# Patient Record
Sex: FEMALE | Race: WHITE | NOT HISPANIC OR LATINO | Employment: OTHER | URBAN - METROPOLITAN AREA
[De-identification: names, ages, dates, MRNs, and addresses within clinical notes are randomized per-mention and may not be internally consistent; named-entity substitution may affect disease eponyms.]

---

## 2017-01-16 ENCOUNTER — OFFICE VISIT (OUTPATIENT)
Dept: FAMILY MEDICINE | Facility: CLINIC | Age: 67
End: 2017-01-16
Payer: COMMERCIAL

## 2017-01-16 VITALS
DIASTOLIC BLOOD PRESSURE: 50 MMHG | RESPIRATION RATE: 9 BRPM | SYSTOLIC BLOOD PRESSURE: 100 MMHG | TEMPERATURE: 97.8 F | WEIGHT: 166.8 LBS | HEART RATE: 78 BPM | BODY MASS INDEX: 30.69 KG/M2 | HEIGHT: 62 IN

## 2017-01-16 DIAGNOSIS — Z11.59 NEED FOR HEPATITIS C SCREENING TEST: ICD-10-CM

## 2017-01-16 DIAGNOSIS — M54.42 ACUTE MIDLINE LOW BACK PAIN WITH LEFT-SIDED SCIATICA: ICD-10-CM

## 2017-01-16 DIAGNOSIS — Z23 NEED FOR PROPHYLACTIC VACCINATION AGAINST STREPTOCOCCUS PNEUMONIAE (PNEUMOCOCCUS): ICD-10-CM

## 2017-01-16 DIAGNOSIS — E21.3 HYPERPARATHYROIDISM (H): ICD-10-CM

## 2017-01-16 DIAGNOSIS — M85.80 OSTEOPENIA: ICD-10-CM

## 2017-01-16 DIAGNOSIS — M25.562 LEFT KNEE PAIN, UNSPECIFIED CHRONICITY: ICD-10-CM

## 2017-01-16 DIAGNOSIS — E78.5 HYPERLIPIDEMIA WITH TARGET LDL LESS THAN 130: ICD-10-CM

## 2017-01-16 DIAGNOSIS — Z00.00 ROUTINE HISTORY AND PHYSICAL EXAMINATION OF ADULT: Primary | ICD-10-CM

## 2017-01-16 DIAGNOSIS — E55.9 VITAMIN D DEFICIENCY: ICD-10-CM

## 2017-01-16 DIAGNOSIS — Z23 NEED FOR PROPHYLACTIC VACCINATION WITH TETANUS-DIPHTHERIA (TD): ICD-10-CM

## 2017-01-16 LAB
ALBUMIN SERPL-MCNC: 4.3 G/DL (ref 3.4–5)
ALP SERPL-CCNC: 59 U/L (ref 40–150)
ALT SERPL W P-5'-P-CCNC: 22 U/L (ref 0–50)
ANION GAP SERPL CALCULATED.3IONS-SCNC: 10 MMOL/L (ref 3–14)
AST SERPL W P-5'-P-CCNC: 19 U/L (ref 0–45)
BILIRUB SERPL-MCNC: 1 MG/DL (ref 0.2–1.3)
BUN SERPL-MCNC: 16 MG/DL (ref 7–30)
CALCIUM SERPL-MCNC: 10.6 MG/DL (ref 8.5–10.1)
CHLORIDE SERPL-SCNC: 106 MMOL/L (ref 94–109)
CHOLEST SERPL-MCNC: 204 MG/DL
CO2 SERPL-SCNC: 25 MMOL/L (ref 20–32)
CREAT SERPL-MCNC: 0.8 MG/DL (ref 0.52–1.04)
DEPRECATED CALCIDIOL+CALCIFEROL SERPL-MC: 47 UG/L (ref 20–75)
GFR SERPL CREATININE-BSD FRML MDRD: 71 ML/MIN/1.7M2
GLUCOSE SERPL-MCNC: 86 MG/DL (ref 70–99)
HCV AB SERPL QL IA: NORMAL
HDLC SERPL-MCNC: 75 MG/DL
LDLC SERPL CALC-MCNC: 109 MG/DL
NONHDLC SERPL-MCNC: 129 MG/DL
POTASSIUM SERPL-SCNC: 4.3 MMOL/L (ref 3.4–5.3)
PROT SERPL-MCNC: 7.5 G/DL (ref 6.8–8.8)
SODIUM SERPL-SCNC: 141 MMOL/L (ref 133–144)
TRIGL SERPL-MCNC: 98 MG/DL

## 2017-01-16 PROCEDURE — 90715 TDAP VACCINE 7 YRS/> IM: CPT | Performed by: FAMILY MEDICINE

## 2017-01-16 PROCEDURE — 90670 PCV13 VACCINE IM: CPT | Performed by: FAMILY MEDICINE

## 2017-01-16 PROCEDURE — 36415 COLL VENOUS BLD VENIPUNCTURE: CPT | Performed by: FAMILY MEDICINE

## 2017-01-16 PROCEDURE — 86803 HEPATITIS C AB TEST: CPT | Performed by: FAMILY MEDICINE

## 2017-01-16 PROCEDURE — 80061 LIPID PANEL: CPT | Performed by: FAMILY MEDICINE

## 2017-01-16 PROCEDURE — 99397 PER PM REEVAL EST PAT 65+ YR: CPT | Mod: 25 | Performed by: FAMILY MEDICINE

## 2017-01-16 PROCEDURE — 82306 VITAMIN D 25 HYDROXY: CPT | Performed by: FAMILY MEDICINE

## 2017-01-16 PROCEDURE — 99213 OFFICE O/P EST LOW 20 MIN: CPT | Mod: 25 | Performed by: FAMILY MEDICINE

## 2017-01-16 PROCEDURE — 90471 IMMUNIZATION ADMIN: CPT | Performed by: FAMILY MEDICINE

## 2017-01-16 PROCEDURE — 80053 COMPREHEN METABOLIC PANEL: CPT | Performed by: FAMILY MEDICINE

## 2017-01-16 PROCEDURE — G0009 ADMIN PNEUMOCOCCAL VACCINE: HCPCS | Mod: 59 | Performed by: FAMILY MEDICINE

## 2017-01-16 RX ORDER — ALENDRONATE SODIUM 70 MG/1
70 TABLET ORAL
Qty: 4 TABLET | Status: SHIPPED | OUTPATIENT
Start: 2017-01-16 | End: 2018-02-16

## 2017-01-16 RX ORDER — NABUMETONE 500 MG/1
500-1000 TABLET, FILM COATED ORAL 2 TIMES DAILY PRN
Qty: 30 TABLET | Refills: 1 | Status: SHIPPED | OUTPATIENT
Start: 2017-01-16 | End: 2017-09-05

## 2017-01-16 ASSESSMENT — ANXIETY QUESTIONNAIRES
1. FEELING NERVOUS, ANXIOUS, OR ON EDGE: NOT AT ALL
2. NOT BEING ABLE TO STOP OR CONTROL WORRYING: NOT AT ALL
7. FEELING AFRAID AS IF SOMETHING AWFUL MIGHT HAPPEN: NOT AT ALL
3. WORRYING TOO MUCH ABOUT DIFFERENT THINGS: NOT AT ALL
GAD7 TOTAL SCORE: 0
5. BEING SO RESTLESS THAT IT IS HARD TO SIT STILL: NOT AT ALL
IF YOU CHECKED OFF ANY PROBLEMS ON THIS QUESTIONNAIRE, HOW DIFFICULT HAVE THESE PROBLEMS MADE IT FOR YOU TO DO YOUR WORK, TAKE CARE OF THINGS AT HOME, OR GET ALONG WITH OTHER PEOPLE: NOT DIFFICULT AT ALL
6. BECOMING EASILY ANNOYED OR IRRITABLE: NOT AT ALL

## 2017-01-16 ASSESSMENT — PATIENT HEALTH QUESTIONNAIRE - PHQ9: 5. POOR APPETITE OR OVEREATING: NOT AT ALL

## 2017-01-16 NOTE — PROGRESS NOTES
SUBJECTIVE:                                                            Aracelis Muñoz is a 66 year old female who presents for Preventive Visit.      Are you in the first 12 months of your Medicare Part B coverage?  Not sure     Healthy Habits:  Answers for HPI/ROS submitted by the patient on 1/13/2017   Annual Exam:  Getting at least 3 servings of Calcium per day:: NO  Bi-annual eye exam:: Yes  Dental care twice a year:: Yes  Sleep apnea or symptoms of sleep apnea:: Daytime drowsiness  Diet:: Regular (no restrictions)  Taking medications regularly:: Yes  Additional concerns today:: YES  PHQ-2 Depressed: Several days, Not at all  PHQ-2 Score: 1        COGNITIVE SCREEN  1) Repeat 3 items (Banana, Sunrise, Chair)    2) Clock draw: NORMAL  3) 3 item recall: Recalls 3 objects  Results: 3 items recalled: COGNITIVE IMPAIRMENT LESS LIKELY    Mini-CogTM Copyright S João. Licensed by the author for use in [facility]; reprinted with permission ([email or web address]). All rights reserved.             PROBLEMS TO ADD ON... Back spasms, wondering about sciatica. Was seen few weeks ago, and still has some ongoing problem on and off . Hard to do her exercise routine. sometimes pain goes down to back of thigh but not any further down no numbness. Had issue with sciatica long time ago and then she had pain all the way down to foot and also had toe problem, so she had seen back specialist . Has  he suggest she may need surgery although she opted to do injection and her sx did improve so she was just told to monitor although she had no significant recurrence of sciatica sx .     Right knee and ankle painful-  fell on it in Sept. She thought she was ok, although it did swell up couple of weeks later. she may have  hit against car door so she was seen  at urgent care. Swelling has gone down and still hurts. lately ankle is hurting too . sometimes going up down the step is hard, mostly bc of knee and back.. OTC pain med's  help some. Not using flexeril as much now. Has never been to any PT in a long time  But had PT for knee long time  ago. also has hx of knee injections  as well many years ago bc of arthritis in her knee . she was much better after that until recent injury     All Histories reviewed and updated in [facility] as appropriate.  Social History   Substance Use Topics     Smoking status: Never Smoker      Smokeless tobacco: Never Used     Alcohol Use: 0.0 oz/week     0 Standard drinks or equivalent per week      Comment: 1-2 glasses of wine 1-2 times per week        The patient does not drink >3 drinks per day nor >7 drinks per week.    Today's PHQ-2 Score:   PHQ-2 ( 1999 Pfizer) 1/13/2017 10/21/2016   Q1: Little interest or pleasure in doing things - 0   Q2: Feeling down, depressed or hopeless - 0   PHQ-2 Score - 0   Little interest or pleasure in doing things Several days -   Feeling down, depressed or hopeless Not at all -   PHQ-2 Score 1 -       Do you feel safe in your environment - YES    Do you have a Health Care Directive?: Yes: Patient states has Advance Directive and will bring in a copy to clinic.    Current providers sharing in care for this patient include:   Patient Care Team:  Shalini Phan MD as PCP - General (Family Practice)      Hearing impairment: Yes,     Ability to successfully perform activities of daily living: Yes, no assistance needed     Fall risk:       Home safety:  none identified      The following health maintenance items are reviewed in Epic and correct as of today:  Health Maintenance   Topic Date Due     HEPATITIS C SCREENING  05/28/1968     TETANUS Q10 YR  02/17/2015     PNEUMOCOCCAL (2 of 2 - PCV13) 12/21/2016     ASTHMA ACTION PLAN Q1 YR (NO INBASKET)  12/21/2016     FALL RISK ASSESSMENT  12/21/2016     ASTHMA CONTROL TEST Q6 MOS (NO INBASKET)  12/27/2016     PHQ-9 Q6 MONTHS (NO INBASKET)  06/07/2017     DEPRESSION ACTION PLAN Q1 YR (NO INBASKET)  06/27/2017     INFLUENZA VACCINE  "(SYSTEM ASSIGNED)  09/01/2017     PAP Q3 YR  09/22/2017     FIT Q1 YR (NO INBASKET)  09/24/2017     MAMMO Q1 YR INBASKET MESSAGE  11/15/2017     TSH Q2 YEAR (NO INBASKET)  12/21/2017     ADVANCE DIRECTIVE PLANNING Q5 YRS (NO INBASKET)  04/25/2018     DEXA Q2 YR  11/15/2018     LIPID SCREEN Q5 YR FEMALE (SYSTEM ASSIGNED)  12/21/2020         Pneumonia Vaccine:Adults age 65+ who received Pneumovax (PPSV23) at 65 years or older: Should be given PCV13 > 1 year after their most recent PPSV23  Mammogram Screening: Patient over age 50, mutual decision to screen reflected in health maintenance.     ROS:  C: NEGATIVE for fever, chills, change in weight  I: NEGATIVE for worrisome rashes, moles or lesions  E: NEGATIVE for vision changes or irritation  E/M: NEGATIVE for ear, mouth and throat problems  R: NEGATIVE for significant cough or SOB  B: NEGATIVE for masses, tenderness or discharge  CV: NEGATIVE for chest pain, palpitations or peripheral edema  GI: NEGATIVE for nausea, abdominal pain, heartburn, or change in bowel habits  : NEGATIVE for frequency, dysuria, or hematuria  MUSCULOSKELETAL:as per HPI   N: NEGATIVE for weakness, dizziness or paresthesias  E: NEGATIVE for temperature intolerance, skin/hair changes  H: NEGATIVE for bleeding problems  P: NEGATIVE for changes in mood or affect    Problem list, Medication list, Allergies, and Medical/Social/Surgical histories reviewed in [facility] and updated as appropriate.  OBJECTIVE:                                                            LMP 11/01/2004 Estimated body mass index is 30.54 kg/(m^2) as calculated from the following:    Height as of 12/9/16: 5' 2\" (1.575 m).    Weight as of 12/9/16: 167 lb (75.751 kg).  EXAM:   GENERAL: healthy, alert and no distress  EYES: Eyes grossly normal to inspection, PERRL and conjunctivae and sclerae normal  HENT: ear canals and TM's normal, nose and mouth without ulcers or lesions  NECK: no adenopathy, no asymmetry, masses, or scars and " thyroid normal to palpation  RESP: lungs clear to auscultation - no rales, rhonchi or wheezes  BREAST: normal without masses, tenderness or nipple discharge and no palpable axillary masses or adenopathy  CV: regular rate and rhythm, normal S1 S2, no S3 or S4, no murmur, click or rub, no peripheral edema   ABDOMEN: soft, nontender, no hepatosplenomegaly, no masses and bowel sounds normal   (female): deferred  MS: back with decreased range of motion ,  Tenderness mostly  lumber area bilaterally more so on left then rt.   knee with no swelling  tenderness to palpation along   Anterior, lateral, medial knee diffusely. , ROM aggravates pain   SKIN: no suspicious lesions or rashes  NEURO: Normal strength and tone, mentation intact and speech normal  PSYCH: mentation appears normal, affect normal/bright    ASSESSMENT / PLAN:                                                              (Z00.00) Routine history and physical examination of adult  (primary encounter diagnosis)  Comment:   Plan:       (M85.80) Osteopenia  Comment: 1. Prominent osteopenia in the left femoral neck, minimally improved.  2. Moderate-prominent osteopenia in the right femoral neck, mildly  improved.    Plan: alendronate (FOSAMAX) 70 MG tablet, Vitamin D         Deficiency, ENDOCRINOLOGY ADULT REFERRAL            (Z11.59) Need for hepatitis C screening test  Comment:   Plan: Hepatitis C Screen Reflex to HCV RNA Quant and         Genotype            (Z23) Need for prophylactic vaccination against Streptococcus pneumoniae (pneumococcus)  Comment:   Plan:  vaccine given today     (Z23) Need for prophylactic vaccination with tetanus-diphtheria (TD)  Comment:   Plan: vaccine given today     (E55.9) Vitamin D deficiency  Comment:   Plan: ENDOCRINOLOGY ADULT REFERRAL            (E21.3) Hyperparathyroidism (H)  Comment: due fro endo, f/u. Will check labs today. She will f/u with endo as needed   Plan: Comprehensive metabolic panel, Vitamin D          "Deficiency, ENDOCRINOLOGY ADULT REFERRAL            (M25.562) Left knee pain, unspecified chronicity  Comment:   Plan: [facility] PT, HAND, AND CHIROPRACTIC REFERRAL,         nabumetone (RELAFEN) 500 MG tablet           discussed back and knee  cares and symptomatic treatment including  adequate pain control, heat,  stretches etc. Gave Relafen and also referral to   physical therapy    she will do follow up if no improvement or problem. Consider further evaluation  if needed.       (M54.42) Acute midline low back pain with left-sided sciatica  Comment:   Plan: [facility] PT, HAND, AND CHIROPRACTIC REFERRAL,         nabumetone (RELAFEN) 500 MG tablet            (E78.5) Hyperlipidemia with target LDL less than 130  Comment:   Plan: Lipid panel reflex to direct LDL              End of Life Planning:  Patient currently has an advanced directive: Yes.  Practitioner is supportive of decision.    COUNSELING:  Reviewed preventive health counseling, as reflected in patient instructions       Regular exercise       Healthy diet/nutrition       Vision screening       Hearing screening       Dental care        Estimated body mass index is 30.54 kg/(m^2) as calculated from the following:    Height as of 12/9/16: 5' 2\" (1.575 m).    Weight as of 12/9/16: 167 lb (75.751 kg).  Weight management plan noted, stable and monitoring   reports that she has never smoked. She has never used smokeless tobacco.      Appropriate preventive services were discussed with this patient, including applicable screening as appropriate for cardiovascular disease, diabetes, osteopenia/osteoporosis, and glaucoma.  As appropriate for age/gender, discussed screening for colorectal cancer, prostate cancer, breast cancer, and cervical cancer. Checklist reviewing preventive services available has been given to the patient.    Reviewed patients plan of care and provided an AVS. The Basic Care Plan (routine screening as documented in Health Maintenance) for Aracelis meets " the Care Plan requirement. This Care Plan has been established and reviewed with the Patient.    Counseling Resources:  ATP IV Guidelines  Pooled Cohorts Equation Calculator  Breast Cancer Risk Calculator  FRAX Risk Assessment  ICSI Preventive Guidelines  Dietary Guidelines for Americans, 2010  USDA's MyPlate  ASA Prophylaxis  Lung CA Screening    Shalini Phan MD  [facility]

## 2017-01-16 NOTE — MR AVS SNAPSHOT
After Visit Summary   1/16/2017    Aracelis Muñoz    MRN: [id number]           Patient Information     Date Of Birth          1950        Visit Information        Provider Department      1/16/2017 8:30 AM Shalini Phan MD [facility]        Today's Diagnoses     Osteoporosis    -  1     Osteopenia         Need for hepatitis C screening test         Need for prophylactic vaccination against Streptococcus pneumoniae (pneumococcus)         Need for prophylactic vaccination with tetanus-diphtheria (TD)         Vitamin D deficiency         Hyperparathyroidism (H)         Left knee pain, unspecified chronicity         Acute midline low back pain with left-sided sciatica         Hyperlipidemia with target LDL less than 130           Care Instructions      Preventive Health Recommendations    Female Ages 65 +    Yearly exam:     See your health care provider every year in order to  o Review health changes.   o Discuss preventive care.    o Review your medicines if your doctor has prescribed any.      You no longer need a yearly Pap test unless you've had an abnormal Pap test in the past 10 years. If you have vaginal symptoms, such as bleeding or discharge, be sure to talk with your provider about a Pap test.      Every 1 to 2 years, have a mammogram.  If you are over 69, talk with your health care provider about whether or not you want to continue having screening mammograms.      Every 10 years, have a colonoscopy. Or, have a yearly FIT test (stool test). These exams will check for colon cancer.       Have a cholesterol test every 5 years, or more often if your doctor advises it.       Have a diabetes test (fasting glucose) every three years. If you are at risk for diabetes, you should have this test more often.       At age 65, have a bone density scan (DEXA) to check for osteoporosis (brittle bone disease).    Shots:    Get a flu shot each year.    Get a tetanus shot every  10 years.    Talk to your doctor about your pneumonia vaccines. There are now two you should receive - Pneumovax (PPSV 23) and Prevnar (PCV 13).    Talk to your doctor about the shingles vaccine.    Talk to your doctor about the hepatitis B vaccine.    Nutrition:     Eat at least 5 servings of fruits and vegetables each day.      Eat whole-grain bread, whole-wheat pasta and brown rice instead of white grains and rice.      Talk to your provider about Calcium and Vitamin D.     Lifestyle    Exercise at least 150 minutes a week (30 minutes a day, 5 days a week). This will help you control your weight and prevent disease.      Limit alcohol to one drink per day.      No smoking.       Wear sunscreen to prevent skin cancer.       See your dentist twice a year for an exam and cleaning.      See your eye doctor every 1 to 2 years to screen for conditions such as glaucoma, macular degeneration and cataracts.        Follow-ups after your visit        Additional Services     ENDOCRINOLOGY ADULT REFERRAL       Your provider has referred you to: [facility]: [facility] [phone]   [email or web address]      Please be aware that coverage of these services is subject to the terms and limitations of your health insurance plan.  Call member services at your health plan with any benefit or coverage questions.      Please bring the following to your appointment:    >>   Any x-rays, CTs or MRIs which have been performed.  Contact the facility where they were done to arrange for  prior to your scheduled appointment.    >>   List of current medications   >>   This referral request   >>   Any documents/labs given to you for this referral            [facility] PT, HAND, AND CHIROPRACTIC REFERRAL       **This order will print in the [facility] Scheduling Office**    Physical Therapy, Hand Therapy and Chiropractic Care are available through:    *[facility]  *[facility]  *[place]  [facility]    Call one number to schedule at any of the above locations: [phone].    Your provider has referred you to: Physical Therapy at [facility] or [facility]    Indication/Reason for Referral: Low Back Pain  Onset of Illness: ongoing several weeks, also has hx of sciatica   Therapy Orders: Evaluate and Treat  Special Programs:   Special Request:     Dwayne Rice      Additional Comments for the Therapist or Chiropractor:     Please be aware that coverage of these services is subject to the terms and limitations of your health insurance plan.  Call member services at your health plan with any benefit or coverage questions.      Please bring the following to your appointment:    *Your personal calendar for scheduling future appointments  *Comfortable clothing                  Who to contact     If you have questions or need follow up information about today's clinic visit or your schedule please contact [facility] [place] directly at [phone].  Normal or non-critical lab and imaging results will be communicated to you by MyChart, letter or phone within 4 business days after the clinic has received the results. If you do not hear from us within 7 days, please contact the clinic through [employer] or phone. If you have a critical or abnormal lab result, we will notify you by phone as soon as possible.  Submit refill requests through [employer] or call your pharmacy and they will forward the refill request to us. Please allow 3 business days for your refill to be completed.          Additional Information About Your Visit        MyChart Information     [employer] gives you secure access to your electronic health record. If you see a primary care provider, you can also send messages to your care team and make appointments. If you have questions, please call your primary care clinic.  If you do not have a primary care provider, please call [phone] and they will assist you.        Care  "EveryWhere ID     This is your Care EveryWhere ID. This could be used by other organizations to access your [facility] medical records  [id number]        Your Vitals Were     Pulse Temperature Respirations Height BMI (Body Mass Index) Last Period    78 97.8  F (36.6  C) (Tympanic) 9 5' 2\" (1.575 m) 30.50 kg/m2 11/01/2004       Blood Pressure from Last 3 Encounters:   01/16/17 100/50   12/09/16 116/64   10/21/16 103/66    Weight from Last 3 Encounters:   01/16/17 166 lb 12.8 oz (75.66 kg)   12/09/16 167 lb (75.751 kg)   10/21/16 172 lb (78.019 kg)              We Performed the Following     Comprehensive metabolic panel     ENDOCRINOLOGY ADULT REFERRAL     Hepatitis C Screen Reflex to HCV RNA Quant and Genotype     [facility] PT, HAND, AND CHIROPRACTIC REFERRAL     Lipid panel reflex to direct LDL     Vitamin D Deficiency          Today's Medication Changes          These changes are accurate as of: 1/16/17  9:37 AM.  If you have any questions, ask your nurse or doctor.               Start taking these medicines.        Dose/Directions    nabumetone 500 MG tablet   Commonly known as:  RELAFEN   Used for:  Left knee pain, unspecified chronicity, Acute midline low back pain with left-sided sciatica   Started by:  Shalini Phan MD        Dose:  500-1000 mg   Take 1-2 tablets (500-1,000 mg) by mouth 2 times daily as needed for moderate pain   Quantity:  30 tablet   Refills:  1            Where to get your medicines      These medications were sent to [facility]/pharmacy #[id number] - [place], [place] - [address]  [address] [place] [address]     Phone:  [phone]    - alendronate 70 MG tablet  - nabumetone 500 MG tablet             Primary Care Provider Office Phone # Fax #    Shalini Phan [phone]       [facility] [address] [place] [address] [address]  [place] [address]        Thank you!     Thank you for choosing [facility] [place]  for your care. Our goal is always " to provide you with excellent care. Hearing back from our patients is one way we can continue to improve our services. Please take a few minutes to complete the written survey that you may receive in the mail after your visit with us. Thank you!             Your Updated Medication List - Protect others around you: Learn how to safely use, store and throw away your medicines at www.disposemymeds.org.          This list is accurate as of: 1/16/17  9:37 AM.  Always use your most recent med list.                   Brand Name Dispense Instructions for use    albuterol 108 (90 BASE) MCG/ACT Inhaler    PROAIR HFA/PROVENTIL HFA/VENTOLIN HFA    1 Inhaler    Inhale 2 puffs into the lungs every 6 hours as needed for shortness of breath / dyspnea or wheezing As needed       alendronate 70 MG tablet    FOSAMAX    4 tablet    Take 1 tablet (70 mg) by mouth every 7 days       azelastine 0.05 % Soln ophthalmic solution    OPTIVAR    1 Bottle    Place 1 drop into both eyes daily as needed In both eyes as needed       cetirizine 10 MG tablet    zyrTEC    30 tablet    Take 1 tablet (10 mg) by mouth every evening       cyclobenzaprine 10 MG tablet    FLEXERIL    30 tablet    Take 1 tablet (10 mg) by mouth 3 times daily as needed for muscle spasms       escitalopram 20 MG tablet    LEXAPRO    90 tablet    TAKE 1 TABLET (20 MG) BY MOUTH DAILY       fluticasone-salmeterol 250-50 MCG/DOSE diskus inhaler    ADVAIR DISKUS    1 Inhaler    Inhale 1 puff into the lungs every 12 hours       nabumetone 500 MG tablet    RELAFEN    30 tablet    Take 1-2 tablets (500-1,000 mg) by mouth 2 times daily as needed for moderate pain       traZODone 100 MG tablet    DESYREL    90 tablet    TAKE 0.5-1 TABLETS ( MG) BY MOUTH NIGHTLY AS NEEDED FOR SLEEP       triamcinolone 0.5 % cream    KENALOG    30 g    Apply sparingly to affected area three times daily.

## 2017-01-17 ASSESSMENT — PATIENT HEALTH QUESTIONNAIRE - PHQ9: SUM OF ALL RESPONSES TO PHQ QUESTIONS 1-9: 3

## 2017-01-17 ASSESSMENT — ASTHMA QUESTIONNAIRES: ACT_TOTALSCORE: 24

## 2017-01-17 ASSESSMENT — ANXIETY QUESTIONNAIRES: GAD7 TOTAL SCORE: 0

## 2017-01-19 ENCOUNTER — THERAPY VISIT (OUTPATIENT)
Dept: PHYSICAL THERAPY | Facility: CLINIC | Age: 67
End: 2017-01-19
Payer: COMMERCIAL

## 2017-01-19 DIAGNOSIS — M25.561 RIGHT KNEE PAIN: ICD-10-CM

## 2017-01-19 DIAGNOSIS — M54.50 LUMBAGO: Primary | ICD-10-CM

## 2017-01-19 PROCEDURE — 97110 THERAPEUTIC EXERCISES: CPT | Mod: GP | Performed by: PHYSICAL THERAPIST

## 2017-01-19 PROCEDURE — 97161 PT EVAL LOW COMPLEX 20 MIN: CPT | Mod: GP | Performed by: PHYSICAL THERAPIST

## 2017-01-19 ASSESSMENT — ACTIVITIES OF DAILY LIVING (ADL)
LIMPING: I DO NOT HAVE THE SYMPTOM
KNEE_ACTIVITY_OF_DAILY_LIVING_SUM: 22
KNEEL ON THE FRONT OF YOUR KNEE: NOT ANSWERED
STAND: NOT ANSWERED
RISE FROM A CHAIR: NOT ANSWERED
SIT WITH YOUR KNEE BENT: NOT ANSWERED
WEAKNESS: THE SYMPTOM AFFECTS MY ACTIVITY SLIGHTLY
GIVING WAY, BUCKLING OR SHIFTING OF KNEE: I DO NOT HAVE THE SYMPTOM
GO UP STAIRS: NOT ANSWERED
GO DOWN STAIRS: NOT ANSWERED
SQUAT: NOT ANSWERED
SWELLING: I DO NOT HAVE THE SYMPTOM
PAIN: THE SYMPTOM AFFECTS MY ACTIVITY MODERATELY
WALK: NOT ANSWERED
STIFFNESS: THE SYMPTOM AFFECTS MY ACTIVITY SLIGHTLY

## 2017-01-20 NOTE — PROGRESS NOTES
Subjective:                                       Pertinent medical history includes:  Overweight and depression (pain at night/rest).      Current medications:  Pain medication and anti-depressants (Bone density).  Current occupation is Part time consultant.    Primary job tasks include:  Prolonged sitting.                              Objective:    System    Physical Exam    General     ROS    Assessment/Plan:

## 2017-01-26 ENCOUNTER — THERAPY VISIT (OUTPATIENT)
Dept: PHYSICAL THERAPY | Facility: CLINIC | Age: 67
End: 2017-01-26
Payer: COMMERCIAL

## 2017-01-26 DIAGNOSIS — M54.50 LUMBAGO: ICD-10-CM

## 2017-01-26 DIAGNOSIS — M25.561 RIGHT KNEE PAIN: Primary | ICD-10-CM

## 2017-01-26 PROCEDURE — 97112 NEUROMUSCULAR REEDUCATION: CPT | Mod: GP | Performed by: PHYSICAL THERAPIST

## 2017-01-26 PROCEDURE — 97110 THERAPEUTIC EXERCISES: CPT | Mod: GP | Performed by: PHYSICAL THERAPIST

## 2017-02-09 ENCOUNTER — THERAPY VISIT (OUTPATIENT)
Dept: PHYSICAL THERAPY | Facility: CLINIC | Age: 67
End: 2017-02-09
Payer: COMMERCIAL

## 2017-02-09 DIAGNOSIS — M54.50 LUMBAGO: ICD-10-CM

## 2017-02-09 DIAGNOSIS — M25.561 RIGHT KNEE PAIN: Primary | ICD-10-CM

## 2017-02-09 PROCEDURE — 97112 NEUROMUSCULAR REEDUCATION: CPT | Mod: GP | Performed by: PHYSICAL THERAPIST

## 2017-02-09 PROCEDURE — 97110 THERAPEUTIC EXERCISES: CPT | Mod: GP | Performed by: PHYSICAL THERAPIST

## 2017-03-05 DIAGNOSIS — F33.0 MAJOR DEPRESSIVE DISORDER, RECURRENT EPISODE, MILD (H): ICD-10-CM

## 2017-03-06 NOTE — TELEPHONE ENCOUNTER
Lexapro     Last Written Prescription Date: 12/7/16  Last Fill Quantity: 90, # refills: 0  Last Office Visit with G primary care provider:  1/16/17        Last PHQ-9 score on record=   PHQ-9 SCORE 1/16/2017   Total Score 3         Emily Arndt CMA

## 2017-03-07 RX ORDER — ESCITALOPRAM OXALATE 20 MG/1
TABLET ORAL
Qty: 90 TABLET | Refills: 1 | Status: SHIPPED | OUTPATIENT
Start: 2017-03-07 | End: 2018-04-10

## 2017-03-07 NOTE — TELEPHONE ENCOUNTER
Routing refill request to provider for review/approval because:  Drug interaction warning     Maile Morgan RN  [facility]  [phone]

## 2017-03-10 ENCOUNTER — TELEPHONE (OUTPATIENT)
Dept: FAMILY MEDICINE | Facility: CLINIC | Age: 67
End: 2017-03-10

## 2017-03-10 DIAGNOSIS — H91.90 DECREASED HEARING, UNSPECIFIED LATERALITY: Primary | ICD-10-CM

## 2017-03-10 NOTE — TELEPHONE ENCOUNTER
Reason for Call: Request for an order or referral:    Order or referral being requested: REFERRAL    Date needed: at your convenience    Has the patient been seen by the PCP for this problem? NO    Additional comments: PATIENT IS WANTING A REFERRAL FOR A HEARING TEST. SHE BELIEVES SHE NEEDS HEARING AIDES.    Phone number Patient can be reached at:  Home number on file [phone] (home)    Best Time:  ANY    Can we leave a detailed message on this number?  YES    Call taken on 3/10/2017 at 2:26 PM by Radha Penaloza

## 2017-03-13 NOTE — TELEPHONE ENCOUNTER
Patient informed with referral info as below.  No further questions    Your provider has referred you to: [facility]: [facility] [phone]   [email or web address]  [facility]: [facility] [phone]   [email or web address]    Nahed Petres RN

## 2017-03-21 ENCOUNTER — OFFICE VISIT (OUTPATIENT)
Dept: AUDIOLOGY | Facility: CLINIC | Age: 67
End: 2017-03-21
Attending: FAMILY MEDICINE
Payer: COMMERCIAL

## 2017-03-21 DIAGNOSIS — H90.3 BILATERAL SENSORINEURAL HEARING LOSS: Primary | ICD-10-CM

## 2017-03-21 PROCEDURE — 92550 TYMPANOMETRY & REFLEX THRESH: CPT | Performed by: AUDIOLOGIST

## 2017-03-21 PROCEDURE — 92557 COMPREHENSIVE HEARING TEST: CPT | Performed by: AUDIOLOGIST

## 2017-03-21 NOTE — PROGRESS NOTES
AUDIOLOGY REPORT    SUBJECTIVE:  Aracelis Muñoz is a 66 year old female who was seen in the Audiology Clinic at the [facility]  for audiologic evaluation, referred by Shalini Phan MD. The patient reports a gradual hearing loss bilaterally. The patient denies  Otalgia, aural fullness, otorrhea, tinnitus, and dizziness.  The patient notes difficulty with communication in a variety of listening situations.     OBJECTIVE:    Otoscopic exam indicates ears are clear of cerumen bilaterally     Pure Tone Thresholds assessed using conventional audiometry with good  reliability from 250-8000 Hz bilaterally using insert earphones     RIGHT:  normal sloping to moderately-severe sensorineural hearing loss    LEFT:    normal sloping to moderately-severe sensorineural hearing loss    Tympanogram:    RIGHT: normal eardrum mobility    LEFT:   normal eardrum mobility    Reflexes (reported by stimulus ear):  RIGHT: Ipsilateral is present at normal levels  RIGHT: Contralateral is present at normal levels  LEFT:   Ipsilateral is present at normal levels  LEFT:   Contralateral is present at normal levels      Speech Reception Threshold:    RIGHT: 35 dB HL    LEFT:   35 dB HL  Word Recognition Score:     RIGHT: 100% at 75 dB HL using NU-6 recorded word list.    LEFT:   100% at 75 dB HL using NU-6 recorded word list.      ASSESSMENT:     ICD-10-CM    1. Bilateral sensorineural hearing loss H90.3 AUDIOGRAM/TYMPANOGRAM - INTERFACE     COMPREHENSIVE HEARING TEST     TYMPANOMETRY AND REFLEX THRESHOLD MEASUREMENTS       Today s results were discussed with the patient in detail.     PLAN:  Patient was counseled regarding hearing loss and impact on communication.  Patient is a good candidate for amplification at this time. Handout on good communication strategies, and hearing aid use was given to patient. It is recommended that the patient consider a trial with hearing aids pending a medical evaluation.  Please  call this clinic with questions regarding these results or recommendations.        Antonio Wilkins.  Doctor of Audiology  [place] License # [id number]

## 2017-03-21 NOTE — MR AVS SNAPSHOT
After Visit Summary   3/21/2017    Aracelis Muñoz    MRN: [id number]           Patient Information     Date Of Birth          1950        Visit Information        Provider Department      3/21/2017 10:00 AM Steven Colon AuD [facility]        Today's Diagnoses     Bilateral sensorineural hearing loss    -  1       Follow-ups after your visit        Who to contact     If you have questions or need follow up information about today's clinic visit or your schedule please contact [facility] directly at [phone].  Normal or non-critical lab and imaging results will be communicated to you by [employer], letter or phone within 4 business days after the clinic has received the results. If you do not hear from us within 7 days, please contact the clinic through [employer] or phone. If you have a critical or abnormal lab result, we will notify you by phone as soon as possible.  Submit refill requests through [employer] or call your pharmacy and they will forward the refill request to us. Please allow 3 business days for your refill to be completed.          Additional Information About Your Visit        MyChart Information     [employer] gives you secure access to your electronic health record. If you see a primary care provider, you can also send messages to your care team and make appointments. If you have questions, please call your primary care clinic.  If you do not have a primary care provider, please call [phone] and they will assist you.      [employer] is an electronic gateway that provides easy, online access to your medical records. With [employer], you can request a clinic appointment, read your test results, renew a prescription or communicate with your care team.     To access your existing account, please contact your [facility] or call [phone] for assistance.        Care EveryWhere ID     This is your Care EveryWhere ID.  This could be used by other organizations to access your [facility] medical records  [id number]        Your Vitals Were     Last Period                   11/01/2004            Blood Pressure from Last 3 Encounters:   01/16/17 100/50   12/09/16 116/64   10/21/16 103/66    Weight from Last 3 Encounters:   01/16/17 166 lb 12.8 oz (75.7 kg)   12/09/16 167 lb (75.8 kg)   10/21/16 172 lb (78 kg)              We Performed the Following     AUDIOGRAM/TYMPANOGRAM - INTERFACE     COMPREHENSIVE HEARING TEST     TYMPANOMETRY AND REFLEX THRESHOLD MEASUREMENTS        Primary Care Provider Office Phone # Fax #    Shalini Phan [phone]       [facility] [place] [address] [place] [address] [address]  [place] [address]        Thank you!     Thank you for choosing [facility]  for your care. Our goal is always to provide you with excellent care. Hearing back from our patients is one way we can continue to improve our services. Please take a few minutes to complete the written survey that you may receive in the mail after your visit with us. Thank you!             Your Updated Medication List - Protect others around you: Learn how to safely use, store and throw away your medicines at www.disposemymeds.org.          This list is accurate as of: 3/21/17  3:04 PM.  Always use your most recent med list.                   Brand Name Dispense Instructions for use    albuterol 108 (90 BASE) MCG/ACT Inhaler    PROAIR HFA/PROVENTIL HFA/VENTOLIN HFA    1 Inhaler    Inhale 2 puffs into the lungs every 6 hours as needed for shortness of breath / dyspnea or wheezing As needed       alendronate 70 MG tablet    FOSAMAX    4 tablet    Take 1 tablet (70 mg) by mouth every 7 days       azelastine 0.05 % Soln ophthalmic solution    OPTIVAR    1 Bottle    Place 1 drop into both eyes daily as needed In both eyes as needed       cetirizine 10 MG tablet    zyrTEC    30 tablet    Take 1 tablet (10 mg) by mouth every evening        cyclobenzaprine 10 MG tablet    FLEXERIL    30 tablet    Take 1 tablet (10 mg) by mouth 3 times daily as needed for muscle spasms       escitalopram 20 MG tablet    LEXAPRO    90 tablet    TAKE 1 TABLET (20 MG) BY MOUTH DAILY       fluticasone-salmeterol 250-50 MCG/DOSE diskus inhaler    ADVAIR DISKUS    1 Inhaler    Inhale 1 puff into the lungs every 12 hours       nabumetone 500 MG tablet    RELAFEN    30 tablet    Take 1-2 tablets (500-1,000 mg) by mouth 2 times daily as needed for moderate pain       traZODone 100 MG tablet    DESYREL    90 tablet    TAKE 0.5-1 TABLETS ( MG) BY MOUTH NIGHTLY AS NEEDED FOR SLEEP       triamcinolone 0.5 % cream    KENALOG    30 g    Apply sparingly to affected area three times daily.

## 2017-04-19 DIAGNOSIS — G47.09 OTHER INSOMNIA: ICD-10-CM

## 2017-04-19 DIAGNOSIS — F33.0 MAJOR DEPRESSIVE DISORDER, RECURRENT EPISODE, MILD (H): ICD-10-CM

## 2017-04-20 RX ORDER — TRAZODONE HYDROCHLORIDE 100 MG/1
TABLET ORAL
Qty: 90 TABLET | Refills: 1 | OUTPATIENT
Start: 2017-04-20

## 2017-04-20 NOTE — TELEPHONE ENCOUNTER
Duplicate- sent 03/14/17- info sent to pharmacy.  Maile Morgan,RN  [facility]  [phone]

## 2017-04-20 NOTE — TELEPHONE ENCOUNTER
Trazodone       Last Written Prescription Date: 3/14/17  Last Fill Quantity: 90; # refills: 1  Last Office Visit with [facility], [facility] or  Health prescribing provider:  1/16/17        Last PHQ-9 score on record=   PHQ-9 SCORE 1/16/2017   Total Score 3       Lab Results   Component Value Date    AST 19 01/16/2017     Lab Results   Component Value Date    ALT 22 01/16/2017     Emily Arndt CMA

## 2017-06-05 DIAGNOSIS — Z91.09 ENVIRONMENTAL ALLERGIES: ICD-10-CM

## 2017-06-06 NOTE — TELEPHONE ENCOUNTER
Routing refill request to provider for review/approval because:  Prescription is greater than 12 months old    VALARIE Castillo

## 2017-06-06 NOTE — TELEPHONE ENCOUNTER
azelastine (OPTIVAR) 0.05 % SOLN ophthalmic solution  Last Written Prescription Date: 12/21/2015  Last Fill Quantity: 1 bottle,  # refills: prn   Last Office Visit with [facility], [facility] or [facility] prescribing provider:   01/16/2017      Sharda Roberson CMA

## 2017-06-07 RX ORDER — AZELASTINE HYDROCHLORIDE 0.5 MG/ML
SOLUTION/ DROPS OPHTHALMIC
Qty: 6 ML | Refills: 3 | Status: SHIPPED | OUTPATIENT
Start: 2017-06-07 | End: 2018-04-25

## 2017-07-24 ENCOUNTER — OFFICE VISIT (OUTPATIENT)
Dept: FAMILY MEDICINE | Facility: CLINIC | Age: 67
End: 2017-07-24
Payer: COMMERCIAL

## 2017-07-24 VITALS
TEMPERATURE: 99.7 F | WEIGHT: 163 LBS | HEART RATE: 76 BPM | SYSTOLIC BLOOD PRESSURE: 104 MMHG | OXYGEN SATURATION: 97 % | DIASTOLIC BLOOD PRESSURE: 67 MMHG | HEIGHT: 62 IN | BODY MASS INDEX: 30 KG/M2

## 2017-07-24 DIAGNOSIS — J45.30 MILD PERSISTENT ASTHMA WITHOUT COMPLICATION: ICD-10-CM

## 2017-07-24 DIAGNOSIS — R30.0 DYSURIA: Primary | ICD-10-CM

## 2017-07-24 DIAGNOSIS — N39.0 URINARY TRACT INFECTION, SITE UNSPECIFIED: ICD-10-CM

## 2017-07-24 LAB
ALBUMIN UR-MCNC: 30 MG/DL
APPEARANCE UR: CLEAR
BACTERIA #/AREA URNS HPF: ABNORMAL /HPF
BILIRUB UR QL STRIP: NEGATIVE
COLOR UR AUTO: YELLOW
GLUCOSE UR STRIP-MCNC: NEGATIVE MG/DL
HGB UR QL STRIP: ABNORMAL
KETONES UR STRIP-MCNC: NEGATIVE MG/DL
LEUKOCYTE ESTERASE UR QL STRIP: ABNORMAL
NITRATE UR QL: NEGATIVE
PH UR STRIP: 5.5 PH (ref 5–7)
RBC #/AREA URNS AUTO: ABNORMAL /HPF (ref 0–2)
SP GR UR STRIP: 1.02 (ref 1–1.03)
URN SPEC COLLECT METH UR: ABNORMAL
UROBILINOGEN UR STRIP-ACNC: 0.2 EU/DL (ref 0.2–1)
WBC #/AREA URNS AUTO: ABNORMAL /HPF (ref 0–2)

## 2017-07-24 PROCEDURE — 81001 URINALYSIS AUTO W/SCOPE: CPT | Performed by: FAMILY MEDICINE

## 2017-07-24 PROCEDURE — 87086 URINE CULTURE/COLONY COUNT: CPT | Performed by: FAMILY MEDICINE

## 2017-07-24 PROCEDURE — 99214 OFFICE O/P EST MOD 30 MIN: CPT | Performed by: FAMILY MEDICINE

## 2017-07-24 RX ORDER — NITROFURANTOIN 25; 75 MG/1; MG/1
100 CAPSULE ORAL 2 TIMES DAILY
Qty: 14 CAPSULE | Refills: 0 | Status: SHIPPED | OUTPATIENT
Start: 2017-07-24 | End: 2017-08-28

## 2017-07-24 NOTE — PATIENT INSTRUCTIONS
"   * BLADDER INFECTION,Female (Adult)    A bladder infection (\"cystitis\" or \"UTI\") usually causes a constant urge to urinate and a burning when passing urine. Urine may be cloudy, smelly or dark. There may be pain in the lower abdomen. A bladder infection occurs when bacteria from the vaginal area enter the bladder opening (urethra). This can occur from sexual intercourse, wearing tight clothing, dehydration and other factors.  HOME CARE:  1. Drink lots of fluids (at least 6-8 glasses a day, unless you must restrict fluids for other medical reasons). This will force the medicine into your urinary system and flush the bacteria out of your body. Cranberry juice has been shown to help clear out the bacteria.  2. Avoid sexual intercourse until your symptoms are gone.  3. A bladder infection is treated with antibiotics. You may also be given Pyridium (generic = phenazopyridine) to reduce the burning sensation. This medicine will cause your urine to become a bright orange color. The orange urine may stain clothing. You may wear a pad or panty-liner to protect clothing.  PREVENTING FUTURE INFECTIONS:  1. Always wipe from front to back after a bowel movement.  2. Keep the genital area clean and dry.  3. Drink plenty of fluids each day to avoid dehydration.  4. Urinate right after intercourse to flush out the bladder.  5. Wear cotton underwear and cotton-lined panty hose; avoid tight-fitting pants.  6. If you are on birth control pills and are having frequent bladder infections, discuss with your doctor.  FOLLOW UP: Return to this facility or see your doctor if ALL symptoms are not gone after three days of treatment.  GET PROMPT MEDICAL ATTENTION if any of the following occur:    Fever over 101 F (38.3 C)    No improvement by the third day of treatment    Increasing back or abdominal pain    Repeated vomiting; unable to keep medicine down    Weakness, dizziness or fainting    Vaginal discharge    Pain, redness or swelling in " the labia (outer vaginal area)    5488-8696 [employer], [address] [place] [address], [place] [address]. All rights reserved. This information is not intended as a substitute for professional medical care. Always follow your healthcare professional's instructions.

## 2017-07-24 NOTE — NURSING NOTE
"Chief Complaint   Patient presents with     UTI       Initial /67  Pulse 76  Temp 99.7  F (37.6  C) (Tympanic)  Ht 5' 2\" (1.575 m)  Wt 163 lb (73.9 kg)  LMP 11/01/2004  SpO2 97%  BMI 29.81 kg/m2 Estimated body mass index is 29.81 kg/(m^2) as calculated from the following:    Height as of this encounter: 5' 2\" (1.575 m).    Weight as of this encounter: 163 lb (73.9 kg).  Medication Reconciliation: complete  "

## 2017-07-24 NOTE — MR AVS SNAPSHOT
"              After Visit Summary   7/24/2017    Aracelis Muñoz    MRN: [id number]           Patient Information     Date Of Birth          1950        Visit Information        Provider Department      7/24/2017 3:20 PM Shalini Phan MD [facility]        Today's Diagnoses     Dysuria    -  1    Urinary tract infection, site unspecified        Mild persistent asthma without complication          Care Instructions       * BLADDER INFECTION,Female (Adult)    A bladder infection (\"cystitis\" or \"UTI\") usually causes a constant urge to urinate and a burning when passing urine. Urine may be cloudy, smelly or dark. There may be pain in the lower abdomen. A bladder infection occurs when bacteria from the vaginal area enter the bladder opening (urethra). This can occur from sexual intercourse, wearing tight clothing, dehydration and other factors.  HOME CARE:  1. Drink lots of fluids (at least 6-8 glasses a day, unless you must restrict fluids for other medical reasons). This will force the medicine into your urinary system and flush the bacteria out of your body. Cranberry juice has been shown to help clear out the bacteria.  2. Avoid sexual intercourse until your symptoms are gone.  3. A bladder infection is treated with antibiotics. You may also be given Pyridium (generic = phenazopyridine) to reduce the burning sensation. This medicine will cause your urine to become a bright orange color. The orange urine may stain clothing. You may wear a pad or panty-liner to protect clothing.  PREVENTING FUTURE INFECTIONS:  1. Always wipe from front to back after a bowel movement.  2. Keep the genital area clean and dry.  3. Drink plenty of fluids each day to avoid dehydration.  4. Urinate right after intercourse to flush out the bladder.  5. Wear cotton underwear and cotton-lined panty hose; avoid tight-fitting pants.  6. If you are on birth control pills and are having frequent bladder infections, " discuss with your doctor.  FOLLOW UP: Return to this facility or see your doctor if ALL symptoms are not gone after three days of treatment.  GET PROMPT MEDICAL ATTENTION if any of the following occur:    Fever over 101 F (38.3 C)    No improvement by the third day of treatment    Increasing back or abdominal pain    Repeated vomiting; unable to keep medicine down    Weakness, dizziness or fainting    Vaginal discharge    Pain, redness or swelling in the labia (outer vaginal area)    9034-8824 [employer], [address] [place] [address], [place] [id number]. All rights reserved. This information is not intended as a substitute for professional medical care. Always follow your healthcare professional's instructions.            Follow-ups after your visit        Who to contact     If you have questions or need follow up information about today's clinic visit or your schedule please contact [facility] [place] directly at [phone].  Normal or non-critical lab and imaging results will be communicated to you by MyChart, letter or phone within 4 business days after the clinic has received the results. If you do not hear from us within 7 days, please contact the clinic through [facility] or phone. If you have a critical or abnormal lab result, we will notify you by phone as soon as possible.  Submit refill requests through [employer] or call your pharmacy and they will forward the refill request to us. Please allow 3 business days for your refill to be completed.          Additional Information About Your Visit        MyChart Information     [employer] gives you secure access to your electronic health record. If you see a primary care provider, you can also send messages to your care team and make appointments. If you have questions, please call your primary care clinic.  If you do not have a primary care provider, please call [phone] and they will assist you.        Care EveryWhere ID     This is your Care  "EveryWhere ID. This could be used by other organizations to access your [facility] medical records  [id number]        Your Vitals Were     Pulse Temperature Height Last Period Pulse Oximetry BMI (Body Mass Index)    76 99.7  F (37.6  C) (Tympanic) 5' 2\" (1.575 m) 11/01/2004 97% 29.81 kg/m2       Blood Pressure from Last 3 Encounters:   07/24/17 104/67   01/16/17 100/50   12/09/16 116/64    Weight from Last 3 Encounters:   07/24/17 163 lb (73.9 kg)   01/16/17 166 lb 12.8 oz (75.7 kg)   12/09/16 167 lb (75.8 kg)              We Performed the Following     *UA reflex to Microscopic and Culture ([place] and [facility] (except [place] and [place])     DEPRESSION ACTION PLAN (DAP)     Urine Culture Aerobic Bacterial     Urine Microscopic          Today's Medication Changes          These changes are accurate as of: 7/24/17  4:06 PM.  If you have any questions, ask your nurse or doctor.               Start taking these medicines.        Dose/Directions    nitroFURantoin (macrocrystal-monohydrate) 100 MG capsule   Commonly known as:  MACROBID   Used for:  Urinary tract infection, site unspecified   Started by:  Shalini Phan MD        Dose:  100 mg   Take 1 capsule (100 mg) by mouth 2 times daily   Quantity:  14 capsule   Refills:  0            Where to get your medicines      These medications were sent to [facility]/pharmacy #[id number] - [place], [place] - [address]  [address] [place] [facility] [address]     Phone:  [phone]     nitroFURantoin (macrocrystal-monohydrate) 100 MG capsule                Primary Care Provider Office Phone # Fax #    Shalini Phan [phone]       [address]  [place] [address]        Equal Access to Services     [facility] IRVIN AH: Andriy merritt Soally, waaxda luqadaha, qaybta kaalmada adelaurayarandy, starr albarran. [facility] [phone].    ATENCIÓN: Si paco paredes " disposición servicios gratuitos de asistencia lingüística. Milly andre [phone].    We comply with applicable federal civil rights laws and [place] laws. We do not discriminate on the basis of race, color, national origin, age, disability sex, sexual orientation or gender identity.            Thank you!     Thank you for choosing [facility] [place]  for your care. Our goal is always to provide you with excellent care. Hearing back from our patients is one way we can continue to improve our services. Please take a few minutes to complete the written survey that you may receive in the mail after your visit with us. Thank you!             Your Updated Medication List - Protect others around you: Learn how to safely use, store and throw away your medicines at www.disposemymeds.org.          This list is accurate as of: 7/24/17  4:06 PM.  Always use your most recent med list.                   Brand Name Dispense Instructions for use Diagnosis    albuterol 108 (90 BASE) MCG/ACT Inhaler    PROAIR HFA/PROVENTIL HFA/VENTOLIN HFA    1 Inhaler    Inhale 2 puffs into the lungs every 6 hours as needed for shortness of breath / dyspnea or wheezing As needed    Mild persistent asthma without complication       alendronate 70 MG tablet    FOSAMAX    4 tablet    Take 1 tablet (70 mg) by mouth every 7 days    Osteopenia       ALLEGRA PO           azelastine 0.05 % Soln ophthalmic solution    OPTIVAR    6 mL    PLACE 1 DROP INTO BOTH EYES DAILY AS NEEDED IN BOTH EYES AS NEEDED    Environmental allergies       cetirizine 10 MG tablet    zyrTEC    30 tablet    Take 1 tablet (10 mg) by mouth every evening    Other specified disorder of skin, Allergic rhinitis, cause unspecified       cyclobenzaprine 10 MG tablet    FLEXERIL    30 tablet    Take 1 tablet (10 mg) by mouth 3 times daily as needed for muscle spasms    Back muscle spasm       escitalopram 20 MG tablet    LEXAPRO    90 tablet    TAKE 1 TABLET (20 MG) BY MOUTH  DAILY    Major depressive disorder, recurrent episode, mild (H)       fluticasone-salmeterol 250-50 MCG/DOSE diskus inhaler    ADVAIR DISKUS    1 Inhaler    Inhale 1 puff into the lungs every 12 hours    Mild persistent asthma with exacerbation       nabumetone 500 MG tablet    RELAFEN    30 tablet    Take 1-2 tablets (500-1,000 mg) by mouth 2 times daily as needed for moderate pain    Left knee pain, unspecified chronicity, Acute midline low back pain with left-sided sciatica       nitroFURantoin (macrocrystal-monohydrate) 100 MG capsule    MACROBID    14 capsule    Take 1 capsule (100 mg) by mouth 2 times daily    Urinary tract infection, site unspecified       traZODone 100 MG tablet    DESYREL    90 tablet    TAKE 0.5-1 TABLETS ( MG) BY MOUTH NIGHTLY AS NEEDED FOR SLEEP    Major depressive disorder, recurrent episode, mild (H), Other insomnia       triamcinolone 0.5 % cream    KENALOG    30 g    Apply sparingly to affected area three times daily.    Eczema, unspecified type

## 2017-07-24 NOTE — PROGRESS NOTES
SUBJECTIVE:                                                    Aracelis Muñoz is a 67 year old female who presents to clinic today for the following health issues:      URINARY TRACT SYMPTOMS      Duration: 3 days     Description  dysuria, frequency, burning urgency and odor    Intensity:  Moderate to severe     Accompanying signs and symptoms:  Fever/chills: YES- 99.7  Flank pain no   Nausea and vomiting: no   Vaginal symptoms: odor  Abdominal/Pelvic Pain: YES- mild tender     History  History of frequent UTI's: YES  History of kidney stones: no   Sexually Active: no   Possibility of pregnancy: No    Precipitating or alleviating factors: None    Therapies tried and outcome: none   Outcome:         Asthma Follow-Up    Was ACT completed today?    Yes  . Doing well on current med's, rarely needs albuterol   ACT Total Scores 7/24/2017   ACT TOTAL SCORE -   ASTHMA ER VISITS -   ASTHMA HOSPITALIZATIONS -   ACT TOTAL SCORE (Goal Greater than or Equal to 20) 24   In the past 12 months, how many times did you visit the emergency room for your asthma without being admitted to the hospital? 0   In the past 12 months, how many times were you hospitalized overnight because of your asthma? 0       Recent asthma triggers that patient is dealing with: None              Problem list and histories reviewed & adjusted, as indicated.  Additional history: as documented    Patient Active Problem List   Diagnosis     Menopausal LMP age 54     Diffuse cystic mastopathy     Slow transit constipation     Lumbar disc herniation with radiculopathy     Post-Nasal Drainage     Perennial allergic rhinitis     Environmental allergies     Osteopenia     Generalized anxiety disorder     Mild persistent intrinsic asthma without status asthmaticus with acute exacerbation     Serum calcium elevated     Hyperparathyroidism (H)     Vitamin D deficiency     Hypercalcemia     Other insomnia     Hyperlipidemia with target LDL less than 130     Mild  "persistent asthma without complication     Major depressive disorder, recurrent episode, mild (H)     Eczema, unspecified type     Left knee pain, unspecified chronicity     Acute midline low back pain with left-sided sciatica     Lumbago     Right knee pain     Past Surgical History:   Procedure Laterality Date     C NONSPECIFIC PROCEDURE      Cyst     C/SECTION, LOW TRANSVERSE      S/P C/S     CHOLECYSTECTOMY, OPEN      Cholecystectomy     HC EXCISION BREAST LESION, OPEN >=1      Benign mass right breast     HC MRI LUMBAR SPINE W/O CONTRAST  2010    multilevel degen disc disease/facet arthropathy, 5 mm caudally extruded left posterolateral disc herniation at L4-5 with impingement on the left L5 nerve root      HC PUNCTURE/ASPIRATION BREAST CYST, FIRST  ,,    bilateral '03, left '     LIGATN/STRIP LONG & SHORT SAPHEN      varicose vein stripping       Social History   Substance Use Topics     Smoking status: Never Smoker     Smokeless tobacco: Never Used     Alcohol use 0.0 oz/week     0 Standard drinks or equivalent per week      Comment: 1-2 glasses of wine 1-2 times per week      Family History   Problem Relation Age of Onset     Thyroid Disease Mother      Cancer - colorectal Father       age 65 colon cancer     Genitourinary Problems Maternal Aunt      fibrocystic breast     Hypertension Mother      Thyroid Disease Father      Allergies Mother      Allergies Father      Depression Father      Obesity Father              Reviewed and updated as needed this visit by clinical staff     Reviewed and updated as needed this visit by Provider         ROS:  Constitutional, HEENT, cardiovascular, pulmonary, GI, , musculoskeletal, neuro, skin, endocrine and psych systems are negative, except as otherwise noted.      OBJECTIVE:   /67  Pulse 76  Temp 99.7  F (37.6  C) (Tympanic)  Ht 5' 2\" (1.575 m)  Wt 163 lb (73.9 kg)  LMP 2004  SpO2 97%  BMI 29.81 kg/m2  Body " mass index is 29.81 kg/(m^2).  GENERAL: healthy, alert and no distress  RESP: lungs clear to auscultation - no rales, rhonchi or wheezes  CV: regular rate and rhythm, normal S1 S2, no S3 or S4, no murmur, click or rub, no peripheral edema and peripheral pulses strong  ABDOMEN: soft, nontender, no hepatosplenomegaly, no masses and bowel sounds normal  BACK: no CVA tenderness, no paralumbar tenderness  PSYCH: mentation appears normal, affect normal/bright        ASSESSMENT/PLAN:       (R30.0) Dysuria  (primary encounter diagnosis)  Comment:   Plan: *UA reflex to Microscopic and Culture ([place]         and [facility] (except [facility] and         [place]), Urine Microscopic, Urine Culture         Aerobic Bacterial            (N39.0) Urinary tract infection, site unspecified  Comment:   Plan: nitroFURantoin, macrocrystal-monohydrate,         (MACROBID) 100 MG capsule            (J45.30) Mild persistent asthma without complication  Comment:   Plan: stable on current med's       Patient expressed understanding and agreement with treatment plan. All patient's questions were answered, will let me know if has more later.  Medications: Rx's: Reviewed the potential side effects/complications of medications prescribed.       Shalini Phan MD  [facility] [place] [facility]

## 2017-07-25 LAB
BACTERIA SPEC CULT: NORMAL
MICRO REPORT STATUS: NORMAL
SPECIMEN SOURCE: NORMAL

## 2017-07-25 ASSESSMENT — ASTHMA QUESTIONNAIRES: ACT_TOTALSCORE: 24

## 2017-07-26 ENCOUNTER — TELEPHONE (OUTPATIENT)
Dept: FAMILY MEDICINE | Facility: CLINIC | Age: 67
End: 2017-07-26

## 2017-07-26 DIAGNOSIS — N89.8 VAGINAL ITCHING: ICD-10-CM

## 2017-07-26 DIAGNOSIS — R82.90 ABNORMAL URINALYSIS: Primary | ICD-10-CM

## 2017-07-26 RX ORDER — TERCONAZOLE 8 MG/G
1 CREAM VAGINAL AT BEDTIME
Qty: 20 G | Refills: 0 | Status: SHIPPED | OUTPATIENT
Start: 2017-07-26 | End: 2017-07-29

## 2017-07-26 RX ORDER — FLUCONAZOLE 150 MG/1
150 TABLET ORAL ONCE
Qty: 1 TABLET | Refills: 1 | Status: CANCELLED | OUTPATIENT
Start: 2017-07-26 | End: 2017-07-26

## 2017-07-26 NOTE — TELEPHONE ENCOUNTER
Script faxed, vaginal cream   Since her urine Cx is negative, recommend doing repeat UA after finishing treatment to make sure urine is back to normal   Order placed for lab

## 2017-07-26 NOTE — TELEPHONE ENCOUNTER
Patient had 2 questions:    1) patient is asking if she should be concerned about the trace amount of blood in her urine.    2)  Patient states that the medication she was given has helped with the frequency, but she still has intense itching. States that Dr. Phan mentioned that it may be a yeast infection.    Patient was prescribed Diflucan in 2014. Order pended.    Please advise. Triage to call  the patient back.  Cass Fraire RN

## 2017-08-01 ENCOUNTER — OFFICE VISIT (OUTPATIENT)
Dept: FAMILY MEDICINE | Facility: CLINIC | Age: 67
End: 2017-08-01
Payer: COMMERCIAL

## 2017-08-01 VITALS
HEIGHT: 62 IN | BODY MASS INDEX: 30.36 KG/M2 | HEART RATE: 71 BPM | DIASTOLIC BLOOD PRESSURE: 76 MMHG | TEMPERATURE: 99.1 F | SYSTOLIC BLOOD PRESSURE: 119 MMHG | WEIGHT: 165 LBS

## 2017-08-01 DIAGNOSIS — B96.89 BV (BACTERIAL VAGINOSIS): ICD-10-CM

## 2017-08-01 DIAGNOSIS — N76.0 BV (BACTERIAL VAGINOSIS): ICD-10-CM

## 2017-08-01 DIAGNOSIS — R82.90 ABNORMAL URINALYSIS: ICD-10-CM

## 2017-08-01 DIAGNOSIS — L29.9 ITCHING: Primary | ICD-10-CM

## 2017-08-01 LAB
ALBUMIN UR-MCNC: NEGATIVE MG/DL
APPEARANCE UR: CLEAR
BILIRUB UR QL STRIP: NEGATIVE
COLOR UR AUTO: YELLOW
GLUCOSE UR STRIP-MCNC: NEGATIVE MG/DL
HGB UR QL STRIP: NEGATIVE
KETONES UR STRIP-MCNC: ABNORMAL MG/DL
LEUKOCYTE ESTERASE UR QL STRIP: NEGATIVE
MICRO REPORT STATUS: ABNORMAL
NITRATE UR QL: NEGATIVE
PH UR STRIP: 6 PH (ref 5–7)
SP GR UR STRIP: 1.01 (ref 1–1.03)
SPECIMEN SOURCE: ABNORMAL
URN SPEC COLLECT METH UR: ABNORMAL
UROBILINOGEN UR STRIP-ACNC: 0.2 EU/DL (ref 0.2–1)
WET PREP SPEC: ABNORMAL

## 2017-08-01 PROCEDURE — 99213 OFFICE O/P EST LOW 20 MIN: CPT | Performed by: FAMILY MEDICINE

## 2017-08-01 PROCEDURE — 87210 SMEAR WET MOUNT SALINE/INK: CPT | Performed by: FAMILY MEDICINE

## 2017-08-01 PROCEDURE — 81003 URINALYSIS AUTO W/O SCOPE: CPT | Performed by: FAMILY MEDICINE

## 2017-08-01 RX ORDER — METRONIDAZOLE 7.5 MG/G
1 GEL VAGINAL AT BEDTIME
Qty: 70 G | Refills: 0 | Status: SHIPPED | OUTPATIENT
Start: 2017-08-01 | End: 2017-08-06

## 2017-08-01 ASSESSMENT — ANXIETY QUESTIONNAIRES
1. FEELING NERVOUS, ANXIOUS, OR ON EDGE: NOT AT ALL
3. WORRYING TOO MUCH ABOUT DIFFERENT THINGS: NOT AT ALL
IF YOU CHECKED OFF ANY PROBLEMS ON THIS QUESTIONNAIRE, HOW DIFFICULT HAVE THESE PROBLEMS MADE IT FOR YOU TO DO YOUR WORK, TAKE CARE OF THINGS AT HOME, OR GET ALONG WITH OTHER PEOPLE: NOT DIFFICULT AT ALL
3. WORRYING TOO MUCH ABOUT DIFFERENT THINGS: NOT AT ALL
6. BECOMING EASILY ANNOYED OR IRRITABLE: NOT AT ALL
GAD7 TOTAL SCORE: 0
GAD7 TOTAL SCORE: 0
7. FEELING AFRAID AS IF SOMETHING AWFUL MIGHT HAPPEN: NOT AT ALL
5. BEING SO RESTLESS THAT IT IS HARD TO SIT STILL: NOT AT ALL
2. NOT BEING ABLE TO STOP OR CONTROL WORRYING: NOT AT ALL
IF YOU CHECKED OFF ANY PROBLEMS ON THIS QUESTIONNAIRE, HOW DIFFICULT HAVE THESE PROBLEMS MADE IT FOR YOU TO DO YOUR WORK, TAKE CARE OF THINGS AT HOME, OR GET ALONG WITH OTHER PEOPLE: NOT DIFFICULT AT ALL
1. FEELING NERVOUS, ANXIOUS, OR ON EDGE: NOT AT ALL
7. FEELING AFRAID AS IF SOMETHING AWFUL MIGHT HAPPEN: NOT AT ALL
5. BEING SO RESTLESS THAT IT IS HARD TO SIT STILL: NOT AT ALL
6. BECOMING EASILY ANNOYED OR IRRITABLE: NOT AT ALL
2. NOT BEING ABLE TO STOP OR CONTROL WORRYING: NOT AT ALL

## 2017-08-01 ASSESSMENT — PATIENT HEALTH QUESTIONNAIRE - PHQ9
5. POOR APPETITE OR OVEREATING: NOT AT ALL
5. POOR APPETITE OR OVEREATING: NOT AT ALL

## 2017-08-01 NOTE — NURSING NOTE
"Chief Complaint   Patient presents with     UTI       Initial /76  Pulse 71  Temp 99.1  F (37.3  C) (Tympanic)  Ht 5' 2\" (1.575 m)  Wt 165 lb (74.8 kg)  LMP 11/01/2004  BMI 30.18 kg/m2 Estimated body mass index is 30.18 kg/(m^2) as calculated from the following:    Height as of this encounter: 5' 2\" (1.575 m).    Weight as of this encounter: 165 lb (74.8 kg).  Medication Reconciliation: complete  "

## 2017-08-01 NOTE — PATIENT INSTRUCTIONS
Bacterial Vaginosis    You have a vaginal infection called bacterial vaginosis (BV). Both good and bad bacteria are present in a healthy vagina. BV occurs when these bacteria get out of balance. The number of bad bacteria increase. And the number of good bacteria decrease.  BV may or may not cause symptoms. If symptoms do occur, they can include:    Thin, gray, milky-white, or sometimes green discharge    Unpleasant odor or  fishy  smell    Itching, burning, or pain in or around the vagina  It is not known what causes BV, but certain factors can make the problem more likely. This can include:    Douching    Having sex with a new partner    Having sex with more than one partner  BV will sometimes go away on its own. But treatment is usually recommended. This is because untreated BV can increase the risk of more serious health problems such as:    Pelvic inflammatory disease (PID)     delivery (giving birth to a baby early if you re pregnant)    HIV and certain other sexually transmitted diseases (STDs)    Infection after surgery on the reproductive organs  Home care  General care    BV is most often treated with medicines called antibiotics. These may be given as pills or as a vaginal cream. If antibiotics are prescribed, be sure to use them exactly as directed. Also, be sure to complete all of the medicine, even if your symptoms go away.    Avoid douching or having sex during treatment.    If you have sex with a female partner, ask your healthcare provider if she should also be treated.  Prevention    Limit or avoid douching.    Avoid having sex. If you do have sex, then take steps to lower your risk:    Use condoms when having sex.    Limit the number of partners you have sex with.  Follow-up care  Follow up with your healthcare provider, or as advised.  When to seek medical advice  Call your healthcare provider right away if:    You have a fever of 100.4 F (38 C) or higher, or as directed by your  provider.    Your symptoms worsen, or they don t go away within a few days of starting treatment.    You have new pain in the lower belly or pelvic region.    You have side effects that bother you or a reaction to the pills or cream you re prescribed.    You or any partners you have sex with have new symptoms, such as a rash, joint pain, or sores.  Date Last Reviewed: 7/30/2015 2000-2017 [employer]. [address] [place] [address], [place] [address]. All rights reserved. This information is not intended as a substitute for professional medical care. Always follow your healthcare professional's instructions.

## 2017-08-01 NOTE — MR AVS SNAPSHOT
After Visit Summary   2017    Aracelis Muñoz    MRN: [id number]           Patient Information     Date Of Birth          1950        Visit Information        Provider Department      2017 4:00 PM Shalini Phan MD [facility]        Today's Diagnoses     Itching    -  1    Abnormal urine        BV (bacterial vaginosis)        Abnormal urinalysis          Care Instructions      Bacterial Vaginosis    You have a vaginal infection called bacterial vaginosis (BV). Both good and bad bacteria are present in a healthy vagina. BV occurs when these bacteria get out of balance. The number of bad bacteria increase. And the number of good bacteria decrease.  BV may or may not cause symptoms. If symptoms do occur, they can include:    Thin, gray, milky-white, or sometimes green discharge    Unpleasant odor or  fishy  smell    Itching, burning, or pain in or around the vagina  It is not known what causes BV, but certain factors can make the problem more likely. This can include:    Douching    Having sex with a new partner    Having sex with more than one partner  BV will sometimes go away on its own. But treatment is usually recommended. This is because untreated BV can increase the risk of more serious health problems such as:    Pelvic inflammatory disease (PID)     delivery (giving birth to a baby early if you re pregnant)    HIV and certain other sexually transmitted diseases (STDs)    Infection after surgery on the reproductive organs  Home care  General care    BV is most often treated with medicines called antibiotics. These may be given as pills or as a vaginal cream. If antibiotics are prescribed, be sure to use them exactly as directed. Also, be sure to complete all of the medicine, even if your symptoms go away.    Avoid douching or having sex during treatment.    If you have sex with a female partner, ask your healthcare provider if she should also be  treated.  Prevention    Limit or avoid douching.    Avoid having sex. If you do have sex, then take steps to lower your risk:    Use condoms when having sex.    Limit the number of partners you have sex with.  Follow-up care  Follow up with your healthcare provider, or as advised.  When to seek medical advice  Call your healthcare provider right away if:    You have a fever of 100.4 F (38 C) or higher, or as directed by your provider.    Your symptoms worsen, or they don t go away within a few days of starting treatment.    You have new pain in the lower belly or pelvic region.    You have side effects that bother you or a reaction to the pills or cream you re prescribed.    You or any partners you have sex with have new symptoms, such as a rash, joint pain, or sores.  Date Last Reviewed: 7/30/2015 2000-2017 [employer]. [address] [place] [address]. All rights reserved. This information is not intended as a substitute for professional medical care. Always follow your healthcare professional's instructions.                Follow-ups after your visit        Who to contact     If you have questions or need follow up information about today's clinic visit or your schedule please contact [facility] [place] directly at [phone].  Normal or non-critical lab and imaging results will be communicated to you by [employer], letter or phone within 4 business days after the clinic has received the results. If you do not hear from us within 7 days, please contact the clinic through [employer] or phone. If you have a critical or abnormal lab result, we will notify you by phone as soon as possible.  Submit refill requests through [employer]Things or call your pharmacy and they will forward the refill request to us. Please allow 3 business days for your refill to be completed.          Additional Information About Your Visit        PingThings Information     [employer] gives you secure access to your  "electronic health record. If you see a primary care provider, you can also send messages to your care team and make appointments. If you have questions, please call your primary care clinic.  If you do not have a primary care provider, please call [phone] and they will assist you.        Care EveryWhere ID     This is your Care EveryWhere ID. This could be used by other organizations to access your [facility] medical records  [id number]        Your Vitals Were     Pulse Temperature Height Last Period BMI (Body Mass Index)       71 99.1  F (37.3  C) (Tympanic) 5' 2\" (1.575 m) 11/01/2004 30.18 kg/m2        Blood Pressure from Last 3 Encounters:   08/01/17 119/76   07/24/17 104/67   01/16/17 100/50    Weight from Last 3 Encounters:   08/01/17 165 lb (74.8 kg)   07/24/17 163 lb (73.9 kg)   01/16/17 166 lb 12.8 oz (75.7 kg)              We Performed the Following     *UA reflex to Microscopic and Culture ([place] and [facility] (except [place] and [place])     Wet prep          Today's Medication Changes          These changes are accurate as of: 8/1/17  4:56 PM.  If you have any questions, ask your nurse or doctor.               Start taking these medicines.        Dose/Directions    metroNIDAZOLE 0.75 % vaginal gel   Commonly known as:  METROGEL   Used for:  BV (bacterial vaginosis)   Started by:  Shalini Phan MD        Dose:  1 applicator   Place 1 applicator (5 g) vaginally At Bedtime for 5 days   Quantity:  70 g   Refills:  0            Where to get your medicines      These medications were sent to [facility]/pharmacy #[id number] - [place], [place] - [address]  [address] [facility] [place] [address]     Phone:  [phone]     metroNIDAZOLE 0.75 % vaginal gel                Primary Care Provider Office Phone # Fax #    Shalini Phan [phone]       [facility] [place] [address] [place] [address] [address]  [place] [address]        Equal Access to Services     EVELYN BRYAN AH: " Hadii aad ku hadnaelo Soomaali, waaxda luqadaha, qaybta kaalmada adelibrado, starr albarran. So [facility] [phone].    ATENCIÓN: Si sun fleming, tiene a perez disposición servicios gratuitos de asistencia lingüística. Llame al [phone].    We comply with applicable federal civil rights laws and [place] laws. We do not discriminate on the basis of race, color, national origin, age, disability sex, sexual orientation or gender identity.            Thank you!     Thank you for choosing [facility] [place]  for your care. Our goal is always to provide you with excellent care. Hearing back from our patients is one way we can continue to improve our services. Please take a few minutes to complete the written survey that you may receive in the mail after your visit with us. Thank you!             Your Updated Medication List - Protect others around you: Learn how to safely use, store and throw away your medicines at www.disposemymeds.org.          This list is accurate as of: 8/1/17  4:56 PM.  Always use your most recent med list.                   Brand Name Dispense Instructions for use Diagnosis    albuterol 108 (90 BASE) MCG/ACT Inhaler    PROAIR HFA/PROVENTIL HFA/VENTOLIN HFA    1 Inhaler    Inhale 2 puffs into the lungs every 6 hours as needed for shortness of breath / dyspnea or wheezing As needed    Mild persistent asthma without complication       alendronate 70 MG tablet    FOSAMAX    4 tablet    Take 1 tablet (70 mg) by mouth every 7 days    Osteopenia       ALLEGRA PO           azelastine 0.05 % Soln ophthalmic solution    OPTIVAR    6 mL    PLACE 1 DROP INTO BOTH EYES DAILY AS NEEDED IN BOTH EYES AS NEEDED    Environmental allergies       cetirizine 10 MG tablet    zyrTEC    30 tablet    Take 1 tablet (10 mg) by mouth every evening    Other specified disorder of skin, Allergic rhinitis, cause unspecified       cyclobenzaprine 10 MG tablet    FLEXERIL    30 tablet    Take  1 tablet (10 mg) by mouth 3 times daily as needed for muscle spasms    Back muscle spasm       escitalopram 20 MG tablet    LEXAPRO    90 tablet    TAKE 1 TABLET (20 MG) BY MOUTH DAILY    Major depressive disorder, recurrent episode, mild (H)       fluticasone-salmeterol 250-50 MCG/DOSE diskus inhaler    ADVAIR DISKUS    1 Inhaler    Inhale 1 puff into the lungs every 12 hours    Mild persistent asthma with exacerbation       metroNIDAZOLE 0.75 % vaginal gel    METROGEL    70 g    Place 1 applicator (5 g) vaginally At Bedtime for 5 days    BV (bacterial vaginosis)       nabumetone 500 MG tablet    RELAFEN    30 tablet    Take 1-2 tablets (500-1,000 mg) by mouth 2 times daily as needed for moderate pain    Left knee pain, unspecified chronicity, Acute midline low back pain with left-sided sciatica       nitroFURantoin (macrocrystal-monohydrate) 100 MG capsule    MACROBID    14 capsule    Take 1 capsule (100 mg) by mouth 2 times daily    Urinary tract infection, site unspecified       traZODone 100 MG tablet    DESYREL    90 tablet    TAKE 0.5-1 TABLETS ( MG) BY MOUTH NIGHTLY AS NEEDED FOR SLEEP    Major depressive disorder, recurrent episode, mild (H), Other insomnia       triamcinolone 0.5 % cream    KENALOG    30 g    Apply sparingly to affected area three times daily.    Eczema, unspecified type

## 2017-08-01 NOTE — PROGRESS NOTES
SUBJECTIVE:                                                    Aracelis Muñoz is a 67 year old female who presents to clinic today for the following health issues:      URINARY TRACT SYMPTOMS/VAGINAL ITCHING       Duration: follow up from OV 7/24    Description  Urinary sx resolved mostly . Just having slight  vaginal itching . Urine culture was not significant for infection although had microscopic blood, so she was told to do f/u check .    Intensity:  mild    Accompanying signs and symptoms:  Fever/chills: no   Flank pain no   Nausea and vomiting: no   Vaginal symptoms: itching, not much discharge   Abdominal/Pelvic Pain: no     History  History of frequent UTI's: YES  History of kidney stones: no   Sexually Active: no   Possibility of pregnancy: No    Precipitating or alleviating factors: None    Therapies tried and outcome: vaginal cream   Outcome: finished             Problem list and histories reviewed & adjusted, as indicated.  Additional history: as documented    Patient Active Problem List   Diagnosis     Menopausal LMP age 54     Diffuse cystic mastopathy     Slow transit constipation     Lumbar disc herniation with radiculopathy     Post-Nasal Drainage     Perennial allergic rhinitis     Environmental allergies     Osteopenia     Generalized anxiety disorder     Mild persistent intrinsic asthma without status asthmaticus with acute exacerbation     Serum calcium elevated     Hyperparathyroidism (H)     Vitamin D deficiency     Hypercalcemia     Other insomnia     Hyperlipidemia with target LDL less than 130     Mild persistent asthma without complication     Major depressive disorder, recurrent episode, mild (H)     Eczema, unspecified type     Left knee pain, unspecified chronicity     Acute midline low back pain with left-sided sciatica     Lumbago     Right knee pain     Past Surgical History:   Procedure Laterality Date     C NONSPECIFIC PROCEDURE  1982    Cyst     C/SECTION, LOW TRANSVERSE       "S/P C/S     CHOLECYSTECTOMY, OPEN      Cholecystectomy     HC EXCISION BREAST LESION, OPEN >=1      Benign mass right breast     HC MRI LUMBAR SPINE W/O CONTRAST  2010    multilevel degen disc disease/facet arthropathy, 5 mm caudally extruded left posterolateral disc herniation at L4-5 with impingement on the left L5 nerve root      HC PUNCTURE/ASPIRATION BREAST CYST, FIRST  ,,    bilateral '03, left '04     LIGATN/STRIP LONG & SHORT SAPHEN      varicose vein stripping       Social History   Substance Use Topics     Smoking status: Never Smoker     Smokeless tobacco: Never Used     Alcohol use 0.0 oz/week     0 Standard drinks or equivalent per week      Comment: 1-2 glasses of wine 1-2 times per week      Family History   Problem Relation Age of Onset     Thyroid Disease Mother      Cancer - colorectal Father       age 65 colon cancer     Genitourinary Problems Maternal Aunt      fibrocystic breast     Hypertension Mother      Thyroid Disease Father      Allergies Mother      Allergies Father      Depression Father      Obesity Father              Reviewed and updated as needed this visit by clinical staff     Reviewed and updated as needed this visit by Provider         ROS:  Constitutional, HEENT, cardiovascular, pulmonary, GI, , musculoskeletal, neuro, skin, endocrine and psych systems are negative, except as otherwise noted.      OBJECTIVE:   /76  Pulse 71  Temp 99.1  F (37.3  C) (Tympanic)  Ht 5' 2\" (1.575 m)  Wt 165 lb (74.8 kg)  LMP 2004  BMI 30.18 kg/m2  Body mass index is 30.18 kg/(m^2).  GENERAL: healthy, alert and no distress  NECK: no adenopathy  RESP: lungs clear to auscultation - no rales, rhonchi or wheezes  CV: regular rate and rhythm, normal S1 S2, no S3 or S4, no murmur, click or rub, no peripheral edema and peripheral pulses strong  ABDOMEN: soft, nontender, no hepatosplenomegaly, no masses and bowel sounds normal   (female): deferred per pt, " she did her own vaginal swab     Diagnostic Test Results:  Wet prep +ve clue cells   Urinalysis - unremarkable    ASSESSMENT/PLAN:     (L29.9) Itching  (primary encounter diagnosis)  Comment:   Plan: Wet prep            (N76.0,  B96.89) BV (bacterial vaginosis)  Comment:   Plan: metroNIDAZOLE (METROGEL) 0.75 % vaginal gel            (R82.90) Abnormal urinalysis  Comment: had microscopic blood, so need f/u   Plan: UA, normal except trace ketones    Cares and  treatment discussed follow up if problem   Patient expressed understanding and agreement with treatment plan. All patient's questions were answered, will let me know if has more later.  Medications: Rx's: Reviewed the potential side effects/complications of medications prescribed.       Shalini Phan MD  [facility]

## 2017-08-02 ASSESSMENT — PATIENT HEALTH QUESTIONNAIRE - PHQ9: SUM OF ALL RESPONSES TO PHQ QUESTIONS 1-9: 3

## 2017-08-02 ASSESSMENT — ANXIETY QUESTIONNAIRES: GAD7 TOTAL SCORE: 0

## 2017-08-18 DIAGNOSIS — J45.31 MILD PERSISTENT ASTHMA WITH EXACERBATION: ICD-10-CM

## 2017-08-18 NOTE — TELEPHONE ENCOUNTER
Medication is being filled for 1 time refill only due to:  Asthma check    Mary Valderrama RN- Triage [employer]

## 2017-08-18 NOTE — TELEPHONE ENCOUNTER
advair        Last Written Prescription Date: 12/21/15  Last Fill Quantity: 1, # refills: 11    Last Office Visit with G, P or [facility] prescribing provider:  8/1/17   Future Office Visit:       Date of Last Asthma Action Plan Letter:   Asthma Action Plan Q1 Year    Topic Date Due     Asthma Action Plan - yearly  03/07/2018      Asthma Control Test:   ACT Total Scores 7/24/2017   ACT TOTAL SCORE -   ASTHMA ER VISITS -   ASTHMA HOSPITALIZATIONS -   ACT TOTAL SCORE (Goal Greater than or Equal to 20) 24   In the past 12 months, how many times did you visit the emergency room for your asthma without being admitted to the hospital? 0   In the past 12 months, how many times were you hospitalized overnight because of your asthma? 0       Date of Last Spirometry Test:   No results found for this or any previous visit.

## 2017-08-28 ENCOUNTER — OFFICE VISIT (OUTPATIENT)
Dept: FAMILY MEDICINE | Facility: CLINIC | Age: 67
End: 2017-08-28
Payer: COMMERCIAL

## 2017-08-28 VITALS
WEIGHT: 162 LBS | SYSTOLIC BLOOD PRESSURE: 108 MMHG | BODY MASS INDEX: 29.81 KG/M2 | HEART RATE: 68 BPM | DIASTOLIC BLOOD PRESSURE: 69 MMHG | HEIGHT: 62 IN | OXYGEN SATURATION: 100 % | TEMPERATURE: 98.8 F

## 2017-08-28 DIAGNOSIS — N89.8 VAGINAL ITCHING: Primary | ICD-10-CM

## 2017-08-28 DIAGNOSIS — J06.9 VIRAL UPPER RESPIRATORY TRACT INFECTION: ICD-10-CM

## 2017-08-28 DIAGNOSIS — Z12.11 COLON CANCER SCREENING: ICD-10-CM

## 2017-08-28 DIAGNOSIS — J45.31 MILD PERSISTENT ASTHMA WITH EXACERBATION: ICD-10-CM

## 2017-08-28 DIAGNOSIS — M79.671 RIGHT FOOT PAIN: ICD-10-CM

## 2017-08-28 LAB
SPECIMEN SOURCE: NORMAL
WET PREP SPEC: NORMAL

## 2017-08-28 PROCEDURE — 99214 OFFICE O/P EST MOD 30 MIN: CPT | Performed by: FAMILY MEDICINE

## 2017-08-28 PROCEDURE — 87210 SMEAR WET MOUNT SALINE/INK: CPT | Performed by: FAMILY MEDICINE

## 2017-08-28 RX ORDER — TERCONAZOLE 8 MG/G
1 CREAM VAGINAL AT BEDTIME
Qty: 20 G | Refills: 0 | Status: SHIPPED | OUTPATIENT
Start: 2017-08-28 | End: 2017-08-31

## 2017-08-28 NOTE — MR AVS SNAPSHOT
After Visit Summary   8/28/2017    Aracelis Muñoz    MRN: [id number]           Patient Information     Date Of Birth          1950        Visit Information        Provider Department      8/28/2017 1:00 PM Shalini Phan MD [facility]        Today's Diagnoses     Vaginal symptom    -  1    Mild persistent asthma with exacerbation        Right foot pain          Care Instructions    Cares and  treatment discussed follow up if problem               Follow-ups after your visit        Your next 10 appointments already scheduled     Nov 24, 2017  2:00 PM CST   Office Visit with Ismael Adler MD   [facility] ([facility])    [address] [place] [id number] [phone]           Bring a current list of meds and any records pertaining to this visit. For Physicals, please bring immunization records and any forms needing to be filled out. Please arrive 10 minutes early to complete paperwork.              Who to contact     If you have questions or need follow up information about today's clinic visit or your schedule please contact [facility] directly at [phone].  Normal or non-critical lab and imaging results will be communicated to you by MyChart, letter or phone within 4 business days after the clinic has received the results. If you do not hear from us within 7 days, please contact the clinic through Precysehart or phone. If you have a critical or abnormal lab result, we will notify you by phone as soon as possible.  Submit refill requests through Slingjot or call your pharmacy and they will forward the refill request to us. Please allow 3 business days for your refill to be completed.          Additional Information About Your Visit        MyChart Information     Slingjot gives you secure access to your electronic health record. If you see a primary care provider, you can also send messages to your  "care team and make appointments. If you have questions, please call your primary care clinic.  If you do not have a primary care provider, please call [phone] and they will assist you.        Care EveryWhere ID     This is your Care EveryWhere ID. This could be used by other organizations to access your [place] medical records  [id number]        Your Vitals Were     Pulse Temperature Height Last Period Pulse Oximetry BMI (Body Mass Index)    68 98.8  F (37.1  C) (Tympanic) 5' 2\" (1.575 m) 11/01/2004 100% 29.63 kg/m2       Blood Pressure from Last 3 Encounters:   08/28/17 108/69   08/01/17 119/76   07/24/17 104/67    Weight from Last 3 Encounters:   08/28/17 162 lb (73.5 kg)   08/01/17 165 lb (74.8 kg)   07/24/17 163 lb (73.9 kg)              We Performed the Following     Wet prep          Today's Medication Changes          These changes are accurate as of: 8/28/17  1:41 PM.  If you have any questions, ask your nurse or doctor.               These medicines have changed or have updated prescriptions.        Dose/Directions    fluticasone-salmeterol 250-50 MCG/DOSE diskus inhaler   Commonly known as:  ADVAIR DISKUS   This may have changed:  See the new instructions.   Used for:  Mild persistent asthma with exacerbation   Changed by:  Shalini Phan MD        Dose:  1 puff   Inhale 1 puff into the lungs 2 times daily   Quantity:  60 Inhaler   Refills:  5            Where to get your medicines      These medications were sent to [facility]/pharmacy #[id number] - [place], [place] - [address]  [address] [place] [address] [place] [address]     Phone:  [phone]     fluticasone-salmeterol 250-50 MCG/DOSE diskus inhaler                Primary Care Provider Office Phone # Fax #    Shalini Phan [phone]       [address]  [place] [address]        Equal Access to Services     [facility] Hospital[facility] AH: Andriy Beltrán, sravan cunha, starr adhikari " stan boland conrado gordon'aan ah. So [facility] [phone].    ATENCIÓN: Si sun fleming, tiene a perez disposición servicios gratuitos de asistencia lingüística. Milly al [phone].    We comply with applicable federal civil rights laws and [place] laws. We do not discriminate on the basis of race, color, national origin, age, disability sex, sexual orientation or gender identity.            Thank you!     Thank you for choosing [facility] [place]  for your care. Our goal is always to provide you with excellent care. Hearing back from our patients is one way we can continue to improve our services. Please take a few minutes to complete the written survey that you may receive in the mail after your visit with us. Thank you!             Your Updated Medication List - Protect others around you: Learn how to safely use, store and throw away your medicines at www.disposemymeds.org.          This list is accurate as of: 8/28/17  1:41 PM.  Always use your most recent med list.                   Brand Name Dispense Instructions for use Diagnosis    albuterol 108 (90 BASE) MCG/ACT Inhaler    PROAIR HFA/PROVENTIL HFA/VENTOLIN HFA    1 Inhaler    Inhale 2 puffs into the lungs every 6 hours as needed for shortness of breath / dyspnea or wheezing As needed    Mild persistent asthma without complication       alendronate 70 MG tablet    FOSAMAX    4 tablet    Take 1 tablet (70 mg) by mouth every 7 days    Osteopenia       ALLEGRA PO           azelastine 0.05 % Soln ophthalmic solution    OPTIVAR    6 mL    PLACE 1 DROP INTO BOTH EYES DAILY AS NEEDED IN BOTH EYES AS NEEDED    Environmental allergies       cyclobenzaprine 10 MG tablet    FLEXERIL    30 tablet    Take 1 tablet (10 mg) by mouth 3 times daily as needed for muscle spasms    Back muscle spasm       escitalopram 20 MG tablet    LEXAPRO    90 tablet    TAKE 1 TABLET (20 MG) BY MOUTH DAILY    Major depressive disorder, recurrent episode, mild (H)        fluticasone-salmeterol 250-50 MCG/DOSE diskus inhaler    ADVAIR DISKUS    60 Inhaler    Inhale 1 puff into the lungs 2 times daily    Mild persistent asthma with exacerbation       nabumetone 500 MG tablet    RELAFEN    30 tablet    Take 1-2 tablets (500-1,000 mg) by mouth 2 times daily as needed for moderate pain    Left knee pain, unspecified chronicity, Acute midline low back pain with left-sided sciatica       traZODone 100 MG tablet    DESYREL    90 tablet    TAKE 0.5-1 TABLETS ( MG) BY MOUTH NIGHTLY AS NEEDED FOR SLEEP    Major depressive disorder, recurrent episode, mild (H), Other insomnia       triamcinolone 0.5 % cream    KENALOG    30 g    Apply sparingly to affected area three times daily.    Eczema, unspecified type

## 2017-08-28 NOTE — NURSING NOTE
"Chief Complaint   Patient presents with     Vaginal Problem       Initial /69  Pulse 68  Temp 98.8  F (37.1  C) (Tympanic)  Ht 5' 2\" (1.575 m)  Wt 162 lb (73.5 kg)  LMP 11/01/2004  SpO2 100%  BMI 29.63 kg/m2 Estimated body mass index is 29.63 kg/(m^2) as calculated from the following:    Height as of this encounter: 5' 2\" (1.575 m).    Weight as of this encounter: 162 lb (73.5 kg).  Medication Reconciliation: complete  "

## 2017-08-28 NOTE — PROGRESS NOTES
SUBJECTIVE:   Aracelis Muñoz is a 67 year old female who presents to clinic today for the following health issues:      Vaginal Symptoms      Duration: since Thursday     Description  vaginal discharge - white and itching. She was treated recently for BV. She was better. She was travelling so felt like sx coming back last few days.     Intensity:  moderate    Accompanying signs and symptoms (fever/dysuria/abdominal or back pain): None    History  Sexually active: not at present  Possibility of pregnancy: No  Recent antibiotic use: no     Precipitating or alleviating factors: None    Therapies tried and outcome: none   Outcome:         RESPIRATORY SYMPTOMS / asthma follow up       Duration: 8 days     Description  nasal congestion mostly clear drainage sometimes cough up phlegm productive slight yellow , although it is getting better     Severity:  Mild moderate, but improving     Accompanying signs and symptoms: No fever or chills , no sob     History (predisposing factors):  Asthma , doing well on current inhalers, need refill on Advair, works well, has not felt the need for albuterol in a while . Planning to travel. So wants to have enough med's to take with     Precipitating or alleviating factors: she was travelling, so was around lot of people     Therapies tried and outcome:  oral decongestant      add on problem   Has rt foot pain on and off past few weeks , no recall of injury although has been  on her feet a lot during recent vacation. No swelling, hurts sometimes mid foot.     Problem list and histories reviewed & adjusted, as indicated.  Additional history: as documented    Patient Active Problem List   Diagnosis     Menopausal LMP age 54     Diffuse cystic mastopathy     Slow transit constipation     Lumbar disc herniation with radiculopathy     Post-Nasal Drainage     Perennial allergic rhinitis     Environmental allergies     Osteopenia     Generalized anxiety disorder     Mild persistent  intrinsic asthma without status asthmaticus with acute exacerbation     Serum calcium elevated     Hyperparathyroidism (H)     Vitamin D deficiency     Hypercalcemia     Other insomnia     Hyperlipidemia with target LDL less than 130     Mild persistent asthma without complication     Major depressive disorder, recurrent episode, mild (H)     Eczema, unspecified type     Left knee pain, unspecified chronicity     Acute midline low back pain with left-sided sciatica     Lumbago     Right knee pain     Past Surgical History:   Procedure Laterality Date     C NONSPECIFIC PROCEDURE      Cyst     C/SECTION, LOW TRANSVERSE      S/P C/S     CHOLECYSTECTOMY, OPEN      Cholecystectomy     HC EXCISION BREAST LESION, OPEN >=1      Benign mass right breast     HC MRI LUMBAR SPINE W/O CONTRAST  2010    multilevel degen disc disease/facet arthropathy, 5 mm caudally extruded left posterolateral disc herniation at L4-5 with impingement on the left L5 nerve root      HC PUNCTURE/ASPIRATION BREAST CYST, FIRST  ,,    bilateral '03, left '     LIGATN/STRIP LONG & SHORT SAPHEN      varicose vein stripping       Social History   Substance Use Topics     Smoking status: Never Smoker     Smokeless tobacco: Never Used     Alcohol use 0.0 oz/week     0 Standard drinks or equivalent per week      Comment: 1-2 glasses of wine 1-2 times per week      Family History   Problem Relation Age of Onset     Thyroid Disease Mother      Cancer - colorectal Father       age 65 colon cancer     Genitourinary Problems Maternal Aunt      fibrocystic breast     Hypertension Mother      Thyroid Disease Father      Allergies Mother      Allergies Father      Depression Father      Obesity Father              Reviewed and updated as needed this visit by clinical staff     Reviewed and updated as needed this visit by Provider       ROS:  Constitutional, HEENT, cardiovascular, pulmonary, GI, , musculoskeletal, neuro, skin,  "endocrine and psych systems are negative, except as otherwise noted.      OBJECTIVE:   /69  Pulse 68  Temp 98.8  F (37.1  C) (Tympanic)  Ht 5' 2\" (1.575 m)  Wt 162 lb (73.5 kg)  LMP 11/01/2004  SpO2 100%  BMI 29.63 kg/m2  Body mass index is 29.63 kg/(m^2).  GENERAL: healthy, alert and no distress  NECK: no adenopathy  HE ENT, unremarkable  RESP: lungs clear to auscultation - no rales, rhonchi or wheezes  CV: regular rate and rhythm, normal S1 S2, no S3 or S4, no murmur,   ABDOMEN: soft, nontender, no hepatosplenomegaly, no masses and bowel sounds normal  MS: no edema, rt ankle/ foot, with no swelling or erythema, has minimal localized tenderness across lateral foot little below the lateral malleolus area, rest of the foot and ankle with no tenderness, ROM of ankle and foot is normal , minimal aggravation of pain with hyperextension of her ankle   , normal external genital very tight with speculum exam so unable to do adequate exam, although she did have small whitish thick discharge so wet prep swab taken         ASSESSMENT/PLAN:     (L29.8) Vaginal itching  (primary encounter diagnosis)  Comment:   Plan: Wet prep, terconazole (TERAZOL 3) 0.8 % cream           wet prep (vaginal swab )  is negative , although her sx sounds like yeast, so she is willing to try Terazol-3 to see if helps. Cares and symptomatic treatment discussed follow up if problem         (J06.9,  B97.89) Viral upper respiratory tract infection  Comment: improving  Plan: Cares and symptomatic treatment discussed follow up if problem         (J45.31) Mild persistent asthma with exacerbation  Comment: stable   Plan: fluticasone-salmeterol (ADVAIR DISKUS) 250-50         MCG/DOSE diskus inhaler      (M79.671) Right foot pain  Comment: lateral strain   Plan:   Discussed feet cares. Talked about comfortable shoes, orthotics to support etc..she is comfortable taking OTC pain med's as needed ,  to help with pain in the meantime. follow up if " ongoing problem. Consider PT or podiatry referral if needed. He will follow up as needed       (Z12.11) Colon cancer screening  Comment:   Plan: Fecal colorectal cancer screen (FIT)            Patient expressed understanding and agreement with treatment plan. All patient's questions were answered, will let me know if has more later.  Medications: Rx's: Reviewed the potential side effects/complications of medications prescribed.       Shalini Phan MD  [facility]

## 2017-08-31 ENCOUNTER — TELEPHONE (OUTPATIENT)
Dept: FAMILY MEDICINE | Facility: CLINIC | Age: 67
End: 2017-08-31

## 2017-08-31 DIAGNOSIS — N89.8 VAGINAL ITCHING: Primary | ICD-10-CM

## 2017-08-31 NOTE — TELEPHONE ENCOUNTER
Discussed cares  with pt over the phone. She was treated for BV earlier although her recent test was negative for any infection but we treated for possible yeast.although bc of her ongoing recurrent sx, suggest seeing gyn. Referral given. In the meantime she can OTC monistat as needed to help with her sx . She verbalizes understanding

## 2017-08-31 NOTE — TELEPHONE ENCOUNTER
Patient states that she was recently treated for yeast infection and states symptoms are better but not gone.  States still has some itching especially after urination and it will settle down and then get worse again.  States did complete 3 treatments as prescribed in OV with not complete resolve of symptoms.    Please advise with further recommendations for patient.  Soraya Amaro RN - Triage  [facility]    LOV 8/28/2017  L29.8) Vaginal itching  (primary encounter diagnosis)  Comment:   Plan: Wet prep, terconazole (TERAZOL 3) 0.8 % cream            wet prep (vaginal swab )  is negative , although her sx sounds like yeast, so she is willing to try Terazol-3 to see if helps. Cares and symptomatic treatment discussed follow up if problem

## 2017-08-31 NOTE — TELEPHONE ENCOUNTER
Reason for call:  Patient reporting a symptom    Symptom or request: Yeast infection, pt need to speak with a nurse regarding ongoing vaginal infection.    Duration (how long have symptoms been present): for one week    Have you been treated for this before? Yes    Additional comments: no    Phone Number patient can be reached at:  Cell number on file:    Telephone Information:   Mobile [phone]       Best Time:  anytime    Can we leave a detailed message on this number:  YES    Call taken on 8/31/2017 at 9:26 AM by Gi Lin

## 2017-09-05 ENCOUNTER — OFFICE VISIT (OUTPATIENT)
Dept: OBGYN | Facility: CLINIC | Age: 67
End: 2017-09-05
Payer: COMMERCIAL

## 2017-09-05 VITALS
SYSTOLIC BLOOD PRESSURE: 102 MMHG | DIASTOLIC BLOOD PRESSURE: 60 MMHG | BODY MASS INDEX: 29.13 KG/M2 | WEIGHT: 164.4 LBS | HEIGHT: 63 IN

## 2017-09-05 DIAGNOSIS — L29.2 VULVAR ITCHING: Primary | ICD-10-CM

## 2017-09-05 DIAGNOSIS — L65.9 HAIR LOSS: ICD-10-CM

## 2017-09-05 LAB
SPECIMEN SOURCE: NORMAL
WET PREP SPEC: NORMAL

## 2017-09-05 PROCEDURE — 87070 CULTURE OTHR SPECIMN AEROBIC: CPT | Performed by: OBSTETRICS & GYNECOLOGY

## 2017-09-05 PROCEDURE — 99203 OFFICE O/P NEW LOW 30 MIN: CPT | Mod: 25 | Performed by: OBSTETRICS & GYNECOLOGY

## 2017-09-05 PROCEDURE — 87210 SMEAR WET MOUNT SALINE/INK: CPT | Performed by: OBSTETRICS & GYNECOLOGY

## 2017-09-05 PROCEDURE — 88305 TISSUE EXAM BY PATHOLOGIST: CPT | Performed by: OBSTETRICS & GYNECOLOGY

## 2017-09-05 PROCEDURE — 36415 COLL VENOUS BLD VENIPUNCTURE: CPT | Performed by: OBSTETRICS & GYNECOLOGY

## 2017-09-05 PROCEDURE — 84443 ASSAY THYROID STIM HORMONE: CPT | Performed by: OBSTETRICS & GYNECOLOGY

## 2017-09-05 PROCEDURE — 87077 CULTURE AEROBIC IDENTIFY: CPT | Performed by: OBSTETRICS & GYNECOLOGY

## 2017-09-05 PROCEDURE — 87186 SC STD MICRODIL/AGAR DIL: CPT | Performed by: OBSTETRICS & GYNECOLOGY

## 2017-09-05 PROCEDURE — 56605 BIOPSY OF VULVA/PERINEUM: CPT | Performed by: OBSTETRICS & GYNECOLOGY

## 2017-09-05 ASSESSMENT — ANXIETY QUESTIONNAIRES
1. FEELING NERVOUS, ANXIOUS, OR ON EDGE: NOT AT ALL
2. NOT BEING ABLE TO STOP OR CONTROL WORRYING: NOT AT ALL
6. BECOMING EASILY ANNOYED OR IRRITABLE: NOT AT ALL
GAD7 TOTAL SCORE: 0
7. FEELING AFRAID AS IF SOMETHING AWFUL MIGHT HAPPEN: NOT AT ALL
3. WORRYING TOO MUCH ABOUT DIFFERENT THINGS: NOT AT ALL
5. BEING SO RESTLESS THAT IT IS HARD TO SIT STILL: NOT AT ALL

## 2017-09-05 ASSESSMENT — PATIENT HEALTH QUESTIONNAIRE - PHQ9
5. POOR APPETITE OR OVEREATING: NOT AT ALL
SUM OF ALL RESPONSES TO PHQ QUESTIONS 1-9: 1

## 2017-09-05 NOTE — PROGRESS NOTES
Consultation Requested by: Shalini Phan MD    Chief Complaint:                                                   Aracelis Muñoz is a 67 year old female who presents to clinic today for the following health issue(s):  Patient presents with:  Vaginal Problem: Pt states she has had vaginal itching on and off since early August. Pt was seen end of July and early August for possibly UTI. Everything was negative but pt was given antibiotics anyway. Had wetprep done at both visits and given cream which helped a little. Was travling and noticed discharge and odor which has cleared up. Now having more vaginal itching. Pt thinks may be related to hormones-states having hotflashes which she never had before and hair loss.        HPI:  Aracelis presents with vulvar itching for the past two months. Prior to her trip to [place] she had foul smelling vaginal discharge at the end of July. She was noted to have bacterial vaginosis at that time and was successfully treated. Although the discharge resolved she continued to have vulvar itching at the entrance to the vagina that seemed to be aggravated with urination. She denies any increased urinary frequency, denies painful urination. She denies any vaginal bleeding. She was evaluated with a repeat wet prep that was negative. Urine culture at the end of July was negative as well. She denies any history of vulvar disease in the past. She is also concerned about her progressive hair loss. She was last screened for thyroid disease in  and her TSH was normal at the time. She notes that her father and mother both had hypothyroidism requiring medication when they were older. She would like to be checked for this today as well.    Patient's last menstrual period was 2004..   Patient is not sexually active, .  Using menopause for contraception.    reports that she has never smoked. She has never used smokeless tobacco.      STD testing offered?  Declined    Health  maintenance updated:  yes    Today's PHQ-2 Score:   PHQ-2 ( 1999 Pfizer) 1/13/2017   Q1: Little interest or pleasure in doing things -   Q2: Feeling down, depressed or hopeless -   PHQ-2 Score -   Q1: Little interest or pleasure in doing things Several days   Q2: Feeling down, depressed or hopeless Not at all   PHQ-2 Score 1     Today's PHQ-9 Score:   PHQ-9 SCORE 9/5/2017   Total Score -   Total Score 1     Today's MARCELLE-7 Score:   MARCELLE-7 SCORE 9/5/2017   Total Score -   Total Score 0       Problem list and histories reviewed & adjusted, as indicated.  Additional history: as documented.    Patient Active Problem List   Diagnosis     Menopausal LMP age 54     Diffuse cystic mastopathy     Slow transit constipation     Lumbar disc herniation with radiculopathy     Post-Nasal Drainage     Perennial allergic rhinitis     Environmental allergies     Osteopenia     Generalized anxiety disorder     Mild persistent intrinsic asthma without status asthmaticus with acute exacerbation     Serum calcium elevated     Hyperparathyroidism (H)     Vitamin D deficiency     Hypercalcemia     Other insomnia     Hyperlipidemia with target LDL less than 130     Mild persistent asthma without complication     Major depressive disorder, recurrent episode, mild (H)     Eczema, unspecified type     Left knee pain, unspecified chronicity     Acute midline low back pain with left-sided sciatica     Lumbago     Right knee pain     Past Surgical History:   Procedure Laterality Date     C NONSPECIFIC PROCEDURE  1982    Cyst     C/SECTION, LOW TRANSVERSE      S/P C/S     CHOLECYSTECTOMY, OPEN  1985    Cholecystectomy     HC EXCISION BREAST LESION, OPEN >=1  1972    Benign mass right breast     HC MRI LUMBAR SPINE W/O CONTRAST  4/2010    multilevel degen disc disease/facet arthropathy, 5 mm caudally extruded left posterolateral disc herniation at L4-5 with impingement on the left L5 nerve root      HC PUNCTURE/ASPIRATION BREAST CYST, FIRST   ",,    bilateral '03, left '04     LIGATN/STRIP LONG & SHORT SAPHEN      varicose vein stripping      Social History   Substance Use Topics     Smoking status: Never Smoker     Smokeless tobacco: Never Used     Alcohol use 0.0 oz/week     0 Standard drinks or equivalent per week      Comment: 1-2 glasses of wine 1-2 times per week       Problem (# of Occurrences) Relation (Name,Age of Onset)    Allergies (2) Mother, Father    Cancer - colorectal (1) Father:  age 65 colon cancer    Depression (1) Father    Genitourinary Problems (1) Maternal Aunt: fibrocystic breast    Hypertension (1) Mother    Obesity (1) Father    Thyroid Disease (2) Mother, Father            Current Outpatient Prescriptions   Medication Sig     fluticasone-salmeterol (ADVAIR DISKUS) 250-50 MCG/DOSE diskus inhaler Inhale 1 puff into the lungs 2 times daily     Fexofenadine HCl (ALLEGRA PO)      azelastine (OPTIVAR) 0.05 % SOLN ophthalmic solution PLACE 1 DROP INTO BOTH EYES DAILY AS NEEDED IN BOTH EYES AS NEEDED     traZODone (DESYREL) 100 MG tablet TAKE 0.5-1 TABLETS ( MG) BY MOUTH NIGHTLY AS NEEDED FOR SLEEP     escitalopram (LEXAPRO) 20 MG tablet TAKE 1 TABLET (20 MG) BY MOUTH DAILY     alendronate (FOSAMAX) 70 MG tablet Take 1 tablet (70 mg) by mouth every 7 days     triamcinolone (KENALOG) 0.5 % cream Apply sparingly to affected area three times daily.     No current facility-administered medications for this visit.      Allergies   Allergen Reactions     Nefazodone Hydrochloride Swelling     (serzone) sore swollen tongue     Sulfa Drugs Swelling     Sore swollen tongue     Prochlorperazine Anxiety     (Compazine) became agitated       ROS:  12 point review of systems negative other than symptoms noted below.  Respiratory: Cough  Gastrointestinal: Constipation  Skin: Skin Dryness  Endocrine: Loss of Hair    OBJECTIVE:     /60  Ht 5' 3\" (1.6 m)  Wt 164 lb 6.4 oz (74.6 kg)  LMP 2004  BMI 29.12 " kg/m2  Body mass index is 29.12 kg/(m^2).    Exam:  Constitutional:  Appearance: Well nourished, well developed alert, in no acute distress  Chest:  Respiratory Effort:  Breathing unlabored  Cardiovascular: well perfused extremities, no peripheral edema  Back: no CVA tenderness bilaterally.  Gastrointestinal:  Abdominal Examination:  Abdomen nontender to palpation, tone normal without rigidity or guarding, no masses present, umbilicus without lesions; Liver/Spleen:  No hepatomegaly present, liver nontender to palpation; Hernias:  No hernias present  Skin:General Inspection:  No rashes present, no lesions present, no areas of discoloration; Genitalia and Groin:  No rashes present, no lesions present, no areas of discoloration, no masses present.  Neurologic/Psychiatric:  Mental Status:  Oriented X3   Pelvic Exam:  External Genitalia:     Atrophic vulva with thinning of the skin, no gross lesions. On Qtip application, endorses pruritus at posterior fourchette and along medial aspects of bilateral labia  Vagina:     Normal vaginal vault without central or paravaginal defects, ATROPHIC, thin white discharge in vaginal vault. Swabs collected  Bladder:     Nontender to palpation  Urethra:   Urethral Body:  Urethra palpation normal, urethra structural support normal   Urethral Meatus:  No erythema or lesions present  Cervix:     Appearance healthy, no lesions present,  no bleeding present    Perineum:     Perineum within normal limits, no evidence of trauma, no rashes or skin lesions present       In-Clinic Test Results:  Results for orders placed or performed in visit on 09/05/17 (from the past 24 hour(s))   Wet prep   Result Value Ref Range    Specimen Description Vagina     Wet Prep No Trichomonas seen     Wet Prep No clue cells seen     Wet Prep No yeast seen        ASSESSMENT/PLAN:                                                        ICD-10-CM    1. Vulvar itching L29.2 Wet prep     Wound Culture Aerobic Bacterial      Surgical pathology exam   2. Hair loss L65.9 TSH with free T4 reflex     As Aracelis's exam with her PCP was limited a wet prep was repeated today which was negative. An aerobic culture was collected as well for increased sensitivity. As aracelis is postmenopausal with no sign of candida on exam a vulvar biopsy was performed as noted below to rule out lichen sclerosus. She was informed that if positive will recommend topical clobetasol.    There are no Patient Instructions on file for this visit.        Ada Wnag MD  [facility]        INDICATIONS:                                                    Aracelis Muñoz is a 67 year old female that was found to have itching of her vulva.    Is a pregnancy test required: No.  Was a consent obtained?  Yes    Today's PHQ-2 Score:   PHQ-2 ( 1999 Pfizer) 1/13/2017   Q1: Little interest or pleasure in doing things -   Q2: Feeling down, depressed or hopeless -   PHQ-2 Score -   Q1: Little interest or pleasure in doing things Several days   Q2: Feeling down, depressed or hopeless Not at all   PHQ-2 Score 1       PROCEDURE:                                                      The area was prepped with Betadine. the area was injected with 3 cc's of 1%  Lidocaine without epinephrine. After adequate anesthesia was reached, the skin was flattened with one hand and a sample of the abnormal area was made with a 3 mm punch biopsy instrument.  The skin disc was elevated and scissors used to cut the underlying tissue.  The specimen was placed in formalin and sent to pathology for evaluation.  Pressure was applied to the biopsy site to control bleeding. Silver nitrate was applied.  Hemostasis was adequate.     POST PROCEDURE:                                                      She was observed.  She tolerated the procedure well. There were no complications. Patient was discharged in stable condition.    No douching, tampons or intercourse.  Call if  bleeding, swelling, pain, redness or fever occur.  Patient will be called with pathology results.    Ada Wang MD

## 2017-09-05 NOTE — Clinical Note
Ramo Phan. I saw Aracelis today and performed a vulvar biopsy. Her vaginal exam was not concerning for any vaginitis but I collected a general culture as well. Thank you for the referral! Ada Wang

## 2017-09-05 NOTE — MR AVS SNAPSHOT
After Visit Summary   9/5/2017    Aracelis Muñoz    MRN: [id number]           Patient Information     Date Of Birth          1950        Visit Information        Provider Department      9/5/2017 9:30 AM Ada Wang MD [facility]        Today's Diagnoses     Vulvar itching    -  1    Hair loss           Follow-ups after your visit        Follow-up notes from your care team     Return if symptoms worsen or fail to improve.      Your next 10 appointments already scheduled     Nov 24, 2017  2:00 PM CST   Office Visit with Ismael Adler MD   [facility] [place]    [address] [place] [id number] [phone]           Bring a current list of meds and any records pertaining to this visit. For Physicals, please bring immunization records and any forms needing to be filled out. Please arrive 10 minutes early to complete paperwork.              Who to contact     If you have questions or need follow up information about today's clinic visit or your schedule please contact [facility] directly at [phone].  Normal or non-critical lab and imaging results will be communicated to you by MyChart, letter or phone within 4 business days after the clinic has received the results. If you do not hear from us within 7 days, please contact the clinic through [employer] or phone. If you have a critical or abnormal lab result, we will notify you by phone as soon as possible.  Submit refill requests through [employer] ???? or call your pharmacy and they will forward the refill request to us. Please allow 3 business days for your refill to be completed.          Additional Information About Your Visit        MyChart Information     [employer] ???? gives you secure access to your electronic health record. If you see a primary care provider, you can also send messages to your care team and make appointments. If you  "have questions, please call your primary care clinic.  If you do not have a primary care provider, please call [phone] and they will assist you.        Care EveryWhere ID     This is your Care EveryWhere ID. This could be used by other organizations to access your [place] medical records  [id number]        Your Vitals Were     Height Last Period BMI (Body Mass Index)             5' 3\" (1.6 m) 11/01/2004 29.12 kg/m2          Blood Pressure from Last 3 Encounters:   09/05/17 102/60   08/28/17 108/69   08/01/17 119/76    Weight from Last 3 Encounters:   09/05/17 164 lb 6.4 oz (74.6 kg)   08/28/17 162 lb (73.5 kg)   08/01/17 165 lb (74.8 kg)              We Performed the Following     Surgical pathology exam     TSH with free T4 reflex     Wet prep     Wound Culture Aerobic Bacterial        Primary Care Provider Office Phone # Fax #    Shalini Darryl Phan [phone]       [address]  [place] [address]        Equal Access to Services     [facility]: Hadii aad ku hadasho Soally, waaxda luqadaha, qaybta kaalmarandy hoffman, starr jalloh . So [facility] [phone].    ATENCIÓN: Si habla español, tiene a perez disposición servicios gratuitos de asistencia lingüística. [facility] [phone].    We comply with applicable federal civil rights laws and [place] laws. We do not discriminate on the basis of race, color, national origin, age, disability sex, sexual orientation or gender identity.            Thank you!     Thank you for choosing [facility]  for your care. Our goal is always to provide you with excellent care. Hearing back from our patients is one way we can continue to improve our services. Please take a few minutes to complete the written survey that you may receive in the mail after your visit with us. Thank you!             Your Updated Medication List - Protect others around you: Learn how to safely use, store and throw away " your medicines at www.disposemymeds.org.          This list is accurate as of: 9/5/17 12:35 PM.  Always use your most recent med list.                   Brand Name Dispense Instructions for use Diagnosis    alendronate 70 MG tablet    FOSAMAX    4 tablet    Take 1 tablet (70 mg) by mouth every 7 days    Osteopenia       ALLEGRA PO           azelastine 0.05 % Soln ophthalmic solution    OPTIVAR    6 mL    PLACE 1 DROP INTO BOTH EYES DAILY AS NEEDED IN BOTH EYES AS NEEDED    Environmental allergies       escitalopram 20 MG tablet    LEXAPRO    90 tablet    TAKE 1 TABLET (20 MG) BY MOUTH DAILY    Major depressive disorder, recurrent episode, mild (H)       fluticasone-salmeterol 250-50 MCG/DOSE diskus inhaler    ADVAIR DISKUS    60 Inhaler    Inhale 1 puff into the lungs 2 times daily    Mild persistent asthma with exacerbation       traZODone 100 MG tablet    DESYREL    90 tablet    TAKE 0.5-1 TABLETS ( MG) BY MOUTH NIGHTLY AS NEEDED FOR SLEEP    Major depressive disorder, recurrent episode, mild (H), Other insomnia       triamcinolone 0.5 % cream    KENALOG    30 g    Apply sparingly to affected area three times daily.    Eczema, unspecified type

## 2017-09-06 LAB — TSH SERPL DL<=0.005 MIU/L-ACNC: 2.33 MU/L (ref 0.4–4)

## 2017-09-06 ASSESSMENT — ANXIETY QUESTIONNAIRES: GAD7 TOTAL SCORE: 0

## 2017-09-08 LAB
BACTERIA SPEC CULT: ABNORMAL
COPATH REPORT: NORMAL
Lab: ABNORMAL
SPECIMEN SOURCE: ABNORMAL

## 2017-09-11 DIAGNOSIS — N30.00 ACUTE CYSTITIS WITHOUT HEMATURIA: Primary | ICD-10-CM

## 2017-09-11 DIAGNOSIS — N76.1 SUBACUTE VAGINITIS: ICD-10-CM

## 2017-09-11 RX ORDER — LEVOFLOXACIN 250 MG/1
250 TABLET, FILM COATED ORAL DAILY
Qty: 3 TABLET | Refills: 0 | Status: SHIPPED | OUTPATIENT
Start: 2017-09-11 | End: 2017-12-28

## 2017-09-11 RX ORDER — CLINDAMYCIN PHOSPHATE 20 MG/G
CREAM VAGINAL
Qty: 40 G | Refills: 1 | Status: SHIPPED | OUTPATIENT
Start: 2017-09-11 | End: 2017-10-17

## 2017-09-11 NOTE — PROGRESS NOTES
Aracelis was called and the decision was made to treat a possible UTI that has developed since her last visit. And we are going to trial vaginal clindamycin for 2 weeks for inflammatory vaginitis.

## 2017-09-13 DIAGNOSIS — F33.0 MAJOR DEPRESSIVE DISORDER, RECURRENT EPISODE, MILD (H): ICD-10-CM

## 2017-09-13 DIAGNOSIS — G47.09 OTHER INSOMNIA: ICD-10-CM

## 2017-09-13 RX ORDER — TRAZODONE HYDROCHLORIDE 100 MG/1
TABLET ORAL
Qty: 90 TABLET | Refills: 1 | Status: SHIPPED | OUTPATIENT
Start: 2017-09-13 | End: 2018-03-07

## 2017-09-13 NOTE — TELEPHONE ENCOUNTER
Trazodone       Last Written Prescription Date: 3/14/17  Last Fill Quantity: 90; # refills: 1  Last Office Visit with [facility], [facility] or [facility] prescribing provider:  8/28/17   Next 5 appointments (look out 90 days)     Nov 24, 2017  2:00 PM CST   Office Visit with Ismael Adler MD   [facility] ([facility]    [address] [place] [id number] [phone]                   Last PHQ-9 score on record=   PHQ-9 SCORE 9/5/2017   Total Score -   Total Score 1       Lab Results   Component Value Date    AST 19 01/16/2017     Lab Results   Component Value Date    ALT 22 01/16/2017     Emily Arndt CMA

## 2017-09-13 NOTE — TELEPHONE ENCOUNTER
Routing refill request to provider for review/approval because:  Drug interaction warning    Maile Morgan RN  [facility]  [phone]

## 2017-10-16 ENCOUNTER — TELEPHONE (OUTPATIENT)
Dept: NURSING | Facility: CLINIC | Age: 67
End: 2017-10-16

## 2017-10-16 NOTE — TELEPHONE ENCOUNTER
Please let Aracelis know that she can refill the prescription. She may have a different infection such as a yeast infection so I recommend she keep the appointment so that I can send cultures.

## 2017-10-16 NOTE — TELEPHONE ENCOUNTER
"Pt last in clinic on 9/5/17 to see Dr. Wang for vaginal itching. Pt was treated with Clindamycin 2% cream. Pt's sx's went away and then on Thursday last wk her sx's started again. Only sx is vaginal itching \"right inside the vagina area\" and states that it's not nearly as bad as before. Denies unusual discharge, denies burning with urination, denies urinary frequency. Copy and pasted pt's result note below from her 9/5/17 visit. On 9/11/17 pt was prescribed: clindamycin (CLEOCIN) 2 % cream-Apply one application to the vagina at bed time for 2 weeks, 40g/1rf.     Routing to Dr. Wang, pt did schedule an appt with you tomorrow for her recurring vaginal itching. She does have a refill on the Clindamycin Rx, should she fill the Rx again it or come in for appt with you?          Notes Recorded by Ada Wang MD on 9/11/2017 at 8:35 AM  Ramo Hsu  Here are your results:  1. No thyroid disease  2. The biopsy did not show any skin conditions, just signs of scratching  3. I took a culture of the vagina and the bacteria shown are normally found in the vagina and bowel so nothing to treat  One treatment we can try is clindamycin cream, per vagina for 2 weeks to see if that gives you some relief. Let me know if you are interested and I can send it to your pharmacy of choice.  Best,  Ada Wang  "

## 2017-10-17 ENCOUNTER — OFFICE VISIT (OUTPATIENT)
Dept: OBGYN | Facility: CLINIC | Age: 67
End: 2017-10-17
Payer: COMMERCIAL

## 2017-10-17 VITALS — BODY MASS INDEX: 29.23 KG/M2 | HEIGHT: 63 IN | WEIGHT: 165 LBS

## 2017-10-17 DIAGNOSIS — N76.1 SUBACUTE VAGINITIS: Primary | ICD-10-CM

## 2017-10-17 LAB
GRAM STN SPEC: ABNORMAL
SPECIMEN SOURCE: ABNORMAL

## 2017-10-17 PROCEDURE — 87102 FUNGUS ISOLATION CULTURE: CPT | Performed by: OBSTETRICS & GYNECOLOGY

## 2017-10-17 PROCEDURE — 87205 SMEAR GRAM STAIN: CPT | Performed by: OBSTETRICS & GYNECOLOGY

## 2017-10-17 PROCEDURE — 99213 OFFICE O/P EST LOW 20 MIN: CPT | Performed by: OBSTETRICS & GYNECOLOGY

## 2017-10-17 RX ORDER — CLINDAMYCIN PHOSPHATE 20 MG/G
CREAM VAGINAL
Qty: 40 G | Refills: 3 | Status: SHIPPED | OUTPATIENT
Start: 2017-10-17 | End: 2017-12-28

## 2017-10-17 NOTE — PROGRESS NOTES
SUBJECTIVE:                                                   Aracelis Muñoz is a 67 year old female who presents to clinic today for the following health issue(s):  Patient presents with:  Vaginal Problem      HPI:  Aracelis experienced relief within 48 hours of using the clindamycin cream the last time. She used it for 14 days as prescribed. She started to have symptoms several days ago similar to her prior symptoms of vaginal itching without vaginal discharge. In September her wet prep was negative for yeast, trichomonas or bacterial vaginosis. She was treated presumptively for inflammatory vaginitis. She denies any increased urinary frequency or painful urination.    Patient's last menstrual period was 2004..   Patient is not sexually active, .  Using menopause for contraception.    reports that she has never smoked. She has never used smokeless tobacco.      STD testing offered?  Declined    Health maintenance updated:  yes    Today's PHQ-2 Score:   PHQ-2 (  [employer]) 2017   Q1: Little interest or pleasure in doing things -   Q2: Feeling down, depressed or hopeless -   PHQ-2 Score -   Q1: Little interest or pleasure in doing things Several days   Q2: Feeling down, depressed or hopeless Not at all   PHQ-2 Score 1     Today's PHQ-9 Score:   PHQ-9 SCORE 2017   Total Score -   Total Score 1     Today's MARCELLE-7 Score:   MARCELLE-7 SCORE 2017   Total Score -   Total Score 0       Problem list and histories reviewed & adjusted, as indicated.  Additional history: as documented.    Patient Active Problem List   Diagnosis     Menopausal LMP age 54     Diffuse cystic mastopathy     Slow transit constipation     Lumbar disc herniation with radiculopathy     Post-Nasal Drainage     Perennial allergic rhinitis     Environmental allergies     Osteopenia     Generalized anxiety disorder     Mild persistent intrinsic asthma without status asthmaticus with acute exacerbation     Serum calcium elevated      Hyperparathyroidism (H)     Vitamin D deficiency     Hypercalcemia     Other insomnia     Hyperlipidemia with target LDL less than 130     Mild persistent asthma without complication     Major depressive disorder, recurrent episode, mild (H)     Eczema, unspecified type     Left knee pain, unspecified chronicity     Acute midline low back pain with left-sided sciatica     Lumbago     Right knee pain     Past Surgical History:   Procedure Laterality Date     C NONSPECIFIC PROCEDURE      Cyst     C/SECTION, LOW TRANSVERSE      S/P C/S     CHOLECYSTECTOMY, OPEN      Cholecystectomy     HC EXCISION BREAST LESION, OPEN >=1      Benign mass right breast     HC MRI LUMBAR SPINE W/O CONTRAST  2010    multilevel degen disc disease/facet arthropathy, 5 mm caudally extruded left posterolateral disc herniation at L4-5 with impingement on the left L5 nerve root      HC PUNCTURE/ASPIRATION BREAST CYST, FIRST  ,,    bilateral '03, left      LIGATN/STRIP LONG & SHORT SAPHEN      varicose vein stripping      Social History   Substance Use Topics     Smoking status: Never Smoker     Smokeless tobacco: Never Used     Alcohol use 0.0 oz/week     0 Standard drinks or equivalent per week      Comment: 1-2 glasses of wine 1-2 times per week       Problem (# of Occurrences) Relation (Name,Age of Onset)    Allergies (2) Mother, Father    Cancer - colorectal (1) Father:  age 65 colon cancer    Depression (1) Father    Genitourinary Problems (1) Maternal Aunt: fibrocystic breast    Hypertension (1) Mother    Obesity (1) Father    Thyroid Disease (2) Mother, Father            Current Outpatient Prescriptions   Medication Sig     clindamycin (CLEOCIN) 2 % cream Apply one application to the vagina at bed time for 2 weeks     traZODone (DESYREL) 100 MG tablet TAKE 0.5-1 TABLETS BY MOUTH NIGHTLY AS NEEDED FOR SLEEP     levofloxacin (LEVAQUIN) 250 MG tablet Take 1 tablet (250 mg) by mouth daily      "fluticasone-salmeterol (ADVAIR DISKUS) 250-50 MCG/DOSE diskus inhaler Inhale 1 puff into the lungs 2 times daily     Fexofenadine HCl (ALLEGRA PO)      azelastine (OPTIVAR) 0.05 % SOLN ophthalmic solution PLACE 1 DROP INTO BOTH EYES DAILY AS NEEDED IN BOTH EYES AS NEEDED     escitalopram (LEXAPRO) 20 MG tablet TAKE 1 TABLET (20 MG) BY MOUTH DAILY     alendronate (FOSAMAX) 70 MG tablet Take 1 tablet (70 mg) by mouth every 7 days     triamcinolone (KENALOG) 0.5 % cream Apply sparingly to affected area three times daily.     No current facility-administered medications for this visit.      Allergies   Allergen Reactions     Nefazodone Hydrochloride Swelling     (serzone) sore swollen tongue     Sulfa Drugs Swelling     Sore swollen tongue     Prochlorperazine Anxiety     (Compazine) became agitated       ROS:  12 point review of systems negative other than symptoms noted below.  Genitourinary: Vaginal Dryness and Vaginal Itching    OBJECTIVE:     Ht 5' 3\" (1.6 m)  Wt 165 lb (74.8 kg)  LMP 11/01/2004  BMI 29.23 kg/m2  Body mass index is 29.23 kg/(m^2).    Exam:  GEN: NAD  CV: extremities are well perfused  RESP: non-labored respirations  :  Vulva: Atrophic labia with no visible lesions or abnormal discharge  Vagina: arugous with small amount of thin white discharge in fornix  Cervix; appears normal, no lesions noted. No vaginal bleeding    In-Clinic Test Results:  No results found for this or any previous visit (from the past 24 hour(s)).    ASSESSMENT/PLAN:                                                        ICD-10-CM    1. Subacute vaginitis N76.1 clindamycin (CLEOCIN) 2 % cream     Yeast culture     Gram stain   Will initiate another two weeks of treatment with the clindamycin cream as this worked before. This time a yeast culture and a gram stain were sent. If both are negative, I discussed with Aracelis going on chronic clindamycin cream on a weekly basis after this initial two weeks of treatment. Will " follow up with the gram stain and yeast culture results.  We also discussed proper cleaning and adding on rinsing with water externally after bowel movements to decrease bacteria load on the perineum.    There are no Patient Instructions on file for this visit.        Ada Wang MD  [facility]

## 2017-10-17 NOTE — MR AVS SNAPSHOT
"              After Visit Summary   10/17/2017    Aracelis Muñoz    MRN: [id number]           Patient Information     Date Of Birth          1950        Visit Information        Provider Department      10/17/2017 2:10 PM Ada Wang MD [facility]        Today's Diagnoses     Subacute vaginitis    -  1       Follow-ups after your visit        Follow-up notes from your care team     Return if symptoms worsen or fail to improve.      Who to contact     If you have questions or need follow up information about today's clinic visit or your schedule please contact [facility] directly at [phone].  Normal or non-critical lab and imaging results will be communicated to you by MyChart, letter or phone within 4 business days after the clinic has received the results. If you do not hear from us within 7 days, please contact the clinic through [employer] [facility] or phone. If you have a critical or abnormal lab result, we will notify you by phone as soon as possible.  Submit refill requests through [employer] or call your pharmacy and they will forward the refill request to us. Please allow 3 business days for your refill to be completed.          Additional Information About Your Visit        MyChart Information     [employer] gives you secure access to your electronic health record. If you see a primary care provider, you can also send messages to your care team and make appointments. If you have questions, please call your primary care clinic.  If you do not have a primary care provider, please call [phone] and they will assist you.        Care EveryWhere ID     This is your Care EveryWhere ID. This could be used by other organizations to access your [place] medical records  [id number]        Your Vitals Were     Height Last Period BMI (Body Mass Index)             5' 3\" (1.6 m) 11/01/2004 29.23 kg/m2          Blood Pressure from Last 3 Encounters:   09/05/17 " 102/60   08/28/17 108/69   08/01/17 119/76    Weight from Last 3 Encounters:   10/17/17 165 lb (74.8 kg)   09/05/17 164 lb 6.4 oz (74.6 kg)   08/28/17 162 lb (73.5 kg)              We Performed the Following     Gram stain     Yeast culture          Where to get your medicines      These medications were sent to [facility]/pharmacy #[id number] - [place], [place] - [address]  [address] [place] [address]     Phone:  [phone]     clindamycin 2 % cream          Primary Care Provider Office Phone # Fax #    Shalini Darryl Phan [phone]       [address]  [place] [address]        Equal Access to Services     [facility]: Hadii aad ku hadasho Soally, waaxda luqadaha, qaybta kaalmada conradoyarandy, starr jalloh . So [facility] [phone].    ATENCIÓN: Si habla español, tiene a perez disposición servicios gratuitos de asistencia lingüística. [facility] [phone].    We comply with applicable federal civil rights laws and [place] laws. We do not discriminate on the basis of race, color, national origin, age, disability, sex, sexual orientation, or gender identity.            Thank you!     Thank you for choosing [facility] [place]  for your care. Our goal is always to provide you with excellent care. Hearing back from our patients is one way we can continue to improve our services. Please take a few minutes to complete the written survey that you may receive in the mail after your visit with us. Thank you!             Your Updated Medication List - Protect others around you: Learn how to safely use, store and throw away your medicines at www.disposemymeds.org.          This list is accurate as of: 10/17/17  3:43 PM.  Always use your most recent med list.                   Brand Name Dispense Instructions for use Diagnosis    alendronate 70 MG tablet    FOSAMAX    4 tablet    Take 1 tablet (70 mg) by mouth every 7 days    Osteopenia        ALLEGRA PO           azelastine 0.05 % Soln ophthalmic solution    OPTIVAR    6 mL    PLACE 1 DROP INTO BOTH EYES DAILY AS NEEDED IN BOTH EYES AS NEEDED    Environmental allergies       clindamycin 2 % cream    CLEOCIN    40 g    Apply one application to the vagina at bed time for 2 weeks    Subacute vaginitis       escitalopram 20 MG tablet    LEXAPRO    90 tablet    TAKE 1 TABLET (20 MG) BY MOUTH DAILY    Major depressive disorder, recurrent episode, mild (H)       fluticasone-salmeterol 250-50 MCG/DOSE diskus inhaler    ADVAIR DISKUS    60 Inhaler    Inhale 1 puff into the lungs 2 times daily    Mild persistent asthma with exacerbation       levofloxacin 250 MG tablet    LEVAQUIN    3 tablet    Take 1 tablet (250 mg) by mouth daily    Acute cystitis without hematuria       traZODone 100 MG tablet    DESYREL    90 tablet    TAKE 0.5-1 TABLETS BY MOUTH NIGHTLY AS NEEDED FOR SLEEP    Major depressive disorder, recurrent episode, mild (H), Other insomnia       triamcinolone 0.5 % cream    KENALOG    30 g    Apply sparingly to affected area three times daily.    Eczema, unspecified type

## 2017-10-21 ENCOUNTER — MYC MEDICAL ADVICE (OUTPATIENT)
Dept: OBGYN | Facility: CLINIC | Age: 67
End: 2017-10-21

## 2017-10-21 DIAGNOSIS — R30.0 DYSURIA: Primary | ICD-10-CM

## 2017-10-23 ENCOUNTER — TELEPHONE (OUTPATIENT)
Dept: NURSING | Facility: CLINIC | Age: 67
End: 2017-10-23

## 2017-10-23 LAB
SPECIMEN SOURCE: NORMAL
YEAST SPEC QL CULT: NORMAL

## 2017-10-23 RX ORDER — NITROFURANTOIN 25; 75 MG/1; MG/1
100 CAPSULE ORAL 2 TIMES DAILY
Qty: 14 CAPSULE | Refills: 0 | Status: SHIPPED | OUTPATIENT
Start: 2017-10-23 | End: 2017-12-28

## 2017-10-23 NOTE — TELEPHONE ENCOUNTER
10/17/17 Subacute Vaginitis. Please see my chart message below. Routing to Dr Wang. Please advise.     10/17/17 Will initiate another two weeks of treatment with the clindamycin cream as this worked before. This time a yeast culture and a gram stain were sent. If both are negative, I discussed with Aracelis going on chronic clindamycin cream on a weekly basis after this initial two weeks of treatment. Will follow up with the gram stain and yeast culture results.  We also discussed proper cleaning and adding on rinsing with water externally after bowel movements to decrease bacteria load on the perineum.

## 2017-10-23 NOTE — TELEPHONE ENCOUNTER
Attempted to call Maile back but there was no answer. Macrobid has been sent to her pharmacy. Will await more communication from her before changing clindamycin to hydrocortisone cream.

## 2017-10-24 ENCOUNTER — ALLIED HEALTH/NURSE VISIT (OUTPATIENT)
Dept: NURSING | Facility: CLINIC | Age: 67
End: 2017-10-24
Payer: COMMERCIAL

## 2017-10-24 DIAGNOSIS — Z23 NEED FOR PROPHYLACTIC VACCINATION AND INOCULATION AGAINST INFLUENZA: Primary | ICD-10-CM

## 2017-10-24 PROCEDURE — 99207 ZZC NO CHARGE NURSE ONLY: CPT

## 2017-10-24 PROCEDURE — G0008 ADMIN INFLUENZA VIRUS VAC: HCPCS

## 2017-10-24 PROCEDURE — 90662 IIV NO PRSV INCREASED AG IM: CPT

## 2017-10-24 NOTE — MR AVS SNAPSHOT
After Visit Summary   10/24/2017    Aracelis Muñoz    MRN: [id number]           Patient Information     Date Of Birth          1950        Visit Information        Provider Department      10/24/2017 1:00 PM HAROON MARKS/LPN [facility] [place]        Today's Diagnoses     Need for prophylactic vaccination and inoculation against influenza    -  1       Follow-ups after your visit        Who to contact     If you have questions or need follow up information about today's clinic visit or your schedule please contact [facility] [place] directly at [phone].  Normal or non-critical lab and imaging results will be communicated to you by [employer], letter or phone within 4 business days after the clinic has received the results. If you do not hear from us within 7 days, please contact the clinic through [employer] or phone. If you have a critical or abnormal lab result, we will notify you by phone as soon as possible.  Submit refill requests through [employer] or call your pharmacy and they will forward the refill request to us. Please allow 3 business days for your refill to be completed.          Additional Information About Your Visit        MyChart Information     [employer] gives you secure access to your electronic health record. If you see a primary care provider, you can also send messages to your care team and make appointments. If you have questions, please call your primary care clinic.  If you do not have a primary care provider, please call [phone] and they will assist you.        Care EveryWhere ID     This is your Care EveryWhere ID. This could be used by other organizations to access your [place] medical records  [id number]        Your Vitals Were     Last Period                   11/01/2004            Blood Pressure from Last 3 Encounters:   09/05/17 102/60   08/28/17 108/69   08/01/17 119/76    Weight from Last 3 Encounters:   10/17/17 165 lb (74.8 kg)   09/05/17 164  lb 6.4 oz (74.6 kg)   08/28/17 162 lb (73.5 kg)              We Performed the Following     ADMIN INFLUENZA (For MEDICARE Patients ONLY) []     FLU VACCINE, INCREASED ANTIGEN, PRESV FREE, AGE 65+ [29456]        Primary Care Provider Office Phone # Fax #    Shalini Darryl Phan [phone]       [address]  [place] [address]        Equal Access to Services     [facility] : Hadii aad ku hadasho Soomaali, waaxda luqadaha, qaybta kaalmada adeegyada, waxay idiin hayaan adeeg kharash la'aan . So [facility] [phone].    ATENCIÓN: Si habla español, tiene a perez disposición servicios gratuitos de asistencia lingüística. [facility] [phone].    We comply with applicable federal civil rights laws and [place] laws. We do not discriminate on the basis of race, color, national origin, age, disability, sex, sexual orientation, or gender identity.            Thank you!     Thank you for choosing [facility] [place]  for your care. Our goal is always to provide you with excellent care. Hearing back from our patients is one way we can continue to improve our services. Please take a few minutes to complete the written survey that you may receive in the mail after your visit with us. Thank you!             Your Updated Medication List - Protect others around you: Learn how to safely use, store and throw away your medicines at www.disposemymeds.org.          This list is accurate as of: 10/24/17  1:14 PM.  Always use your most recent med list.                   Brand Name Dispense Instructions for use Diagnosis    alendronate 70 MG tablet    FOSAMAX    4 tablet    Take 1 tablet (70 mg) by mouth every 7 days    Osteopenia       ALLEGRA PO           azelastine 0.05 % Soln ophthalmic solution    OPTIVAR    6 mL    PLACE 1 DROP INTO BOTH EYES DAILY AS NEEDED IN BOTH EYES AS NEEDED    Environmental allergies       clindamycin 2 % cream    CLEOCIN    40 g    Apply one application to the vagina  at bed time for 2 weeks    Subacute vaginitis       escitalopram 20 MG tablet    LEXAPRO    90 tablet    TAKE 1 TABLET (20 MG) BY MOUTH DAILY    Major depressive disorder, recurrent episode, mild (H)       fluticasone-salmeterol 250-50 MCG/DOSE diskus inhaler    ADVAIR DISKUS    60 Inhaler    Inhale 1 puff into the lungs 2 times daily    Mild persistent asthma with exacerbation       levofloxacin 250 MG tablet    LEVAQUIN    3 tablet    Take 1 tablet (250 mg) by mouth daily    Acute cystitis without hematuria       nitroFURantoin (macrocrystal-monohydrate) 100 MG capsule    MACROBID    14 capsule    Take 1 capsule (100 mg) by mouth 2 times daily    Dysuria       traZODone 100 MG tablet    DESYREL    90 tablet    TAKE 0.5-1 TABLETS BY MOUTH NIGHTLY AS NEEDED FOR SLEEP    Major depressive disorder, recurrent episode, mild (H), Other insomnia       triamcinolone 0.5 % cream    KENALOG    30 g    Apply sparingly to affected area three times daily.    Eczema, unspecified type

## 2017-10-24 NOTE — PROGRESS NOTES

## 2017-10-25 ENCOUNTER — TELEPHONE (OUTPATIENT)
Dept: NURSING | Facility: CLINIC | Age: 67
End: 2017-10-25

## 2017-10-25 NOTE — TELEPHONE ENCOUNTER
Pt calling stating that the UTI sx's have subsided but the vaginal itching is still there. The vaginal itching has gotten a little better but still there. Pt aware Dr. Wang is in surgery today and if doesn't hear back today as she may check messages between cases then tomorrow. Pt voiced understanding and prefers it to be addressed by Dr. Wang and not on-call. Informed pt call with any concerns. Routing to Dr. Wang to review/advise.

## 2017-10-26 DIAGNOSIS — N89.8 ITCHING OF VAGINA: Primary | ICD-10-CM

## 2017-10-26 RX ORDER — HYDROCORTISONE ACETATE 25 MG/1
SUPPOSITORY RECTAL
Qty: 28 SUPPOSITORY | Refills: 1 | Status: SHIPPED | OUTPATIENT
Start: 2017-10-26 | End: 2017-12-28

## 2017-10-26 RX ORDER — FLUCONAZOLE 150 MG/1
150 TABLET ORAL ONCE
Qty: 1 TABLET | Refills: 0 | Status: SHIPPED | OUTPATIENT
Start: 2017-10-26 | End: 2018-01-18

## 2017-10-26 NOTE — PROGRESS NOTES
Discussed with Aracelis about her continued symptoms of itching not responding to the clindamycin cream. Will give her a one time dose of fluconazole followed by hydrocortisone suppositories to be placed in the vagina.

## 2017-10-27 ENCOUNTER — TELEPHONE (OUTPATIENT)
Dept: OBGYN | Facility: CLINIC | Age: 67
End: 2017-10-27

## 2017-10-27 NOTE — TELEPHONE ENCOUNTER
Called the pt. She is requesting an alternative medication due to cost of over $300 for the anusol-HC vaginal suppositories. She said that she was told by the pharmacy that they sent a request for reduction in cost to us- (possibly request for PA) I do not see any notation in the notes. Pt says that she is slightly better with the use of the other medication. Due to the clinic being closed- I will send a note to Dr Wang regarding alternative medication. We will get back to the pt next week after we heard back from the Dr.  Routing to Dr Wang to advise for possible alternative medication.

## 2017-12-08 ENCOUNTER — TRANSFERRED RECORDS (OUTPATIENT)
Dept: HEALTH INFORMATION MANAGEMENT | Facility: CLINIC | Age: 67
End: 2017-12-08

## 2017-12-14 ENCOUNTER — HOSPITAL ENCOUNTER (OUTPATIENT)
Dept: MAMMOGRAPHY | Facility: CLINIC | Age: 67
Discharge: HOME | End: 2017-12-14
Attending: FAMILY MEDICINE | Admitting: FAMILY MEDICINE
Payer: MEDICARE

## 2017-12-14 DIAGNOSIS — Z12.31 VISIT FOR SCREENING MAMMOGRAM: ICD-10-CM

## 2017-12-14 PROCEDURE — 77063 BREAST TOMOSYNTHESIS BI: CPT

## 2017-12-28 ENCOUNTER — OFFICE VISIT (OUTPATIENT)
Dept: FAMILY MEDICINE | Facility: CLINIC | Age: 67
End: 2017-12-28
Payer: COMMERCIAL

## 2017-12-28 ENCOUNTER — TELEPHONE (OUTPATIENT)
Dept: FAMILY MEDICINE | Facility: CLINIC | Age: 67
End: 2017-12-28

## 2017-12-28 VITALS
HEIGHT: 63 IN | WEIGHT: 163 LBS | DIASTOLIC BLOOD PRESSURE: 69 MMHG | BODY MASS INDEX: 28.88 KG/M2 | SYSTOLIC BLOOD PRESSURE: 107 MMHG | RESPIRATION RATE: 14 BRPM | TEMPERATURE: 97.3 F | HEART RATE: 67 BPM | OXYGEN SATURATION: 100 %

## 2017-12-28 DIAGNOSIS — Z12.11 SCREEN FOR COLON CANCER: ICD-10-CM

## 2017-12-28 DIAGNOSIS — Z01.818 PREOP GENERAL PHYSICAL EXAM: Primary | ICD-10-CM

## 2017-12-28 DIAGNOSIS — Z91.81 AT RISK FOR FALLING: ICD-10-CM

## 2017-12-28 DIAGNOSIS — H25.89 OTHER AGE-RELATED CATARACT OF BOTH EYES: ICD-10-CM

## 2017-12-28 PROCEDURE — 99214 OFFICE O/P EST MOD 30 MIN: CPT | Performed by: FAMILY MEDICINE

## 2017-12-28 NOTE — TELEPHONE ENCOUNTER
Pt calling to let you know to fax the Pre Op info to #[phone]. Phone # [phone]  [facility], Dr Falcon.

## 2017-12-28 NOTE — PROGRESS NOTES
[facility] [place]  [address] [address]  [phone]  Dept: [phone]    PRE-OP EVALUATION:  Today's date: 2017    Aracelis Muñoz (: 1950) presents for pre-operative evaluation assessment as requested by Dr. Devan Falcon.  She requires evaluation and anesthesia risk assessment prior to undergoing surgery/procedure.  Proposed procedure: Cataract surgery  Rt  first and then left  couple of weeks later     Date of Surgery/ Procedure: 18, 18  Time of Surgery/ Procedure: [facility]   Hospital/Surgical Facility: [facility]   Fax number for surgical facility: pt will call with fax number   Primary Physician: Shalini Phan  Type of Anesthesia Anticipated: to be determined    Patient has a Health Care Directive or Living Will:  YES    1. NO - Do you have a history of heart attack, stroke, stent, bypass or surgery on an artery in the head, neck, heart or legs?  2. NO - Do you ever have any pain or discomfort in your chest?  3. NO - Do you have a history of  Heart Failure?  4. YES - Are you troubled by shortness of breath when: walking on the level, up a slight hill or at night?  Sob mostly Asthma related sometimes , but asthma is well controlled on current med's   5. NO - Do you currently have a cold, bronchitis or other respiratory infection?  6. NO - Do you have a cough, shortness of breath or wheezing?  7. YES- Do you sometimes get pains in the calves of your legs when you walk? Mostly knee arthritis related, seeing ortho and improving   8. NO - Do you or anyone in your family have previous history of blood clots?  9. NO - Do you or does anyone in your family have a serious bleeding problem such as prolonged bleeding following surgeries or cuts?  10. NO - Have you ever had problems with anemia or been told to take iron pills?  11. NO - Have you had any abnormal blood loss such as black, tarry or bloody stools, or abnormal vaginal  bleeding?  12. YES - Have you ever had a blood transfusion? After child birth 1982   13. NO - Have you or any of your relatives ever had problems with anesthesia?  14. NO - Do you have sleep apnea, excessive snoring or daytime drowsiness?  15. NO - Do you have any prosthetic heart valves?  16. NO - Do you have prosthetic joints?  17. NO - Is there any chance that you may be pregnant?        HPI:                                                      Brief HPI related to upcoming procedure:  Having Cataract surgery  Rt  first and then left  couple of weeks later       See problem list for active medical problems.  Problems all longstanding and stable, except as noted/documented.  See ROS for pertinent symptoms related to these conditions.                                                                                                  .    MEDICAL HISTORY:                                                    Patient Active Problem List    Diagnosis Date Noted     Lumbago 01/19/2017     Priority: Medium     Right knee pain 01/19/2017     Priority: Medium     Left knee pain, unspecified chronicity 01/16/2017     Priority: Medium     Acute midline low back pain with left-sided sciatica 01/16/2017     Priority: Medium     Mild persistent asthma without complication 06/27/2016     Priority: Medium     Major depressive disorder, recurrent episode, mild (H) 06/27/2016     Priority: Medium     Eczema, unspecified type 06/27/2016     Priority: Medium     rt leg        Hyperlipidemia with target LDL less than 130 01/28/2016     Priority: Medium     Other insomnia 12/21/2015     Priority: Medium     Hypercalcemia 10/30/2013     Priority: Medium     Vitamin D deficiency 09/30/2013     Priority: Medium     Hyperparathyroidism (H) 09/24/2013     Priority: Medium     Serum calcium elevated 09/22/2013     Priority: Medium     Mild persistent intrinsic asthma without status asthmaticus with acute exacerbation 04/25/2013     Priority:  Medium     Generalized anxiety disorder 05/12/2011     Priority: Medium     Diagnosis updated by automated process. Provider to review and confirm.       Environmental allergies 04/22/2011     Priority: Medium     Osteopenia      Priority: Medium     Diffuse cystic mastopathy      Priority: Medium     bilateral       Slow transit constipation      Priority: Medium     Lumbar disc herniation with radiculopathy      Priority: Medium     left L4-5       Post-Nasal Drainage      Priority: Medium     chronic       Perennial allergic rhinitis      Priority: Medium     Menopausal LMP age 54      Priority: Medium      Past Medical History:   Diagnosis Date     Depressive disorder, not elsewhere classified     improved with trazadone     Diffuse cystic mastopathy     bilateral     Insomnia, unspecified      trazodone     Lumbar disc herniation with radiculopathy 2010    left L4-5     Mild persistent asthma      Osteopenia 2008     Perennial allergic rhinitis      Post-Nasal Drainage     chronic     Slow transit constipation      Past Surgical History:   Procedure Laterality Date     C NONSPECIFIC PROCEDURE  1982    Cyst     C/SECTION, LOW TRANSVERSE      S/P C/S     CHOLECYSTECTOMY, OPEN  1985    Cholecystectomy     HC EXCISION BREAST LESION, OPEN >=1  1972    Benign mass right breast     HC MRI LUMBAR SPINE W/O CONTRAST  4/2010    multilevel degen disc disease/facet arthropathy, 5 mm caudally extruded left posterolateral disc herniation at L4-5 with impingement on the left L5 nerve root      HC PUNCTURE/ASPIRATION BREAST CYST, FIRST  1995,12/03,8/04    bilateral '03, left '04     LIGATN/STRIP LONG & SHORT SAPHEN      varicose vein stripping     Current Outpatient Prescriptions   Medication Sig Dispense Refill     hydrocortisone (ANUSOL-HC) 25 MG Suppository Place one suppository in the vagina at night. 28 suppository 1     nitroFURantoin, macrocrystal-monohydrate, (MACROBID) 100 MG capsule Take 1 capsule (100 mg) by mouth 2  times daily 14 capsule 0     clindamycin (CLEOCIN) 2 % cream Apply one application to the vagina at bed time for 2 weeks 40 g 3     traZODone (DESYREL) 100 MG tablet TAKE 0.5-1 TABLETS BY MOUTH NIGHTLY AS NEEDED FOR SLEEP 90 tablet 1     levofloxacin (LEVAQUIN) 250 MG tablet Take 1 tablet (250 mg) by mouth daily 3 tablet 0     fluticasone-salmeterol (ADVAIR DISKUS) 250-50 MCG/DOSE diskus inhaler Inhale 1 puff into the lungs 2 times daily 60 Inhaler 5     Fexofenadine HCl (ALLEGRA PO)        azelastine (OPTIVAR) 0.05 % SOLN ophthalmic solution PLACE 1 DROP INTO BOTH EYES DAILY AS NEEDED IN BOTH EYES AS NEEDED 6 mL 3     escitalopram (LEXAPRO) 20 MG tablet TAKE 1 TABLET (20 MG) BY MOUTH DAILY 90 tablet 1     alendronate (FOSAMAX) 70 MG tablet Take 1 tablet (70 mg) by mouth every 7 days 4 tablet prn     triamcinolone (KENALOG) 0.5 % cream Apply sparingly to affected area three times daily. 30 g 0     OTC products: None, except as noted above    Allergies   Allergen Reactions     Nefazodone Hydrochloride Swelling     (serzone) sore swollen tongue     Sulfa Drugs Swelling     Sore swollen tongue     Prochlorperazine Anxiety     (Compazine) became agitated      Latex Allergy: NO    Social History   Substance Use Topics     Smoking status: Never Smoker     Smokeless tobacco: Never Used     Alcohol use 0.0 oz/week     0 Standard drinks or equivalent per week      Comment: 1-2 glasses of wine 1-2 times per week      History   Drug Use No       REVIEW OF SYSTEMS:                                                    C: NEGATIVE for fever, chills, change in weight  I: NEGATIVE for worrisome rashes, moles or lesions  E: NEGATIVE for vision changes or irritation  E/M: NEGATIVE for ear, mouth and throat problems  R: NEGATIVE for significant cough or SOB  B: NEGATIVE for masses, tenderness or discharge  CV: NEGATIVE for chest pain, palpitations or peripheral edema  GI: NEGATIVE for nausea, abdominal pain, heartburn, or change in  bowel habits  : NEGATIVE for frequency, dysuria, or hematuria  MUSCULOSKELETAL:POSITIVE  for  Arthritis   N: NEGATIVE for weakness, dizziness or paresthesias  E: NEGATIVE for temperature intolerance, skin/hair changes  H: NEGATIVE for bleeding problems  P: NEGATIVE for changes in mood or affect    EXAM:                                                    LMP 11/01/2004    GENERAL APPEARANCE: healthy, alert and no distress     EYES: grossly normal,, no erythema      HENT: ear canals and TM's normal  and mouth without ulcers or lesions     NECK: no adenopathy, no asymmetry, masses, or scars and thyroid normal to palpation     RESP: lungs clear to auscultation - no rales, rhonchi or wheezes     CV: regular rates and rhythm, normal S1 S2, no S3 or S4 and no murmur,     ABDOMEN:  soft, nontender, no HSM or masses and bowel sounds normal     MS: no  ankle edema     SKIN: no suspicious lesions or rashes     NEURO: Normal strength and tone, sensory exam grossly normal, mentation intact and speech normal     PSYCH: mentation appears normal. and affect normal/bright    DIAGNOSTICS:                                                    No labs or EKG required for low risk surgery (cataract, skin procedure, breast biopsy, etc)    Recent Labs   Lab Test  01/16/17   0941  12/21/15   1409   09/05/13   0733  05/12/11   0917   HGB   --    --    --   14.0  14.5   PLT   --    --    --   271  263   NA  141  139   < >  145*  139   POTASSIUM  4.3  3.9   < >  4.5  4.7   CR  0.80  0.79   < >  0.77  0.86    < > = values in this interval not displayed.        IMPRESSION:                                                      (Z01.818) Preop general physical exam  (primary encounter diagnosis)  Comment:   Plan:     (Z12.11) Screen for colon cancer  Comment:   Plan: Fecal colorectal cancer screen FIT      (H25.89) Other age-related cataract of both eyes  Comment:   Plan: ok for surgery     The proposed surgical procedure is considered INTERMEDIATE  risk.    REVISED CARDIAC RISK INDEX  The patient has the following serious cardiovascular risks for perioperative complications such as (MI, PE, VFib and 3  AV Block):  No serious cardiac risks  INTERPRETATION: 1 risks: Class II (low risk - 0.9% complication rate)    The patient has the following additional risks for perioperative complications:  No identified additional risks        RECOMMENDATIONS:                                                          APPROVAL GIVEN to proceed with proposed procedure, without further diagnostic evaluation       Signed Electronically by: Shalini Phan MD    Ok for surgery . Shalini Phan MD  02/06/2018     Copy of this evaluation report is provided to requesting physician.    [facility] Preop Guidelines

## 2017-12-28 NOTE — MR AVS SNAPSHOT
After Visit Summary   12/28/2017    Aracelis Muñoz    MRN: [id number]           Patient Information     Date Of Birth          1950        Visit Information        Provider Department      12/28/2017 9:40 AM Shalini Phan MD [facility] [place]        Today's Diagnoses     Preop general physical exam    -  1    Screen for colon cancer        At risk for falling        Other age-related cataract of both eyes          Care Instructions      Before Your Surgery      Call your surgeon if there is any change in your health. This includes signs of a cold or flu (such as a sore throat, runny nose, cough, rash or fever).    Do not smoke, drink alcohol or take over the counter medicine (unless your surgeon or primary care doctor tells you to) for the 24 hours before and after surgery.    If you take prescribed drugs: Follow your doctor s orders about which medicines to take and which to stop until after surgery.    Eating and drinking prior to surgery: follow the instructions from your surgeon    Take a shower or bath the night before surgery. Use the soap your surgeon gave you to gently clean your skin. If you do not have soap from your surgeon, use your regular soap. Do not shave or scrub the surgery site.  Wear clean pajamas and have clean sheets on your bed.           Follow-ups after your visit        Future tests that were ordered for you today     Open Future Orders        Priority Expected Expires Ordered    Fecal colorectal cancer screen FIT Routine 1/18/2018 3/22/2018 12/28/2017            Who to contact     If you have questions or need follow up information about today's clinic visit or your schedule please contact [facility] [place] directly at [phone].  Normal or non-critical lab and imaging results will be communicated to you by MyChart, letter or phone within 4 business days after the clinic has received the results. If you do not hear from us within 7  "days, please contact the clinic through [employer] or phone. If you have a critical or abnormal lab result, we will notify you by phone as soon as possible.  Submit refill requests through [employer] or call your pharmacy and they will forward the refill request to us. Please allow 3 business days for your refill to be completed.          Additional Information About Your Visit        Inmobiliariehart Information     [employer] gives you secure access to your electronic health record. If you see a primary care provider, you can also send messages to your care team and make appointments. If you have questions, please call your primary care clinic.  If you do not have a primary care provider, please call [phone] and they will assist you.        Care EveryWhere ID     This is your Care EveryWhere ID. This could be used by other organizations to access your [place] medical records  [id number]        Your Vitals Were     Pulse Temperature Respirations Height Last Period Pulse Oximetry    67 97.3  F (36.3  C) (Tympanic) 14 5' 3\" (1.6 m) 11/01/2004 100%    BMI (Body Mass Index)                   28.87 kg/m2            Blood Pressure from Last 3 Encounters:   12/28/17 107/69   09/05/17 102/60   08/28/17 108/69    Weight from Last 3 Encounters:   12/28/17 163 lb (73.9 kg)   10/17/17 165 lb (74.8 kg)   09/05/17 164 lb 6.4 oz (74.6 kg)               Primary Care Provider Office Phone # Fax #    Shalini Darryl Phan [phone]       [address] [place] [facility] [place] [place] [address]        Equal Access to Services     [facility] AH: Hadii aad ku hadasho Soomaali, waaxda luqadaha, qaybta kaalmada adeegmagaly, starr albarran. So [facility] [phone].    ATENCIÓN: Si habla español, tiene a perez disposición servicios gratuitos de asistencia lingüística. Llame al [phone].    We comply with applicable federal civil rights laws and [place] laws. We do not discriminate on the basis of race, color, national " origin, age, disability, sex, sexual orientation, or gender identity.            Thank you!     Thank you for choosing [facility] [place]  for your care. Our goal is always to provide you with excellent care. Hearing back from our patients is one way we can continue to improve our services. Please take a few minutes to complete the written survey that you may receive in the mail after your visit with us. Thank you!             Your Updated Medication List - Protect others around you: Learn how to safely use, store and throw away your medicines at www.disposemymeds.org.          This list is accurate as of: 12/28/17 10:23 AM.  Always use your most recent med list.                   Brand Name Dispense Instructions for use Diagnosis    alendronate 70 MG tablet    FOSAMAX    4 tablet    Take 1 tablet (70 mg) by mouth every 7 days    Osteopenia       ALLEGRA PO           azelastine 0.05 % Soln ophthalmic solution    OPTIVAR    6 mL    PLACE 1 DROP INTO BOTH EYES DAILY AS NEEDED IN BOTH EYES AS NEEDED    Environmental allergies       escitalopram 20 MG tablet    LEXAPRO    90 tablet    TAKE 1 TABLET (20 MG) BY MOUTH DAILY    Major depressive disorder, recurrent episode, mild (H)       fluticasone-salmeterol 250-50 MCG/DOSE diskus inhaler    ADVAIR DISKUS    60 Inhaler    Inhale 1 puff into the lungs 2 times daily    Mild persistent asthma with exacerbation       traZODone 100 MG tablet    DESYREL    90 tablet    TAKE 0.5-1 TABLETS BY MOUTH NIGHTLY AS NEEDED FOR SLEEP    Major depressive disorder, recurrent episode, mild (H), Other insomnia

## 2017-12-28 NOTE — NURSING NOTE
"Chief Complaint   Patient presents with     Pre-Op Exam       Initial /69 (Cuff Size: Adult Regular)  Pulse 67  Temp 97.3  F (36.3  C) (Tympanic)  Resp 14  Ht 5' 3\" (1.6 m)  Wt 163 lb (73.9 kg)  LMP 11/01/2004  SpO2 100%  BMI 28.87 kg/m2 Estimated body mass index is 28.87 kg/(m^2) as calculated from the following:    Height as of this encounter: 5' 3\" (1.6 m).    Weight as of this encounter: 163 lb (73.9 kg).  Medication Reconciliation: complete   Kiarra Bentley, CMA    "

## 2017-12-29 ASSESSMENT — ASTHMA QUESTIONNAIRES: ACT_TOTALSCORE: 23

## 2017-12-29 NOTE — TELEPHONE ENCOUNTER
Faxed preop report to Dr. Falcon-[facility] [phone] on December 29, 2017.  Molly Aldrich TC

## 2018-01-18 ENCOUNTER — OFFICE VISIT (OUTPATIENT)
Dept: FAMILY MEDICINE | Facility: CLINIC | Age: 68
End: 2018-01-18
Payer: COMMERCIAL

## 2018-01-18 VITALS
BODY MASS INDEX: 28.53 KG/M2 | OXYGEN SATURATION: 98 % | HEART RATE: 77 BPM | WEIGHT: 161 LBS | DIASTOLIC BLOOD PRESSURE: 70 MMHG | HEIGHT: 63 IN | SYSTOLIC BLOOD PRESSURE: 114 MMHG | TEMPERATURE: 98 F

## 2018-01-18 DIAGNOSIS — N89.8 ITCHING OF VAGINA: ICD-10-CM

## 2018-01-18 DIAGNOSIS — J40 BRONCHITIS: ICD-10-CM

## 2018-01-18 DIAGNOSIS — R68.89 FLU-LIKE SYMPTOMS: Primary | ICD-10-CM

## 2018-01-18 LAB
FLUAV+FLUBV AG SPEC QL: NEGATIVE
FLUAV+FLUBV AG SPEC QL: NEGATIVE
SPECIMEN SOURCE: NORMAL

## 2018-01-18 PROCEDURE — 99213 OFFICE O/P EST LOW 20 MIN: CPT | Performed by: FAMILY MEDICINE

## 2018-01-18 PROCEDURE — 87804 INFLUENZA ASSAY W/OPTIC: CPT | Performed by: FAMILY MEDICINE

## 2018-01-18 RX ORDER — FLUCONAZOLE 150 MG/1
150 TABLET ORAL ONCE
Qty: 1 TABLET | Refills: 0 | Status: SHIPPED | OUTPATIENT
Start: 2018-01-18 | End: 2018-01-18

## 2018-01-18 RX ORDER — CEFPROZIL 500 MG/1
500 TABLET, FILM COATED ORAL 2 TIMES DAILY
Qty: 20 TABLET | Refills: 0 | Status: SHIPPED | OUTPATIENT
Start: 2018-01-18 | End: 2018-04-05

## 2018-01-18 NOTE — PATIENT INSTRUCTIONS
Take medications as directed.  Treatment  and symptomatic cares discussed   Follow up if problem or concern

## 2018-01-18 NOTE — NURSING NOTE
"Chief Complaint   Patient presents with     URI     Vaginal Problem       Initial /70  Pulse 77  Temp 98  F (36.7  C) (Tympanic)  Ht 5' 3\" (1.6 m)  Wt 161 lb (73 kg)  LMP 11/01/2004  SpO2 98%  BMI 28.52 kg/m2 Estimated body mass index is 28.52 kg/(m^2) as calculated from the following:    Height as of this encounter: 5' 3\" (1.6 m).    Weight as of this encounter: 161 lb (73 kg).  Medication Reconciliation: complete  "

## 2018-01-18 NOTE — PROGRESS NOTES
SUBJECTIVE:   Aracelis Muñoz is a 67 year old female who presents to clinic today for the following health issues:      RESPIRATORY SYMPTOMS      Duration: 1 week     Description  nasal congestion, cough, chills,feverish feeling fatigue/malaise, myalgias, nausea, stomach ache,nauseated feeling  and chest congestion , cough becoming productive , week tired     Severity: moderate to severe     Accompanying signs and symptoms: None    History (predisposing factors):  none    Precipitating or alleviating factors: None    Therapies tried and outcome:  none        Vaginal Symptoms      Duration: 2 days     Description  itching and odor    Intensity:  moderate    Accompanying signs and symptoms (fever/dysuria/abdominal or back pain): None    History  Sexually active: not at present  Possibility of pregnancy: No  Recent antibiotic use: no     Precipitating or alleviating factors: None    Therapies tried and outcome: none   Outcome: She has history of yeast infections previously.  She actually had seen GYN at some point as well.  She was given Diflucan and that actually helped with her symptoms that she would like to try that again.  She also stated she is feeling well so she preferred not to do any GYN exam today.        Problem list and histories reviewed & adjusted, as indicated.  Additional history: as documented    Patient Active Problem List   Diagnosis     Menopausal LMP age 54     Diffuse cystic mastopathy     Slow transit constipation     Lumbar disc herniation with radiculopathy     Post-Nasal Drainage     Perennial allergic rhinitis     Environmental allergies     Osteopenia     Generalized anxiety disorder     Mild persistent intrinsic asthma without status asthmaticus with acute exacerbation     Serum calcium elevated     Hyperparathyroidism (H)     Vitamin D deficiency     Hypercalcemia     Other insomnia     Hyperlipidemia with target LDL less than 130     Mild persistent asthma without complication      "Major depressive disorder, recurrent episode, mild (H)     Eczema, unspecified type     Left knee pain, unspecified chronicity     Acute midline low back pain with left-sided sciatica     Lumbago     Right knee pain     Past Surgical History:   Procedure Laterality Date     C NONSPECIFIC PROCEDURE      Cyst     C/SECTION, LOW TRANSVERSE      S/P C/S     CHOLECYSTECTOMY, OPEN      Cholecystectomy     HC EXCISION BREAST LESION, OPEN >=1      Benign mass right breast     HC MRI LUMBAR SPINE W/O CONTRAST  2010    multilevel degen disc disease/facet arthropathy, 5 mm caudally extruded left posterolateral disc herniation at L4-5 with impingement on the left L5 nerve root      HC PUNCTURE/ASPIRATION BREAST CYST, FIRST  ,,    bilateral '03, left '     LIGATN/STRIP LONG & SHORT SAPHEN      varicose vein stripping       Social History   Substance Use Topics     Smoking status: Never Smoker     Smokeless tobacco: Never Used     Alcohol use 0.0 oz/week     0 Standard drinks or equivalent per week      Comment: 1-2 glasses of wine 1-2 times per week      Family History   Problem Relation Age of Onset     Thyroid Disease Mother      Hypertension Mother      Allergies Mother      Cancer - colorectal Father       age 65 colon cancer     Thyroid Disease Father      Allergies Father      Depression Father      Obesity Father      Genitourinary Problems Maternal Aunt      fibrocystic breast             Reviewed and updated as needed this visit by clinical staff     Reviewed and updated as needed this visit by Provider         ROS:  Constitutional, HEENT, cardiovascular, pulmonary, GI, , musculoskeletal, neuro, skin, endocrine and psych systems are negative, except as otherwise noted.      OBJECTIVE:   /70  Pulse 77  Temp 98  F (36.7  C) (Tympanic)  Ht 5' 3\" (1.6 m)  Wt 161 lb (73 kg)  LMP 2004  SpO2 98%  BMI 28.52 kg/m2  Body mass index is 28.52 kg/(m^2).  GENERAL: healthy, " alert and no distress  EYES: Eyes grossly normal to inspection, PERRL and conjunctivae and sclerae normal  HENT: ear canals and TM's normal and oral mucous membranes moist,Throat with mild pharyngeal erythema, no sinus  tenderness  NECK: no adenopathy  RESP: lungs clear to auscultation - no rales, rhonchi or wheezes  CV: regular rate and rhythm, normal S1 S2, no S3 or S4,   ABDOMEN: soft, nontender, no hepatosplenomegaly, no masses and bowel sounds normal  NEURO: Normal strength and tone, mentation intact and speech duong   , pt declined l        ASSESSMENT/PLAN:     (R68.89) Flu-like symptoms  (primary encounter diagnosis)  Comment:   Plan: Influenza A/B antigen  Influenza test negative          (J40) Bronchitis  Comment:   Plan: cefPROZIL (CEFZIL) 500 MG tablet             URI lingering onto bronchitis. Treat with cefzil.  Cares and symptomatic treatment discussed follow up if problem         (L29.8) Itching of vagina  Comment: declined wet prep and gu exam, but wish to get refill on diflucan, has been given previously per gyn   Plan: fluconazole (DIFLUCAN) 150 MG tablet                Patient expressed understanding and agreement with treatment plan. All patient's questions were answered, will let me know if has more later.  Medications: Rx's: Reviewed the potential side effects/complications of medications prescribed.       Shalini Phan MD  [facility] [place]

## 2018-01-18 NOTE — MR AVS SNAPSHOT
After Visit Summary   1/18/2018    Aracelis Muñoz    MRN: [id number]           Patient Information     Date Of Birth          1950        Visit Information        Provider Department      1/18/2018 10:20 AM Shalini Phan MD [facility]        Today's Diagnoses     Flu-like symptoms    -  1    Itching of vagina        Bronchitis          Care Instructions      Take medications as directed.  Treatment  and symptomatic cares discussed   Follow up if problem or concern             Follow-ups after your visit        Who to contact     If you have questions or need follow up information about today's clinic visit or your schedule please contact [facility] directly at [phone].  Normal or non-critical lab and imaging results will be communicated to you by MyChart, letter or phone within 4 business days after the clinic has received the results. If you do not hear from us within 7 days, please contact the clinic through [employer] or phone. If you have a critical or abnormal lab result, we will notify you by phone as soon as possible.  Submit refill requests through [employer] or call your pharmacy and they will forward the refill request to us. Please allow 3 business days for your refill to be completed.          Additional Information About Your Visit        MyChart Information     [employer] gives you secure access to your electronic health record. If you see a primary care provider, you can also send messages to your care team and make appointments. If you have questions, please call your primary care clinic.  If you do not have a primary care provider, please call [phone] and they will assist you.        Care EveryWhere ID     This is your Care EveryWhere ID. This could be used by other organizations to access your [facility] medical records  [id number]        Your Vitals Were     Pulse Temperature Height Last Period Pulse Oximetry BMI (Body Mass  "Index)    77 98  F (36.7  C) (Tympanic) 5' 3\" (1.6 m) 11/01/2004 98% 28.52 kg/m2       Blood Pressure from Last 3 Encounters:   01/18/18 114/70   12/28/17 107/69   09/05/17 102/60    Weight from Last 3 Encounters:   01/18/18 161 lb (73 kg)   12/28/17 163 lb (73.9 kg)   10/17/17 165 lb (74.8 kg)              We Performed the Following     Influenza A/B antigen          Today's Medication Changes          These changes are accurate as of: 1/18/18 11:07 AM.  If you have any questions, ask your nurse or doctor.               Start taking these medicines.        Dose/Directions    cefPROZIL 500 MG tablet   Commonly known as:  CEFZIL   Used for:  Bronchitis   Started by:  Shalini Phan MD        Dose:  500 mg   Take 1 tablet (500 mg) by mouth 2 times daily   Quantity:  20 tablet   Refills:  0       fluconazole 150 MG tablet   Commonly known as:  DIFLUCAN   Used for:  Itching of vagina   Started by:  Shalini Phan MD        Dose:  150 mg   Take 1 tablet (150 mg) by mouth once for 1 dose   Quantity:  1 tablet   Refills:  0            Where to get your medicines      These medications were sent to [id number] - [place], [place]  [address] [place] [address] [place] [facility] [address]     Phone:  [phone]     cefPROZIL 500 MG tablet    fluconazole 150 MG tablet                Primary Care Provider Office Phone # Fax #    Shalini Phan [phone]       [address]  [place] [address]        Equal Access to Services     [facility] AH: Hadii atiya Beltrán, wayarelida lusophia, qaybta kaalmari hoffman, starr albarran. So [facility] [phone].    ATENCIÓN: Si habla español, tiene a perez disposición servicios gratuitos de asistencia lingüística. Llame al [phone].    We comply with applicable federal civil rights laws and [place] laws. We do not discriminate on the basis of race, color, national origin, age, disability, " sex, sexual orientation, or gender identity.            Thank you!     Thank you for choosing [facility] [place]  for your care. Our goal is always to provide you with excellent care. Hearing back from our patients is one way we can continue to improve our services. Please take a few minutes to complete the written survey that you may receive in the mail after your visit with us. Thank you!             Your Updated Medication List - Protect others around you: Learn how to safely use, store and throw away your medicines at www.disposemymeds.org.          This list is accurate as of: 1/18/18 11:07 AM.  Always use your most recent med list.                   Brand Name Dispense Instructions for use Diagnosis    alendronate 70 MG tablet    FOSAMAX    4 tablet    Take 1 tablet (70 mg) by mouth every 7 days    Osteopenia       ALLEGRA PO           azelastine 0.05 % Soln ophthalmic solution    OPTIVAR    6 mL    PLACE 1 DROP INTO BOTH EYES DAILY AS NEEDED IN BOTH EYES AS NEEDED    Environmental allergies       cefPROZIL 500 MG tablet    CEFZIL    20 tablet    Take 1 tablet (500 mg) by mouth 2 times daily    Bronchitis       escitalopram 20 MG tablet    LEXAPRO    90 tablet    TAKE 1 TABLET (20 MG) BY MOUTH DAILY    Major depressive disorder, recurrent episode, mild (H)       fluconazole 150 MG tablet    DIFLUCAN    1 tablet    Take 1 tablet (150 mg) by mouth once for 1 dose    Itching of vagina       fluticasone-salmeterol 250-50 MCG/DOSE diskus inhaler    ADVAIR DISKUS    60 Inhaler    Inhale 1 puff into the lungs 2 times daily    Mild persistent asthma with exacerbation       traZODone 100 MG tablet    DESYREL    90 tablet    TAKE 0.5-1 TABLETS BY MOUTH NIGHTLY AS NEEDED FOR SLEEP    Major depressive disorder, recurrent episode, mild (H), Other insomnia

## 2018-01-20 ENCOUNTER — HOSPITAL ENCOUNTER (EMERGENCY)
Facility: CLINIC | Age: 68
Discharge: HOME | End: 2018-01-20
Attending: EMERGENCY MEDICINE | Admitting: EMERGENCY MEDICINE
Payer: MEDICARE

## 2018-01-20 ENCOUNTER — APPOINTMENT (OUTPATIENT)
Dept: GENERAL RADIOLOGY | Facility: CLINIC | Age: 68
End: 2018-01-20
Attending: EMERGENCY MEDICINE
Payer: MEDICARE

## 2018-01-20 VITALS
SYSTOLIC BLOOD PRESSURE: 115 MMHG | WEIGHT: 161 LBS | HEART RATE: 77 BPM | RESPIRATION RATE: 16 BRPM | DIASTOLIC BLOOD PRESSURE: 67 MMHG | OXYGEN SATURATION: 97 % | BODY MASS INDEX: 29.63 KG/M2 | HEIGHT: 62 IN | TEMPERATURE: 97.3 F

## 2018-01-20 DIAGNOSIS — R05.9 COUGH: ICD-10-CM

## 2018-01-20 DIAGNOSIS — R53.83 OTHER FATIGUE: ICD-10-CM

## 2018-01-20 LAB
ALBUMIN SERPL-MCNC: 3.8 G/DL (ref 3.4–5)
ALBUMIN UR-MCNC: NEGATIVE MG/DL
ALP SERPL-CCNC: 55 U/L (ref 40–150)
ALT SERPL W P-5'-P-CCNC: 22 U/L (ref 0–50)
ANION GAP SERPL CALCULATED.3IONS-SCNC: 8 MMOL/L (ref 3–14)
APPEARANCE UR: CLEAR
AST SERPL W P-5'-P-CCNC: 15 U/L (ref 0–45)
BASOPHILS # BLD AUTO: 0 10E9/L (ref 0–0.2)
BASOPHILS NFR BLD AUTO: 0.4 %
BILIRUB SERPL-MCNC: 0.6 MG/DL (ref 0.2–1.3)
BILIRUB UR QL STRIP: NEGATIVE
BUN SERPL-MCNC: 11 MG/DL (ref 7–30)
CALCIUM SERPL-MCNC: 9.9 MG/DL (ref 8.5–10.1)
CHLORIDE SERPL-SCNC: 104 MMOL/L (ref 94–109)
CO2 SERPL-SCNC: 26 MMOL/L (ref 20–32)
COLOR UR AUTO: YELLOW
CREAT SERPL-MCNC: 0.72 MG/DL (ref 0.52–1.04)
DIFFERENTIAL METHOD BLD: NORMAL
EOSINOPHIL # BLD AUTO: 0.2 10E9/L (ref 0–0.7)
EOSINOPHIL NFR BLD AUTO: 1.9 %
ERYTHROCYTE [DISTWIDTH] IN BLOOD BY AUTOMATED COUNT: 12.9 % (ref 10–15)
GFR SERPL CREATININE-BSD FRML MDRD: 81 ML/MIN/1.7M2
GLUCOSE SERPL-MCNC: 98 MG/DL (ref 70–99)
GLUCOSE UR STRIP-MCNC: NEGATIVE MG/DL
HCT VFR BLD AUTO: 43.3 % (ref 35–47)
HGB BLD-MCNC: 14.7 G/DL (ref 11.7–15.7)
HGB UR QL STRIP: NEGATIVE
IMM GRANULOCYTES # BLD: 0.1 10E9/L (ref 0–0.4)
IMM GRANULOCYTES NFR BLD: 0.8 %
KETONES UR STRIP-MCNC: NEGATIVE MG/DL
LACTATE BLD-SCNC: 0.7 MMOL/L (ref 0.7–2)
LEUKOCYTE ESTERASE UR QL STRIP: NEGATIVE
LYMPHOCYTES # BLD AUTO: 1.2 10E9/L (ref 0.8–5.3)
LYMPHOCYTES NFR BLD AUTO: 14.7 %
MCH RBC QN AUTO: 30.2 PG (ref 26.5–33)
MCHC RBC AUTO-ENTMCNC: 33.9 G/DL (ref 31.5–36.5)
MCV RBC AUTO: 89 FL (ref 78–100)
MONOCYTES # BLD AUTO: 0.8 10E9/L (ref 0–1.3)
MONOCYTES NFR BLD AUTO: 9.7 %
MUCOUS THREADS #/AREA URNS LPF: PRESENT /LPF
NEUTROPHILS # BLD AUTO: 5.8 10E9/L (ref 1.6–8.3)
NEUTROPHILS NFR BLD AUTO: 72.5 %
NITRATE UR QL: NEGATIVE
NRBC # BLD AUTO: 0 10*3/UL
NRBC BLD AUTO-RTO: 0 /100
NT-PROBNP SERPL-MCNC: 39 PG/ML (ref 0–900)
PH UR STRIP: 6.5 PH (ref 5–7)
PLATELET # BLD AUTO: 278 10E9/L (ref 150–450)
POTASSIUM SERPL-SCNC: 3.9 MMOL/L (ref 3.4–5.3)
PROT SERPL-MCNC: 7.4 G/DL (ref 6.8–8.8)
RBC # BLD AUTO: 4.87 10E12/L (ref 3.8–5.2)
RBC #/AREA URNS AUTO: <1 /HPF (ref 0–2)
SODIUM SERPL-SCNC: 138 MMOL/L (ref 133–144)
SOURCE: ABNORMAL
SP GR UR STRIP: 1.01 (ref 1–1.03)
SQUAMOUS #/AREA URNS AUTO: 1 /HPF (ref 0–1)
TROPONIN I SERPL-MCNC: <0.015 UG/L (ref 0–0.04)
TSH SERPL DL<=0.005 MIU/L-ACNC: 1.6 MU/L (ref 0.4–4)
UROBILINOGEN UR STRIP-MCNC: NORMAL MG/DL (ref 0–2)
WBC # BLD AUTO: 7.6 10E9/L (ref 4–11)
WBC #/AREA URNS AUTO: 2 /HPF (ref 0–2)

## 2018-01-20 PROCEDURE — 84484 ASSAY OF TROPONIN QUANT: CPT | Performed by: EMERGENCY MEDICINE

## 2018-01-20 PROCEDURE — 93005 ELECTROCARDIOGRAM TRACING: CPT

## 2018-01-20 PROCEDURE — 83880 ASSAY OF NATRIURETIC PEPTIDE: CPT | Performed by: EMERGENCY MEDICINE

## 2018-01-20 PROCEDURE — 81001 URINALYSIS AUTO W/SCOPE: CPT | Performed by: EMERGENCY MEDICINE

## 2018-01-20 PROCEDURE — 83605 ASSAY OF LACTIC ACID: CPT | Performed by: EMERGENCY MEDICINE

## 2018-01-20 PROCEDURE — 87040 BLOOD CULTURE FOR BACTERIA: CPT | Performed by: EMERGENCY MEDICINE

## 2018-01-20 PROCEDURE — 36415 COLL VENOUS BLD VENIPUNCTURE: CPT

## 2018-01-20 PROCEDURE — 71046 X-RAY EXAM CHEST 2 VIEWS: CPT

## 2018-01-20 PROCEDURE — 99285 EMERGENCY DEPT VISIT HI MDM: CPT | Mod: 25

## 2018-01-20 PROCEDURE — 84443 ASSAY THYROID STIM HORMONE: CPT | Performed by: EMERGENCY MEDICINE

## 2018-01-20 PROCEDURE — 85025 COMPLETE CBC W/AUTO DIFF WBC: CPT | Performed by: EMERGENCY MEDICINE

## 2018-01-20 PROCEDURE — 80053 COMPREHEN METABOLIC PANEL: CPT | Performed by: EMERGENCY MEDICINE

## 2018-01-20 PROCEDURE — 36415 COLL VENOUS BLD VENIPUNCTURE: CPT | Performed by: EMERGENCY MEDICINE

## 2018-01-20 ASSESSMENT — ENCOUNTER SYMPTOMS
ACTIVITY CHANGE: 1
COUGH: 1
LIGHT-HEADEDNESS: 1
DYSURIA: 0
CHILLS: 1
FREQUENCY: 0
FEVER: 0
SHORTNESS OF BREATH: 1

## 2018-01-20 NOTE — ED AVS SNAPSHOT
Emergency Department    [address] [facility] [address]    Phone:  [phone]    Fax:  [phone]                                       Aracelis Muñoz   MRN: [id number]    Department:   Emergency Department   Date of Visit:  1/20/2018           Patient Information     Date Of Birth          1950        Your diagnoses for this visit were:     Other fatigue     Cough        You were seen by Dotty Schuler MD.      Follow-up Information     Follow up with Shalini Phan MD. Schedule an appointment as soon as possible for a visit in 3 days.    Specialty:  Family Practice    Contact information:    [address] [place] [address] [address]  [place] [id number] [phone]          Discharge Instructions       *You may resume diet and activities as tolerated.  Get plenty of rest and drink plenty of fluids.  *No new medications. Continue your current medications.  *Follow-up with your doctor in 2-3 days for a recheck and follow-up of your thyroid tests.  *Return if you become worse in any way.      Managing Fatigue     Family members can help with meals and chores around the house.   Fatigue is common. It can be caused by worry, lack of sleep, or poor appetite. Fatigue can also be a sign of anemia, a shortage of red blood cells. You might need medical treatment for anemia. The tips below can help you feel better.  Conserving energy    Keep track of the times of day when you are most tired and plan around them. For instance, if you are more tired in the afternoon, try to get tasks done in the morning.    Decide which tasks are most important. Do those first.    Pass tasks along to others when you need to. Ask for help.    Accept help when it s offered. Tell people what they can do to help. For instance, you may need someone to fix a meal, fold clothes, or put gas in your car.    Plan rest times. You may want to take a nap each day. Just sitting quietly for a few minutes can make you feel more  rested.  What you can do to feel better    Relax before you try to sleep. Take a bath or read for a while.    Form a sleep pattern. Go to bed at the same time each night and get up at the same time each morning.    Eat well. Choose foods from all of the food groups each day.    Exercise. Take a brisk walk to help increase your energy.    Avoid caffeine and alcohol. Drink plenty of water or fruit juices instead.  Treating anemia  If you begin to feel more tired than normal, tell your doctor. Fatigue could be a sign of anemia. This problem is fairly common in cancer patients, especially during chemotherapy and radiation treatments. If your red blood cell count is too low, you may get a blood transfusion. In some cases, you may need medicine to increase the number of red blood cells your body makes.  When to call your healthcare provider  Call your healthcare provider if you have:    Shortness of breath or chest pain    A dizzy feeling when you get up from lying or sitting down    Paler skin than normal    Extreme tiredness that is not helped by sleep   Date Last Reviewed: 1/3/2016    6218-5534 [employer]. [address] [place] [address]. All rights reserved. This information is not intended as a substitute for professional medical care. Always follow your healthcare professional's instructions.      Discharge Instructions  Influenza    You were diagnosed today with influenza or influenza like illness.  Influenza is a respiratory (breathing) illness caused by influenza A or B viruses.  Influenza causes five primary symptoms: fever, headache, muscle aches/fatigue/malaise, sore throat and cough.  These symptoms start one to four days after you have been around a person with this illness. Influenza is spread through sneezing and coughing and can live on surfaces for several days.  It is usually contagious for 5 days but in some cases up to 10 days and often affects several family members. If you  have a family member who is less than 2 years old, older than 65 years old, pregnant or has a serious medical condition, they should be seen right away by a provider to decide if they should take preventative medications. Although influenza will make you feel very ill, most patients don t require any specific treatment. An antiviral medication might be prescribed for certain groups of patients (older patients, younger patients, and those with certain chronic medical problems).    Generally, every Emergency Department visit should have a follow-up clinic visit with either a primary or a specialty clinic/provider. Please follow-up as instructed by your emergency provider today.    Return to the Emergency Department if:    You have trouble breathing.    You develop pain in your chest.    You have signs of being dehydrated, such as dizziness or unable to urinate (pee) at least three times daily.    You are confused or severely weak.    You cannot stop vomiting (throwing up) or you cannot drink enough fluids.    In children, you should seek help if the child has any of the above or if child:    Has blue or purplish skin color.    Is so irritable that he or she does not want to be held.    Does not have tears when crying (in infants) or does not urinate at least three times daily.    Does not wake up easily.    What can I do to help myself?    Rest.    Fluids -- Drink hydrating solutions such as Gatorade  or Pedialyte  as often as you can. If you are drinking enough, you should pass urine at least every eight hours.    Tylenol  (acetaminophen) and Advil  (ibuprofen) can relieve fever, headache, and muscle aches. Do not give aspirin to children under 18 years old.     Antiviral treatment -- Antiviral medicines do not make the flu symptoms go away immediately.  They have only been shown to make the symptoms go away 12 to 24 hours sooner than they would without treatment.       Antibiotics -- Antibiotics are NOT useful for  treating viral illnesses such as influenza. Antibiotics should only be used if there is a bacterial complication of the flu such as bacterial pneumonia, ear infection, or sinusitis.    Because you were diagnosed with a flu like illness you are very contagious.  This means you cannot work, attend school or  for at least 24 hours or until you no longer have a fever.  If you were given a prescription for medicine here today, be sure to read all of the information (including the package insert) that comes with your prescription.  This will include important information about the medicine, its side effects, and any warnings that you need to know about.  The pharmacist who fills the prescription can provide more information and answer questions you may have about the medicine.  If you have questions or concerns that the pharmacist cannot address, please call or return to the Emergency Department.   Remember that you can always come back to the Emergency Department if you are not able to see your regular provider in the amount of time listed above, if you get any new symptoms, or if there is anything that worries you.        24 Hour Appointment Hotline       To make an appointment at any [facility], call [phone] ([phone]). If you don't have a family doctor or clinic, we will help you find one. [facility] clinics are conveniently located to serve the needs of you and your family.             Review of your medicines      Our records show that you are taking the medicines listed below. If these are incorrect, please call your family doctor or clinic.        Dose / Directions Last dose taken    alendronate 70 MG tablet   Commonly known as:  FOSAMAX   Dose:  70 mg   Quantity:  4 tablet        Take 1 tablet (70 mg) by mouth every 7 days   Refills:  prn        ALLEGRA PO        Refills:  0        azelastine 0.05 % Soln ophthalmic solution   Commonly known as:  OPTIVAR   Quantity:  6 mL        PLACE 1 DROP  INTO BOTH EYES DAILY AS NEEDED IN BOTH EYES AS NEEDED   Refills:  3        cefPROZIL 500 MG tablet   Commonly known as:  CEFZIL   Dose:  500 mg   Quantity:  20 tablet        Take 1 tablet (500 mg) by mouth 2 times daily   Refills:  0        escitalopram 20 MG tablet   Commonly known as:  LEXAPRO   Quantity:  90 tablet        TAKE 1 TABLET (20 MG) BY MOUTH DAILY   Refills:  1        fluticasone-salmeterol 250-50 MCG/DOSE diskus inhaler   Commonly known as:  ADVAIR DISKUS   Dose:  1 puff   Quantity:  60 Inhaler        Inhale 1 puff into the lungs 2 times daily   Refills:  5        traZODone 100 MG tablet   Commonly known as:  DESYREL   Quantity:  90 tablet        TAKE 0.5-1 TABLETS BY MOUTH NIGHTLY AS NEEDED FOR SLEEP   Refills:  1                Procedures and tests performed during your visit     Procedure/Test Number of Times Performed    Blood culture 2    CBC with platelets differential 1    Cardiac Continuous Monitoring 1    Comprehensive metabolic panel 1    EKG 12-lead, tracing only 1    Lactic acid whole blood 1    Nt probnp inpatient (BNP) 1    Peripheral IV catheter 1    Pulse oximetry nursing 1    TSH with free T4 reflex 1    Troponin I 1    UA with Microscopic 1    Vital signs 1    XR Chest 2 Views 1      Orders Needing Specimen Collection     None      Pending Results     Date and Time Order Name Status Description    1/20/2018 1202 Blood culture In process     1/20/2018 1154 XR Chest 2 Views Preliminary     1/20/2018 1154 EKG 12-lead, tracing only Preliminary     1/20/2018 1154 Blood culture In process             Pending Culture Results     Date and Time Order Name Status Description    1/20/2018 1202 Blood culture In process     1/20/2018 1154 Blood culture In process             Pending Results Instructions     If you had any lab results that were not finalized at the time of your Discharge, you can call the ED Lab Result RN at [phone]. You will be contacted by this team for any positive Lab  results or changes in treatment. The nurses are available 7 days a week from 10A to 6:30P.  You can leave a message 24 hours per day and they will return your call.        Test Results From Your Hospital Stay        1/20/2018  1:58 PM      Component Results     Component Value Ref Range & Units Status    WBC 7.6 4.0 - 11.0 10e9/L Final    RBC Count 4.87 3.8 - 5.2 10e12/L Final    Hemoglobin 14.7 11.7 - 15.7 g/dL Final    Hematocrit 43.3 35.0 - 47.0 % Final    MCV 89 78 - 100 fl Final    MCH 30.2 26.5 - 33.0 pg Final    MCHC 33.9 31.5 - 36.5 g/dL Final    RDW 12.9 10.0 - 15.0 % Final    Platelet Count 278 150 - 450 10e9/L Final    Diff Method Automated Method  Final    % Neutrophils 72.5 % Final    % Lymphocytes 14.7 % Final    % Monocytes 9.7 % Final    % Eosinophils 1.9 % Final    % Basophils 0.4 % Final    % Immature Granulocytes 0.8 % Final    Nucleated RBCs 0 0 /100 Final    Absolute Neutrophil 5.8 1.6 - 8.3 10e9/L Final    Absolute Lymphocytes 1.2 0.8 - 5.3 10e9/L Final    Absolute Monocytes 0.8 0.0 - 1.3 10e9/L Final    Absolute Eosinophils 0.2 0.0 - 0.7 10e9/L Final    Absolute Basophils 0.0 0.0 - 0.2 10e9/L Final    Abs Immature Granulocytes 0.1 0 - 0.4 10e9/L Final    Absolute Nucleated RBC 0.0  Final         1/20/2018 12:41 PM      Component Results     Component Value Ref Range & Units Status    Sodium 138 133 - 144 mmol/L Final    Potassium 3.9 3.4 - 5.3 mmol/L Final    Chloride 104 94 - 109 mmol/L Final    Carbon Dioxide 26 20 - 32 mmol/L Final    Anion Gap 8 3 - 14 mmol/L Final    Glucose 98 70 - 99 mg/dL Final    Urea Nitrogen 11 7 - 30 mg/dL Final    Creatinine 0.72 0.52 - 1.04 mg/dL Final    GFR Estimate 81 >60 mL/min/1.7m2 Final    Non  GFR Calc    GFR Estimate If Black >90 >60 mL/min/1.7m2 Final    African American GFR Calc    Calcium 9.9 8.5 - 10.1 mg/dL Final    Bilirubin Total 0.6 0.2 - 1.3 mg/dL Final    Albumin 3.8 3.4 - 5.0 g/dL Final    Protein Total 7.4 6.8 - 8.8 g/dL Final     Alkaline Phosphatase 55 40 - 150 U/L Final    ALT 22 0 - 50 U/L Final    AST 15 0 - 45 U/L Final         1/20/2018 12:23 PM      Component Results     Component Value Ref Range & Units Status    Lactic Acid 0.7 0.7 - 2.0 mmol/L Final         1/20/2018 12:41 PM      Component Results     Component Value Ref Range & Units Status    Troponin I ES <0.015 0.000 - 0.045 ug/L Final    The 99th percentile for upper reference range is 0.045 ug/L.  Troponin values   in the range of 0.045 - 0.120 ug/L may be associated with risks of adverse   clinical events.           1/20/2018 12:41 PM      Component Results     Component Value Ref Range & Units Status    N-Terminal Pro BNP Inpatient 39 0 - 900 pg/mL Final       Reference range shown and results flagged as abnormal are suggested inpatient   cut points for confirming diagnosis if CHF in an acute setting. Establishing a   baseline value for each individual patient is useful for follow-up. An   inpatient or emergency department NT-proPBNP <300 pg/mL effectively rules out   acute CHF, with 99% negative predictive value.  The outpatient non-acute reference range for ruling out CHF is:   0-125 pg/mL (age 18 to less than 75)   0-450 pg/mL (age 75 yrs and older)           1/20/2018 12:15 PM         1/20/2018 12:35 PM      Narrative     CHEST TWO VIEWS 1/20/2018 12:22 PM     COMPARISON: Two-view chest x-ray 6/9/2011    HISTORY: Fatigue, cough.     FINDINGS: The cardiac silhouette, pulmonary vasculature, lungs and  pleural spaces are within normal limits.        Impression     IMPRESSION: Clear lungs.         1/20/2018  1:28 PM      Component Results     Component Value Ref Range & Units Status    Color Urine Yellow  Final    Appearance Urine Clear  Final    Glucose Urine Negative NEG^Negative mg/dL Final    Bilirubin Urine Negative NEG^Negative Final    Ketones Urine Negative NEG^Negative mg/dL Final    Specific Gravity Urine 1.009 1.003 - 1.035 Final    Blood Urine Negative  NEG^Negative Final    pH Urine 6.5 5.0 - 7.0 pH Final    Protein Albumin Urine Negative NEG^Negative mg/dL Final    Urobilinogen mg/dL Normal 0.0 - 2.0 mg/dL Final    Nitrite Urine Negative NEG^Negative Final    Leukocyte Esterase Urine Negative NEG^Negative Final    Source Midstream Urine  Final    WBC Urine 2 0 - 2 /HPF Final    RBC Urine <1 0 - 2 /HPF Final    Squamous Epithelial /HPF Urine 1 0 - 1 /HPF Final    Mucous Urine Present (A) NEG^Negative /LPF Final         1/20/2018  1:36 PM         1/20/2018  2:12 PM      Component Results     Component Value Ref Range & Units Status    TSH 1.60 0.40 - 4.00 mU/L Final                Clinical Quality Measure: Blood Pressure Screening     Your blood pressure was checked while you were in the emergency department today. The last reading we obtained was  BP: 119/56 . Please read the guidelines below about what these numbers mean and what you should do about them.  If your systolic blood pressure (the top number) is less than 120 and your diastolic blood pressure (the bottom number) is less than 80, then your blood pressure is normal. There is nothing more that you need to do about it.  If your systolic blood pressure (the top number) is 120-139 or your diastolic blood pressure (the bottom number) is 80-89, your blood pressure may be higher than it should be. You should have your blood pressure rechecked within a year by a primary care provider.  If your systolic blood pressure (the top number) is 140 or greater or your diastolic blood pressure (the bottom number) is 90 or greater, you may have high blood pressure. High blood pressure is treatable, but if left untreated over time it can put you at risk for heart attack, stroke, or kidney failure. You should have your blood pressure rechecked by a primary care provider within the next 4 weeks.  If your provider in the emergency department today gave you specific instructions to follow-up with your doctor or provider even  sooner than that, you should follow that instruction and not wait for up to 4 weeks for your follow-up visit.        Thank you for choosing [facility]       Thank you for choosing [facility] for your care. Our goal is always to provide you with excellent care. Hearing back from our patients is one way we can continue to improve our services. Please take a few minutes to complete the written survey that you may receive in the mail after you visit with us. Thank you!        [email or web address] Information     [employer] gives you secure access to your electronic health record. If you see a primary care provider, you can also send messages to your care team and make appointments. If you have questions, please call your primary care clinic.  If you do not have a primary care provider, please call [phone] and they will assist you.        Care EveryWhere ID     This is your Care EveryWhere ID. This could be used by other organizations to access your [facility] medical records  [id number]        Equal Access to Services     EVELYN BRYAN : Andriy Beltrán, sravan cunha, justine hoffman, starr jalloh . So [facility] [phone].    ATENCIÓN: Si habla español, tiene a perez disposición servicios gratuitos de asistencia lingüística. Llame al [phone].    We comply with applicable federal civil rights laws and [place] laws. We do not discriminate on the basis of race, color, national origin, age, disability, sex, sexual orientation, or gender identity.            After Visit Summary       This is your record. Keep this with you and show to your community pharmacist(s) and doctor(s) at your next visit.

## 2018-01-20 NOTE — ED PROVIDER NOTES
History     Chief Complaint:  Flu Symptoms       HPI   Aracelis Muñoz is a 67 year old female who presents to the emergency department today for evaluation of flu symptoms. The patient reports cough and chills with associated loss of energy and lightheadedness over the past 12 days. She had influenza like symptoms last week that improved but she had persistent cough and still feels awful.  She reports mild shortness of breath, nausea and loose stool. She was placed on cefprozil 2 days ago for possible bronchitis by her PMD when she was seen 2 days ago and has not had significant improvement of her symptoms. The patient denies any chest pain, urinary symptoms or fevers.    Allergies:  Nefazodone Hydrochloride  Sulfa Drugs  Prochlorperazine     Medications:    traZODone (DESYREL) 100 MG tablet  fluticasone-salmeterol (ADVAIR DISKUS) 250-50 MCG/DOSE diskus inhaler  Fexofenadine HCl (ALLEGRA PO)  alendronate (FOSAMAX) 70 MG tablet  escitalopram (LEXAPRO) 20 MG tablet    Past Medical History:    Depressive disorder, not elsewhere classified   Diffuse cystic mastopathy   Insomnia, unspecified   Lumbar disc herniation with radiculopathy   Mild persistent asthma   Osteopenia   Perennial allergic rhinitis   Post-Nasal Drainage   Slow transit constipation     Past Surgical History:    Cyst  , low transverse  Cholecystectomy  Benign mass right breast, excision  MRI Lumbar spine w/o contrast  Puncture/aspiration breast cyst, first  Varicose vein stripping    Family History:    Thyroid Disease  Hypertension  Allergies  Colorectal Cancer  Thyroid Disease  Depression  Obesity  Genitourinary Problems    Social History:  The patient was alone.  Smoking Status: Never Smoker  Smokeless Tobacco: Never Used  Alcohol Use: Yes   Marital Status:        Review of Systems   Constitutional: Positive for activity change and chills. Negative for fever.   Respiratory: Positive for cough and shortness of breath.   "  Genitourinary: Negative for dysuria, frequency and urgency.   Neurological: Positive for light-headedness.   All other systems reviewed and are negative.    Physical Exam   First Vitals:  BP: 115/62  Pulse: 74  Temp: 97.3  F (36.3  C)  Resp: 16  Height: 157.5 cm (5' 2\")  Weight: 73 kg (161 lb)  SpO2: 99 %    Physical Exam  General: Well-nourished, appears fatigued  Eyes: PERRL, conjunctivae pink no scleral icterus or conjunctival injection  ENT:  Moist mucus membranes, posterior oropharynx clear without erythema or exudates  Respiratory:  Lungs clear to auscultation bilaterally, no crackles/rubs/wheezes.  Good air movement  CV: Normal rate and rhythm, no murmurs/rubs/gallops  GI:  Abdomen soft and non-distended.  Normoactive BS.  No tenderness, guarding or rebound  Skin: Warm, dry.  No rashes or petechiae  Musculoskeletal: No peripheral edema or calf tenderness  Neuro: Alert and oriented to person/place/time  Psychiatric: Flat affect      Emergency Department Course     ECG:  Indication: Flu Symptoms  Completed at 1228.  Read at 1230.   Normal sinus rhythm  Left axis deviation  Abnormal ECG  Rate 66 bpm. ME interval 158. QRS duration 72. QT/QTc 384/402. P-R-T axes 46 -33 52.     Imaging:  Radiology findings were communicated with the patient who voiced understanding of the findings.    XR Chest 2 Views  IMPRESSION: Clear lungs.  Report per radiology      Laboratory:  Laboratory findings were communicated with the family who voiced understanding of the findings.  CBC: AWNL. (WBC 7.6, HGB 14.7, )   CMP: AWNL (Creatinine 0.72)  Lactic Acid: 0.7   Troponin (Collected 1200): <0.015   BNP: 39   TSH with free T4 reflex: Pending   Blood cultures: Pending     UA with Microscopic: clear yellow urine, mucous present o/w WNL     Emergency Department Course:  Nursing notes and vitals reviewed.  EKG obtained in the ED, see results above.    The patient was sent for a XR Chest 2 Views while in the emergency department, " results above.    IV was inserted and blood was drawn for laboratory testing, results above.  The patient provided a urine sample here in the emergency department. This was sent for laboratory testing, findings above.   1146: I performed an exam of the patient as documented above.   1345: Patient rechecked and updated.   Findings and plan explained to the Patient. Patient discharged home with instructions regarding supportive care, medications, and reasons to return. The importance of close follow-up was reviewed.   I personally reviewed the laboratory and imaging results with the Patient and answered all related questions prior to discharge.    Impression & Plan      Medical Decision Making:  Aracelis Muñoz is a delightful 67 year old woman who is relatively healthy and comes today with generalized fatigue and feeling wiped out. It sounds like she had an influenza-like illness last week and recently saw her doctor who diagnosed her with bronchitis and placed her on an antibiotic. She tested negative 2 days ago in the clinic for influenza so I doubt that it is influenza today. Her vitals are all completely normal. I considered a broad differential. Sodium is normal as are all of the rest of her electrolytes. She is not in renal failure. She has a normal creatinine that is at its baseline. There are no signs of hepatitis. Lactic acid is normal. There are no signs of dehydration or infection. Troponin and BNP were both normal, so no signs of cardiac event,abdominal pain, post-influenza cardiomyopathy. Chest x-ray is clear without congestion or signs of pneumonia and her vital signs have remained normal. Her EKG is unremarkable and I did add on a TSH, which is pending at this time. I explained to her that her primary care doctor can follow up on this. She is not significantly bradycardic. I think this is all related to post-influenza symptoms and that she probably needs some time to improve. She was updated and I  discussed the plan with her. She is frustrated that I don't have a treatment to offered her but given that I am not sure the exact cause of her fatigue, I don't have anything for her to take and I don't have any solutions at this point. I would like her to follow up with her doctor in the next few days and return with any worsening.     Diagnosis:    ICD-10-CM    1. Other fatigue R53.83 TSH with free T4 reflex   2. Cough R05      Disposition:  discharged to home  Scribe Disclosure:  I, Tess Gomez, am serving as a scribe at 11:40 AM on 1/20/2018 to document services personally performed by Dotty Schuler MD based on my observations and the provider's statements to me.    1/20/2018    EMERGENCY DEPARTMENT       Dotty Schuler MD  01/20/18 1947

## 2018-01-20 NOTE — ED AVS SNAPSHOT
Emergency Department    [address] [facility] [address]    Phone:  [phone]    Fax:  [phone]                                       Aracelis Muñoz   MRN: [id number]    Department:   Emergency Department   Date of Visit:  1/20/2018           After Visit Summary Signature Page     I have received my discharge instructions, and my questions have been answered. I have discussed any challenges I see with this plan with the nurse or doctor.    ..........................................................................................................................................  Patient/Patient Representative Signature      ..........................................................................................................................................  Patient Representative Print Name and Relationship to Patient    ..................................................               ................................................  Date                                            Time    ..........................................................................................................................................  Reviewed by Signature/Title    ...................................................              ..............................................  Date                                                            Time

## 2018-01-20 NOTE — DISCHARGE INSTRUCTIONS
*You may resume diet and activities as tolerated.  Get plenty of rest and drink plenty of fluids.  *No new medications. Continue your current medications.  *Follow-up with your doctor in 2-3 days for a recheck and follow-up of your thyroid tests.  *Return if you become worse in any way.      Managing Fatigue     Family members can help with meals and chores around the house.   Fatigue is common. It can be caused by worry, lack of sleep, or poor appetite. Fatigue can also be a sign of anemia, a shortage of red blood cells. You might need medical treatment for anemia. The tips below can help you feel better.  Conserving energy    Keep track of the times of day when you are most tired and plan around them. For instance, if you are more tired in the afternoon, try to get tasks done in the morning.    Decide which tasks are most important. Do those first.    Pass tasks along to others when you need to. Ask for help.    Accept help when it s offered. Tell people what they can do to help. For instance, you may need someone to fix a meal, fold clothes, or put gas in your car.    Plan rest times. You may want to take a nap each day. Just sitting quietly for a few minutes can make you feel more rested.  What you can do to feel better    Relax before you try to sleep. Take a bath or read for a while.    Form a sleep pattern. Go to bed at the same time each night and get up at the same time each morning.    Eat well. Choose foods from all of the food groups each day.    Exercise. Take a brisk walk to help increase your energy.    Avoid caffeine and alcohol. Drink plenty of water or fruit juices instead.  Treating anemia  If you begin to feel more tired than normal, tell your doctor. Fatigue could be a sign of anemia. This problem is fairly common in cancer patients, especially during chemotherapy and radiation treatments. If your red blood cell count is too low, you may get a blood transfusion. In some cases, you may need  medicine to increase the number of red blood cells your body makes.  When to call your healthcare provider  Call your healthcare provider if you have:    Shortness of breath or chest pain    A dizzy feeling when you get up from lying or sitting down    Paler skin than normal    Extreme tiredness that is not helped by sleep   Date Last Reviewed: 1/3/2016    9068-2596 [employer]. [address] [place] [address]. All rights reserved. This information is not intended as a substitute for professional medical care. Always follow your healthcare professional's instructions.      Discharge Instructions  Influenza    You were diagnosed today with influenza or influenza like illness.  Influenza is a respiratory (breathing) illness caused by influenza A or B viruses.  Influenza causes five primary symptoms: fever, headache, muscle aches/fatigue/malaise, sore throat and cough.  These symptoms start one to four days after you have been around a person with this illness. Influenza is spread through sneezing and coughing and can live on surfaces for several days.  It is usually contagious for 5 days but in some cases up to 10 days and often affects several family members. If you have a family member who is less than 2 years old, older than 65 years old, pregnant or has a serious medical condition, they should be seen right away by a provider to decide if they should take preventative medications. Although influenza will make you feel very ill, most patients don t require any specific treatment. An antiviral medication might be prescribed for certain groups of patients (older patients, younger patients, and those with certain chronic medical problems).    Generally, every Emergency Department visit should have a follow-up clinic visit with either a primary or a specialty clinic/provider. Please follow-up as instructed by your emergency provider today.    Return to the Emergency Department if:    You have  trouble breathing.    You develop pain in your chest.    You have signs of being dehydrated, such as dizziness or unable to urinate (pee) at least three times daily.    You are confused or severely weak.    You cannot stop vomiting (throwing up) or you cannot drink enough fluids.    In children, you should seek help if the child has any of the above or if child:    Has blue or purplish skin color.    Is so irritable that he or she does not want to be held.    Does not have tears when crying (in infants) or does not urinate at least three times daily.    Does not wake up easily.    What can I do to help myself?    Rest.    Fluids -- Drink hydrating solutions such as Gatorade  or Pedialyte  as often as you can. If you are drinking enough, you should pass urine at least every eight hours.    Tylenol  (acetaminophen) and Advil  (ibuprofen) can relieve fever, headache, and muscle aches. Do not give aspirin to children under 18 years old.     Antiviral treatment -- Antiviral medicines do not make the flu symptoms go away immediately.  They have only been shown to make the symptoms go away 12 to 24 hours sooner than they would without treatment.       Antibiotics -- Antibiotics are NOT useful for treating viral illnesses such as influenza. Antibiotics should only be used if there is a bacterial complication of the flu such as bacterial pneumonia, ear infection, or sinusitis.    Because you were diagnosed with a flu like illness you are very contagious.  This means you cannot work, attend school or  for at least 24 hours or until you no longer have a fever.  If you were given a prescription for medicine here today, be sure to read all of the information (including the package insert) that comes with your prescription.  This will include important information about the medicine, its side effects, and any warnings that you need to know about.  The pharmacist who fills the prescription can provide more information and  answer questions you may have about the medicine.  If you have questions or concerns that the pharmacist cannot address, please call or return to the Emergency Department.   Remember that you can always come back to the Emergency Department if you are not able to see your regular provider in the amount of time listed above, if you get any new symptoms, or if there is anything that worries you.

## 2018-01-21 LAB — INTERPRETATION ECG - MUSE: NORMAL

## 2018-01-26 LAB
BACTERIA SPEC CULT: NO GROWTH
BACTERIA SPEC CULT: NO GROWTH
Lab: NORMAL
SPECIMEN SOURCE: NORMAL
SPECIMEN SOURCE: NORMAL

## 2018-02-06 ENCOUNTER — TELEPHONE (OUTPATIENT)
Dept: FAMILY MEDICINE | Facility: CLINIC | Age: 68
End: 2018-02-06

## 2018-02-06 NOTE — TELEPHONE ENCOUNTER
Reason for Call:  Other     Detailed comments: The patient is calling saying her eye doctor, Dr. Hernandez, needs an addendum to her preop by Dr. Phan. It needs to state that she can have her surgery. It needs to be faxed to Dr. Hernandez's office at [phone]. She would like a phone call when it has been faxed over as well.     Phone Number Patient can be reached at: Home number on file [phone] (home)    Best Time: Anytime    Can we leave a detailed message on this number? YES    Call taken on 2/6/2018 at 10:06 AM by Manjula Guzmán

## 2018-02-06 NOTE — TELEPHONE ENCOUNTER
left voicemail message for patient to contact clinic with info  Dr Phan needs to know if patient is having another eye procedure and that's why she needs clearance?  If patient is having any new concerning problems then she needs to schedule an appointment to see Dr Phan.  Molly VILLAR

## 2018-02-06 NOTE — TELEPHONE ENCOUNTER
Last pre op was 12/28/2017, clear for surgery is already documented. Id she is having another eye procedure that need clearance? Need clarification     I also noted that she was in er since after last visit although sounds like everything was checked ou ok, so she may be ok. If it still minor eye [procedure then I can added the notes and give clearance, if she is having any new concerning or problem then I might be best that I should see her.  Let me know please

## 2018-02-06 NOTE — TELEPHONE ENCOUNTER
Patient wants the 12/28/2017 Preo-op addended  Needs to say ok for patient to have surgery  Molly VILLAR

## 2018-02-06 NOTE — TELEPHONE ENCOUNTER
Pt called to leave massage she will have surgery on left eye with Dr David Alexandra. Please fax phys history to Fax # [phone]. The surgery is on Friday. 2/09/2018

## 2018-02-22 ENCOUNTER — TRANSFERRED RECORDS (OUTPATIENT)
Dept: HEALTH INFORMATION MANAGEMENT | Facility: CLINIC | Age: 68
End: 2018-02-22

## 2018-03-07 DIAGNOSIS — G47.09 OTHER INSOMNIA: ICD-10-CM

## 2018-03-07 DIAGNOSIS — F33.0 MAJOR DEPRESSIVE DISORDER, RECURRENT EPISODE, MILD (H): ICD-10-CM

## 2018-03-07 NOTE — TELEPHONE ENCOUNTER
"Requested Prescriptions   Pending Prescriptions Disp Refills     traZODone (DESYREL) 100 MG tablet [Pharmacy Med Name: TRAZODONE 100 MG TABLET]  Last Written Prescription Date:  9/13/17  Last Fill Quantity: 90,  # refills: 1   Last office visit: 1/18/2018 with prescribing provider:  1/18/18   Future Office Visit:     90 tablet 1     Sig: TAKE 0.5-1 TABLETS BY MOUTH NIGHTLY AS NEEDED FOR SLEEP    Serotonin Modulators Passed    3/7/2018  2:21 AM       Passed - Recent (12 mo) or future (30 days) visit within the authorizing provider's specialty    Patient had office visit in the last year or has a visit in the next 30 days with authorizing provider.  See \"Patient Info\" tab in inbasket, or \"Choose Columns\" in Meds & Orders section of the refill encounter.            Passed - Patient is age 18 or older       Passed - No active pregnancy on record       Passed - No positive pregnancy test in past 12 months          "

## 2018-03-08 NOTE — TELEPHONE ENCOUNTER
Routing refill request to provider for review/approval because:  High allergy for drug.  Soraya Amaro RN - Triage  [facility]

## 2018-03-09 ENCOUNTER — TRANSFERRED RECORDS (OUTPATIENT)
Dept: HEALTH INFORMATION MANAGEMENT | Facility: CLINIC | Age: 68
End: 2018-03-09

## 2018-03-09 RX ORDER — TRAZODONE HYDROCHLORIDE 100 MG/1
TABLET ORAL
Qty: 90 TABLET | Refills: 3 | Status: SHIPPED | OUTPATIENT
Start: 2018-03-09 | End: 2019-01-03

## 2018-03-09 NOTE — TELEPHONE ENCOUNTER
Patient states that she has been taking trazodone for 25 years and has never had any problem with it. Patient states the this will not cause any problem with her refill. Cass Fraire RN

## 2018-04-05 ENCOUNTER — OFFICE VISIT (OUTPATIENT)
Dept: FAMILY MEDICINE | Facility: CLINIC | Age: 68
End: 2018-04-05
Payer: COMMERCIAL

## 2018-04-05 VITALS
DIASTOLIC BLOOD PRESSURE: 64 MMHG | SYSTOLIC BLOOD PRESSURE: 114 MMHG | HEIGHT: 62 IN | OXYGEN SATURATION: 99 % | BODY MASS INDEX: 29.81 KG/M2 | WEIGHT: 162 LBS | HEART RATE: 77 BPM | TEMPERATURE: 99.1 F

## 2018-04-05 DIAGNOSIS — Z12.11 COLON CANCER SCREENING: Primary | ICD-10-CM

## 2018-04-05 DIAGNOSIS — J45.31 MILD PERSISTENT ASTHMA WITH EXACERBATION: ICD-10-CM

## 2018-04-05 DIAGNOSIS — F33.0 MAJOR DEPRESSIVE DISORDER, RECURRENT EPISODE, MILD (H): ICD-10-CM

## 2018-04-05 DIAGNOSIS — G47.09 OTHER INSOMNIA: ICD-10-CM

## 2018-04-05 DIAGNOSIS — M79.671 RIGHT FOOT PAIN: ICD-10-CM

## 2018-04-05 PROCEDURE — 99214 OFFICE O/P EST MOD 30 MIN: CPT | Performed by: FAMILY MEDICINE

## 2018-04-05 RX ORDER — ALPRAZOLAM 0.25 MG
0.25 TABLET ORAL 3 TIMES DAILY PRN
Qty: 20 TABLET | Refills: 0 | Status: SHIPPED | OUTPATIENT
Start: 2018-04-05 | End: 2018-07-23

## 2018-04-05 ASSESSMENT — ANXIETY QUESTIONNAIRES
6. BECOMING EASILY ANNOYED OR IRRITABLE: NOT AT ALL
2. NOT BEING ABLE TO STOP OR CONTROL WORRYING: NEARLY EVERY DAY
5. BEING SO RESTLESS THAT IT IS HARD TO SIT STILL: SEVERAL DAYS
3. WORRYING TOO MUCH ABOUT DIFFERENT THINGS: MORE THAN HALF THE DAYS
1. FEELING NERVOUS, ANXIOUS, OR ON EDGE: NEARLY EVERY DAY
GAD7 TOTAL SCORE: 12
7. FEELING AFRAID AS IF SOMETHING AWFUL MIGHT HAPPEN: SEVERAL DAYS

## 2018-04-05 ASSESSMENT — PATIENT HEALTH QUESTIONNAIRE - PHQ9: 5. POOR APPETITE OR OVEREATING: MORE THAN HALF THE DAYS

## 2018-04-05 NOTE — PATIENT INSTRUCTIONS
Take medications as directed.  Cares and symptomatic cares discussed   Follow up in 3 weeks, sooner  if problem or concern

## 2018-04-05 NOTE — NURSING NOTE
"Chief Complaint   Patient presents with     Depression       Initial /64  Pulse 77  Temp 99.1  F (37.3  C) (Tympanic)  Ht 5' 2\" (1.575 m)  Wt 162 lb (73.5 kg)  LMP 11/01/2004  SpO2 99%  BMI 29.63 kg/m2 Estimated body mass index is 29.63 kg/(m^2) as calculated from the following:    Height as of this encounter: 5' 2\" (1.575 m).    Weight as of this encounter: 162 lb (73.5 kg).  Medication Reconciliation: complete  "

## 2018-04-05 NOTE — MR AVS SNAPSHOT
After Visit Summary   4/5/2018    Aracelis Muñoz    MRN: [id number]           Patient Information     Date Of Birth          1950        Visit Information        Provider Department      4/5/2018 9:00 AM Shalini Phan MD [facility] [place]        Today's Diagnoses     Colon cancer screening    -  1    Major depressive disorder, recurrent episode, mild (H)        Other insomnia        Right foot pain        Mild persistent asthma with exacerbation           Follow-ups after your visit        Follow-up notes from your care team     Return in about 3 weeks (around 4/26/2018).      Who to contact     If you have questions or need follow up information about today's clinic visit or your schedule please contact [facility] [place] directly at [phone].  Normal or non-critical lab and imaging results will be communicated to you by MyChart, letter or phone within 4 business days after the clinic has received the results. If you do not hear from us within 7 days, please contact the clinic through MyChart or phone. If you have a critical or abnormal lab result, we will notify you by phone as soon as possible.  Submit refill requests through [employer] or call your pharmacy and they will forward the refill request to us. Please allow 3 business days for your refill to be completed.          Additional Information About Your Visit        MyChart Information     [employer] gives you secure access to your electronic health record. If you see a primary care provider, you can also send messages to your care team and make appointments. If you have questions, please call your primary care clinic.  If you do not have a primary care provider, please call [phone] and they will assist you.        Care EveryWhere ID     This is your Care EveryWhere ID. This could be used by other organizations to access your [facility] medical records  [id number]        Your Vitals Were     Pulse  "Temperature Height Last Period Pulse Oximetry BMI (Body Mass Index)    77 99.1  F (37.3  C) (Tympanic) 5' 2\" (1.575 m) 11/01/2004 99% 29.63 kg/m2       Blood Pressure from Last 3 Encounters:   04/05/18 114/64   01/20/18 115/67   01/18/18 114/70    Weight from Last 3 Encounters:   04/05/18 162 lb (73.5 kg)   01/20/18 161 lb (73 kg)   01/18/18 161 lb (73 kg)              Today, you had the following     No orders found for display         Today's Medication Changes          These changes are accurate as of 4/5/18  9:48 AM.  If you have any questions, ask your nurse or doctor.               Start taking these medicines.        Dose/Directions    ALPRAZolam 0.25 MG tablet   Commonly known as:  XANAX   Used for:  Major depressive disorder, recurrent episode, mild (H), Other insomnia   Started by:  Shalini Phan MD        Dose:  0.25 mg   Take 1 tablet (0.25 mg) by mouth 3 times daily as needed for anxiety   Quantity:  20 tablet   Refills:  0            Where to get your medicines      These medications were sent to [facility]/pharmacy #[id number] - [place], [place] - [address] [place] [address]     Phone:  [phone]     fluticasone-salmeterol 250-50 MCG/DOSE diskus inhaler         Some of these will need a paper prescription and others can be bought over the counter.  Ask your nurse if you have questions.     Bring a paper prescription for each of these medications     ALPRAZolam 0.25 MG tablet                Primary Care Provider Office Phone # Fax #    Shalini Phan [phone]       [address]  [place] [address]        Equal Access to Services     [facility] IRVIN : Andriy Beltrán, walibby lusophia, qaybta kaalmarandy hoffman, starr albarran. So [facility] [phone].    ATENCIÓN: Si habla español, tiene a perez disposición servicios gratuitos de asistencia lingüística. Llame al [phone].    We comply with " applicable federal civil rights laws and [place] laws. We do not discriminate on the basis of race, color, national origin, age, disability, sex, sexual orientation, or gender identity.            Thank you!     Thank you for choosing [facility] [place]  for your care. Our goal is always to provide you with excellent care. Hearing back from our patients is one way we can continue to improve our services. Please take a few minutes to complete the written survey that you may receive in the mail after your visit with us. Thank you!             Your Updated Medication List - Protect others around you: Learn how to safely use, store and throw away your medicines at www.disposemymeds.org.          This list is accurate as of 4/5/18  9:48 AM.  Always use your most recent med list.                   Brand Name Dispense Instructions for use Diagnosis    alendronate 70 MG tablet    FOSAMAX    12 tablet    TAKE 1 TABLET (70 MG) BY MOUTH EVERY 7 DAYS    Osteopenia       ALLEGRA PO           ALPRAZolam 0.25 MG tablet    XANAX    20 tablet    Take 1 tablet (0.25 mg) by mouth 3 times daily as needed for anxiety    Major depressive disorder, recurrent episode, mild (H), Other insomnia       azelastine 0.05 % Soln ophthalmic solution    OPTIVAR    6 mL    PLACE 1 DROP INTO BOTH EYES DAILY AS NEEDED IN BOTH EYES AS NEEDED    Environmental allergies       escitalopram 20 MG tablet    LEXAPRO    90 tablet    TAKE 1 TABLET (20 MG) BY MOUTH DAILY    Major depressive disorder, recurrent episode, mild (H)       fluticasone-salmeterol 250-50 MCG/DOSE diskus inhaler    ADVAIR DISKUS    60 Inhaler    Inhale 1 puff into the lungs 2 times daily    Mild persistent asthma with exacerbation       nabumetone 750 MG tablet    RELAFEN     TAKE 1 TAB BY MOUTH TWICE DAILY        traZODone 100 MG tablet    DESYREL    90 tablet    TAKE 0.5-1 TABLETS BY MOUTH NIGHTLY AS NEEDED FOR SLEEP    Major depressive disorder, recurrent episode, mild  (H), Other insomnia

## 2018-04-05 NOTE — PROGRESS NOTES
SUBJECTIVE:   Aracelis Muñoz is a 67 year old female who presents to clinic today for the following health issues:        Medication Followup of  Lexapro Numbetone    Taking Medication as prescribed: yes    Side Effects:  None, unsure if causing heartburn     Medication Helping Symptoms:  yes       Depression/Anxiety Follow up      Status since last visit: Worsened since last 2 weeks , since she saw foot doctor, they are debating surgery so she is currently nervous and worried about it.     She also had stopped taking lexapro for few months ,so just started again last week , his anxiety symptoms start getting worse recently.    See PHQ-9 for current symptoms.  Other associated symptoms: None    Complicating factors:   Significant life event:  Yes-  Dx with arthritis in feet , she lives alone and hard for her to mange , she ended up having surgery.   Current substance abuse:  None  Anxiety or Panic symptoms:  Yes-  Chest tightness but gets better as day progresses , mostly when she feels anxious, not sleeping well .  No exertional chest pain shortness of breath palpitation etc. she thinks mostly anxiety          PHQ-9 8/1/2017 9/5/2017 4/5/2018   Total Score 3 1 16   Q9: Suicide Ideation Not at all Not at all Several days       PHQ-9  English  PHQ-9   Any Language  Suicide Assessment Five-step Evaluation and Treatment (SAFE-T)    Amount of exercise or physical activity: None    Problems taking medications regularly: No    Medication side effects: none    Diet: regular (no restrictions)    Add on problem  ..  Has ongoing feet ssue. Has been to [place], debating surgery etc ,  Causing stress , she is trying med's for now and foot brace is helping  Has been taking Relafen by ortho giving  her mild  stomach upset, although she is jut taking 1/2 tab now. So that is better .     Also please use refill on her inhalers.  Asthma is doing well otherwise.    Problem list and histories reviewed & adjusted, as  indicated.  Additional history: as documented    Patient Active Problem List   Diagnosis     Menopausal LMP age 54     Diffuse cystic mastopathy     Slow transit constipation     Lumbar disc herniation with radiculopathy     Post-Nasal Drainage     Perennial allergic rhinitis     Environmental allergies     Osteopenia     Generalized anxiety disorder     Mild persistent intrinsic asthma without status asthmaticus with acute exacerbation     Serum calcium elevated     Hyperparathyroidism (H)     Vitamin D deficiency     Hypercalcemia     Other insomnia     Hyperlipidemia with target LDL less than 130     Mild persistent asthma without complication     Major depressive disorder, recurrent episode, mild (H)     Eczema, unspecified type     Left knee pain, unspecified chronicity     Acute midline low back pain with left-sided sciatica     Lumbago     Right knee pain     Right foot pain     Past Surgical History:   Procedure Laterality Date     C NONSPECIFIC PROCEDURE      Cyst     C/SECTION, LOW TRANSVERSE      S/P C/S     CHOLECYSTECTOMY, OPEN      Cholecystectomy     HC EXCISION BREAST LESION, OPEN >=1      Benign mass right breast     HC MRI LUMBAR SPINE W/O CONTRAST  2010    multilevel degen disc disease/facet arthropathy, 5 mm caudally extruded left posterolateral disc herniation at L4-5 with impingement on the left L5 nerve root      HC PUNCTURE/ASPIRATION BREAST CYST, FIRST  ,,    bilateral '03, left '04     LIGATN/STRIP LONG & SHORT SAPHEN      varicose vein stripping       Social History   Substance Use Topics     Smoking status: Never Smoker     Smokeless tobacco: Never Used     Alcohol use 0.0 oz/week     0 Standard drinks or equivalent per week      Comment: 1-2 glasses of wine 1-2 times per week      Family History   Problem Relation Age of Onset     Thyroid Disease Mother      Hypertension Mother      Allergies Mother      Cancer - colorectal Father       age 65 colon  "cancer     Thyroid Disease Father      Allergies Father      Depression Father      Obesity Father      Genitourinary Problems Maternal Aunt      fibrocystic breast           Reviewed and updated as needed this visit by clinical staff  Tobacco  Allergies  Meds       Reviewed and updated as needed this visit by Provider         ROS:  Constitutional, HEENT, cardiovascular, pulmonary, GI, , musculoskeletal, neuro, skin, endocrine and psych systems are negative, except as otherwise noted.    OBJECTIVE:     /64  Pulse 77  Temp 99.1  F (37.3  C) (Tympanic)  Ht 5' 2\" (1.575 m)  Wt 162 lb (73.5 kg)  LMP 11/01/2004  SpO2 99%  BMI 29.63 kg/m2  Body mass index is 29.63 kg/(m^2).  GENERAL: healthy, alert and no distress  RESP: lungs clear to auscultation - no rales, rhonchi or wheezes  CV: regular rate and rhythm, normal S1 S2, no S3 or S4, no murmur, click or rub, no peripheral edema and peripheral pulses strong  ABDOMEN: soft, nontender, no hepatosplenomegaly, no masses and bowel sounds normal  MS: no gross musculoskeletal defects noted, no edema  NEURO: Normal strength and tone, mentation intact and speech normal  PSYCH: mentation appears normal, affect normal/bright, tearful, anxious, fatigued, speech pressured, judgement and insight intact and appearance well groomed        ASSESSMENT/PLAN:                 (F33.0) Major depressive disorder, recurrent episode, mild (H)  Comment:   Plan: ALPRAZolam (XANAX) 0.25 MG tablet            (G47.09) Other insomnia  Comment:   Plan: ALPRAZolam (XANAX) 0.25 MG tablet          Discussed cares, talked about signs and symptoms of anxiety/ depression and treatment options. Willing to continue with low-dose of Lexapro at 20 mg which has worked well for so she just started back again recently.  May take occasional Xanax as needed.  pros/ cons of med's discussed . encouraged to see  to help . spent sometimes counseling patient. Follow up in 6-8 weeks, sooner if " problem.       (M02.284) Right foot pain  Comment: seeing ortho   Plan: Relafen as needed, aspirin      (J45.31) Mild persistent asthma with exacerbation  Comment: stable   Plan: fluticasone-salmeterol (ADVAIR DISKUS) 250-50         MCG/DOSE diskus inhaler            (Z12.11) Colon cancer screening  (primary encounter diagnosis)  Comment: per pt, she sent kit last week   Plan: Fecal colorectal cancer screen (FIT)    Patient expressed understanding and agreement with treatment plan. All patient's questions were answered, will let me know if has more later.  Medications: Rx's: Reviewed the potential side effects/complications of medications prescribed.         Shalini Phan MD  [facility]

## 2018-04-06 ASSESSMENT — ANXIETY QUESTIONNAIRES: GAD7 TOTAL SCORE: 12

## 2018-04-06 ASSESSMENT — PATIENT HEALTH QUESTIONNAIRE - PHQ9: SUM OF ALL RESPONSES TO PHQ QUESTIONS 1-9: 16

## 2018-04-06 ASSESSMENT — ASTHMA QUESTIONNAIRES: ACT_TOTALSCORE: 25

## 2018-04-07 LAB — HEMOCCULT STL QL IA: NEGATIVE

## 2018-04-09 DIAGNOSIS — Z12.11 SCREEN FOR COLON CANCER: ICD-10-CM

## 2018-04-10 ENCOUNTER — TELEPHONE (OUTPATIENT)
Dept: FAMILY MEDICINE | Facility: CLINIC | Age: 68
End: 2018-04-10

## 2018-04-10 DIAGNOSIS — F33.0 MAJOR DEPRESSIVE DISORDER, RECURRENT EPISODE, MILD (H): ICD-10-CM

## 2018-04-10 DIAGNOSIS — G47.09 OTHER INSOMNIA: Primary | ICD-10-CM

## 2018-04-10 RX ORDER — ESCITALOPRAM OXALATE 20 MG/1
20 TABLET ORAL DAILY
Qty: 90 TABLET | Refills: 1 | Status: SHIPPED | OUTPATIENT
Start: 2018-04-10 | End: 2018-04-17

## 2018-04-10 NOTE — TELEPHONE ENCOUNTER
Reason for Call:  Medication or medication refill:    Do you use a [facility]?  Name of the pharmacy and phone number for the current request:      [id number] - [place], [place] - [address]         Name of the medication requested: escitalopram (LEXAPRO) 20 MG tablet    Other request: Patient feels the side effects of hair loss wants to see if there is a replacement drug for this one.    Can we leave a detailed message on this number? YES    Phone number patient can be reached at: Home number on file [phone] (home)    Best Time: anytime today or in am.    Call taken on 4/10/2018 at 3:35 PM by Daiana Aguero

## 2018-04-17 ENCOUNTER — OFFICE VISIT (OUTPATIENT)
Dept: FAMILY MEDICINE | Facility: CLINIC | Age: 68
End: 2018-04-17
Payer: COMMERCIAL

## 2018-04-17 VITALS
HEART RATE: 91 BPM | HEIGHT: 62 IN | SYSTOLIC BLOOD PRESSURE: 117 MMHG | OXYGEN SATURATION: 99 % | WEIGHT: 162 LBS | BODY MASS INDEX: 29.81 KG/M2 | DIASTOLIC BLOOD PRESSURE: 67 MMHG | TEMPERATURE: 98.5 F

## 2018-04-17 DIAGNOSIS — F33.0 MAJOR DEPRESSIVE DISORDER, RECURRENT EPISODE, MILD (H): Primary | ICD-10-CM

## 2018-04-17 DIAGNOSIS — G47.09 OTHER INSOMNIA: ICD-10-CM

## 2018-04-17 PROCEDURE — 99214 OFFICE O/P EST MOD 30 MIN: CPT | Performed by: FAMILY MEDICINE

## 2018-04-17 RX ORDER — DICLOFENAC SODIUM 75 MG/1
75 TABLET, DELAYED RELEASE ORAL
COMMUNITY
Start: 2018-04-16 | End: 2018-04-25

## 2018-04-17 RX ORDER — ESCITALOPRAM OXALATE 20 MG/1
10 TABLET ORAL DAILY
Qty: 90 TABLET | Refills: 1
Start: 2018-04-17 | End: 2019-01-03

## 2018-04-17 ASSESSMENT — ANXIETY QUESTIONNAIRES
5. BEING SO RESTLESS THAT IT IS HARD TO SIT STILL: NOT AT ALL
1. FEELING NERVOUS, ANXIOUS, OR ON EDGE: SEVERAL DAYS
2. NOT BEING ABLE TO STOP OR CONTROL WORRYING: MORE THAN HALF THE DAYS
IF YOU CHECKED OFF ANY PROBLEMS ON THIS QUESTIONNAIRE, HOW DIFFICULT HAVE THESE PROBLEMS MADE IT FOR YOU TO DO YOUR WORK, TAKE CARE OF THINGS AT HOME, OR GET ALONG WITH OTHER PEOPLE: SOMEWHAT DIFFICULT
GAD7 TOTAL SCORE: 4
7. FEELING AFRAID AS IF SOMETHING AWFUL MIGHT HAPPEN: NOT AT ALL
6. BECOMING EASILY ANNOYED OR IRRITABLE: NOT AT ALL
3. WORRYING TOO MUCH ABOUT DIFFERENT THINGS: SEVERAL DAYS

## 2018-04-17 ASSESSMENT — PATIENT HEALTH QUESTIONNAIRE - PHQ9: 5. POOR APPETITE OR OVEREATING: NOT AT ALL

## 2018-04-17 NOTE — NURSING NOTE
"Chief Complaint   Patient presents with     Recheck Medication       Initial /67  Pulse 91  Temp 98.5  F (36.9  C) (Tympanic)  Ht 5' 2\" (1.575 m)  Wt 162 lb (73.5 kg)  LMP 11/01/2004  SpO2 99%  BMI 29.63 kg/m2 Estimated body mass index is 29.63 kg/(m^2) as calculated from the following:    Height as of this encounter: 5' 2\" (1.575 m).    Weight as of this encounter: 162 lb (73.5 kg).  Medication Reconciliation: complete  "

## 2018-04-17 NOTE — MR AVS SNAPSHOT
After Visit Summary   4/17/2018    Aracelis Muñoz    MRN: [id number]           Patient Information     Date Of Birth          1950        Visit Information        Provider Department      4/17/2018 2:20 PM Shalini Phan MD [facility] [place]        Today's Diagnoses     Major depressive disorder, recurrent episode, mild (H)    -  1    Other insomnia          Care Instructions    Take medications as directed.  Cares and symptomatic cares discussed   Follow up 2 - months  if problem or concern             Follow-ups after your visit        Follow-up notes from your care team     Return in about 3 months (around 7/17/2018).      Who to contact     If you have questions or need follow up information about today's clinic visit or your schedule please contact [facility] [place] directly at [phone].  Normal or non-critical lab and imaging results will be communicated to you by MyChart, letter or phone within 4 business days after the clinic has received the results. If you do not hear from us within 7 days, please contact the clinic through MyChart or phone. If you have a critical or abnormal lab result, we will notify you by phone as soon as possible.  Submit refill requests through [employer] or call your pharmacy and they will forward the refill request to us. Please allow 3 business days for your refill to be completed.          Additional Information About Your Visit        MyChart Information     [employer] gives you secure access to your electronic health record. If you see a primary care provider, you can also send messages to your care team and make appointments. If you have questions, please call your primary care clinic.  If you do not have a primary care provider, please call [phone] and they will assist you.        Care EveryWhere ID     This is your Care EveryWhere ID. This could be used by other organizations to access your [facility]  "records  [id number]        Your Vitals Were     Pulse Temperature Height Last Period Pulse Oximetry BMI (Body Mass Index)    91 98.5  F (36.9  C) (Tympanic) 5' 2\" (1.575 m) 11/01/2004 99% 29.63 kg/m2       Blood Pressure from Last 3 Encounters:   04/17/18 117/67   04/05/18 114/64   01/20/18 115/67    Weight from Last 3 Encounters:   04/17/18 162 lb (73.5 kg)   04/05/18 162 lb (73.5 kg)   01/20/18 161 lb (73 kg)              Today, you had the following     No orders found for display         Today's Medication Changes          These changes are accurate as of 4/17/18  3:03 PM.  If you have any questions, ask your nurse or doctor.               These medicines have changed or have updated prescriptions.        Dose/Directions    escitalopram 20 MG tablet   Commonly known as:  LEXAPRO   This may have changed:  how much to take   Used for:  Major depressive disorder, recurrent episode, mild (H), Other insomnia   Changed by:  Shalini Phan MD        Dose:  10 mg   Take 0.5 tablets (10 mg) by mouth daily   Quantity:  90 tablet   Refills:  1            Where to get your medicines      Some of these will need a paper prescription and others can be bought over the counter.  Ask your nurse if you have questions.     You don't need a prescription for these medications     escitalopram 20 MG tablet                Primary Care Provider Office Phone # Fax #    Shalini Phan [phone]       [address] [place] [facility] [address]        Equal Access to Services     [facility] AH: Hadii atiya quispeo Soally, waaxda luqadaha, qaybta kaalmada yasmin, waxay idiin hayaan adeeg kharash la'aan . So [facility] [phone].    ATENCIÓN: Si habla español, tiene a perez disposición servicios gratuitos de asistencia lingüística. Llame al [phone].    We comply with applicable federal civil rights laws and [place] laws. We do not discriminate on the basis of race, color, national origin, age, " disability, sex, sexual orientation, or gender identity.            Thank you!     Thank you for choosing [facility] [place]  for your care. Our goal is always to provide you with excellent care. Hearing back from our patients is one way we can continue to improve our services. Please take a few minutes to complete the written survey that you may receive in the mail after your visit with us. Thank you!             Your Updated Medication List - Protect others around you: Learn how to safely use, store and throw away your medicines at www.disposemymeds.org.          This list is accurate as of 4/17/18  3:03 PM.  Always use your most recent med list.                   Brand Name Dispense Instructions for use Diagnosis    alendronate 70 MG tablet    FOSAMAX    12 tablet    TAKE 1 TABLET (70 MG) BY MOUTH EVERY 7 DAYS    Osteopenia       ALLEGRA PO           ALPRAZolam 0.25 MG tablet    XANAX    20 tablet    Take 1 tablet (0.25 mg) by mouth 3 times daily as needed for anxiety    Major depressive disorder, recurrent episode, mild (H), Other insomnia       azelastine 0.05 % Soln ophthalmic solution    OPTIVAR    6 mL    PLACE 1 DROP INTO BOTH EYES DAILY AS NEEDED IN BOTH EYES AS NEEDED    Environmental allergies       diclofenac 75 MG EC tablet    VOLTAREN     Take 75 mg by mouth        escitalopram 20 MG tablet    LEXAPRO    90 tablet    Take 0.5 tablets (10 mg) by mouth daily    Major depressive disorder, recurrent episode, mild (H), Other insomnia       fluticasone-salmeterol 250-50 MCG/DOSE diskus inhaler    ADVAIR DISKUS    60 Inhaler    Inhale 1 puff into the lungs 2 times daily    Mild persistent asthma with exacerbation       traZODone 100 MG tablet    DESYREL    90 tablet    TAKE 0.5-1 TABLETS BY MOUTH NIGHTLY AS NEEDED FOR SLEEP    Major depressive disorder, recurrent episode, mild (H), Other insomnia

## 2018-04-17 NOTE — PATIENT INSTRUCTIONS
Take medications as directed.  Cares and symptomatic cares discussed   Follow up 2 - months  if problem or concern

## 2018-04-17 NOTE — PROGRESS NOTES
SUBJECTIVE:   Aracelis Muñoz is a 67 year old female who presents to clinic today for the following health issues:    Medication Followup of  Lexapro Diclofenac     Taking Medication as prescribed: yes    Side Effects:  Poss causing hair loss , unsure if it is just med's or something else     Medication Helping Symptoms:  yes     Depression and Anxiety Follow-Up    Status since last visit: Improved still taking lexapro at lower dosage , it is helping with mood but still need slight improvement. She had hair loss even before lexapro, although she thinsk whe she was off lexapro she noticed some improvement in hair loss etc. But she thisk she is comfortbable at current low dose as she is still not sure. She is also debating it was relefen that may have caused the problem.   So ortho has changed her pian meds and she was given Voltaren now , so she has not started taking it yet.      Other associated symptoms:None    Complicating factors:     Significant life event: No     Current substance abuse: None    PHQ-9 9/5/2017 4/5/2018 4/17/2018   Total Score 1 16 11   Q9: Suicide Ideation Not at all Several days Not at all     MARCELLE-7 SCORE 9/5/2017 4/5/2018 4/17/2018   Total Score - - -   Total Score 0 12 4       PHQ-9  English  PHQ-9   Any Language  MARCELLE-7  Suicide Assessment Five-step Evaluation and Treatment (SAFE-T)    Amount of exercise or physical activity: None    Problems taking medications regularly: No    Medication side effects: none    Diet: regular (no restrictions)          PROBLEMS TO ADD ON...      Problem list and histories reviewed & adjusted, as indicated.  Additional history: as documented    Patient Active Problem List   Diagnosis     Menopausal LMP age 54     Diffuse cystic mastopathy     Slow transit constipation     Lumbar disc herniation with radiculopathy     Post-Nasal Drainage     Perennial allergic rhinitis     Environmental allergies     Osteopenia     Generalized anxiety disorder     Mild  persistent intrinsic asthma without status asthmaticus with acute exacerbation     Serum calcium elevated     Hyperparathyroidism (H)     Vitamin D deficiency     Hypercalcemia     Other insomnia     Hyperlipidemia with target LDL less than 130     Mild persistent asthma without complication     Major depressive disorder, recurrent episode, mild (H)     Eczema, unspecified type     Left knee pain, unspecified chronicity     Acute midline low back pain with left-sided sciatica     Lumbago     Right knee pain     Right foot pain     Past Surgical History:   Procedure Laterality Date     C NONSPECIFIC PROCEDURE      Cyst     C/SECTION, LOW TRANSVERSE      S/P C/S     CHOLECYSTECTOMY, OPEN      Cholecystectomy     HC EXCISION BREAST LESION, OPEN >=1      Benign mass right breast     HC MRI LUMBAR SPINE W/O CONTRAST  2010    multilevel degen disc disease/facet arthropathy, 5 mm caudally extruded left posterolateral disc herniation at L4-5 with impingement on the left L5 nerve root      HC PUNCTURE/ASPIRATION BREAST CYST, FIRST  ,,    bilateral '03, left '     LIGATN/STRIP LONG & SHORT SAPHEN      varicose vein stripping       Social History   Substance Use Topics     Smoking status: Never Smoker     Smokeless tobacco: Never Used     Alcohol use 0.0 oz/week     0 Standard drinks or equivalent per week      Comment: 1-2 glasses of wine 1-2 times per week      Family History   Problem Relation Age of Onset     Thyroid Disease Mother      Hypertension Mother      Allergies Mother      Cancer - colorectal Father       age 65 colon cancer     Thyroid Disease Father      Allergies Father      Depression Father      Obesity Father      Genitourinary Problems Maternal Aunt      fibrocystic breast           Reviewed and updated as needed this visit by clinical staff  Tobacco  Allergies  Meds       Reviewed and updated as needed this visit by Provider         ROS:  Constitutional, HEENT,  "cardiovascular, pulmonary, GI, , musculoskeletal, neuro, skin, endocrine and psych systems are negative, except as otherwise noted.    OBJECTIVE:     /67  Pulse 91  Temp 98.5  F (36.9  C) (Tympanic)  Ht 5' 2\" (1.575 m)  Wt 162 lb (73.5 kg)  LMP 11/01/2004  SpO2 99%  BMI 29.63 kg/m2  Body mass index is 29.63 kg/(m^2).  GENERAL: healthy, alert and no distress  RESP: lungs clear to auscultation - no rales, rhonchi or wheezes  CV: regular rate and rhythm, normal S1 S2, no S3 or S4,   ABDOMEN: soft, nontender, no hepatosplenomegaly, no masses and bowel sounds normal  SKIN: no suspicious lesions or rashes  PSYCH: mentation appears normal, affect normal, slightly uptight,  speech pressured        ASSESSMENT/PLAN:   (F33.0) Major depressive disorder, recurrent episode, mild (H)  (primary encounter diagnosis)  Comment:   Plan: escitalopram (LEXAPRO) 20 MG tablet            (G47.09) Other insomnia  Comment:   Plan: escitalopram (LEXAPRO) 20 MG tablet          She is  comfortable watching at current dose of lexapro for now .  Willing to continue at 10 mg( 1/2 of 20 mg tab)  , since she thinks its the alexia med's what may have caused the hair loss.  Orthopedic also switched her medications recently she   just wants to watch and she will follow-up if any problem.     Discussed cares, talked about signs and symptoms of anxiety/ depression and treatment options.Pros/ cons of med's discussed.  She was also encouraged to see the counselor to help.  Referral has been given previously.   Follow up in 2-3 months, sooner if problem.       Patient expressed understanding and agreement with treatment plan. All patient's questions were answered, will let me know if has more later.  Medications: Rx's: Reviewed the potential side effects/complications of medications prescribed.       Shalini Phan MD  [facility] – [place], [place]    "

## 2018-04-18 ASSESSMENT — ANXIETY QUESTIONNAIRES: GAD7 TOTAL SCORE: 4

## 2018-04-18 ASSESSMENT — PATIENT HEALTH QUESTIONNAIRE - PHQ9: SUM OF ALL RESPONSES TO PHQ QUESTIONS 1-9: 11

## 2018-04-24 ENCOUNTER — TELEPHONE (OUTPATIENT)
Dept: FAMILY MEDICINE | Facility: CLINIC | Age: 68
End: 2018-04-24

## 2018-04-24 NOTE — TELEPHONE ENCOUNTER
History and Physical form received from patient  Patient did not have a Preop done  Patient last seen 4/17/18  Form placed in PCP box to determine if patient should come in for an actual Preop appointment: Surgery is 4/30/18  Molly VILLAR

## 2018-04-24 NOTE — TELEPHONE ENCOUNTER
left voicemail message for patient to contact Main Clinic Number to schedule.  NTBS: Preop Appointment before Surgery 4/30  Molly VILLAR

## 2018-04-24 NOTE — TELEPHONE ENCOUNTER
Last pre op was 12/2017, so she will monica a new one, that's just protocol, should be within a month.

## 2018-04-25 ENCOUNTER — OFFICE VISIT (OUTPATIENT)
Dept: FAMILY MEDICINE | Facility: CLINIC | Age: 68
End: 2018-04-25
Payer: COMMERCIAL

## 2018-04-25 VITALS
BODY MASS INDEX: 30.18 KG/M2 | HEIGHT: 62 IN | DIASTOLIC BLOOD PRESSURE: 70 MMHG | HEART RATE: 70 BPM | TEMPERATURE: 98.9 F | SYSTOLIC BLOOD PRESSURE: 120 MMHG | WEIGHT: 164 LBS | OXYGEN SATURATION: 99 %

## 2018-04-25 DIAGNOSIS — H25.019 CORTICAL AGE-RELATED CATARACT, UNSPECIFIED LATERALITY: ICD-10-CM

## 2018-04-25 DIAGNOSIS — Z01.818 PREOP GENERAL PHYSICAL EXAM: Primary | ICD-10-CM

## 2018-04-25 PROCEDURE — 99214 OFFICE O/P EST MOD 30 MIN: CPT | Performed by: FAMILY MEDICINE

## 2018-04-25 NOTE — MR AVS SNAPSHOT
After Visit Summary   4/25/2018    Aracelis Muñoz    MRN: [id number]           Patient Information     Date Of Birth          1950        Visit Information        Provider Department      4/25/2018 2:40 PM Dejon Black MD [facility] [place]        Today's Diagnoses     Preop general physical exam    -  1    Cortical age-related cataract, unspecified laterality          Care Instructions      Before Your Surgery      Call your surgeon if there is any change in your health. This includes signs of a cold or flu (such as a sore throat, runny nose, cough, rash or fever).    Do not smoke, drink alcohol or take over the counter medicine (unless your surgeon or primary care doctor tells you to) for the 24 hours before and after surgery.    If you take prescribed drugs: Follow your doctor s orders about which medicines to take and which to stop until after surgery.    Eating and drinking prior to surgery: follow the instructions from your surgeon    Take a shower or bath the night before surgery. Use the soap your surgeon gave you to gently clean your skin. If you do not have soap from your surgeon, use your regular soap. Do not shave or scrub the surgery site.  Wear clean pajamas and have clean sheets on your bed.           Follow-ups after your visit        Follow-up notes from your care team     Return in about 2 years (around 4/25/2020) for Mood Recheck.      Who to contact     If you have questions or need follow up information about today's clinic visit or your schedule please contact [facility] [place] directly at [phone].  Normal or non-critical lab and imaging results will be communicated to you by MyChart, letter or phone within 4 business days after the clinic has received the results. If you do not hear from us within 7 days, please contact the clinic through MyChart or phone. If you have a critical or abnormal lab result, we will notify you by phone as soon as  "possible.  Submit refill requests through [employer] or call your pharmacy and they will forward the refill request to us. Please allow 3 business days for your refill to be completed.          Additional Information About Your Visit        [employer] Information     [employer] gives you secure access to your electronic health record. If you see a primary care provider, you can also send messages to your care team and make appointments. If you have questions, please call your primary care clinic.  If you do not have a primary care provider, please call [phone] and they will assist you.        Care EveryWhere ID     This is your Care EveryWhere ID. This could be used by other organizations to access your [facility] medical records  [id number]        Your Vitals Were     Pulse Temperature Height Last Period Pulse Oximetry BMI (Body Mass Index)    70 98.9  F (37.2  C) (Tympanic) 5' 2\" (1.575 m) 11/01/2004 99% 30 kg/m2       Blood Pressure from Last 3 Encounters:   04/25/18 120/70   04/17/18 117/67   04/05/18 114/64    Weight from Last 3 Encounters:   04/25/18 164 lb (74.4 kg)   04/17/18 162 lb (73.5 kg)   04/05/18 162 lb (73.5 kg)              Today, you had the following     No orders found for display         Today's Medication Changes          These changes are accurate as of 4/25/18  2:54 PM.  If you have any questions, ask your nurse or doctor.               Stop taking these medicines if you haven't already. Please contact your care team if you have questions.     ALLEGRA PO   Stopped by:  Dejon Black MD           azelastine 0.05 % Soln ophthalmic solution   Commonly known as:  OPTIVAR   Stopped by:  Dejon Black MD           diclofenac 75 MG EC tablet   Commonly known as:  VOLTAREN   Stopped by:  Dejon Black MD                    Primary Care Provider Office Phone # Fax #    Shalini Phan [phone]       [address] [facility] [place] [address]        Equal Access to Services     " EVELYN BRYAN : Hadii aad ku rené Beltrán, waaxda luqadaha, qaybta kaalmada adelibrado, starr stan eddienael camp marekdaxa jalloh . So [facility] [phone].    ATENCIÓN: Si habla espnoemy, tiene a perez disposición servicios gratuitos de asistencia lingüística. Lisaame al [phone].    We comply with applicable federal civil rights laws and [place] laws. We do not discriminate on the basis of race, color, national origin, age, disability, sex, sexual orientation, or gender identity.            Thank you!     Thank you for choosing [facility] [place]  for your care. Our goal is always to provide you with excellent care. Hearing back from our patients is one way we can continue to improve our services. Please take a few minutes to complete the written survey that you may receive in the mail after your visit with us. Thank you!             Your Updated Medication List - Protect others around you: Learn how to safely use, store and throw away your medicines at www.disposemymeds.org.          This list is accurate as of 4/25/18  2:54 PM.  Always use your most recent med list.                   Brand Name Dispense Instructions for use Diagnosis    alendronate 70 MG tablet    FOSAMAX    12 tablet    TAKE 1 TABLET (70 MG) BY MOUTH EVERY 7 DAYS    Osteopenia       ALPRAZolam 0.25 MG tablet    XANAX    20 tablet    Take 1 tablet (0.25 mg) by mouth 3 times daily as needed for anxiety    Major depressive disorder, recurrent episode, mild (H), Other insomnia       escitalopram 20 MG tablet    LEXAPRO    90 tablet    Take 0.5 tablets (10 mg) by mouth daily    Major depressive disorder, recurrent episode, mild (H), Other insomnia       fluticasone-salmeterol 250-50 MCG/DOSE diskus inhaler    ADVAIR DISKUS    60 Inhaler    Inhale 1 puff into the lungs 2 times daily    Mild persistent asthma with exacerbation       traZODone 100 MG tablet    DESYREL    90 tablet    TAKE 0.5-1 TABLETS BY MOUTH NIGHTLY AS NEEDED FOR SLEEP     Major depressive disorder, recurrent episode, mild (H), Other insomnia

## 2018-04-25 NOTE — PROGRESS NOTES
[address] [address]  [phone]  Dept: [phone]    PRE-OP EVALUATION:  Today's date: 2018    Aracelis Muñoz (: 1950) presents for pre-operative evaluation assessment as requested by Dr. Ibrahima Hernandez.  She requires evaluation and anesthesia risk assessment prior to undergoing surgery/procedure for treatment of cataract follow up .    Proposed Surgery/ Procedure: Yag laser- right eye  Date of Surgery/ Procedure: 18  Time of Surgery/ Procedure: 830  Hospital/Surgical Facility: [facility]  Fax number for surgical facility: [phone]  Primary Physician: Shalini Phan  Type of Anesthesia Anticipated: Local    Patient has a Health Care Directive or Living Will:  NO    1. NO - Do you have a history of heart attack, stroke, stent, bypass or surgery on an artery in the head, neck, heart or legs?  2. NO - Do you ever have any pain or discomfort in your chest?  3. NO - Do you have a history of  Heart Failure?  4. YES - ARE YOUR TROUBLED BY SHORTNESS OF BREATH WHEN WALKING ON THE LEVEL, UP A SLIGHT HILL OR AT NIGHT? H/o Asthma  5. NO - Do you currently have a cold, bronchitis or other respiratory infection?  6. NO - Do you have a cough, shortness of breath or wheezing?  7. NO - Do you sometimes get pains in the calves of your legs when you walk?  8. NO - Do you or anyone in your family have previous history of blood clots?  9. NO - Do you or does anyone in your family have a serious bleeding problem such as prolonged bleeding following surgeries or cuts?  10. NO - Have you ever had problems with anemia or been told to take iron pills?  11. NO - Have you had any abnormal blood loss such as black, tarry or bloody stools, or abnormal vaginal bleeding?  12. YES - HAVE YOU EVER HAD A BLOOD TRANSFUSION?  1982  13. NO - Have you or any of your relatives ever had problems with anesthesia?  14. NO - Do you have sleep  apnea, excessive snoring or daytime drowsiness?  15. NO - Do you have any prosthetic heart valves?  16. NO - Do you have prosthetic joints?  17. NO - Is there any chance that you may be pregnant?        MEDICAL HISTORY:     Patient Active Problem List    Diagnosis Date Noted     Right foot pain 04/05/2018     Priority: Medium     seeing ortho , ankle/ foot arthritis        Lumbago 01/19/2017     Priority: Medium     Right knee pain 01/19/2017     Priority: Medium     Left knee pain, unspecified chronicity 01/16/2017     Priority: Medium     Acute midline low back pain with left-sided sciatica 01/16/2017     Priority: Medium     Mild persistent asthma without complication 06/27/2016     Priority: Medium     Major depressive disorder, recurrent episode, mild (H) 06/27/2016     Priority: Medium     Eczema, unspecified type 06/27/2016     Priority: Medium     rt leg        Hyperlipidemia with target LDL less than 130 01/28/2016     Priority: Medium     Other insomnia 12/21/2015     Priority: Medium     Hypercalcemia 10/30/2013     Priority: Medium     Vitamin D deficiency 09/30/2013     Priority: Medium     Hyperparathyroidism (H) 09/24/2013     Priority: Medium     Serum calcium elevated 09/22/2013     Priority: Medium     Mild persistent intrinsic asthma without status asthmaticus with acute exacerbation 04/25/2013     Priority: Medium     Generalized anxiety disorder 05/12/2011     Priority: Medium     Diagnosis updated by automated process. Provider to review and confirm.       Environmental allergies 04/22/2011     Priority: Medium     Osteopenia      Priority: Medium     Diffuse cystic mastopathy      Priority: Medium     bilateral       Slow transit constipation      Priority: Medium     Lumbar disc herniation with radiculopathy      Priority: Medium     left L4-5       Post-Nasal Drainage      Priority: Medium     chronic       Perennial allergic rhinitis      Priority: Medium     Menopausal LMP age 54       Priority: Medium      Past Medical History:   Diagnosis Date     Depressive disorder, not elsewhere classified     improved with trazadone     Diffuse cystic mastopathy     bilateral     Insomnia, unspecified      trazodone     Lumbar disc herniation with radiculopathy 2010    left L4-5     Mild persistent asthma      Osteopenia 2008     Perennial allergic rhinitis      Post-Nasal Drainage     chronic     Slow transit constipation      Past Surgical History:   Procedure Laterality Date     C NONSPECIFIC PROCEDURE  1982    Cyst     C/SECTION, LOW TRANSVERSE      S/P C/S     CHOLECYSTECTOMY, OPEN  1985    Cholecystectomy     HC EXCISION BREAST LESION, OPEN >=1  1972    Benign mass right breast     HC MRI LUMBAR SPINE W/O CONTRAST  4/2010    multilevel degen disc disease/facet arthropathy, 5 mm caudally extruded left posterolateral disc herniation at L4-5 with impingement on the left L5 nerve root      HC PUNCTURE/ASPIRATION BREAST CYST, FIRST  1995,12/03,8/04    bilateral '03, left '04     LIGATN/STRIP LONG & SHORT SAPHEN      varicose vein stripping     Current Outpatient Prescriptions   Medication Sig Dispense Refill     alendronate (FOSAMAX) 70 MG tablet TAKE 1 TABLET (70 MG) BY MOUTH EVERY 7 DAYS 12 tablet 3     ALPRAZolam (XANAX) 0.25 MG tablet Take 1 tablet (0.25 mg) by mouth 3 times daily as needed for anxiety 20 tablet 0     escitalopram (LEXAPRO) 20 MG tablet Take 0.5 tablets (10 mg) by mouth daily 90 tablet 1     fluticasone-salmeterol (ADVAIR DISKUS) 250-50 MCG/DOSE diskus inhaler Inhale 1 puff into the lungs 2 times daily 60 Inhaler 5     traZODone (DESYREL) 100 MG tablet TAKE 0.5-1 TABLETS BY MOUTH NIGHTLY AS NEEDED FOR SLEEP 90 tablet 3     OTC products: None, except as noted above    Allergies   Allergen Reactions     Nefazodone Hydrochloride Swelling     (serzone) sore swollen tongue     Sulfa Drugs Swelling     Sore swollen tongue     Prochlorperazine Anxiety     (Compazine) became agitated      Latex  "Allergy: NO    Social History   Substance Use Topics     Smoking status: Never Smoker     Smokeless tobacco: Never Used     Alcohol use 0.0 oz/week     0 Standard drinks or equivalent per week      Comment: 1-2 glasses of wine 1-2 times per week      History   Drug Use No       REVIEW OF SYSTEMS:   CONSTITUTIONAL: NEGATIVE for fever, chills, change in weight  ENT/MOUTH: NEGATIVE for ear, mouth and throat problems  RESP: NEGATIVE for significant cough or SOB  CV: NEGATIVE for chest pain, palpitations or peripheral edema    EXAM:   /70  Pulse 70  Temp 98.9  F (37.2  C) (Tympanic)  Ht 5' 2\" (1.575 m)  Wt 164 lb (74.4 kg)  LMP 11/01/2004  SpO2 99%  BMI 30 kg/m2  GENERAL APPEARANCE: healthy, alert and no distress  HENT: ear canals and TM's normal and nose and mouth without ulcers or lesions  RESP: lungs clear to auscultation - no rales, rhonchi or wheezes  CV: regular rate and rhythm, normal S1 S2, no S3 or S4 and no murmur, click or rub   ABDOMEN: soft, nontender, no HSM or masses and bowel sounds normal  NEURO: Normal strength and tone, sensory exam grossly normal, mentation intact and speech normal    DIAGNOSTICS:   No labs or EKG required for low risk surgery (cataract, skin procedure, breast biopsy, etc)    Recent Labs   Lab Test  01/20/18   1200  01/16/17   0941   09/05/13   0733   HGB  14.7   --    --   14.0   PLT  278   --    --   271   NA  138  141   < >  145*   POTASSIUM  3.9  4.3   < >  4.5   CR  0.72  0.80   < >  0.77    < > = values in this interval not displayed.        IMPRESSION:   Reason for surgery/procedure: Yag laser - right eye  Diagnosis/reason for consult: preop    The proposed surgical procedure is considered LOW risk.    REVISED CARDIAC RISK INDEX  The patient has the following serious cardiovascular risks for perioperative complications such as (MI, PE, VFib and 3  AV Block):  No serious cardiac risks  INTERPRETATION: 0 risks: Class I (very low risk - 0.4% complication rate)    The " patient has the following additional risks for perioperative complications:  No identified additional risks      ICD-10-CM    1. Preop general physical exam Z01.818    2. Cortical age-related cataract, unspecified laterality H25.019        RECOMMENDATIONS:       Recommended to hold off on using diclofenac until after the surgery.  May use Tylenol if needed for discomfort in the joints.    APPROVAL GIVEN to proceed with proposed procedure, without further diagnostic evaluation       Signed Electronically by: Dejon Black MD    Copy of this evaluation report is provided to requesting physician.    [facility] Preop Guidelines    Revised Cardiac Risk Index

## 2018-04-25 NOTE — NURSING NOTE
"Chief Complaint   Patient presents with     Pre-Op Exam       Initial /70  Pulse 70  Temp 98.9  F (37.2  C) (Tympanic)  Ht 5' 2\" (1.575 m)  Wt 164 lb (74.4 kg)  LMP 11/01/2004  SpO2 99%  BMI 30 kg/m2 Estimated body mass index is 30 kg/(m^2) as calculated from the following:    Height as of this encounter: 5' 2\" (1.575 m).    Weight as of this encounter: 164 lb (74.4 kg).  Medication Reconciliation: complete   Emily Arndt CMA      "

## 2018-05-22 ENCOUNTER — TELEPHONE (OUTPATIENT)
Dept: FAMILY MEDICINE | Facility: CLINIC | Age: 68
End: 2018-05-22

## 2018-05-22 DIAGNOSIS — L65.9 HAIR LOSS: Primary | ICD-10-CM

## 2018-05-22 NOTE — TELEPHONE ENCOUNTER
Left message for patient to call back to discuss referral   Not sure if she wants treatment for hair loss ? Or evaluation for hair loss ?   See kriss's note ?

## 2018-05-22 NOTE — TELEPHONE ENCOUNTER
Dr Phan patient is calling for a referral to [facility] for her hair loss. Not sure why she needs to go to [facility] when we have Dr Jon at the [facility]. Dr Jon treats patients with hair loss. Please enter derm referral. Your decision on   Where you would like patient to be seen. Please advise.    Clover Meek  Referral Coordinator

## 2018-05-23 NOTE — TELEPHONE ENCOUNTER
Referral faxed to [facility]. They will contact patient if they can offer her an appointment.    Clover Meek  Referral Coordinator

## 2018-05-23 NOTE — TELEPHONE ENCOUNTER
Spoke with patient regarding below. States she has always had thin hair but it is now falling out in clumps. She reports the sink will be covered with hair. She would like evaluation and treatment. She called Dr. Jon's office at the [facility] who cannot see her until September and she would have to have seen a dermatologist outside of [place] prior.     She would like to see Dr. Nikia Harris DO at [facility] for this. Please advise.     Patient can be reached at [phone] okay to leave detailed message.    Denisse Lowery RN   [facility] - Triage

## 2018-05-23 NOTE — TELEPHONE ENCOUNTER
Left message on answering machine for patient to call back.    Maile Morgan,RN BSN  [facility]  [phone]

## 2018-06-25 ENCOUNTER — TELEPHONE (OUTPATIENT)
Dept: FAMILY MEDICINE | Facility: CLINIC | Age: 68
End: 2018-06-25

## 2018-06-25 DIAGNOSIS — N95.1 SYMPTOMATIC MENOPAUSAL OR FEMALE CLIMACTERIC STATES: ICD-10-CM

## 2018-06-25 DIAGNOSIS — M85.88 OSTEOPENIA OF OTHER SITE: Primary | ICD-10-CM

## 2018-06-25 NOTE — TELEPHONE ENCOUNTER
Left detailed message informing of below. Encouraged patient to call with any questions or concerns.   Denisse Loweyr RN   [facility] - Triage

## 2018-06-25 NOTE — TELEPHONE ENCOUNTER
Patient calling to see if she is due for another DEXA scan. Last one was 11/15/16, no recommendation for f/u in result note. Order pended, please advise.     Triage to call with response [phone] okay to leave detailed message.     Denisse Lowery RN   [facility] - Triage

## 2018-06-27 ENCOUNTER — HOSPITAL ENCOUNTER (OUTPATIENT)
Dept: BONE DENSITY | Facility: CLINIC | Age: 68
Discharge: HOME | End: 2018-06-27
Attending: FAMILY MEDICINE | Admitting: FAMILY MEDICINE
Payer: MEDICARE

## 2018-06-27 DIAGNOSIS — N95.1 SYMPTOMATIC MENOPAUSAL OR FEMALE CLIMACTERIC STATES: ICD-10-CM

## 2018-06-27 DIAGNOSIS — M85.88 OSTEOPENIA OF OTHER SITE: ICD-10-CM

## 2018-06-27 PROCEDURE — 77080 DXA BONE DENSITY AXIAL: CPT

## 2018-07-23 DIAGNOSIS — G47.09 OTHER INSOMNIA: ICD-10-CM

## 2018-07-23 DIAGNOSIS — F33.0 MAJOR DEPRESSIVE DISORDER, RECURRENT EPISODE, MILD (H): ICD-10-CM

## 2018-07-24 RX ORDER — ALPRAZOLAM 0.25 MG
TABLET ORAL
Qty: 20 TABLET | Refills: 0 | Status: SHIPPED | OUTPATIENT
Start: 2018-07-24 | End: 2019-01-03

## 2018-07-24 NOTE — TELEPHONE ENCOUNTER
[place] reviewed:  reviewed- no concerns    [place]  [email or web address]    Denisse Lowery RN   [facility] - Triage

## 2018-07-24 NOTE — TELEPHONE ENCOUNTER
Prescription of alprazolam was faxed to [facility]  Date:July 24, 2018  Signature:Molly Aldrich TC

## 2018-07-24 NOTE — TELEPHONE ENCOUNTER
Alprazolam 0.25mg      Last Written Prescription Date:  4/5/18  Last Fill Quantity: 20,   # refills: 0  Last Office Visit: 4/25/18  Future Office visit:       Routing refill request to provider for review/approval because:  Drug not on the [facility], [facility] or [facility] refill protocol or controlled substance    Emily Arndt CMA

## 2018-09-04 ENCOUNTER — TELEPHONE (OUTPATIENT)
Dept: FAMILY MEDICINE | Facility: CLINIC | Age: 68
End: 2018-09-04

## 2018-09-04 DIAGNOSIS — F33.0 MAJOR DEPRESSIVE DISORDER, RECURRENT EPISODE, MILD (H): Primary | ICD-10-CM

## 2018-09-04 NOTE — LETTER
My Depression Action Plan  Name: Aracelis Muñoz   Date of Birth 1950  Date: 9/12/2018    My doctor: Shalini Phan   My clinic: [address] [address]  [phone]          GREEN    ZONE   Good Control    What it looks like:     Things are going generally well. You have normal up s and down s. You may even feel depressed from time to time, but bad moods usually last less than a day.   What you need to do:  1. Continue to care for yourself (see self care plan)  2. Check your depression survival kit and update it as needed  3. Follow your physician s recommendations including any medication.  4. Do not stop taking medication unless you consult with your physician first.           YELLOW         ZONE Getting Worse    What it looks like:     Depression is starting to interfere with your life.     It may be hard to get out of bed; you may be starting to isolate yourself from others.    Symptoms of depression are starting to last most all day and this has happened for several days.     You may have suicidal thoughts but they are not constant.   What you need to do:     1. Call your care team, your response to treatment will improve if you keep your care team informed of your progress. Yellow periods are signs an adjustment may need to be made.     2. Continue your self-care, even if you have to fake it!    3. Talk to someone in your support network    4. Open up your depression survival kit           RED    ZONE Medical Alert - Get Help    What it looks like:     Depression is seriously interfering with your life.     You may experience these or other symptoms: You can t get out of bed most days, can t work or engage in other necessary activities, you have trouble taking care of basic hygiene, or basic responsibilities, thoughts of suicide or death that will not go away, self-injurious behavior.     What you need to do:  1. Call your  care team and request a same-day appointment. If they are not available (weekends or after hours) call your local crisis line, emergency room or 911.            Depression Self Care Plan / Survival Kit    Self-Care for Depression  Here s the deal. Your body and mind are really not as separate as most people think.  What you do and think affects how you feel and how you feel influences what you do and think. This means if you do things that people who feel good do, it will help you feel better.  Sometimes this is all it takes.  There is also a place for medication and therapy depending on how severe your depression is, so be sure to consult with your medical provider and/ or Behavioral Health Consultant if your symptoms are worsening or not improving.     In order to better manage my stress, I will:    Exercise  Get some form of exercise, every day. This will help reduce pain and release endorphins, the  feel good  chemicals in your brain. This is almost as good as taking antidepressants!  This is not the same as joining a gym and then never going! (they count on that by the way ) It can be as simple as just going for a walk or doing some gardening, anything that will get you moving.      Hygiene   Maintain good hygiene (Get out of bed in the morning, Make your bed, Brush your teeth, Take a shower, and Get dressed like you were going to work, even if you are unemployed).  If your clothes don't fit try to get ones that do.    Diet  I will strive to eat foods that are good for me, drink plenty of water, and avoid excessive sugar, caffeine, alcohol, and other mood-altering substances.  Some foods that are helpful in depression are: complex carbohydrates, B vitamins, flaxseed, fish or fish oil, fresh fruits and vegetables.    Psychotherapy  I agree to participate in Individual Therapy (if recommended).    Medication  If prescribed medications, I agree to take them.  Missing doses can result in serious side effects.  I  understand that drinking alcohol, or other illicit drug use, may cause potential side effects.  I will not stop my medication abruptly without first discussing it with my provider.    Staying Connected With Others  I will stay in touch with my friends, family members, and my primary care provider/team.    Use your imagination  Be creative.  We all have a creative side; it doesn t matter if it s oil painting, sand castles, or mud pies! This will also kick up the endorphins.    Witness Beauty  (AKA stop and smell the roses) Take a look outside, even in mid-winter. Notice colors, textures. Watch the squirrels and birds.     Service to others  Be of service to others.  There is always someone else in need.  By helping others we can  get out of ourselves  and remember the really important things.  This also provides opportunities for practicing all the other parts of the program.    Humor  Laugh and be silly!  Adjust your TV habits for less news and crime-drama and more comedy.    Control your stress  Try breathing deep, massage therapy, biofeedback, and meditation. Find time to relax each day.     My support system    Clinic Contact:  Phone number:    Contact 1:  Phone number:    Contact 2:  Phone number:    [employer]/:  Phone number:    Therapist:  Phone number:    Local crisis center:    Phone number:    Other community support:  Phone number:

## 2018-09-04 NOTE — TELEPHONE ENCOUNTER
Pt is due now to update PHQ9.  [employer] message sent to pt. Follow up end date 12/5/18.   PHQ-9 SCORE 9/5/2017 4/5/2018 4/17/2018   Total Score - - -   Total Score 1 16 11     Garrett YAN CMA

## 2018-09-11 NOTE — TELEPHONE ENCOUNTER
Pt has not responded to [employer] message, please call pt and update.Garrett YAN, GABRIELLE

## 2018-09-11 NOTE — TELEPHONE ENCOUNTER
Left message for pt at home number to call clinic or look at my chart message re: depression questionnaire.    Michelle Beltran RN  EP Triage

## 2018-09-12 NOTE — TELEPHONE ENCOUNTER
PHQ-9 SCORE 4/5/2018 4/17/2018 9/12/2018   Total Score - - -   Total Score 16 11 5     Has not been taking lexapro 10 mg 0.5 tab since March.  States she quit all medication except the trazodone.  Will take xanax at times.    States her foot has been bothering her and thinks that is some of her depression issues.    FYI to Dr. Phan.    Michelle Beltran RN  EP Triage

## 2018-09-12 NOTE — TELEPHONE ENCOUNTER
Thanks for the update  Remind pt to do follow up for up , if having problem.   DAP updated for her review

## 2018-09-13 ASSESSMENT — PATIENT HEALTH QUESTIONNAIRE - PHQ9: SUM OF ALL RESPONSES TO PHQ QUESTIONS 1-9: 5

## 2018-12-04 ENCOUNTER — TELEPHONE (OUTPATIENT)
Dept: FAMILY MEDICINE | Facility: CLINIC | Age: 68
End: 2018-12-04

## 2018-12-04 NOTE — TELEPHONE ENCOUNTER
Spoke with patient regarding below. I advised her that PCP is out of office for another week. I offered her an appointment with another provider, she declined this. Assisted patient in scheduling with PCP.   Denisse Lowery RN   [facility] - Triage

## 2018-12-04 NOTE — TELEPHONE ENCOUNTER
Reason for Call:  Other call back, appointment    Detailed comments: Pt was requesting an appointment for med check and to discuss fatigue. She states that the soonest available with Dr. Phan is too far out and would like a call back as to what she should do. She does not want to see a different provider.    Phone Number Patient can be reached at: Home number on file [phone] (home)    Best Time: Any time.    Can we leave a detailed message on this number? YES    Call taken on 12/4/2018 at 12:48 PM by Andrews Bishop

## 2018-12-17 ENCOUNTER — HOSPITAL ENCOUNTER (OUTPATIENT)
Dept: MAMMOGRAPHY | Facility: CLINIC | Age: 68
Discharge: HOME | End: 2018-12-17
Attending: FAMILY MEDICINE | Admitting: FAMILY MEDICINE
Payer: MEDICARE

## 2018-12-17 DIAGNOSIS — Z12.31 VISIT FOR SCREENING MAMMOGRAM: ICD-10-CM

## 2018-12-17 PROCEDURE — 77063 BREAST TOMOSYNTHESIS BI: CPT

## 2018-12-21 ENCOUNTER — OFFICE VISIT (OUTPATIENT)
Dept: FAMILY MEDICINE | Facility: CLINIC | Age: 68
End: 2018-12-21
Payer: COMMERCIAL

## 2018-12-21 VITALS
WEIGHT: 164 LBS | DIASTOLIC BLOOD PRESSURE: 58 MMHG | OXYGEN SATURATION: 95 % | TEMPERATURE: 98 F | HEART RATE: 78 BPM | SYSTOLIC BLOOD PRESSURE: 120 MMHG | BODY MASS INDEX: 30 KG/M2

## 2018-12-21 DIAGNOSIS — J45.31 MILD PERSISTENT ASTHMA WITH EXACERBATION: ICD-10-CM

## 2018-12-21 DIAGNOSIS — J01.90 ACUTE SINUSITIS WITH SYMPTOMS > 10 DAYS: Primary | ICD-10-CM

## 2018-12-21 DIAGNOSIS — R05.9 COUGH: ICD-10-CM

## 2018-12-21 PROCEDURE — 99214 OFFICE O/P EST MOD 30 MIN: CPT | Performed by: NURSE PRACTITIONER

## 2018-12-21 RX ORDER — ALBUTEROL SULFATE 90 UG/1
2 AEROSOL, METERED RESPIRATORY (INHALATION) EVERY 6 HOURS
Qty: 1 INHALER | Refills: 3 | Status: SHIPPED | OUTPATIENT
Start: 2018-12-21 | End: 2019-05-30

## 2018-12-21 RX ORDER — CODEINE PHOSPHATE AND GUAIFENESIN 10; 100 MG/5ML; MG/5ML
1-2 SOLUTION ORAL EVERY 4 HOURS PRN
Qty: 180 ML | Refills: 1 | Status: SHIPPED | OUTPATIENT
Start: 2018-12-21 | End: 2019-04-18

## 2018-12-21 NOTE — PATIENT INSTRUCTIONS
1. Take augmentin twice a day for 7 days.  2. Take robitussin-AC for cough  3.  your new inhaler and use it for cough and wheeze

## 2018-12-21 NOTE — LETTER
My Asthma Action Plan  Name: Aracelis Muñoz   YOB: 1950  Date: 12/21/2018   My doctor: Mark Parikh NP   My clinic: [facility] [place]        My Control Medicine: None  My Rescue Medicine: Albuterol (Proair/Ventolin/Proventil) inhaler as needed   My Asthma Severity: mild persistent  Avoid your asthma triggers: upper respiratory infections  None            GREEN ZONE   Good Control    I feel good    No cough or wheeze    Can work, sleep and play without asthma symptoms       Take your asthma control medicine every day.     1. If exercise triggers your asthma, take your rescue medication    15 minutes before exercise or sports, and    During exercise if you have asthma symptoms  2. Spacer to use with inhaler: If you have a spacer, make sure to use it with your inhaler             YELLOW ZONE Getting Worse  I have ANY of these:    I do not feel good    Cough or wheeze    Chest feels tight    Wake up at night   1. Keep taking your Green Zone medications  2. Start taking your rescue medicine:    every 20 minutes for up to 1 hour. Then every 4 hours for 24-48 hours.  3. If you stay in the Yellow Zone for more than 12-24 hours, contact your doctor.  4. If you do not return to the Green Zone in 12-24 hours or you get worse, start taking your oral steroid medicine if prescribed by your provider.           RED ZONE Medical Alert - Get Help  I have ANY of these:    I feel awful    Medicine is not helping    Breathing getting harder    Trouble walking or talking    Nose opens wide to breathe       1. Take your rescue medicine NOW  2. If your provider has prescribed an oral steroid medicine, start taking it NOW  3. Call your doctor NOW  4. If you are still in the Red Zone after 20 minutes and you have not reached your doctor:    Take your rescue medicine again and    Call 911 or go to the emergency room right away    See your regular doctor within 2 weeks of an Emergency Room or Urgent Care  visit for follow-up treatment.          Annual Reminders:  Meet with Asthma Educator,  Flu Shot in the Fall, consider Pneumonia Vaccination for patients with asthma (aged 19 and older).    Pharmacy:    [facility] - [facility]  [id number] - [place], [place] - [address] [facility] #[id number] - [place], [place] - [address] [facility]                      Asthma Triggers  How To Control Things That Make Your Asthma Worse    Triggers are things that make your asthma worse.  Look at the list below to help you find your triggers and what you can do about them.  You can help prevent asthma flare-ups by staying away from your triggers.      Trigger                                                          What you can do   Cigarette Smoke  Tobacco smoke can make asthma worse. Do not allow smoking in your home, car or around you.  Be sure no one smokes at a child s day care or school.  If you smoke, ask your health care provider for ways to help you quit.  Ask family members to quit too.  Ask your health care provider for a referral to Quit Plan to help you quit smoking, or call 0-578-518-PLAN.     Colds, Flu, Bronchitis  These are common triggers of asthma. Wash your hands often.  Don t touch your eyes, nose or mouth.  Get a flu shot every year.     Dust Mites  These are tiny bugs that live in cloth or carpet. They are too small to see. Wash sheets and blankets in hot water every week.   Encase pillows and mattress in dust mite proof covers.  Avoid having carpet if you can. If you have carpet, vacuum weekly.   Use a dust mask and HEPA vacuum.   Pollen and Outdoor Mold  Some people are allergic to trees, grass, or weed pollen, or molds. Try to keep your windows closed.  Limit time out doors when pollen count is high.   Ask you health care provider about taking medicine during allergy season.     Animal Dander  Some people are allergic to skin flakes, urine or saliva from pets with fur or feathers. Keep  pets with fur or feathers out of your home.    If you can t keep the pet outdoors, then keep the pet out of your bedroom.  Keep the bedroom door closed.  Keep pets off cloth furniture and away from stuffed toys.     Mice, Rats, and Cockroaches  Some people are allergic to the waste from these pests.   Cover food and garbage.  Clean up spills and food crumbs.  Store grease in the refrigerator.   Keep food out of the bedroom.   Indoor Mold  This can be a trigger if your home has high moisture. Fix leaking faucets, pipes, or other sources of water.   Clean moldy surfaces.  Dehumidify basement if it is damp and smelly.   Smoke, Strong Odors, and Sprays  These can reduce air quality. Stay away from strong odors and sprays, such as perfume, powder, hair spray, paints, smoke incense, paint, cleaning products, candles and new carpet.   Exercise or Sports  Some people with asthma have this trigger. Be active!  Ask your doctor about taking medicine before sports or exercise to prevent symptoms.    Warm up for 5-10 minutes before and after sports or exercise.     Other Triggers of Asthma  Cold air:  Cover your nose and mouth with a scarf.  Sometimes laughing or crying can be a trigger.  Some medicines and food can trigger asthma.

## 2018-12-21 NOTE — PROGRESS NOTES
SUBJECTIVE:                                                      Aracelis Muñoz is a 68 year old female who presents to clinic today for the following health issues:    Acute Illness   Acute illness concerns: cough, congestion   Onset: beginning of dec     Fever: no    Chills/Sweats: no    Headache (location?): YES    Sinus Pressure:no    Conjunctivitis:  no    Ear Pain: no    Rhinorrhea: YES    Congestion: YES    Sore Throat: no     Cough: YES    Wheeze: no    Decreased Appetite: no    Nausea: no    Vomiting: no    Diarrhea:  no    Dysuria/Freq.: no    Fatigue/Achiness: YES    Sick/Strep Exposure: YES- saw a friend on wed who was sick      Therapies Tried and outcome: zantac, antihistamine, mucinex     HPI: Sinus / URI symptoms for the entire first part of December. Seemed to get better, but now these have returned and intensified. She states that she is coughing constantly and has persistent sinus and nasal drainage. She also states that she is struggling with headache and fatigue. She denies fever, chill, wheeze, and nausea. She does have underlying asthma, the symptoms of which are generally well-managed with periodic rescue inhaler use. She feels like this has been exacerbated within the context of her present illness.     Problem list and histories reviewed & adjusted, as indicated.  Additional history: as documented    Reviewed and updated as needed this visit by clinical staff  Tobacco  Allergies  Meds  Problems  Med Hx  Surg Hx  Fam Hx       Reviewed and updated as needed this visit by Provider  Tobacco  Allergies  Meds  Problems  Med Hx  Surg Hx  Fam Hx         ROS:  Constitutional, HEENT, pulmonary, GI systems are negative, except as otherwise noted.      OBJECTIVE:                                                      /58 (BP Location: Left arm, Patient Position: Chair, Cuff Size: Adult Regular)   Pulse 78   Temp 98  F (36.7  C) (Tympanic)   Wt 74.4 kg (164 lb)   LMP  11/01/2004   SpO2 95%   BMI 30.00 kg/m   Body mass index is 30 kg/m .   GENERAL: healthy, alert, well nourished, well hydrated, no distress  HENT: ear canals- normal; TMs- mucoid fluid noted behind - no erythema; Nose- normal; Mouth- no ulcers, no lesions  NECK: no tenderness, no adenopathy, no asymmetry, no masses, no stiffness; thyroid- normal to palpation  RESP: aggressive cough. Mild expiratory wheezes noted throughout lung fields.  CV: regular rates and rhythm, normal S1 S2, no S3 or S4 and no murmur, no click or rub -    Diagnostic test results:  none     ASSESSMENT/PLAN:                                                      Aracelis was seen today for cough and sinus symptoms. Given duration, pattern, and severity of symptoms, reasonable to treat antimicrobially. Will treat with Augmentin for 7 days and recommend supportive cares. Will refill albuterol inhaler and emphasize more frequent use in the setting of this mild exacerbation. Discussed reasons to call or return to clinic. Aracelis acknowledges and demonstrates understanding of circumstances under which care should be sought urgently or emergently. Follow up as discussed. Discussed risks, benefits, alternatives, potential side effects, and proper administration of new medication / treatment. Agrees with plan of care. All questions answered.     Diagnoses and all orders for this visit:    Acute sinusitis with symptoms > 10 days  -     amoxicillin-clavulanate (AUGMENTIN) 875-125 MG tablet; Take 1 tablet by mouth 2 times daily for 7 days    Mild persistent asthma with exacerbation  -     albuterol (PROAIR HFA/PROVENTIL HFA/VENTOLIN HFA) 108 (90 Base) MCG/ACT inhaler; Inhale 2 puffs into the lungs every 6 hours    Cough  -     albuterol (PROAIR HFA/PROVENTIL HFA/VENTOLIN HFA) 108 (90 Base) MCG/ACT inhaler; Inhale 2 puffs into the lungs every 6 hours  -     guaiFENesin-codeine (ROBITUSSIN AC) 100-10 MG/5ML solution; Take 5-10 mLs by mouth every 4 hours as  needed for cough      Risks, benefits and alternatives of treatments discussed. Plan agreed upon and all questions answered.      Follow-Up: Return in about 10 days (around 12/31/2018), or if symptoms worsen or fail to improve.    See Patient Instructions      CANDY Morrison, CNP

## 2018-12-22 ASSESSMENT — ASTHMA QUESTIONNAIRES: ACT_TOTALSCORE: 23

## 2019-01-03 ENCOUNTER — OFFICE VISIT (OUTPATIENT)
Dept: FAMILY MEDICINE | Facility: CLINIC | Age: 69
End: 2019-01-03
Payer: COMMERCIAL

## 2019-01-03 VITALS
BODY MASS INDEX: 29.81 KG/M2 | TEMPERATURE: 98.4 F | HEART RATE: 70 BPM | SYSTOLIC BLOOD PRESSURE: 120 MMHG | OXYGEN SATURATION: 98 % | DIASTOLIC BLOOD PRESSURE: 68 MMHG | WEIGHT: 163 LBS

## 2019-01-03 DIAGNOSIS — F33.0 MAJOR DEPRESSIVE DISORDER, RECURRENT EPISODE, MILD (H): ICD-10-CM

## 2019-01-03 DIAGNOSIS — G47.09 OTHER INSOMNIA: ICD-10-CM

## 2019-01-03 PROCEDURE — 99214 OFFICE O/P EST MOD 30 MIN: CPT | Performed by: FAMILY MEDICINE

## 2019-01-03 RX ORDER — ALPRAZOLAM 0.25 MG
0.25 TABLET ORAL 3 TIMES DAILY
Qty: 20 TABLET | Refills: 0 | Status: SHIPPED | OUTPATIENT
Start: 2019-01-03 | End: 2019-04-18

## 2019-01-03 RX ORDER — ESCITALOPRAM OXALATE 20 MG/1
10 TABLET ORAL DAILY
Qty: 30 TABLET | Refills: 1 | Status: SHIPPED | OUTPATIENT
Start: 2019-01-03 | End: 2019-04-18

## 2019-01-03 RX ORDER — TRAZODONE HYDROCHLORIDE 100 MG/1
100 TABLET ORAL AT BEDTIME
Qty: 90 TABLET | Refills: 3 | Status: SHIPPED | OUTPATIENT
Start: 2019-01-03 | End: 2020-02-19

## 2019-01-03 ASSESSMENT — ANXIETY QUESTIONNAIRES
GAD7 TOTAL SCORE: 3
5. BEING SO RESTLESS THAT IT IS HARD TO SIT STILL: NOT AT ALL
2. NOT BEING ABLE TO STOP OR CONTROL WORRYING: NOT AT ALL
IF YOU CHECKED OFF ANY PROBLEMS ON THIS QUESTIONNAIRE, HOW DIFFICULT HAVE THESE PROBLEMS MADE IT FOR YOU TO DO YOUR WORK, TAKE CARE OF THINGS AT HOME, OR GET ALONG WITH OTHER PEOPLE: NOT DIFFICULT AT ALL
6. BECOMING EASILY ANNOYED OR IRRITABLE: NOT AT ALL
3. WORRYING TOO MUCH ABOUT DIFFERENT THINGS: SEVERAL DAYS
7. FEELING AFRAID AS IF SOMETHING AWFUL MIGHT HAPPEN: NOT AT ALL
1. FEELING NERVOUS, ANXIOUS, OR ON EDGE: SEVERAL DAYS

## 2019-01-03 ASSESSMENT — PATIENT HEALTH QUESTIONNAIRE - PHQ9
5. POOR APPETITE OR OVEREATING: SEVERAL DAYS
SUM OF ALL RESPONSES TO PHQ QUESTIONS 1-9: 7

## 2019-01-03 NOTE — PROGRESS NOTES
"  SUBJECTIVE:   Aracelis Muñoz is a 68 year old female who presents to clinic today for the following health issues:      Concern - sleep concerns   Onset: few weeks     Description:    pt waking up at 4 am and cant get back to sleeping , despite taking 100 mg of trazodone.    She admits to being under a lot of stress lately.  Has been  feeling more anxious.  She had to\"  put her mom in  the nursing home over the holidays, bc  she was hospitalized in and out few  times last few month or so\".  She is also going to [place] and planning a foot surgery so it has been making her a bit more worried.  She has been off of her Lexapro for a while (because she thought it was giving her hair loss,not sure if it was truly bc of the med's)   she thought she was doing okay for a while without  the medication mood wise, but recently the mood has been more anxious and feels slightly down at times.  Not sure if she wants to go back on Lexapro yet.  She would like refill on some Xanax to take as needed.  she has had no problems taking medications otherwise..     Intensity: mild    Progression of Symptoms:  worsening    Accompanying Signs & Symptoms: See phq-9 and caroline 7      Previous history of similar problem: She has  history of anxiety and has been on medications,  previously.       Precipitating factors:   Worsened by: recent stress     Alleviating factors:  Improved by: med's     Therapies Tried and outcome: Trazodone  helping with sleep, but not  enough lately     PROBLEMS TO ADD ON...      Problem list and histories reviewed & adjusted, as indicated.  Additional history: as documented    Patient Active Problem List   Diagnosis     Menopausal LMP age 54     Diffuse cystic mastopathy     Slow transit constipation     Lumbar disc herniation with radiculopathy     Post-Nasal Drainage     Perennial allergic rhinitis     Environmental allergies     Osteopenia     Generalized anxiety disorder     Mild persistent intrinsic asthma " without status asthmaticus with acute exacerbation     Serum calcium elevated     Hyperparathyroidism (H)     Vitamin D deficiency     Hypercalcemia     Other insomnia     Hyperlipidemia with target LDL less than 130     Mild persistent asthma without complication     Major depressive disorder, recurrent episode, mild (H)     Eczema, unspecified type     Left knee pain, unspecified chronicity     Acute midline low back pain with left-sided sciatica     Lumbago     Right knee pain     Right foot pain     Osteopenia of other site     Past Surgical History:   Procedure Laterality Date     C NONSPECIFIC PROCEDURE      Cyst     C/SECTION, LOW TRANSVERSE      S/P C/S     CHOLECYSTECTOMY, OPEN      Cholecystectomy     HC EXCISION BREAST LESION, OPEN >=1      Benign mass right breast     HC MRI LUMBAR SPINE W/O CONTRAST  2010    multilevel degen disc disease/facet arthropathy, 5 mm caudally extruded left posterolateral disc herniation at L4-5 with impingement on the left L5 nerve root      HC PUNCTURE/ASPIRATION BREAST CYST, FIRST  ,,    bilateral '03, left '     LIGATN/STRIP LONG & SHORT SAPHEN      varicose vein stripping       Social History     Tobacco Use     Smoking status: Never Smoker     Smokeless tobacco: Never Used   Substance Use Topics     Alcohol use: Yes     Alcohol/week: 0.0 oz     Comment: 1-2 glasses of wine 1-2 times per week      Family History   Problem Relation Age of Onset     Thyroid Disease Mother      Hypertension Mother      Allergies Mother      Cancer - colorectal Father          age 65 colon cancer     Thyroid Disease Father      Allergies Father      Depression Father      Obesity Father      Genitourinary Problems Maternal Aunt         fibrocystic breast           Reviewed and updated as needed this visit by clinical staff       Reviewed and updated as needed this visit by Provider         ROS:  Constitutional, HEENT, cardiovascular, pulmonary, GI, ,  musculoskeletal, neuro, skin, endocrine and psych systems are negative, except as otherwise noted.    OBJECTIVE:     /68   Pulse 70   Temp 98.4  F (36.9  C) (Oral)   Wt 73.9 kg (163 lb)   LMP 11/01/2004   SpO2 98%   BMI 29.81 kg/m    Body mass index is 29.81 kg/m .  GENERAL: healthy, alert and no distress  RESP: lungs clear to auscultation - no rales, rhonchi or wheezes  CV: regular rate and rhythm, normal S1 S2, no S3 or S4,   PSYCH: mentation appears normal, affect normal/bright, anxious, fatigued, speech pressured, judgement and insight intact and appearance well groomed        ASSESSMENT/PLAN:       1. Major depressive disorder, recurrent episode, mild (H)    - escitalopram (LEXAPRO) 20 MG tablet; Take 0.5 tablets (10 mg) by mouth daily  Dispense: 30 tablet; Refill: 1  - traZODone (DESYREL) 100 MG tablet; Take 1 tablet (100 mg) by mouth At Bedtime  Dispense: 90 tablet; Refill: 3  - ALPRAZolam (XANAX) 0.25 MG tablet; Take 1 tablet (0.25 mg) by mouth 3 times daily AS NEEDED FOR ANXIETY  Dispense: 20 tablet; Refill: 0    2. Other insomnia    - escitalopram (LEXAPRO) 20 MG tablet; Take 0.5 tablets (10 mg) by mouth daily  Dispense: 30 tablet; Refill: 1  - traZODone (DESYREL) 100 MG tablet; Take 1 tablet (100 mg) by mouth At Bedtime  Dispense: 90 tablet; Refill: 3  - ALPRAZolam (XANAX) 0.25 MG tablet; Take 1 tablet (0.25 mg) by mouth 3 times daily AS NEEDED FOR ANXIETY  Dispense: 20 tablet; Refill: 0      Discussed cares, talked about signs and symptoms of anxiety/ depression and treatment options. Willing to consider going back on lexapro, although still debating so gave her a written script and she will start if needed. Gave few xanax in the meantime. Pros/ cons of med's discussed . encouraged to see  to help , although she thinks she is doing ok at this time  spent sometimes counseling patient. Follow up in 2-3 months, sooner if problem.       Patient expressed understanding and agreement with  treatment plan. All patient's questions were answered, will let me know if has more later.  Medications: Rx's: Reviewed the potential side effects/complications of medications prescribed.     Shalini Phan MD  [facility] [place]

## 2019-01-04 ASSESSMENT — ANXIETY QUESTIONNAIRES: GAD7 TOTAL SCORE: 3

## 2019-04-18 ENCOUNTER — OFFICE VISIT (OUTPATIENT)
Dept: FAMILY MEDICINE | Facility: CLINIC | Age: 69
End: 2019-04-18
Payer: COMMERCIAL

## 2019-04-18 VITALS
HEART RATE: 81 BPM | HEIGHT: 62 IN | BODY MASS INDEX: 29.81 KG/M2 | TEMPERATURE: 98.5 F | DIASTOLIC BLOOD PRESSURE: 78 MMHG | OXYGEN SATURATION: 98 % | SYSTOLIC BLOOD PRESSURE: 114 MMHG

## 2019-04-18 DIAGNOSIS — F33.0 MAJOR DEPRESSIVE DISORDER, RECURRENT EPISODE, MILD (H): ICD-10-CM

## 2019-04-18 DIAGNOSIS — G47.09 OTHER INSOMNIA: ICD-10-CM

## 2019-04-18 PROCEDURE — 99214 OFFICE O/P EST MOD 30 MIN: CPT | Performed by: FAMILY MEDICINE

## 2019-04-18 RX ORDER — ESCITALOPRAM OXALATE 20 MG/1
10 TABLET ORAL DAILY
Qty: 30 TABLET | Refills: 1 | Status: SHIPPED | OUTPATIENT
Start: 2019-04-18 | End: 2019-05-30

## 2019-04-18 RX ORDER — ALPRAZOLAM 0.25 MG
0.25 TABLET ORAL 3 TIMES DAILY
Qty: 10 TABLET | Refills: 0 | Status: SHIPPED | OUTPATIENT
Start: 2019-04-18 | End: 2021-01-26

## 2019-04-18 ASSESSMENT — ANXIETY QUESTIONNAIRES
7. FEELING AFRAID AS IF SOMETHING AWFUL MIGHT HAPPEN: SEVERAL DAYS
6. BECOMING EASILY ANNOYED OR IRRITABLE: MORE THAN HALF THE DAYS
2. NOT BEING ABLE TO STOP OR CONTROL WORRYING: MORE THAN HALF THE DAYS
3. WORRYING TOO MUCH ABOUT DIFFERENT THINGS: MORE THAN HALF THE DAYS
1. FEELING NERVOUS, ANXIOUS, OR ON EDGE: MORE THAN HALF THE DAYS
GAD7 TOTAL SCORE: 10
5. BEING SO RESTLESS THAT IT IS HARD TO SIT STILL: NOT AT ALL
IF YOU CHECKED OFF ANY PROBLEMS ON THIS QUESTIONNAIRE, HOW DIFFICULT HAVE THESE PROBLEMS MADE IT FOR YOU TO DO YOUR WORK, TAKE CARE OF THINGS AT HOME, OR GET ALONG WITH OTHER PEOPLE: VERY DIFFICULT

## 2019-04-18 ASSESSMENT — PATIENT HEALTH QUESTIONNAIRE - PHQ9
5. POOR APPETITE OR OVEREATING: SEVERAL DAYS
SUM OF ALL RESPONSES TO PHQ QUESTIONS 1-9: 15

## 2019-04-18 NOTE — PATIENT INSTRUCTIONS
Patient Education     Depression: Tips to Help Yourself    As your healthcare providers help treat your depression, you can also help yourself. Keep in mind that your illness affects you emotionally, physically, mentally, and socially. So full recovery will take time. Take care of your body and your soul, and be patient with yourself as you get better.  Self-care    Educate yourself. Read about treatment and medicine options. If you have the energy, attend local conferences or support groups. Keep a list of useful websites and helpful books and use them as needed. This illness is not your fault. Don t blame yourself for your depression.    Manage early symptoms. If you notice symptoms returning, experience triggers, or identify other factors that may lead to a depressive episode, get help as soon as possible. Ask trusted friends and family to monitor your behavior and let you know if they see anything of concern.    Work with your provider. Find a provider you can trust. Communicate honestly with that person and share information on your treatment for depression and your reaction to medicines.    Be prepared for a crisis. Know what to do if you experience a crisis. Keep the phone number of a crisis hotline and know the location of your community's urgent care centers and the closest emergency department.    Hold off on big decisions. Depression can cloud your judgment. So wait until you feel better before making major life decisions, such as changing jobs, moving, or getting  or .    Be patient. Recovering from depression is a process. Don t be discouraged if it takes some time to feel better.    Keep it simple. Depression saps your energy and concentration. So you won t be able to do all the things you used to do. Set small goals and do what you can.    Be with others. Don t isolate yourself--you ll only feel worse. Try to be with other people. And take part in fun activities when you can. Go to a  movie, ballgame, [employer] service, or social event. Talk openly with people you can trust. And accept help when it s offered.  Take care of your body  People with depression often lose the desire to take care of themselves. That only makes their problems worse. During treatment and afterward, make a point to:    Exercise. It s a great way to take care of your body. And studies have shown that exercise helps fight depression.    Avoid drugs and alcohol. These may ease the pain in the short term. But they ll only make your problems worse in the long run.    Get relief from stress. Ask your healthcare provider for relaxation exercises and techniques to help relieve stress.    Eat right. A balanced and healthy diet helps keep your body healthy.  Date Last Reviewed: 1/1/2017 2000-2018 [employer]. [address] [place] [address], [place] [address]. All rights reserved. This information is not intended as a substitute for professional medical care. Always follow your healthcare professional's instructions.

## 2019-04-18 NOTE — PROGRESS NOTES
SUBJECTIVE:   Aracelis Muñoz is a 68 year old female who presents to clinic today for the following   health issues:      Medication Followup of  Escitalopram, alprazolam      Taking Medication as prescribed: no     Side Effects:  None    Medication Helping Symptoms:  yes     Depression and Anxiety Follow-Up    Status since last visit: she thought she was doing ok ,  but Worsened last 2 weeks . Has not been taking lexapro for a while but willing to try something to help . xanax helps although  taking only infrequently but could sisi small refill     Other associated symptoms: stopped taking lexapro  trazodone not helping with sleep See phq-9 and caroline 7    Complicating factors: had foot surgery, so it has slowed her down , also mom passed away couple of months  Ago.     she has power of  so has added stress last few weeks,  having hard time dealing with it with siblings  especially etc     Significant life event: No     Current substance abuse: None    PHQ 4/17/2018 9/12/2018 1/3/2019   PHQ-9 Total Score 11 5 7   Q9: Thoughts of better off dead/self-harm past 2 weeks Not at all Not at all Not at all     CAROLINE-7 SCORE 4/5/2018 4/17/2018 1/3/2019   Total Score - - -   Total Score 12 4 3       PHQ-9  English  PHQ-9   Any Language  CAROLINE-7  Suicide Assessment Five-step Evaluation and Treatment (SAFE-T)    Amount of exercise or physical activity: None bc of restriction since foot surgery     Problems taking medications regularly: no ,      Medication side effects: none    Diet: good         Additional history: as documented    Reviewed  and updated as needed this visit by clinical staff         Reviewed and updated as needed this visit by Provider         Patient Active Problem List   Diagnosis     Menopausal LMP age 54     Diffuse cystic mastopathy     Slow transit constipation     Lumbar disc herniation with radiculopathy     Post-Nasal Drainage     Perennial allergic rhinitis     Environmental allergies      Osteopenia     Generalized anxiety disorder     Mild persistent intrinsic asthma without status asthmaticus with acute exacerbation     Serum calcium elevated     Hyperparathyroidism (H)     Vitamin D deficiency     Hypercalcemia     Other insomnia     Hyperlipidemia with target LDL less than 130     Mild persistent asthma without complication     Major depressive disorder, recurrent episode, mild (H)     Eczema, unspecified type     Left knee pain, unspecified chronicity     Acute midline low back pain with left-sided sciatica     Lumbago     Right knee pain     Right foot pain     Osteopenia of other site     Past Surgical History:   Procedure Laterality Date     C NONSPECIFIC PROCEDURE      Cyst     C/SECTION, LOW TRANSVERSE      S/P C/S     CHOLECYSTECTOMY, OPEN      Cholecystectomy     HC EXCISION BREAST LESION, OPEN >=1      Benign mass right breast     HC MRI LUMBAR SPINE W/O CONTRAST  2010    multilevel degen disc disease/facet arthropathy, 5 mm caudally extruded left posterolateral disc herniation at L4-5 with impingement on the left L5 nerve root      HC PUNCTURE/ASPIRATION BREAST CYST, FIRST  ,,    bilateral '03, left '04     LIGATN/STRIP LONG & SHORT SAPHEN      varicose vein stripping       Social History     Tobacco Use     Smoking status: Never Smoker     Smokeless tobacco: Never Used   Substance Use Topics     Alcohol use: Yes     Alcohol/week: 0.0 oz     Comment: 1-2 glasses of wine 1-2 times per week      Family History   Problem Relation Age of Onset     Thyroid Disease Mother      Hypertension Mother      Allergies Mother      Cancer - colorectal Father          age 65 colon cancer     Thyroid Disease Father      Allergies Father      Depression Father      Obesity Father      Genitourinary Problems Maternal Aunt         fibrocystic breast           ROS:  Constitutional, HEENT, cardiovascular, pulmonary, GI, , musculoskeletal, neuro, skin, endocrine and psych  "systems are negative, except as otherwise noted.    OBJECTIVE:     /78   Pulse 81   Temp 98.5  F (36.9  C) (Tympanic)   Ht 1.575 m (5' 2\")   LMP 11/01/2004   SpO2 98%   BMI 29.81 kg/m    Body mass index is 29.81 kg/m .  GENERAL: healthy, alert and no distress  RESP: lungs clear to auscultation - no rales, rhonchi or wheezes  CV: regular rate and rhythm, normal S1 S2, no S3 or S4, no murmur,   ABDOMEN: soft, nontender, no hepatosplenomegaly, no masses and bowel sounds normal  MS: no edema  NEURO: Normal strength and tone, mentation intact and speech normal  PSYCH: mentation appears normal, affect normal/bright, anxious, speech pressured, judgement and insight intact and appearance well groomed        ASSESSMENT/PLAN:       (F33.0) Major depressive disorder, recurrent episode, mild (H)  Comment: worse lately, not taking jefferson pro for a while, willing to go back   Plan: escitalopram (LEXAPRO) 20 MG tablet, ALPRAZolam        (XANAX) 0.25 MG tablet            (G47.09) Other insomnia  Comment: trazadone not helping enough   Plan: escitalopram (LEXAPRO) 20 MG tablet, ALPRAZolam        (XANAX) 0.25 MG tablet            Discussed cares, talked about signs and symptoms of anxiety/ depression and treatment options. Willing to start lexapro    Pros/ cons of med's discussed . encouraged to see  to help and referral given . spent sometimes counseling patient. Follow up in 3-4  sooner if problem.     Patient expressed understanding and agreement with treatment plan. All patient's questions were answered, will let me know if has more later.  Medications: Rx's: Reviewed the potential side effects/complications of medications prescribed.     Shalini Phan MD  [facility] [place]        "

## 2019-04-19 ASSESSMENT — ANXIETY QUESTIONNAIRES: GAD7 TOTAL SCORE: 10

## 2019-04-30 DIAGNOSIS — M85.88 OSTEOPENIA OF OTHER SITE: Primary | ICD-10-CM

## 2019-04-30 DIAGNOSIS — M85.80 OSTEOPENIA: ICD-10-CM

## 2019-04-30 RX ORDER — ALENDRONATE SODIUM 70 MG/1
TABLET ORAL
Qty: 12 TABLET | Refills: 3 | Status: SHIPPED | OUTPATIENT
Start: 2019-04-30 | End: 2020-03-24

## 2019-04-30 NOTE — TELEPHONE ENCOUNTER
"Requested Prescriptions   Pending Prescriptions Disp Refills     alendronate (FOSAMAX) 70 MG tablet [Pharmacy Med Name: ALENDRONATE SODIUM 70 MG TAB] 12 tablet 3     Sig: TAKE 1 TABLET (70 MG) BY MOUTH EVERY 7 DAYS       Bisphosphonates Failed - 4/30/2019  1:47 AM        Failed - Normal serum creatinine on file within past 12 months     Recent Labs   Lab Test 01/20/18  1200   CR 0.72             Passed - Recent (12 mo) or future (30 days) visit within the authorizing provider's specialty     Patient had office visit in the last 12 months or has a visit in the next 30 days with authorizing provider or within the authorizing provider's specialty.  See \"Patient Info\" tab in inbasket, or \"Choose Columns\" in Meds & Orders section of the refill encounter.              Passed - Dexa on file within past 2 years     Please review last Dexa result.           Passed - Medication is active on med list        Passed - Patient is age 18 or older        alendronate (FOSAMAX) 70 MG tablet 12 tablet 3 2/16/2018       Last Written Prescription Date:  02/16/2018  Last Fill Quantity: 12,  # refills: 3   Last office visit: 4/18/2019 with prescribing provider:  Dr. Phan   Future Office Visit:  Unknown     "

## 2019-04-30 NOTE — TELEPHONE ENCOUNTER
Routing refill request to provider for review/approval because:  Labs not current:  ENRRIQUE KabaN, RN  [facility]

## 2019-04-30 NOTE — LETTER
May 3, 2019      Aracelis Muñoz  [address]  [place] [address]        Dear Aracelis,    I care about your health and have reviewed your health plan. I have reviewed your medical conditions, medication list, and lab results and am making recommendations based on this review, to better manage your health.    You are in particular need of attention regarding:  Medication check    I am recommending that you:  -Schedule an appointment    Here is a list of Health Maintenance topics that are due now or due soon:  Health Maintenance Due   Topic Date Due     Zoster (Shingles) Vaccine (1 of 2) 05/28/2000     Annual Wellness Visit  01/16/2018     Discuss Advance Directive Planning  04/25/2018     Creatinine Lab - yearly  01/20/2019     Colon Cancer Screening - FIT Test - yearly  04/02/2019       Please call us at [phone] (or use [employer]) to address the above recommendations.     Thank you for trusting [facility] and we appreciate the opportunity to serve you.  We look forward to supporting your healthcare needs in the future.    Healthy Regards,    Shalini Phan MD

## 2019-05-01 NOTE — TELEPHONE ENCOUNTER
Routing to team to inform and assist in scheduling.   Denisse Lowery RN   [facility] - Triage

## 2019-05-02 NOTE — TELEPHONE ENCOUNTER
Allison Mnoreal contacted Aracelis on 05/02/19 and left a message. If patient calls back please schedule appointment as soon as possible for a Medication check.        .Allison VELAZQUEZ    [facility] [place]

## 2019-05-03 NOTE — TELEPHONE ENCOUNTER
Allison Monreal contacted Aracelis on 05/03/19 and left a message. If patient calls back please schedule appointment as soon as possible for a medication check.  TC will mail letter.      .Allison VELAZQUEZ    [facility] [place]

## 2019-05-30 ENCOUNTER — OFFICE VISIT (OUTPATIENT)
Dept: FAMILY MEDICINE | Facility: CLINIC | Age: 69
End: 2019-05-30
Payer: COMMERCIAL

## 2019-05-30 VITALS
TEMPERATURE: 98.4 F | DIASTOLIC BLOOD PRESSURE: 61 MMHG | SYSTOLIC BLOOD PRESSURE: 103 MMHG | HEIGHT: 62 IN | OXYGEN SATURATION: 98 % | BODY MASS INDEX: 30 KG/M2 | HEART RATE: 83 BPM | WEIGHT: 163 LBS

## 2019-05-30 DIAGNOSIS — R53.83 OTHER FATIGUE: ICD-10-CM

## 2019-05-30 DIAGNOSIS — J45.30 MILD PERSISTENT ASTHMA WITHOUT COMPLICATION: ICD-10-CM

## 2019-05-30 DIAGNOSIS — E55.9 VITAMIN D DEFICIENCY: ICD-10-CM

## 2019-05-30 DIAGNOSIS — G47.09 OTHER INSOMNIA: ICD-10-CM

## 2019-05-30 DIAGNOSIS — Z12.11 COLON CANCER SCREENING: ICD-10-CM

## 2019-05-30 DIAGNOSIS — F33.0 MAJOR DEPRESSIVE DISORDER, RECURRENT EPISODE, MILD (H): ICD-10-CM

## 2019-05-30 DIAGNOSIS — E78.5 HYPERLIPIDEMIA WITH TARGET LDL LESS THAN 130: ICD-10-CM

## 2019-05-30 DIAGNOSIS — Z00.00 ROUTINE HISTORY AND PHYSICAL EXAMINATION OF ADULT: Primary | ICD-10-CM

## 2019-05-30 LAB
ALBUMIN SERPL-MCNC: 4 G/DL (ref 3.4–5)
ALP SERPL-CCNC: 59 U/L (ref 40–150)
ALT SERPL W P-5'-P-CCNC: 20 U/L (ref 0–50)
ANION GAP SERPL CALCULATED.3IONS-SCNC: 9 MMOL/L (ref 3–14)
AST SERPL W P-5'-P-CCNC: 15 U/L (ref 0–45)
BILIRUB SERPL-MCNC: 1.1 MG/DL (ref 0.2–1.3)
BUN SERPL-MCNC: 16 MG/DL (ref 7–30)
CALCIUM SERPL-MCNC: 10 MG/DL (ref 8.5–10.1)
CHLORIDE SERPL-SCNC: 106 MMOL/L (ref 94–109)
CHOLEST SERPL-MCNC: 201 MG/DL
CO2 SERPL-SCNC: 23 MMOL/L (ref 20–32)
CREAT SERPL-MCNC: 0.79 MG/DL (ref 0.52–1.04)
DEPRECATED CALCIDIOL+CALCIFEROL SERPL-MC: 52 UG/L (ref 20–75)
ERYTHROCYTE [DISTWIDTH] IN BLOOD BY AUTOMATED COUNT: 13 % (ref 10–15)
GFR SERPL CREATININE-BSD FRML MDRD: 76 ML/MIN/{1.73_M2}
GLUCOSE SERPL-MCNC: 88 MG/DL (ref 70–99)
HCT VFR BLD AUTO: 41.8 % (ref 35–47)
HDLC SERPL-MCNC: 55 MG/DL
HGB BLD-MCNC: 13.9 G/DL (ref 11.7–15.7)
LDLC SERPL CALC-MCNC: 118 MG/DL
MCH RBC QN AUTO: 29.6 PG (ref 26.5–33)
MCHC RBC AUTO-ENTMCNC: 33.3 G/DL (ref 31.5–36.5)
MCV RBC AUTO: 89 FL (ref 78–100)
NONHDLC SERPL-MCNC: 146 MG/DL
PLATELET # BLD AUTO: 312 10E9/L (ref 150–450)
POTASSIUM SERPL-SCNC: 4.4 MMOL/L (ref 3.4–5.3)
PROT SERPL-MCNC: 7.1 G/DL (ref 6.8–8.8)
RBC # BLD AUTO: 4.69 10E12/L (ref 3.8–5.2)
SODIUM SERPL-SCNC: 138 MMOL/L (ref 133–144)
TRIGL SERPL-MCNC: 139 MG/DL
TSH SERPL DL<=0.005 MIU/L-ACNC: 2.04 MU/L (ref 0.4–4)
WBC # BLD AUTO: 7 10E9/L (ref 4–11)

## 2019-05-30 PROCEDURE — 36415 COLL VENOUS BLD VENIPUNCTURE: CPT | Performed by: FAMILY MEDICINE

## 2019-05-30 PROCEDURE — G0438 PPPS, INITIAL VISIT: HCPCS | Performed by: FAMILY MEDICINE

## 2019-05-30 PROCEDURE — 82306 VITAMIN D 25 HYDROXY: CPT | Performed by: FAMILY MEDICINE

## 2019-05-30 PROCEDURE — 84443 ASSAY THYROID STIM HORMONE: CPT | Performed by: FAMILY MEDICINE

## 2019-05-30 PROCEDURE — 80061 LIPID PANEL: CPT | Performed by: FAMILY MEDICINE

## 2019-05-30 PROCEDURE — 85027 COMPLETE CBC AUTOMATED: CPT | Performed by: FAMILY MEDICINE

## 2019-05-30 PROCEDURE — 99213 OFFICE O/P EST LOW 20 MIN: CPT | Mod: 25 | Performed by: FAMILY MEDICINE

## 2019-05-30 PROCEDURE — 80053 COMPREHEN METABOLIC PANEL: CPT | Performed by: FAMILY MEDICINE

## 2019-05-30 RX ORDER — ESCITALOPRAM OXALATE 20 MG/1
10 TABLET ORAL DAILY
Qty: 45 TABLET | Refills: 1 | Status: SHIPPED | OUTPATIENT
Start: 2019-05-30 | End: 2020-07-13

## 2019-05-30 RX ORDER — ALBUTEROL SULFATE 90 UG/1
2 AEROSOL, METERED RESPIRATORY (INHALATION)
COMMUNITY
Start: 2015-02-03 | End: 2019-05-30

## 2019-05-30 RX ORDER — ALBUTEROL SULFATE 90 UG/1
2 AEROSOL, METERED RESPIRATORY (INHALATION) EVERY 4 HOURS PRN
Qty: 8.5 G | Refills: 1 | Status: SHIPPED | OUTPATIENT
Start: 2019-05-30 | End: 2021-01-26

## 2019-05-30 ASSESSMENT — MIFFLIN-ST. JEOR: SCORE: 1217.61

## 2019-05-30 NOTE — PATIENT INSTRUCTIONS
Patient Education   Personalized Prevention Plan  You are due for the preventive services outlined below.  Your care team is available to assist you in scheduling these services.  If you have already completed any of these items, please share that information with your care team to update in your medical record.  Health Maintenance Due   Topic Date Due     Zoster (Shingles) Vaccine (1 of 2) 05/28/2000     Annual Wellness Visit  01/16/2018     Discuss Advance Directive Planning  04/25/2018     Asthma Control Test  10/05/2018     Creatinine Lab  01/20/2019     FIT Test  04/02/2019        Patient Education     Prevention Guidelines, Women Ages 65 and Older  Screening tests and vaccines are an important part of managing your health. A screening test is done to find possible disorders or diseases in people who don't have any symptoms. The goal is to find a disease early so lifestyle changes can be made and you can be watched more closely to reduce the risk of disease, or to detect it early enough to treat it most effectively. Screening tests are not considered diagnostic, but are used to determine if more testing is needed. Health counseling is essential, too. Below are guidelines for these, for women ages 65 and older. Talk with your healthcare provider to make sure you re up to date on what you need.  Screening Who needs it How often   Type 2 diabetes or prediabetes All women ages 40 to 75 who are overweight or obese At least every 3 years   Type 2 diabetes All women with prediabetes Every year   Alcohol misuse All women in this age group At routine exams   Blood pressure All women in this age group Yearly checkup if your blood pressure is normal*  Normal blood pressure is less than 120/80 mm Hg*  If your blood pressure reading is higher than normal, follow the advice of your healthcare provider   Breast cancer All women of average risk  There are no guidelines for breast cancer screening for 75 years and older.   Mammograms should be done every 1 or 2 years until age 75. At that point a woman should talk to her doctor about whether to continue screening. Talk to your doctor regarding your recommended frequency depending on your risk factors.   Cervical cancer Only women who had abnormal screening results before age 65 Talk with your healthcare provider   Chlamydia Women at increased risk for infection At routine exams   Colorectal cancer All women over the age of 50  This screening is advised against for women over 75 or if there is a life expectancy of less than 10 years.  Multiple tests are available and are used at different times. Possible tests include: flexible sigmoidoscopy, colonoscopy, double-contrast barium enema, yearly fecal occult blood test, fecal immunochemical test, or stool DNA test as often as your healthcare provider advises. Talk with your healthcare provider about which tests are best for you.   Depression All women in this age group At routine exams   Gonorrhea Sexually active women at increased risk for infection At routine exams   Hepatitis C Anyone at increased risk; 1 time for those born between 1945 and 1965 At routine exams   High cholesterol or triglycerides All women in this age group who are at risk for coronary artery disease At least every 5 years   HIV Women at increased risk for infection-talk with your healthcare provider At routine exams   Lung cancer Adults ages 55 to 74 who have smoked with a 30-pack-a-year history and have smoked within the past 15 years  Annual low dose CT scan   Obesity All women in this age group At routine exams   Osteoporosis All women in this age group Bone density test at age 65, then follow-up as advised by your healthcare provider   Syphilis Women at increased risk for infection-talk with your healthcare provider At routine exams   Thyroid-Stimulating Hormone (TSH) All women in this age group with symptoms of thyroid dysfunction. There is not enough evidence  to support TSH screening in women without symptoms.  ACOG recommendation is every 5 years; American Academy of Family Physicians concludes there is not enough evidence to support routine screening in adults without symptoms.    Tuberculosis Women at increased risk for infection-talk with your healthcare provider Ask your healthcare provider   Vision All women in this age group Every 1 to 2 years; if you have a chronic health condition, ask your healthcare provider if you need exams more often   Vaccine Who needs it How often   Chickenpox (varicella) All women in this age group who have no record of this infection or vaccine 2 doses; second dose should be given at least 4 weeks after the first dose   Hepatitis A Women at increased risk for infection-talk with your healthcare provider 2 doses given 6 months apart   Hepatitis B Women at increased risk for infection-talk with your healthcare provider 3 doses over 6 months; second dose should be given 1 month after the first dose; the third dose should be given at least 2 months after the second dose and at least 4 months after the first dose   Haemophilus influenza Type B (HIB) Women at increased risk for infection-talk with your healthcare provider 1 to 3 doses   Influenza (flu) All women in this age group Once a year   Pneumococcal conjugate vaccine (PCV13) and pneumococcal polysaccharide vaccine (PPSV23) All women in this age group 1 dose of each vaccine   Tetanus/diphtheria/pertussis (Td/Tdap) booster All women in this age group Td every 10 years, or a one-time dose of Tdap instead of a Td booster after age 18, then Td every 10 years   Zoster All women in this age group 1 dose   Counseling Who needs it How often   Diet and exercise Women who are overweight or obese When diagnosed, and then at routine exams   Fall prevention (exercise and vitamin D supplements) All women in this age group At routine exams   Sexually transmitted infection prevention Women at increased  risk for infection-talk with your healthcare provider At routine exams   Use of daily aspirin Talk to your healthcare provider about whether or not to start taking low-dose aspirin for the prevention of cardiovascular disease (CVD) and colorectal cancer in adults ages 60 to 69 who have at least a 10% risk of getting CVD within the next 10 years.  People who are not at increased risk for bleeding, have a life expectancy of at least 10 years, and are willing to take low-dose aspirin daily for at least 10 years are more likely to benefit. When the advantages of taking low-dose aspirin outweigh the risks, people may choose to start taking a low-dose aspirin.  There is not enough data to support the use of aspirin in people over the age of 70.  When your risk is known   Use of tobacco and the health effects it can cause All women in this age group Every exam   Date Last Reviewed: 11/1/2017 2000-2018 [employer]. [address] [place] [address], [place] [address]. All rights reserved. This information is not intended as a substitute for professional medical care. Always follow your healthcare professional's instructions.

## 2019-06-14 DIAGNOSIS — G47.09 OTHER INSOMNIA: ICD-10-CM

## 2019-06-14 DIAGNOSIS — F33.0 MAJOR DEPRESSIVE DISORDER, RECURRENT EPISODE, MILD (H): ICD-10-CM

## 2019-06-14 RX ORDER — ESCITALOPRAM OXALATE 20 MG/1
10 TABLET ORAL DAILY
Qty: 30 TABLET | Refills: 1 | OUTPATIENT
Start: 2019-06-14

## 2019-06-14 NOTE — TELEPHONE ENCOUNTER
"Requested Prescriptions   Pending Prescriptions Disp Refills     escitalopram (LEXAPRO) 20 MG tablet [Pharmacy Med Name: ESCITALOPRAM 20  Last Written Prescription Date:  5-  Last Fill Quantity: 45 tablet,  # refills: 1   Last office visit: 5/30/2019 with prescribing provider:     Future Office Visit:     MG TABLET] 30 tablet 1     Sig: TAKE 0.5 TABLETS (10 MG) BY MOUTH DAILY       SSRIs Protocol Failed - 6/14/2019  2:00 AM        Failed - PHQ-9 score less than 5 in past 6 months     Please review last PHQ-9 score.           Passed - Medication is active on med list        Passed - Patient is age 18 or older        Passed - No active pregnancy on record        Passed - No positive pregnancy test in last 12 months        Passed - Recent (6 mo) or future (30 days) visit within the authorizing provider's specialty     Patient had office visit in the last 6 months or has a visit in the next 30 days with authorizing provider or within the authorizing provider's specialty.  See \"Patient Info\" tab in inbasket, or \"Choose Columns\" in Meds & Orders section of the refill encounter.              "

## 2019-08-25 PROCEDURE — 82274 ASSAY TEST FOR BLOOD FECAL: CPT | Performed by: FAMILY MEDICINE

## 2019-08-28 NOTE — PROGRESS NOTES
"SUBJECTIVE:   Aracelis Muñoz is a 69 year old female who presents for Preventive Visit.      Are you in the first 12 months of your Medicare Part B coverage?  No    Physical Health:    In general, how would you rate your overall physical health? good    Outside of work, how many days during the week do you exercise? 2-3 days/week    Outside of work, approximately how many minutes a day do you exercise?45-60 minutes    If you drink alcohol do you typically have >3 drinks per day or >7 drinks per week? No    Do you usually eat at least 4 servings of fruit and vegetables a day, include whole grains & fiber and avoid regularly eating high fat or \"junk\" foods? NO    Do you have any problems taking medications regularly?  No    Do you have any side effects from medications? none    Needs assistance for the following daily activities: no assistance needed    Which of the following safety concerns are present in your home?  none identified     Hearing impairment: yes wears hearing aids     In the past 6 months, have you been bothered by leaking of urine? no    Mental Health:    In general, how would you rate your overall mental or emotional health? good  PHQ-2 Score: 0    Do you feel safe in your environment? Yes    Do you have a Health Care Directive? Yes: Advance Directive has been received and scanned.    Additional concerns to address?  No    Fall risk:  Fallen 2 or more times in the past year?: No  Any fall with injury in the past year?: No    Cognitive Screenin) Repeat 3 items (Leader, Season, Table)    2) Clock draw: NORMAL  3) 3 item recall: Recalls 3 objects  Results: 3 items recalled: COGNITIVE IMPAIRMENT LESS LIKELY    Mini-CogTM Copyright TORITO Moyer. Licensed by the author for use in [facility]; reprinted with permission (bianka@.[email or web address]). All rights reserved.      Do you have sleep apnea, excessive snoring or daytime drowsiness?: yes snoring         PROBLEMS TO ADD ON...  Osteopenia follow up "   Started fosamax recently, tolerating it well and wish to continue. trying to take calcium and vit-d regularly.   Recovering form foot surgery but now trying to walk more with walking boot now   She has been feeling more tired lately, just lack of energy, although she thinks she is doing much better since been Lexapro .  Has family history of thyroid disorder in many family members so just worried that that could be a problem.  She wish  to have some labs done,  to make sure there is no other reason for her to feel tired    Depression and Anxiety Follow-Up-   Started back on Lexapro recently, here to do follow-up.     How are you doing with your depression since your last visit? Improved since she is  back on lexapro , not really taking any xanax now. Over she is feeling much better. no problem taking med and she wants  to continue     How are you doing with your anxiety since your last visit?  Improved     Are you having other symptoms that might be associated with depression or anxiety? No    Have you had a significant life event? No     Do you have any concerns with your use of alcohol or other drugs? No    Social History     Tobacco Use     Smoking status: Never Smoker     Smokeless tobacco: Never Used   Substance Use Topics     Alcohol use: Yes     Alcohol/week: 0.0 oz     Comment: 1-2 glasses of wine 1-2 times per week      Drug use: No     PHQ 9/12/2018 1/3/2019 4/18/2019   PHQ-9 Total Score 5 7 15   Q9: Thoughts of better off dead/self-harm past 2 weeks Not at all Not at all Not at all     MARCELLE-7 SCORE 4/17/2018 1/3/2019 4/18/2019   Total Score - - -   Total Score 4 3 10     No flowsheet data found.  No flowsheet data found.      Suicide Assessment Five-step Evaluation and Treatment (SAFE-T)    Reviewed and updated as needed this visit by clinical staff  Tobacco  Allergies  Meds         Reviewed and updated as needed this visit by Provider        Social History     Tobacco Use     Smoking status: Never  Smoker     Smokeless tobacco: Never Used   Substance Use Topics     Alcohol use: Yes     Alcohol/week: 0.0 oz     Comment: 1-2 glasses of wine 1-2 times per week                            Current providers sharing in care for this patient include:   Patient Care Team:  Shalini Phan MD as PCP - General (Family Practice)  Shalini Phan MD as Assigned PCP    The following health maintenance items are reviewed in Epic and correct as of today:  Health Maintenance   Topic Date Due     ZOSTER IMMUNIZATION (1 of 2) 05/28/2000     MEDICARE ANNUAL WELLNESS VISIT  01/16/2018     ADVANCED DIRECTIVE PLANNING  04/25/2018     ASTHMA CONTROL TEST  10/05/2018     CREATININE  01/20/2019     FIT  04/02/2019     PHQ-9  10/18/2019     MAMMO SCREENING  12/17/2019     FALL RISK ASSESSMENT  12/21/2019     DEXA  06/27/2020     LIPID  01/16/2022     DTAP/TDAP/TD IMMUNIZATION (3 - Td) 01/16/2027     HEPATITIS C SCREENING  Completed     DEPRESSION ACTION PLAN  Completed     INFLUENZA VACCINE  Completed     PNEUMOCOCCAL IMMUNIZATION 65+ LOW/MEDIUM RISK  Completed     ASTHMA ACTION PLAN  Addressed     IPV IMMUNIZATION  Aged Out     MENINGITIS IMMUNIZATION  Aged Out     Patient Active Problem List   Diagnosis     Menopausal LMP age 54     Diffuse cystic mastopathy     Slow transit constipation     Lumbar disc herniation with radiculopathy     Post-Nasal Drainage     Perennial allergic rhinitis     Environmental allergies     Osteopenia     Generalized anxiety disorder     Mild persistent intrinsic asthma without status asthmaticus with acute exacerbation     Serum calcium elevated     Hyperparathyroidism (H)     Vitamin D deficiency     Hypercalcemia     Other insomnia     Hyperlipidemia with target LDL less than 130     Mild persistent asthma without complication     Major depressive disorder, recurrent episode, mild (H)     Eczema, unspecified type     Left knee pain, unspecified chronicity     Acute midline low back pain with  left-sided sciatica     Lumbago     Right knee pain     Right foot pain     Osteopenia of other site     Past Surgical History:   Procedure Laterality Date     C NONSPECIFIC PROCEDURE      Cyst     C/SECTION, LOW TRANSVERSE      S/P C/S     CHOLECYSTECTOMY, OPEN      Cholecystectomy     HC EXCISION BREAST LESION, OPEN >=1      Benign mass right breast     HC MRI LUMBAR SPINE W/O CONTRAST  2010    multilevel degen disc disease/facet arthropathy, 5 mm caudally extruded left posterolateral disc herniation at L4-5 with impingement on the left L5 nerve root      HC PUNCTURE/ASPIRATION BREAST CYST, FIRST  ,,    bilateral ', left '     LIGATN/STRIP LONG & SHORT SAPHEN      varicose vein stripping       Social History     Tobacco Use     Smoking status: Never Smoker     Smokeless tobacco: Never Used   Substance Use Topics     Alcohol use: Yes     Alcohol/week: 0.0 oz     Comment: 1-2 glasses of wine 1-2 times per week      Family History   Problem Relation Age of Onset     Thyroid Disease Mother      Hypertension Mother      Allergies Mother      Cancer - colorectal Father          age 65 colon cancer     Thyroid Disease Father      Allergies Father      Depression Father      Obesity Father      Genitourinary Problems Maternal Aunt         fibrocystic breast         Pneumonia Vaccine:Adults age 65+ who received Pneumovax (PPSV23) at 65 years or older: Should be given PCV13 > 1 year after their most recent PPSV23  Mammogram Screening: Mammogram Screening: Patient over age 50, mutual decision to screen reflected in health maintenance.    ROS:  CONSTITUTIONAL: NEGATIVE for fever, chills, change in weight  INTEGUMENTARY/SKIN: NEGATIVE for worrisome rashes, moles or lesions  EYES: NEGATIVE for vision changes or irritation  ENT/MOUTH: NEGATIVE for ear, mouth and throat problems  RESP: NEGATIVE for significant cough or SOB.  Has history of asthma doing quite well.  Has not really felt the  "need for albuterol in a while.  She was also on Advair previously but has not used it since fall of last year.   doing well with the Advair.   BREAST: NEGATIVE for masses, tenderness or discharge  CV: NEGATIVE for chest pain, palpitations or peripheral edema  GI: NEGATIVE for nausea, abdominal pain, heartburn, or change in bowel habits  : NEGATIVE for frequency, dysuria, or hematuria  MUSCULOSKELETAL: NEGATIVE for significant arthralgias or myalgia  NEURO: NEGATIVE for weakness, dizziness or paresthesias  ENDOCRINE: NEGATIVE for temperature intolerance, skin/hair changes  HEME: NEGATIVE for bleeding problems  PSYCHIATRIC: NEGATIVE for changes in mood or affect    OBJECTIVE:   /61   Pulse 83   Temp 98.4  F (36.9  C) (Tympanic)   Ht 1.575 m (5' 2\")   Wt 73.9 kg (163 lb)   LMP 11/01/2004   SpO2 98%   BMI 29.81 kg/m   Estimated body mass index is 29.81 kg/m  as calculated from the following:    Height as of this encounter: 1.575 m (5' 2\").    Weight as of this encounter: 73.9 kg (163 lb).  EXAM:   GENERAL: healthy, alert and no distress  EYES: Eyes grossly normal to inspection,  and conjunctivae and sclerae normal  HENT: ear canals and TM's normal, nose and mouth without ulcers or lesions  NECK: no adenopathy, no asymmetry, masses, or scars and thyroid normal to palpation  RESP: lungs clear to auscultation - no rales, rhonchi or wheezes  BREAST: normal without masses, tenderness or nipple discharge and no palpable axillary masses or adenopathy  CV: regular rate and rhythm, normal S1 S2, no S3 or S4, no murmur, click or rub,   ABDOMEN: soft, nontender, no hepatosplenomegaly, no masses and bowel sounds normal   (female): deferred  RECTAL: deferred  MS: no gross musculoskeletal defects noted, no edema  SKIN: no suspicious lesions or rashes  NEURO: Normal strength and tone, mentation intact and speech normal  PSYCH: mentation appears normal, affect normal/bright        ASSESSMENT / PLAN:   (Z00.00) Routine " history and physical examination of adult  (primary encounter diagnosis)  Comment:   Plan: Lipid panel reflex to direct LDL Fasting, Fecal        colorectal cancer screen (FIT), Comprehensive         metabolic panel, GASTROENTEROLOGY ADULT REF         PROCEDURE ONLY            (F33.0) Major depressive disorder, recurrent episode, mild (H)  Comment:   Plan: escitalopram (LEXAPRO) 20 MG tablet            (G47.09) Other insomnia  Comment:   Plan: escitalopram (LEXAPRO) 20 MG tablet          Discussed cares, talked about signs and symptoms of anxiety/ depression and treatment options. Willing to continue with Lexapro.  Takes trazodone for sleep as needed.  pros/ cons of med's discussed . encouraged to see  to help and referral given . spent sometimes counseling patient. Follow up in 2-3 months, sooner if problem.     (E78.5) Hyperlipidemia with target LDL less than 130  Comment:   Plan: check labs . Follow up  as needed     (J45.30) Mild persistent asthma without complication  Comment: stable rarely needs one   Plan: albuterol (PROAIR HFA/PROVENTIL HFA/VENTOLIN         HFA) 108 (90 Base) MCG/ACT inhaler            (Z12.11) Colon cancer screening  Comment:   Plan: GASTROENTEROLOGY ADULT REF PROCEDURE ONLY            Check labs.script  sent.Cares and  treatment discussed. follow up if problem   Patient expressed understanding and agreement with treatment plan. All patient's questions were answered, will let me know if has more later.  Medications: Rx's: Reviewed the potential side effects/complications of medications prescribed.     End of Life Planning:  Patient currently has an advanced directive: Yes.  Practitioner is supportive of decision.    COUNSELING:  Reviewed preventive health counseling, as reflected in patient instructions       Regular exercise       Healthy diet/nutrition       Vision screening       Hearing screening       Fall risk prevention       Osteoporosis Prevention/Bone Health    Estimated  "body mass index is 29.81 kg/m  as calculated from the following:    Height as of this encounter: 1.575 m (5' 2\").    Weight as of this encounter: 73.9 kg (163 lb).         reports that she has never smoked. She has never used smokeless tobacco.      Appropriate preventive services were discussed with this patient, including applicable screening as appropriate for cardiovascular disease, diabetes, osteopenia/osteoporosis, and glaucoma.  As appropriate for age/gender, discussed screening for colorectal cancer, prostate cancer, breast cancer, and cervical cancer. Checklist reviewing preventive services available has been given to the patient.    Reviewed patients plan of care and provided an AVS. The Basic Care Plan (routine screening as documented in Health Maintenance) for Aracelis meets the Care Plan requirement. This Care Plan has been established and reviewed with the Patient.    Counseling Resources:  ATP IV Guidelines  Pooled Cohorts Equation Calculator  Breast Cancer Risk Calculator  FRAX Risk Assessment  ICSI Preventive Guidelines  Dietary Guidelines for Americans, 2010  USDA's MyPlate  ASA Prophylaxis  Lung CA Screening    Shalini Phan MD  [facility] [place]  " medications/heat

## 2019-08-29 DIAGNOSIS — Z00.00 ROUTINE HISTORY AND PHYSICAL EXAMINATION OF ADULT: ICD-10-CM

## 2019-08-29 LAB — HEMOCCULT STL QL IA: NEGATIVE

## 2019-09-17 DIAGNOSIS — Z91.09 ENVIRONMENTAL ALLERGIES: ICD-10-CM

## 2019-09-17 NOTE — TELEPHONE ENCOUNTER
Patient is calling requesting azelastine (OPTIVAR) 0.05 % SOLN ophthalmic solution for her seasonal allergies. This was last prescribed in June 2017 and end date was 4/25/18. Currently discontinued.     Pended medication and pharmacy.     Routing to PCP to advise. Thanks.     Makayla Ward RN, BSN  [facility] [place]

## 2019-09-17 NOTE — TELEPHONE ENCOUNTER
Reason for Call:  Aracelis is calling to request refill of allergy eye drops, Azelastine.    This is needed for her seasonal allergies.    Best phone number to reach pt at is: [phone]    Ok to leave a message with medical info? yes    Pharmacy preferred (if calling for a refill): [facility]  Patient Representative

## 2019-09-18 RX ORDER — AZELASTINE HYDROCHLORIDE 0.5 MG/ML
1 SOLUTION/ DROPS OPHTHALMIC DAILY PRN
Qty: 1 BOTTLE | Status: SHIPPED | OUTPATIENT
Start: 2019-09-18 | End: 2021-02-04

## 2019-09-29 ENCOUNTER — HEALTH MAINTENANCE LETTER (OUTPATIENT)
Age: 69
End: 2019-09-29

## 2019-10-22 NOTE — TELEPHONE ENCOUNTER
FUTURE VISIT INFORMATION      FUTURE VISIT INFORMATION:    Date: 11/18/19    Time: 3PM    [place] [facility] [place]:    Referring provider:      Referring providers clinic:      Reason for visit/diagnosis  Parathyroid     RECORDS REQUESTED FROM:       Clinic name Comments Records Status Imaging Status   [facility] 9/22/14 notes with Dr Shalini OTT    [facility]  1/22/14 notes with Dr Gabriel OTT    [facility] 1/7/14 NM Parathyroid Epic PACS   Endocrinology [facility] 12/4/14 notes with Dr Freed Sent to scan 12/13                  12/3/19 11:17AM called Endocrinology [facility] and left a message with medical records to see if they can fax records (labs, office notes, US reports), left my direct number ([phone]) and fax ([phone]) on Vm - Amay  12/3/19 4:19PM patient was last seen in 2014 , will need a signed RISHABH for these records. I called patient and left a message - Amay   12/13/19 10:24AM received signed RISHABH from patient. Sent a copy to scan, and a request to [facility] for recs - Amay   *received recs and sent to scan - amay

## 2019-10-24 ENCOUNTER — OFFICE VISIT (OUTPATIENT)
Dept: FAMILY MEDICINE | Facility: CLINIC | Age: 69
End: 2019-10-24
Payer: COMMERCIAL

## 2019-10-24 ENCOUNTER — TELEPHONE (OUTPATIENT)
Dept: FAMILY MEDICINE | Facility: CLINIC | Age: 69
End: 2019-10-24

## 2019-10-24 DIAGNOSIS — R82.90 NONSPECIFIC FINDING ON EXAMINATION OF URINE: ICD-10-CM

## 2019-10-24 DIAGNOSIS — J40 BRONCHITIS: ICD-10-CM

## 2019-10-24 DIAGNOSIS — J40 BRONCHITIS: Primary | ICD-10-CM

## 2019-10-24 DIAGNOSIS — M25.50 ARTHRALGIA, UNSPECIFIED JOINT: Primary | ICD-10-CM

## 2019-10-24 LAB
CRP SERPL-MCNC: 3.9 MG/L (ref 0–8)
ERYTHROCYTE [DISTWIDTH] IN BLOOD BY AUTOMATED COUNT: 13.2 % (ref 10–15)
HCT VFR BLD AUTO: 42.3 % (ref 35–47)
HGB BLD-MCNC: 13.5 G/DL (ref 11.7–15.7)
MCH RBC QN AUTO: 29 PG (ref 26.5–33)
MCHC RBC AUTO-ENTMCNC: 31.9 G/DL (ref 31.5–36.5)
MCV RBC AUTO: 91 FL (ref 78–100)
PLATELET # BLD AUTO: 343 10E9/L (ref 150–450)
RBC # BLD AUTO: 4.66 10E12/L (ref 3.8–5.2)
WBC # BLD AUTO: 7 10E9/L (ref 4–11)

## 2019-10-24 PROCEDURE — 86431 RHEUMATOID FACTOR QUANT: CPT | Performed by: FAMILY MEDICINE

## 2019-10-24 PROCEDURE — 86038 ANTINUCLEAR ANTIBODIES: CPT | Performed by: FAMILY MEDICINE

## 2019-10-24 PROCEDURE — 86618 LYME DISEASE ANTIBODY: CPT | Performed by: FAMILY MEDICINE

## 2019-10-24 PROCEDURE — 36415 COLL VENOUS BLD VENIPUNCTURE: CPT | Performed by: FAMILY MEDICINE

## 2019-10-24 PROCEDURE — 86140 C-REACTIVE PROTEIN: CPT | Performed by: FAMILY MEDICINE

## 2019-10-24 PROCEDURE — 99214 OFFICE O/P EST MOD 30 MIN: CPT | Performed by: FAMILY MEDICINE

## 2019-10-24 PROCEDURE — 85027 COMPLETE CBC AUTOMATED: CPT | Performed by: FAMILY MEDICINE

## 2019-10-24 RX ORDER — AMOXICILLIN 500 MG/1
500 CAPSULE ORAL 3 TIMES DAILY
Qty: 30 CAPSULE | Refills: 0 | Status: SHIPPED | OUTPATIENT
Start: 2019-10-24 | End: 2020-02-25

## 2019-10-24 RX ORDER — CEFPROZIL 500 MG/1
500 TABLET, FILM COATED ORAL 2 TIMES DAILY
Qty: 20 TABLET | Refills: 0 | Status: SHIPPED | OUTPATIENT
Start: 2019-10-24 | End: 2020-02-25

## 2019-10-24 ASSESSMENT — MIFFLIN-ST. JEOR: SCORE: 1231.22

## 2019-10-24 NOTE — TELEPHONE ENCOUNTER
Patient states Cefzil was $127, is there an alternate that can be prescribed?  Denisse Lowery RN   [facility] - Triage

## 2019-10-24 NOTE — TELEPHONE ENCOUNTER
Reason for Call:  Other prescription    Detailed comments: The patient is calling to speak with a nurse about her antibiotic she picked up today. She said it cost her $127.00. She is wondering if there is an alternative.     Phone Number Patient can be reached at: Cell number on file:    Telephone Information:   Mobile [phone]     Best Time: Anytime    Can we leave a detailed message on this number? YES    Call taken on 10/24/2019 at 2:51 PM by Manjula Guzmán

## 2019-10-24 NOTE — TELEPHONE ENCOUNTER
Spoke with patient and informed of below. Patient/ parent verbalized understanding and agrees with plan.    Denisse Lowery RN   [facility] - Triage

## 2019-10-24 NOTE — PROGRESS NOTES
Subjective     Aracelis Muñoz is a 69 year old female who presents to clinic today for the following health issues:    HPI     Concern - Arthritis (calcium level)       Description:   Discuss calcium regarding arthritis   she has ongoing issue with arthritis mostly foot,  knees,sometimes her elbows.  shoulder bother her more  so on rt then left   Has seen ortho had injection in knee also had  PT 3 weeks ago.  foot was also hurting although it is better since surgery but worries bc of multiple joints hurting. no swelling. No fever or chills        PROBLEMS TO ADD ON...    RESPIRATORY SYMPTOMS  Duration: more then a week    Description  Has ongoing sx past couple of  Weeks.  rhinorrhea, sore throat, cough is becoming productive of green mucous., has frank  fatigue/malaise and  No fever but has  myalgias    Severity: moderate    Accompanying signs and symptoms: no fever had to use inhaler once last week / over al her asthma has improved since her dog passe away , so she thins she was allergic to dog     History (predisposing factors):  none    Precipitating or alleviating factors: recent cold and now getting worse     Therapies tried and outcome:  OTC stuff I is not helping         Patient Active Problem List   Diagnosis     Menopausal LMP age 54     Diffuse cystic mastopathy     Slow transit constipation     Lumbar disc herniation with radiculopathy     Post-Nasal Drainage     Perennial allergic rhinitis     Environmental allergies     Osteopenia     Generalized anxiety disorder     Mild persistent intrinsic asthma without status asthmaticus with acute exacerbation     Serum calcium elevated     Hyperparathyroidism (H)     Vitamin D deficiency     Hypercalcemia     Other insomnia     Hyperlipidemia with target LDL less than 130     Mild persistent asthma without complication     Major depressive disorder, recurrent episode, mild (H)     Eczema, unspecified type     Left knee pain, unspecified chronicity     Acute  midline low back pain with left-sided sciatica     Lumbago     Right knee pain     Right foot pain     Osteopenia of other site     Past Surgical History:   Procedure Laterality Date     C NONSPECIFIC PROCEDURE      Cyst     C/SECTION, LOW TRANSVERSE      S/P C/S     CHOLECYSTECTOMY, OPEN      Cholecystectomy     HC EXCISION BREAST LESION, OPEN >=1      Benign mass right breast     HC MRI LUMBAR SPINE W/O CONTRAST  2010    multilevel degen disc disease/facet arthropathy, 5 mm caudally extruded left posterolateral disc herniation at L4-5 with impingement on the left L5 nerve root      HC PUNCTURE/ASPIRATION BREAST CYST, FIRST  ,,    bilateral '03, left '     LIGATN/STRIP LONG & SHORT SAPHEN      varicose vein stripping       Social History     Tobacco Use     Smoking status: Never Smoker     Smokeless tobacco: Never Used   Substance Use Topics     Alcohol use: Yes     Alcohol/week: 0.0 standard drinks     Comment: 1-2 glasses of wine 1-2 times per week      Family History   Problem Relation Age of Onset     Thyroid Disease Mother      Hypertension Mother      Allergies Mother      Cancer - colorectal Father          age 65 colon cancer     Thyroid Disease Father      Allergies Father      Depression Father      Obesity Father      Genitourinary Problems Maternal Aunt         fibrocystic breast           Reviewed and updated as needed this visit by Provider         Review of Systems   ROS COMP: Constitutional, HEENT, cardiovascular, pulmonary, GI, , musculoskeletal, neuro, skin, endocrine and psych systems are negative, except as otherwise noted.      Objective    LMP 2004   There is no height or weight on file to calculate BMI.  Physical Exam   GENERAL: healthy, alert and no distress  EYES: Eyes grossly normal to inspection,  and conjunctivae and sclerae normal  HENT:  oral mucous membranes moist, Throat with mild pharyngeal erythema, no sinus  tenderness  NECK: no  "adenopathy  RESP: lungs clear to auscultation - no rales, rhonchi or wheezes  CV: regular rate and rhythm, normal S1 S2, no S3 or S4,   ABDOMEN: soft, nontender, no hepatosplenomegaly, no masses and bowel sounds normal  MS: no joint swelling or redness ,, no elbow or wrist jnt tenderness, both  knees feel sore             Assessment & Plan      (M25.50) Arthralgia, unspecified joint  (primary encounter diagnosis)  Comment:   Plan: CBC with platelets, CRP, inflammation, Anti         Nuclear Josette IgG by IFA with Reflex, Rheumatoid         factor, Lyme Disease Josette with reflex to WB         Serum            (J40) Bronchitis  Comment:   Plan: cefPROZIL (CEFZIL) 500 MG tablet          Check labs.script  sent.Cares and  treatment discussed follow. up if problem   Patient expressed understanding and agreement with treatment plan. All patient's questions were answered, will let me know if has more later.  Medications: Rx's: Reviewed the potential side effects/complications of medications prescribed.     BMI:   Estimated body mass index is 30.36 kg/m  (pended) as calculated from the following:    Height as of this encounter: (P) 1.575 m (5' 2\").    Weight as of this encounter: (P) 75.3 kg (166 lb).   Weight management plan: Discussed healthy diet and exercise guidelines      Shalini Phan MD  [facility] [place]        "

## 2019-10-25 LAB
ANA SER QL IF: NEGATIVE
B BURGDOR IGG+IGM SER QL: 0.07 (ref 0–0.89)
RHEUMATOID FACT SER NEPH-ACNC: <20 IU/ML (ref 0–20)

## 2019-10-25 ASSESSMENT — ASTHMA QUESTIONNAIRES: ACT_TOTALSCORE: 24

## 2019-10-30 ENCOUNTER — TELEPHONE (OUTPATIENT)
Dept: OTOLARYNGOLOGY | Facility: CLINIC | Age: 69
End: 2019-10-30

## 2019-10-30 NOTE — TELEPHONE ENCOUNTER
[facility] Call Center    Phone Message    May a detailed message be left on voicemail: yes    Reason for Call: Other: Patient is wondering if she should see an endocrinologist before she sees Dr. Kang in December//Please follow up with patient//Thanks.     Action Taken: Message routed to:  [facility] ([facility]): ent

## 2019-11-18 ENCOUNTER — PRE VISIT (OUTPATIENT)
Dept: OTOLARYNGOLOGY | Facility: CLINIC | Age: 69
End: 2019-11-18

## 2019-11-20 ENCOUNTER — DOCUMENTATION ONLY (OUTPATIENT)
Dept: CARE COORDINATION | Facility: CLINIC | Age: 69
End: 2019-11-20

## 2019-12-27 ENCOUNTER — HOSPITAL ENCOUNTER (OUTPATIENT)
Dept: MAMMOGRAPHY | Facility: CLINIC | Age: 69
Discharge: HOME | End: 2019-12-27
Attending: FAMILY MEDICINE | Admitting: FAMILY MEDICINE
Payer: COMMERCIAL

## 2019-12-27 DIAGNOSIS — Z12.31 VISIT FOR SCREENING MAMMOGRAM: ICD-10-CM

## 2019-12-27 PROCEDURE — 77063 BREAST TOMOSYNTHESIS BI: CPT

## 2020-01-13 ENCOUNTER — TRANSFERRED RECORDS (OUTPATIENT)
Dept: HEALTH INFORMATION MANAGEMENT | Facility: CLINIC | Age: 70
End: 2020-01-13

## 2020-02-18 DIAGNOSIS — G47.09 OTHER INSOMNIA: ICD-10-CM

## 2020-02-18 DIAGNOSIS — F33.0 MAJOR DEPRESSIVE DISORDER, RECURRENT EPISODE, MILD (H): ICD-10-CM

## 2020-02-19 RX ORDER — TRAZODONE HYDROCHLORIDE 100 MG/1
100 TABLET ORAL AT BEDTIME
Qty: 90 TABLET | Refills: 2 | Status: SHIPPED | OUTPATIENT
Start: 2020-02-19 | End: 2020-09-11

## 2020-02-19 NOTE — TELEPHONE ENCOUNTER
Patient states that she has been taking trazodone without difficulty for 25 years.  Prescription approved per [facility].  Cass Fraire RN

## 2020-02-19 NOTE — TELEPHONE ENCOUNTER
"Requested Prescriptions   Pending Prescriptions Disp Refills     TRAZODONE 100 MG PO tablet [Pharmacy Med Name: TRAZODONE 100 MG TABLET] 90 tablet 3     Sig: TAKE 1 TABLET (100 MG) BY MOUTH AT BEDTIME       Serotonin Modulators Passed - 2/18/2020 12:12 PM        Passed - Recent (12 mo) or future (30 days) visit within the authorizing provider's specialty     Patient has had an office visit with the authorizing provider or a provider within the authorizing providers department within the previous 12 mos or has a future within next 30 days. See \"Patient Info\" tab in inbasket, or \"Choose Columns\" in Meds & Orders section of the refill encounter.              Passed - Medication is active on med list        Passed - Patient is age 18 or older        Passed - No active pregnancy on record        Passed - No positive pregnancy test in past 12 months        Last Written Prescription Date:  1/3/19  Last Fill Quantity: 90,  # refills: 3   Last office visit: 10/24/2019 with prescribing provider:  Dr. Phan   Future Office Visit:    Routing refill request to provider for review/approval because:  Drug interaction warning  Allergy.   Left message for the patient to ask if she has been taking with difficulty.   Cass Fraire RN      "

## 2020-02-25 ENCOUNTER — TELEPHONE (OUTPATIENT)
Dept: FAMILY MEDICINE | Facility: CLINIC | Age: 70
End: 2020-02-25

## 2020-02-25 ENCOUNTER — OFFICE VISIT (OUTPATIENT)
Dept: FAMILY MEDICINE | Facility: CLINIC | Age: 70
End: 2020-02-25
Payer: COMMERCIAL

## 2020-02-25 VITALS
HEIGHT: 62 IN | BODY MASS INDEX: 30.55 KG/M2 | OXYGEN SATURATION: 97 % | WEIGHT: 166 LBS | TEMPERATURE: 97.5 F | DIASTOLIC BLOOD PRESSURE: 70 MMHG | HEART RATE: 89 BPM | SYSTOLIC BLOOD PRESSURE: 126 MMHG

## 2020-02-25 DIAGNOSIS — J06.9 UPPER RESPIRATORY TRACT INFECTION, UNSPECIFIED TYPE: ICD-10-CM

## 2020-02-25 DIAGNOSIS — R30.0 DYSURIA: Primary | ICD-10-CM

## 2020-02-25 DIAGNOSIS — N30.00 ACUTE CYSTITIS WITHOUT HEMATURIA: ICD-10-CM

## 2020-02-25 LAB
ALBUMIN UR-MCNC: 30 MG/DL
APPEARANCE UR: ABNORMAL
BACTERIA #/AREA URNS HPF: ABNORMAL /HPF
BILIRUB UR QL STRIP: ABNORMAL
COLOR UR AUTO: YELLOW
GLUCOSE UR STRIP-MCNC: NEGATIVE MG/DL
HGB UR QL STRIP: NEGATIVE
KETONES UR STRIP-MCNC: ABNORMAL MG/DL
LEUKOCYTE ESTERASE UR QL STRIP: ABNORMAL
NITRATE UR QL: NEGATIVE
NON-SQ EPI CELLS #/AREA URNS LPF: ABNORMAL /LPF
PH UR STRIP: 5.5 PH (ref 5–7)
RBC #/AREA URNS AUTO: ABNORMAL /HPF
SOURCE: ABNORMAL
SP GR UR STRIP: 1.02 (ref 1–1.03)
UROBILINOGEN UR STRIP-ACNC: 1 EU/DL (ref 0.2–1)
WBC #/AREA URNS AUTO: ABNORMAL /HPF

## 2020-02-25 PROCEDURE — 87088 URINE BACTERIA CULTURE: CPT | Performed by: FAMILY MEDICINE

## 2020-02-25 PROCEDURE — 99213 OFFICE O/P EST LOW 20 MIN: CPT | Performed by: FAMILY MEDICINE

## 2020-02-25 PROCEDURE — 87186 SC STD MICRODIL/AGAR DIL: CPT | Performed by: FAMILY MEDICINE

## 2020-02-25 PROCEDURE — 87086 URINE CULTURE/COLONY COUNT: CPT | Performed by: FAMILY MEDICINE

## 2020-02-25 PROCEDURE — 81001 URINALYSIS AUTO W/SCOPE: CPT | Performed by: FAMILY MEDICINE

## 2020-02-25 ASSESSMENT — ANXIETY QUESTIONNAIRES
2. NOT BEING ABLE TO STOP OR CONTROL WORRYING: NOT AT ALL
1. FEELING NERVOUS, ANXIOUS, OR ON EDGE: NOT AT ALL
IF YOU CHECKED OFF ANY PROBLEMS ON THIS QUESTIONNAIRE, HOW DIFFICULT HAVE THESE PROBLEMS MADE IT FOR YOU TO DO YOUR WORK, TAKE CARE OF THINGS AT HOME, OR GET ALONG WITH OTHER PEOPLE: NOT DIFFICULT AT ALL
3. WORRYING TOO MUCH ABOUT DIFFERENT THINGS: NOT AT ALL
6. BECOMING EASILY ANNOYED OR IRRITABLE: NOT AT ALL
5. BEING SO RESTLESS THAT IT IS HARD TO SIT STILL: NOT AT ALL
7. FEELING AFRAID AS IF SOMETHING AWFUL MIGHT HAPPEN: NOT AT ALL

## 2020-02-25 ASSESSMENT — PATIENT HEALTH QUESTIONNAIRE - PHQ9: SUM OF ALL RESPONSES TO PHQ QUESTIONS 1-9: 6

## 2020-02-25 ASSESSMENT — MIFFLIN-ST. JEOR: SCORE: 1231.22

## 2020-02-25 NOTE — TELEPHONE ENCOUNTER
Patient calling wondering if prescription sent today has gone through. It currently states for Augmentin that the transmission to pharmacy is still in progress from 2 hours ago. Called pharmacy to give verbal since still in progress. Advised pharmacy to call patient once prescription is ready. Pharmacy verbalized understanding and agrees with plan. Patient informed that RN will call to give verbal order.     Makayla Ward RN, BSN  [facility] [place]

## 2020-02-25 NOTE — PROGRESS NOTES
Subjective     Aracelis Muñoz is a 69 year old female who presents to clinic today for the following health issues:    HPI   URINARY TRACT SYMPTOMS      Duration: x 1 weeks     Description  odor, hesitancy and retention    Intensity:  mild, moderate    Accompanying signs and symptoms:  Fever/chills: no   Flank pain YES  Nausea and vomiting: YES- nausea   Vaginal symptoms: none  Abdominal/Pelvic Pain: YES- left side pelvic pain     History  History of frequent UTI's: YES  History of kidney stones: no   Sexually Active: no   Possibility of pregnancy: No    Precipitating or alleviating factors: None    Therapies tried and outcome: ibuprofen      RESPIRATORY SYMPTOMS      Duration: x Friday     Description  rhinorrhea, productive earlier, cough, headache, fatigue/malaise and myalgias    Severity: moderate    Accompanying signs and symptoms: None    History (predisposing factors):  none    Precipitating or alleviating factors: None    Therapies tried and outcome:  Ziacam nyquil       Patient Active Problem List   Diagnosis     Menopausal LMP age 54     Diffuse cystic mastopathy     Slow transit constipation     Lumbar disc herniation with radiculopathy     Post-Nasal Drainage     Perennial allergic rhinitis     Environmental allergies     Osteopenia     Generalized anxiety disorder     Mild persistent intrinsic asthma without status asthmaticus with acute exacerbation     Serum calcium elevated     Hyperparathyroidism (H)     Vitamin D deficiency     Hypercalcemia     Other insomnia     Hyperlipidemia with target LDL less than 130     Mild persistent asthma without complication     Major depressive disorder, recurrent episode, mild (H)     Eczema, unspecified type     Left knee pain, unspecified chronicity     Acute midline low back pain with left-sided sciatica     Lumbago     Right knee pain     Right foot pain     Osteopenia of other site     Past Surgical History:   Procedure Laterality Date     C NONSPECIFIC  PROCEDURE      Cyst     C/SECTION, LOW TRANSVERSE      S/P C/S     CHOLECYSTECTOMY, OPEN      Cholecystectomy     HC EXCISION BREAST LESION, OPEN >=1      Benign mass right breast     HC MRI LUMBAR SPINE W/O CONTRAST  2010    multilevel degen disc disease/facet arthropathy, 5 mm caudally extruded left posterolateral disc herniation at L4-5 with impingement on the left L5 nerve root      HC PUNCTURE/ASPIRATION BREAST CYST, FIRST  ,,    bilateral '03, left '04     LIGATN/STRIP LONG & SHORT SAPHEN      varicose vein stripping       Social History     Tobacco Use     Smoking status: Never Smoker     Smokeless tobacco: Never Used   Substance Use Topics     Alcohol use: Yes     Alcohol/week: 0.0 standard drinks     Comment: 1-2 glasses of wine 1-2 times per week      Family History   Problem Relation Age of Onset     Thyroid Disease Mother      Hypertension Mother      Allergies Mother      Cancer - colorectal Father          age 65 colon cancer     Thyroid Disease Father      Allergies Father      Depression Father      Obesity Father      Genitourinary Problems Maternal Aunt         fibrocystic breast         Current Outpatient Medications   Medication Sig Dispense Refill     alendronate (FOSAMAX) 70 MG tablet TAKE 1 TABLET (70 MG) BY MOUTH EVERY 7 DAYS 12 tablet 3     ALPRAZolam (XANAX) 0.25 MG tablet Take 1 tablet (0.25 mg) by mouth 3 times daily AS NEEDED FOR ANXIETY 10 tablet 0     amoxicillin-clavulanate (AUGMENTIN) 875-125 MG tablet Take 1 tablet by mouth 2 times daily for 7 days 14 tablet 0     azelastine (OPTIVAR) 0.05 % ophthalmic solution Place 1 drop into both eyes daily as needed In both eyes as needed 1 Bottle prn     escitalopram (LEXAPRO) 20 MG tablet Take 0.5 tablets (10 mg) by mouth daily 45 tablet 1     TRAZODONE 100 MG PO tablet TAKE 1 TABLET (100 MG) BY MOUTH AT BEDTIME 90 tablet 2     albuterol (PROAIR HFA/PROVENTIL HFA/VENTOLIN HFA) 108 (90 Base) MCG/ACT inhaler  "Inhale 2 puffs into the lungs every 4 hours as needed for shortness of breath / dyspnea or wheezing (Patient not taking: Reported on 2/25/2020) 8.5 g 1     Allergies   Allergen Reactions     Nefazodone Hydrochloride Swelling     (serzone) sore swollen tongue     Sulfa Drugs Swelling     Sore swollen tongue     Prochlorperazine Anxiety     (Compazine) became agitated       Reviewed and updated as needed this visit by Provider  Allergies         Review of Systems   ROS COMP: CONSTITUTIONAL: NEGATIVE for fever, chills, change in weight  INTEGUMENTARY/SKIN: NEGATIVE for worrisome rashes, moles or lesions  GI: NEGATIVE for nausea, abdominal pain, heartburn, or change in bowel habits      Objective    /70   Pulse 89   Temp 97.5  F (36.4  C) (Tympanic)   Ht 1.575 m (5' 2\")   Wt 75.3 kg (166 lb)   LMP 11/01/2004   SpO2 97%   BMI 30.36 kg/m    Body mass index is 30.36 kg/m .  Physical Exam   GENERAL: healthy, alert and no distress  HENT: ear canals and TM's normal, nose and mouth without ulcers or lesions  NECK: no adenopathy, no asymmetry, masses, or scars and thyroid normal to palpation  RESP: lungs clear to auscultation - no rales, rhonchi or wheezes  CV: regular rate and rhythm, normal S1 S2, no S3 or S4, no murmur, click or rub, no peripheral edema and peripheral pulses strong  ABDOMEN: soft, nontender, no hepatosplenomegaly, no masses and bowel sounds normal    Results for orders placed or performed in visit on 02/25/20   *UA reflex to Microscopic and Culture ([place] and [facility] (except [place] and [place])     Status: Abnormal   Result Value Ref Range    Color Urine Yellow     Appearance Urine Slightly Cloudy     Glucose Urine Negative NEG^Negative mg/dL    Bilirubin Urine Small (A) NEG^Negative    Ketones Urine Trace (A) NEG^Negative mg/dL    Specific Gravity Urine 1.025 1.003 - 1.035    Blood Urine Negative NEG^Negative    pH Urine 5.5 5.0 - 7.0 pH    Protein Albumin Urine 30 (A) " NEG^Negative mg/dL    Urobilinogen Urine 1.0 0.2 - 1.0 EU/dL    Nitrite Urine Negative NEG^Negative    Leukocyte Esterase Urine Small (A) NEG^Negative    Source Midstream Urine    Urine Microscopic     Status: Abnormal   Result Value Ref Range    WBC Urine 25-50 (A) OTO5^0 - 5 /HPF    RBC Urine O - 2 OTO2^O - 2 /HPF    Squamous Epithelial /LPF Urine Few FEW^Few /LPF    Bacteria Urine Many (A) NEG^Negative /HPF             Assessment & Plan     1. Dysuria    - *UA reflex to Microscopic and Culture ([place] and [facility] (except [place] and [place])  - Urine Microscopic    2. Acute cystitis without hematuria  Patient is noted to have UTI. Recommended to stay well hydrated. Augmentin ordered. Await the urine culture results.   - Urine Culture Aerobic Bacterial  - amoxicillin-clavulanate (AUGMENTIN) 875-125 MG tablet; Take 1 tablet by mouth 2 times daily for 7 days  Dispense: 14 tablet; Refill: 0    3. Upper respiratory tract infection, unspecified type  Symptoms are in the resolution phase.         Return in about 3 months (around 6/1/2020) for Annual Physical Exam.    Dejon Black MD  [facility] [place]

## 2020-02-27 ENCOUNTER — TELEPHONE (OUTPATIENT)
Dept: FAMILY MEDICINE | Facility: CLINIC | Age: 70
End: 2020-02-27

## 2020-02-27 LAB
BACTERIA SPEC CULT: ABNORMAL
BACTERIA SPEC CULT: ABNORMAL
SPECIMEN SOURCE: ABNORMAL

## 2020-02-27 NOTE — TELEPHONE ENCOUNTER
Her urine culture report is pending it will be back later today or tomorrow based on that we can change the antibiotic if necessary.  For now she can take it with food or she can skip the afternoon dose if results are not back.

## 2020-02-27 NOTE — TELEPHONE ENCOUNTER
Left detailed message (consent below) informing of MD response.   Denisse Lowery RN   [facility] - Triage

## 2020-02-27 NOTE — TELEPHONE ENCOUNTER
Patient calling, states she was started on augmentin for UTI and is very nauseous. Denies vomiting/ diarrhea. She is taking medication with food but states the nausea is making her miserable and she can barely eat. She is requesting an alternate antibiotic. Pharmacy pended, please advise.     Triage to call with response [phone] okay to leave detailed message.     Denisse Lowery RN   [facility] - Triage

## 2020-02-28 ENCOUNTER — TELEPHONE (OUTPATIENT)
Dept: FAMILY MEDICINE | Facility: CLINIC | Age: 70
End: 2020-02-28

## 2020-02-28 DIAGNOSIS — N30.00 ACUTE CYSTITIS WITHOUT HEMATURIA: Primary | ICD-10-CM

## 2020-02-28 RX ORDER — CIPROFLOXACIN 500 MG/1
500 TABLET, FILM COATED ORAL 2 TIMES DAILY
Qty: 10 TABLET | Refills: 0 | Status: SHIPPED | OUTPATIENT
Start: 2020-02-28 | End: 2020-03-06

## 2020-02-28 NOTE — TELEPHONE ENCOUNTER
S/w pt and gave Dr. Black's message below.  Advised rx for cipro was sent to [facility].     Pt states understanding.    Michelle WHEAT RN  EP Triage

## 2020-02-28 NOTE — TELEPHONE ENCOUNTER
S/w pt and advised Dr. Black has not reviewed the urine culture results from 2/25.  Will send message to her to review results and give recommendations.  Pt states she stopped the augmentin due to extreme nausea.  Denies vomiting.     Pharmacy pended.     Pt can be reached at [phone].    Michelle WHEAT RN  EP Triage

## 2020-02-28 NOTE — TELEPHONE ENCOUNTER
Reason for Call:  Request for results:    Name of test or procedure: urin culture     Date of test of procedure: 02/25/2020    Location of the test or procedure: at the clinic    OK to leave the result message on voice mail or with a family member? YES    Phone number Patient can be reached at:  Home number on file [phone] (home)    Additional comments: Pt stopped taking antibiotics that has been prescribed to her because she had some side effect, she needs to know her urin test results and new antibiotics.    Call taken on 2/28/2020 at 2:37 PM by Gi Lin

## 2020-02-28 NOTE — TELEPHONE ENCOUNTER
Switching antibiotic to ciprofloxacin.  Augmentin was a good choice though.  I hope she tolerates ciprofloxacin better.    Dejon Black MD  [facility] [place]

## 2020-03-04 ENCOUNTER — NURSE TRIAGE (OUTPATIENT)
Dept: FAMILY MEDICINE | Facility: CLINIC | Age: 70
End: 2020-03-04

## 2020-03-04 ENCOUNTER — HOSPITAL ENCOUNTER (EMERGENCY)
Facility: CLINIC | Age: 70
Discharge: HOME | End: 2020-03-04
Attending: EMERGENCY MEDICINE | Admitting: EMERGENCY MEDICINE
Payer: COMMERCIAL

## 2020-03-04 VITALS
DIASTOLIC BLOOD PRESSURE: 63 MMHG | RESPIRATION RATE: 18 BRPM | HEIGHT: 62 IN | SYSTOLIC BLOOD PRESSURE: 153 MMHG | OXYGEN SATURATION: 100 % | HEART RATE: 86 BPM | WEIGHT: 165 LBS | BODY MASS INDEX: 30.36 KG/M2 | TEMPERATURE: 97.8 F

## 2020-03-04 DIAGNOSIS — R42 DIZZINESS: ICD-10-CM

## 2020-03-04 DIAGNOSIS — R42 VERTIGO: ICD-10-CM

## 2020-03-04 LAB
ALBUMIN UR-MCNC: NEGATIVE MG/DL
ANION GAP SERPL CALCULATED.3IONS-SCNC: 3 MMOL/L (ref 3–14)
APPEARANCE UR: CLEAR
BASOPHILS # BLD AUTO: 0 10E9/L (ref 0–0.2)
BASOPHILS NFR BLD AUTO: 0.7 %
BILIRUB UR QL STRIP: NEGATIVE
BUN SERPL-MCNC: 11 MG/DL (ref 7–30)
CALCIUM SERPL-MCNC: 10 MG/DL (ref 8.5–10.1)
CHLORIDE SERPL-SCNC: 107 MMOL/L (ref 94–109)
CO2 SERPL-SCNC: 28 MMOL/L (ref 20–32)
COLOR UR AUTO: NORMAL
CREAT SERPL-MCNC: 0.62 MG/DL (ref 0.52–1.04)
DIFFERENTIAL METHOD BLD: NORMAL
EOSINOPHIL # BLD AUTO: 0.1 10E9/L (ref 0–0.7)
EOSINOPHIL NFR BLD AUTO: 2.2 %
ERYTHROCYTE [DISTWIDTH] IN BLOOD BY AUTOMATED COUNT: 13.3 % (ref 10–15)
GFR SERPL CREATININE-BSD FRML MDRD: >90 ML/MIN/{1.73_M2}
GLUCOSE SERPL-MCNC: 96 MG/DL (ref 70–99)
GLUCOSE UR STRIP-MCNC: NEGATIVE MG/DL
HCT VFR BLD AUTO: 41 % (ref 35–47)
HGB BLD-MCNC: 13.6 G/DL (ref 11.7–15.7)
HGB UR QL STRIP: NEGATIVE
IMM GRANULOCYTES # BLD: 0 10E9/L (ref 0–0.4)
IMM GRANULOCYTES NFR BLD: 0.5 %
INTERPRETATION ECG - MUSE: NORMAL
KETONES UR STRIP-MCNC: NEGATIVE MG/DL
LEUKOCYTE ESTERASE UR QL STRIP: NEGATIVE
LYMPHOCYTES # BLD AUTO: 1.8 10E9/L (ref 0.8–5.3)
LYMPHOCYTES NFR BLD AUTO: 31 %
MCH RBC QN AUTO: 29.6 PG (ref 26.5–33)
MCHC RBC AUTO-ENTMCNC: 33.2 G/DL (ref 31.5–36.5)
MCV RBC AUTO: 89 FL (ref 78–100)
MONOCYTES # BLD AUTO: 0.7 10E9/L (ref 0–1.3)
MONOCYTES NFR BLD AUTO: 11.4 %
NEUTROPHILS # BLD AUTO: 3.2 10E9/L (ref 1.6–8.3)
NEUTROPHILS NFR BLD AUTO: 54.2 %
NITRATE UR QL: NEGATIVE
NRBC # BLD AUTO: 0 10*3/UL
NRBC BLD AUTO-RTO: 0 /100
PH UR STRIP: 6 PH (ref 5–7)
PLATELET # BLD AUTO: 310 10E9/L (ref 150–450)
POTASSIUM SERPL-SCNC: 4 MMOL/L (ref 3.4–5.3)
RBC # BLD AUTO: 4.59 10E12/L (ref 3.8–5.2)
RBC #/AREA URNS AUTO: 0 /HPF (ref 0–2)
SODIUM SERPL-SCNC: 138 MMOL/L (ref 133–144)
SOURCE: NORMAL
SP GR UR STRIP: 1 (ref 1–1.03)
SQUAMOUS #/AREA URNS AUTO: <1 /HPF (ref 0–1)
TROPONIN I SERPL-MCNC: <0.015 UG/L (ref 0–0.04)
UROBILINOGEN UR STRIP-MCNC: NORMAL MG/DL (ref 0–2)
WBC # BLD AUTO: 5.9 10E9/L (ref 4–11)
WBC #/AREA URNS AUTO: <1 /HPF (ref 0–5)

## 2020-03-04 PROCEDURE — 99284 EMERGENCY DEPT VISIT MOD MDM: CPT | Mod: 25

## 2020-03-04 PROCEDURE — 96361 HYDRATE IV INFUSION ADD-ON: CPT

## 2020-03-04 PROCEDURE — 84484 ASSAY OF TROPONIN QUANT: CPT | Performed by: EMERGENCY MEDICINE

## 2020-03-04 PROCEDURE — 25800030 ZZH RX IP 258 OP 636: Performed by: EMERGENCY MEDICINE

## 2020-03-04 PROCEDURE — 81001 URINALYSIS AUTO W/SCOPE: CPT | Performed by: EMERGENCY MEDICINE

## 2020-03-04 PROCEDURE — 93005 ELECTROCARDIOGRAM TRACING: CPT

## 2020-03-04 PROCEDURE — 85025 COMPLETE CBC W/AUTO DIFF WBC: CPT | Performed by: EMERGENCY MEDICINE

## 2020-03-04 PROCEDURE — 80048 BASIC METABOLIC PNL TOTAL CA: CPT | Performed by: EMERGENCY MEDICINE

## 2020-03-04 PROCEDURE — 96360 HYDRATION IV INFUSION INIT: CPT

## 2020-03-04 PROCEDURE — 25000132 ZZH RX MED GY IP 250 OP 250 PS 637: Mod: GY | Performed by: EMERGENCY MEDICINE

## 2020-03-04 RX ORDER — MECLIZINE HYDROCHLORIDE 25 MG/1
25 TABLET ORAL 3 TIMES DAILY PRN
Qty: 30 TABLET | Refills: 0 | Status: SHIPPED | OUTPATIENT
Start: 2020-03-04 | End: 2020-07-13

## 2020-03-04 RX ORDER — MECLIZINE HYDROCHLORIDE 25 MG/1
25 TABLET ORAL ONCE
Status: COMPLETED | OUTPATIENT
Start: 2020-03-04 | End: 2020-03-04

## 2020-03-04 RX ORDER — SODIUM CHLORIDE 9 MG/ML
1000 INJECTION, SOLUTION INTRAVENOUS CONTINUOUS
Status: DISCONTINUED | OUTPATIENT
Start: 2020-03-04 | End: 2020-03-04 | Stop reason: HOSPADM

## 2020-03-04 RX ADMIN — SODIUM CHLORIDE 1000 ML: 9 INJECTION, SOLUTION INTRAVENOUS at 19:01

## 2020-03-04 RX ADMIN — MECLIZINE HYDROCHLORIDE 25 MG: 25 TABLET ORAL at 19:27

## 2020-03-04 ASSESSMENT — MIFFLIN-ST. JEOR: SCORE: 1226.69

## 2020-03-04 NOTE — ED TRIAGE NOTES
"Woke up to go to the bathroom and once back into bed developed \"room spinning\" this has been intermittent all day. Lying flat helps. Denies any other neuro symptoms.   "

## 2020-03-04 NOTE — TELEPHONE ENCOUNTER
Reason for call:  Patient reporting a symptom    Symptom or request: dizziness     Duration (how long have symptoms been present): 1 day     Have you been treated for this before? No    Additional comments: Patient just had a UTI does not know if its connected but says she woke up in the middle of the night and the entire house was spinning    Phone Number patient can be reached at:  Cell number on file:    Telephone Information:   Mobile [phone]       Best Time:  Any     Can we leave a detailed message on this number:  YES    Call taken on 3/4/2020 at 10:05 AM by Malgorzata Iraheta

## 2020-03-04 NOTE — ED AVS SNAPSHOT
Emergency Department  [address] [facility] [address]  Phone:  [phone]  Fax:  [phone]                                    Aracelis Muñoz   MRN: [id number]    Department:   Emergency Department   Date of Visit:  3/4/2020           After Visit Summary Signature Page    I have received my discharge instructions, and my questions have been answered. I have discussed any challenges I see with this plan with the nurse or doctor.    ..........................................................................................................................................  Patient/Patient Representative Signature      ..........................................................................................................................................  Patient Representative Print Name and Relationship to Patient    ..................................................               ................................................  Date                                   Time    ..........................................................................................................................................  Reviewed by Signature/Title    ...................................................              ..............................................  Date                                               Time          22EPIC Rev 08/18

## 2020-03-04 NOTE — TELEPHONE ENCOUNTER
Additional Information    Negative: Shock suspected (e.g., cold/pale/clammy skin, too weak to stand, low BP, rapid pulse)    Negative: Difficult to awaken or acting confused (e.g., disoriented, slurred speech)    Negative: Fainted, and still feels dizzy afterwards    Negative: Severe difficulty breathing (e.g., struggling for each breath, speaks in single words)    Negative: Overdose (accidental or intentional) of medications    Negative: New neurologic deficit that is present now: * Weakness of the face, arm, or leg on one side of the body * Numbness of the face, arm, or leg on one side of the body * Loss of speech or garbled speech    Negative: Heart beating < 50 beats per minute OR > 140 beats per minute    Negative: Sounds like a life-threatening emergency to the triager    Negative: Chest pain    Negative: Rectal bleeding, bloody stool, or tarry-black stool    Negative: Vomiting is the main symptom    Negative: Diarrhea is the main symptom    Negative: Headache is the main symptom    Negative: Heat exhaustion suspected (i.e., dehydration from heat exposure)    Negative: Patient states that he/she is having an anxiety/panic attack    Negative: SEVERE dizziness (e.g., unable to stand, requires support to walk, feels like passing out now)    Negative: Severe headache    Negative: Extra heart beats OR irregular heart beating (i.e., 'palpitations')    Negative: Difficulty breathing    Negative: Drinking very little and has signs of dehydration (e.g., no urine > 12 hours, very dry mouth, very lightheaded)    Negative: Follows bleeding (e.g., stomach, rectum, vagina) (Exception: became dizzy from sight of small amount blood)    Negative: Patient sounds very sick or weak to the triager    Negative: Lightheadedness (dizziness) present now, after 2 hours of rest and fluids    Negative: Spinning or tilting sensation (vertigo) present now    Negative: Fever > 103 F (39.4 C)    Negative: Fever > 100.0 F (37.8 C) and has  "diabetes mellitus or a weak immune system (e.g., HIV positive, cancer chemotherapy, organ transplant, splenectomy, chronic steroids)    Negative: Vomiting occurs with dizziness    Negative: Patient wants to be seen    Negative: Taking a medicine that could cause dizziness (e.g., blood pressure medications, diuretics)    Negative: Diabetes    Poor fluid intake probably causing dizziness    Answer Assessment - Initial Assessment Questions  1. DESCRIPTION: \"Describe your dizziness.\"      House was spinning last night.  2. LIGHTHEADED: \"Do you feel lightheaded?\" (e.g., somewhat faint, woozy, weak upon standing)      A little weak upon standing until steadys herself  3. VERTIGO: \"Do you feel like either you or the room is spinning or tilting?\" (i.e. vertigo)      Last night the room was spinning when lying in bed.  4. SEVERITY: \"How bad is it?\"  \"Do you feel like you are going to faint?\" \"Can you stand and walk?\"    - MILD - walking normally    - MODERATE - interferes with normal activities (e.g., work, school)     - SEVERE - unable to stand, requires support to walk, feels like passing out now.       Walking normally with little support  5. ONSET:  \"When did the dizziness begin?\"      Last night  6. AGGRAVATING FACTORS: \"Does anything make it worse?\" (e.g., standing, change in head position)      Movement with bending over at the waist and quick movements  7. HEART RATE: \"Can you tell me your heart rate?\" \"How many beats in 15 seconds?\"  (Note: not all patients can do this)        Not sure  8. CAUSE: \"What do you think is causing the dizziness?\"      Not sure.  Just got off medication for uti and still has a cough and when coughs is lightheaded.  9. RECURRENT SYMPTOM: \"Have you had dizziness before?\" If so, ask: \"When was the last time?\" \"What happened that time?\"      no  10. OTHER SYMPTOMS: \"Do you have any other symptoms?\" (e.g., fever, chest pain, vomiting, diarrhea, bleeding)        no  11. PREGNANCY: \"Is there any " "chance you are pregnant?\" \"When was your last menstrual period?\"        NA    Protocols used: DIZZINESS-A-OH    HOME CARE: You should be able to treat this at home.      DRINK FLUIDS: Drink several glasses of fruit juice, other clear fluids, or water. This will improve hydration and blood glucose. If you have a fever or have had heat exposure, make sure the fluids are cold.      REST FOR 1-2 HOURS: Lie down with feet elevated for 1 hour. This will improve blood flow and increase blood flow to the brain.      STAND UP SLOWLY:   * In the mornings, sit up for a few minutes before you stand up. That will help your blood flow make the adjustment.   * If you have to stand up for long periods of time, contract and relax your leg muscles to help pump the blood back to the heart.   * Sit down or lie down if you feel dizzy.      CALL BACK IF:  * Still feel dizzy after 2 hours of rest and fluids   * Passes out (faints)  * You become worse      Patient/Caregiver understands and will follow care advice? Yes, plans to follow advice     Michelle WHEAT RN  EP Triage      "

## 2020-03-04 NOTE — TELEPHONE ENCOUNTER
Called patient again to get updates on her symptoms. Patient states that she is still having moderate to severe episodes all throughout the day and she feels dizzy along with feeling like the room is spinning. Patient states as long as she keeps her eyes open and does not move her head fast she has not issues. Patient states when she stands she has to hold onto something. All of these symptoms are not baseline for patient. Patient currently is still having these episodes. Patient was advised earlier by both RN's that they recommend patient go to the ED.     RN is concerned that patient might be dehydrated as patient states she does not do well with drinking water throughout the day along with stating that she had wine last night, which would dehydrate patient even more. RN concerned at this time for patients symptoms that are not baseline for her along with patient possibly needing IV fluids. Advised by RN to go to ED.     Patient denies chest pain, numbness and tingling in arms, legs, face, confusion, SOB, fatigue, weakness currently.     Patient states she will have her son pick her up and bring her to the ED. Patient will not drive due to safety concerns. Patient requested to cancel her office visit today scheduled at 5:40 pm. Patient will be going to the ED in the next 30 minutes. Patient verbalized understanding and agrees with plan.       Makayla Ward RN, BSN  [facility]

## 2020-03-04 NOTE — TELEPHONE ENCOUNTER
Patient called back to report that when she got up from taking a nap this afternoon she felt everything was spinning. It was severe at the times.   States that she can still stand and walk, but a times spinning is severe.    Advised ED. Patient states that she will contact her son. If spinning lessons she may keep 5:40 appointment in clinic.   Verbalizes understanding that for severe dizziness she needs to go to the ER.  Cass Fraire RN

## 2020-03-05 ENCOUNTER — TELEPHONE (OUTPATIENT)
Dept: FAMILY MEDICINE | Facility: CLINIC | Age: 70
End: 2020-03-05

## 2020-03-05 NOTE — TELEPHONE ENCOUNTER
Reason for Call:   call back    Detailed comments: ER last night with Vertigo Issues   would rebecca to talk with a nurse again    Phone Number Patient can be reached at: Home number on file [phone] (home)    Best Time: any    Can we leave a detailed message on this number?  Yes   Call taken on 3/5/2020 at 2:33 PM by Rosalva Muhammad

## 2020-03-05 NOTE — DISCHARGE INSTRUCTIONS
Return to the ED if worsening dizziness, unable to walk, severe headache, vision changes, weakness, numbness or tingling or other acute concerns.       Follow up with PCP in 2-3 days    Discharge Instructions  Vertigo  You have been diagnosed with vertigo.  This is a dizzy feeling often described as spinning or that the room is moving around you. You will often have nausea (sick to your stomach), vomiting (throwing up), and balance problems with it.  Vertigo is usually caused by a problem in the inner ear which helps control your balance.  Many things can cause vertigo, including calcium collections in the inner ear, a virus infection of the inner ear, concussion, migraine, and some medicines.  Luckily, these causes are not life threatening and will eventually go away.  However, sometimes there is a serious problem that does not show up right away.  Generally, every Emergency Department visit should have a follow-up clinic visit with either a primary or a specialty clinic/provider. Please follow-up as instructed by your emergency provider today.  Return to the Emergency Department if you have:  New or severe headache.  Double vision (seeing two of things).  Trouble speaking or hearing.  Weakness or trouble moving/using one side of your body.  Passing out.  Numbness or tingling.  Chest pain.  Vomiting that will not stop.    Treatment:  There are several commonly prescribed medications:  Antihistamines such as meclizine (Antivert ), dimenhydrinate (Dramamine ), or diphenhydramine (Benadryl ).  Prescription anti-nausea medicines, such as promethazine (Phenergan ), metoclopramide (Reglan ), or ondansetron (Zofran ).  Prescription sedative medicines, such as diazepam (Valium ), lorazepam (Ativan ), or clonazepam (Klonopin ).  Most of these medicines make you sleepy, and you should not take them before you work or drive. You should only take prescription medicines to treat severe vertigo symptoms, and you should stop the  medicine when your symptoms improve.    Follow Up:  If you have vertigo longer than three days, it is important that you follow up either with your primary provider or an Ear, Nose, and Throat (ENT) specialist.  You may need further testing to evaluate your vertigo and you may also need  vestibular  therapy which is a special form of physical therapy to make the vertigo go away.    If you were given a prescription for medicine here today, be sure to read all of the information (including the package insert) that comes with your prescription.  This will include important information about the medicine, its side effects, and any warnings that you need to know about.  The pharmacist who fills the prescription can provide more information and answer questions you may have about the medicine.  If you have questions or concerns that the pharmacist cannot address, please call or return to the Emergency Department.     Remember that you can always come back to the Emergency Department if you are not able to see your regular provider in the amount of time listed above, if you get any new symptoms, or if there is anything that worries you.

## 2020-03-05 NOTE — TELEPHONE ENCOUNTER
"Patient calling to report that she had 2 episodes of vertigo today. One episode at 4 am and another at 11 am. States that right now she feels a little \"light headed and whoozy\". She denies any other symptoms. Patient states that she is calling because she wants to know the next step.  AVS states to schedule follow up with PCP in 2 days. Patient scheduled for appointment tomorrow at noon with Dr. Patel.  Advised of 24/7 nurse line if new symptoms or questions or concerns. Cass Fraire RN    "

## 2020-03-05 NOTE — ED PROVIDER NOTES
History     Chief Complaint:  Dizziness    HPI   Aracelis Muñoz is a 69 year old female with history of insomnia who presents with dizziness. The patient states she has had intermittent dizziness all day. She says she woke up at 0400, used the restroom, and when she reached her bed, the room started spinning. She states calling her primary, where she was told to rest and call back if symptoms persist. The patient reports going back to bed and when she awoke at 0712, the dizziness persisted. She states the dizziness started improving and she took a nap at 1400. She says when she awoke, she had a similar episode of dizziness after using the restroom. The patient called her primary again, where she was then recommended to come to the ED, prompting today's visit. During evaluation, the patient denied double vision, acute gait issues, acute cough, numbness, tingling, ear pain, chest pain, and shortness of breath, but admits to light-headedness and shakiness. The patient also admits to feeling dehydrated. The patient notes having left trapezial pain the past 1.5 weeks.    CARDIAC RISK FACTORS:  Sex:    F  Tobacco:   No  Hypertension:   No  Hyperlipidemia:  No  Diabetes:   No  Family History:  No    PE/DVT RISK FACTORS:  Sex:    F  Hormones:   No  Tobacco:   No  Cancer:   No  Travel:   No  Surgery:   No  Other immobilization: No  Personal history:  No  Family history:  No    Allergies:  Nabumetone  Sulfa drugs  Prochlorperazine  Nefazodone     Medications:    Trazodone  Fosamax  Albuterol  Optivar  Flexeril  Celebrex  Lexapro  Gabapentin    Past Medical History:    Depression  Diffuse cystic mastopathy  Insomnia  Lumbar disc herniation w/ radiculopathy  Asthma  Osteopenia  Menopausal  Slow transit constipation  Eczema  Hypercalcemia  Hyperthyroidism    Past Surgical History:      Cholecystectomy  Right breast benign mass removal  Varicose vein stripping    Family History:    Thyroid disease  HTN  Colon  "cancer  Depression  Obesity    Social History:  Smoking status: No  Alcohol use: Yes  Drug use: No  The patient presents to the emergency department with son.  Marital Status:   [2]     Review of Systems   All other systems reviewed and are negative.        Physical Exam     Patient Vitals for the past 24 hrs:   BP Temp Temp src Pulse Resp SpO2 Height Weight   03/04/20 1744 (!) 153/63 97.8  F (36.6  C) Temporal 86 18 100 % 1.575 m (5' 2\") 74.8 kg (165 lb)       Physical Exam  General: Resting on the bed.  Head: No obvious trauma to head.  Ears, Nose, Throat:  External ears normal.  Nose normal.  No pharyngeal erythema, swelling or exudate.  Midline uvula.  clear TMs  Eyes:  Conjunctivae clear.  Pupils are equal, round, and reactive. nystagmus with posterior head tilt  Neck: Normal range of motion.  Neck supple.   CV: Regular rate and rhythm.  No murmurs.      Respiratory: Effort normal and breath sounds normal.  No wheezing or crackles.   Gastrointestinal: Soft.  No distension. There is no tenderness.    Neuro: Alert. Moving all extremities appropriately.  Normal speech.  CN II-XII grossly intact, no pronator drift, normal finger-nose-finger, visual fields intact.  Gross muscle strength intact of the proximal and distal bilateral upper and lower extremities.  Sensation intact to light touch in all 4 extremities.  2+ patellar reflexes.  Normal gait.  Negative romberg.  Nystagmus with posterior head tilt.  Epley maneuvers bilaterally negative.  Skin: Skin is warm and dry.  No rash noted.       Emergency Department Course   ECG (18:45:04):  Rate 76 bpm. NH interval 160. QRS duration 74. QT/QTc 360/405. P-R-T axes 46 -48 39. Normal sinus rhythm. Left anterior fascicular block. Abnormal ECG. No significant changed compared to EKG dated 01/20/2018. Interpreted at 1845 by Haley Hankins MD.    Laboratory:  CBC: WNL (WBC 5.9, HGB 13.6, )  BMP: WNL (Creatinine 0.62)  1906 Troponin I <0.015    UA: All " neg.       Interventions:  1901 NS 1L IV Bolus  1927 Antivert 25 mg PO    Emergency Department Course:  Past medical records, nursing notes, and vitals reviewed.  1815: I performed an exam of the patient and obtained history, as documented above.    EKG obtained in the ED, see results above.     IV was inserted and blood was drawn for laboratory testing, results above.    The patient provided a urine sample here in the emergency department. This was sent for laboratory testing, findings above.    2013: I rechecked the patient. Explained findings to patient.    2027: Findings and plan explained to the Patient. Patient discharged home with instructions regarding supportive care, medications, and reasons to return. The importance of close follow-up was reviewed.     Impression & Plan    Medical Decision Making:  Aracelis Muñoz is a 69 year old female who presents for evaluation of vertigo. The differential diagnosis of vertigo is broad and includes common etiologies such as menieres disease, labyrinthitis, benign positional vertigo, otitis media, etc.  More serious etiologies considered include central etiologies such as tumor, intracerebral bleed, dissection, ischemic cerebral vascular accident.  CBC shows no leukocytosis or anemia.  BMP shows no acute electrolyte, metabolic, renal dysfunction.  UA is unrevealing, no evidence of infectious etiology.  No signs of UTI.  EKG shows sinus rhythm, no evidence acute ST-T wave change.  No focal changes from prior EKG.  No signs of arrhythmia.  Troponin is negative.  I doubt cardiac etiology without chest pain or shortness of breath.  I doubt PE without shortness of breath or tachycardia.  Considered stroke at length and had discussion with patient and family member.  Patient has no associated symptoms besides dizziness.  No ataxia, no difficulty with coordination, negative Romberg, no double vision, no headache or other changes.  At this point this seems to be a peripheral  process.  It seems most provoked with position changes including tilting her head back and there is some associated nystagmus appreciable on examination with this maneuver.  This seems most likely to be related to BPPV.  We discussed possibly doing neuro imaging but given the reassuring exam and history do not feel this is indicated today.  Exam shows no evidence of otitis media or effusion.  No appreciable neck pain or tearing pain to suggest dissection.  Do not suspect vascular insufficiency, no appreciable bruit on exam.  Patient felt better after meclizine.  The history, physical exam including detailed neurologic exam, and workup in the emergency room suggests a benign cause of vertigo today.  Patient feels improved after interventions noted above. Further outpatient management is indicated with vertigo medications.   No indication for advanced imaging at this point (CT/MRI) as there are no definite signs of a central and concerning etiology for the vertigo.  Discussed if symptoms worsen that she should return to the ER and may need neuroimaging at that time.  She voiced understanding.  She felt comfortable going home.  Close follow-up is indicated.    Vertigo precautions given for home.      Diagnosis:    ICD-10-CM    1. Dizziness R42    2. Vertigo R42        Disposition:  discharged to home    Discharge Medications:  New Prescriptions    MECLIZINE (ANTIVERT) 25 MG TABLET    Take 1 tablet (25 mg) by mouth 3 times daily as needed for dizziness         Scribe Disclosure:  I, Ez Dominguez, am serving as a scribe at 6:15 PM on 3/4/2020 to document services personally performed by Haley Hankins MD based on my observations and the provider's statements to me.     EMERGENCY DEPARTMENT       Haley Hankins MD  03/05/20 0245

## 2020-03-06 ENCOUNTER — OFFICE VISIT (OUTPATIENT)
Dept: FAMILY MEDICINE | Facility: CLINIC | Age: 70
End: 2020-03-06
Payer: COMMERCIAL

## 2020-03-06 VITALS
WEIGHT: 166 LBS | BODY MASS INDEX: 30.55 KG/M2 | DIASTOLIC BLOOD PRESSURE: 60 MMHG | HEIGHT: 62 IN | HEART RATE: 78 BPM | SYSTOLIC BLOOD PRESSURE: 118 MMHG | TEMPERATURE: 98 F | OXYGEN SATURATION: 99 %

## 2020-03-06 DIAGNOSIS — H81.10 BENIGN PAROXYSMAL POSITIONAL VERTIGO, UNSPECIFIED LATERALITY: Primary | ICD-10-CM

## 2020-03-06 PROCEDURE — 99213 OFFICE O/P EST LOW 20 MIN: CPT | Performed by: INTERNAL MEDICINE

## 2020-03-06 ASSESSMENT — MIFFLIN-ST. JEOR: SCORE: 1231.22

## 2020-03-06 NOTE — PROGRESS NOTES
"Subjective     Aracelis Muñoz is a 69 year old female who presents to clinic today for the following health issues:    HPI   ED/UC Followup:    Facility:  [facility]   Date of visit: 3/4/2020  Reason for visit:  vertigo   Current Status: improved      Maile is here for follow-up from ER visit 2 days ago for vertigo.  She had several episodes when laying down in bed.  She had an evaluation in the emergency room including blood work and an EKG which were normal.  She has had couple more episodes since, only happens when she lays down in bed and looks up, not really with rolling side to side or when up and about walking.  The episode lasts less than a minute.  Last night she also had a bad right-sided headache, but that is resolved today.  She denies any changes in her hearing or ear fullness.  She does have a history of chronic rhinitis and sees an ENT.               Reviewed and updated as needed this visit by Provider         Review of Systems   Const, HEENT, neuro reviewed,  otherwise negative unless noted above.        Objective    /60   Pulse 78   Temp 98  F (36.7  C) (Oral)   Ht 1.575 m (5' 2\")   Wt 75.3 kg (166 lb)   LMP 11/01/2004   SpO2 99%   BMI 30.36 kg/m    Body mass index is 30.36 kg/m .  Physical Exam   Gen: pleasant, well appearing woman, no distress   HEENT: small scab distal right ear canal, otherwise normal ear canals and TM b/l   Neuro: negative Roselia-hallpike to right and left             Assessment & Plan     1. Benign paroxysmal positional vertigo, unspecified laterality  Symptom description consistent with BPPV, though roselia-hallpike is negative. Recommended visit with vestibular PT.   - PHYSICAL THERAPY REFERRAL; Future         F/U - has a visit already scheduled with Dr. Phan on 3/16       Evelin Patel MD  [facility]      "

## 2020-03-10 ENCOUNTER — HOSPITAL ENCOUNTER (OUTPATIENT)
Dept: PHYSICAL THERAPY | Facility: CLINIC | Age: 70
Setting detail: THERAPIES SERIES
End: 2020-03-10
Attending: INTERNAL MEDICINE
Payer: COMMERCIAL

## 2020-03-10 DIAGNOSIS — H81.10 BENIGN PAROXYSMAL POSITIONAL VERTIGO, UNSPECIFIED LATERALITY: ICD-10-CM

## 2020-03-10 PROCEDURE — 97161 PT EVAL LOW COMPLEX 20 MIN: CPT | Mod: GP | Performed by: PHYSICAL THERAPIST

## 2020-03-10 PROCEDURE — 95992 CANALITH REPOSITIONING PROC: CPT | Mod: GP | Performed by: PHYSICAL THERAPIST

## 2020-03-10 NOTE — PROGRESS NOTES
[facility]        OUTPATIENT PHYSICAL THERAPY FUNCTIONAL EVALUATION  PLAN OF TREATMENT FOR OUTPATIENT REHABILITATION  (COMPLETE FOR INITIAL CLAIMS ONLY)  Patient's Last Name, First Name, M.I.  YOB: 1950  Aracelis Muñoz     Provider's Name   [facility]   Medical Record No.  [id number]     Start of Care Date:  03/10/20   Onset Date:  03/04/20   Type:     _X__PT   ____OT  ____SLP Medical Diagnosis:  Benign paroxysmal positional vertigo, unspecified laterality H81.10       PT Diagnosis:  R posterior canal BPPV Visits from SOC:  1                              __________________________________________________________________________________  Plan of Treatment/Functional Goals:  neuromuscular re-education(CRM)           GOALS  Calcium Hallpike  Pt will test negative for Flakita Hallpike in order to return to PLOF  04/09/20    DHI  Pt will score a 10/100 on the DHI in order to show a decrease in symptoms and their impact on QOL.  04/09/20                 Therapy Frequency:  1 time/week   Predicted Duration of Therapy Intervention:  4 weeks    Ninoska Richards, PT                                    I CERTIFY THE NEED FOR THESE SERVICES FURNISHED UNDER        THIS PLAN OF TREATMENT AND WHILE UNDER MY CARE     (Physician co-signature of this document indicates review and certification of the therapy plan).                Certification Date From:  03/10/20   Certification Date To:  05/09/20    Referring Provider:  Evelin Patel MD     Initial Assessment  See Epic Evaluation- Start of Care Date: 03/10/20

## 2020-03-19 ENCOUNTER — TELEPHONE (OUTPATIENT)
Dept: FAMILY MEDICINE | Facility: CLINIC | Age: 70
End: 2020-03-19

## 2020-03-19 DIAGNOSIS — J45.30 MILD PERSISTENT ASTHMA WITHOUT COMPLICATION: Primary | ICD-10-CM

## 2020-03-19 NOTE — TELEPHONE ENCOUNTER
She can go back on Advair to see if that helps . Script faxed with one refill.  Should do follow up check in 4- 6 weeks, sooner if needed. May do e-visit for any other warning concerning sx

## 2020-03-19 NOTE — TELEPHONE ENCOUNTER
Patient is requesting Advair inhaler, this was discontinued in May of 2019.     Patient states that she had a cold a few weeks ago and was seen in the office. Patient has history of asthma and usually has a rescue inhaler and used to be on Advair. Patient states that she is still having a cough and intermittent shortness of breath related to her asthma and having a cold. Patient states that she has enough albuterol at home, but is short living and does not last for long. Patient states that she should have continued the Advair in the first place. Patient is staying at home and is doing everything to try and stay healthy.     Routing to PCP to advise on refill.     Pharmacy pended..  Okay to leave a detailed message? YES    Makayla Ward RN, BSN  [facility]

## 2020-03-19 NOTE — TELEPHONE ENCOUNTER
Reason for Call:   medication refill:    Do you use a [facility]?  Name of the pharmacy and phone number for the current request:    [facility]   Name of the medication requested:   Advair - inhaler   Other request:     Can we leave a detailed message on this number?  Yes     Phone number patient can be reached at: Cell number on file:    Telephone Information:   Mobile [phone]       Best Time: any    Call taken on 3/19/2020 at 9:37 AM by Rosalva Muhammad

## 2020-03-19 NOTE — TELEPHONE ENCOUNTER
Called patient and informed her of MD response below. Patient verbalized understanding and agrees with plan.       Makayla Ward RN, BSN  [facility]

## 2020-03-24 DIAGNOSIS — M85.88 OSTEOPENIA OF OTHER SITE: ICD-10-CM

## 2020-03-24 RX ORDER — ALENDRONATE SODIUM 70 MG/1
TABLET ORAL
Qty: 12 TABLET | Refills: 3 | Status: SHIPPED | OUTPATIENT
Start: 2020-03-24 | End: 2021-02-22

## 2020-03-24 NOTE — TELEPHONE ENCOUNTER
"Requested Prescriptions   Pending Prescriptions Disp Refills     alendronate (FOSAMAX) 70 MG tablet [Pharmacy Med Name: ALENDRONATE SODIUM 70 MG TAB] 12 tablet 3     Sig: TAKE 1 TABLET (70 MG) BY MOUTH EVERY 7 DAYS  Last Written Prescription Date:  4/30/19  Last Fill Quantity: 12,  # refills: 3   Last office visit: 3/6/2020 with prescribing provider:  Jorge   Future Office Visit:           Bisphosphonates Passed - 3/24/2020  2:02 AM        Passed - Recent (12 mo) or future (30 days) visit within the authorizing provider's specialty     Patient has had an office visit with the authorizing provider or a provider within the authorizing providers department within the previous 12 mos or has a future within next 30 days. See \"Patient Info\" tab in inbasket, or \"Choose Columns\" in Meds & Orders section of the refill encounter.              Passed - Dexa on file within past 2 years     Please review last Dexa result.           Passed - Medication is active on med list        Passed - Patient is age 18 or older        Passed - Normal serum creatinine on file within past 12 months     Recent Labs   Lab Test 03/04/20  1906   CR 0.62       Ok to refill medication if creatinine is low               "

## 2020-04-17 ENCOUNTER — NURSE TRIAGE (OUTPATIENT)
Dept: FAMILY MEDICINE | Facility: CLINIC | Age: 70
End: 2020-04-17

## 2020-04-17 NOTE — TELEPHONE ENCOUNTER
"  Additional Information    Negative: Abdomen pain is the main symptom and adult male    Negative: Abdomen pain is the main symptom and adult female    Negative: Rectal bleeding or blood in stool is the main symptom    Negative: Patient sounds very sick or weak to the triager    Negative: Constant abdominal pain lasting > 2 hours    Negative: Vomiting bile (green color)    Negative: Vomiting and abdomen looks much more swollen than usual    Negative: Rectal pain or fullness from fecal impaction (rectum full of stool) and NOT better after SITZ bath, suppository or enema    Negative: Abdomen is more swollen than usual    Negative: Last bowel movement (BM) > 4 days ago    Negative: Leaking stool    Negative: Intermittent mild abdominal pain and fever    Negative: Unable to have a bowel movement (BM) without manually removing stool (using finger to pull out stool or perform disimpaction)    Negative: Unable to have a bowel movement (BM) without using a laxative, suppository, or enema    Negative: Constipation persists > 1 week and no improvement after using CARE ADVICE    Negative: Weight loss greater than 10 pounds (5 kg) and not dieting    Negative: Pencil-like, narrow stools    Negative: Patient wants to be seen    Negative: Uses laxative (e.g., PEG / Miralax. milk of magnesia) or enema more than once a month    Negative: Constipation is a recurrent ongoing problem (i.e., < 3 BMs / week or straining > 25% of the time)    Negative: Minor bleeding from rectum (e.g., blood just on toilet paper, few drops, streaks on surface of normal formed BM) occurs more than twice    Mild constipation    Answer Assessment - Initial Assessment Questions  1. STOOL PATTERN OR FREQUENCY: \"How often do you pass bowel movements (BMs)?\"  (Normal range: tid to q 3 days)  \"When was the last BM passed?\"        Daily.  1 week ago  2. STRAINING: \"Do you have to strain to have a BM?\"       no  3. RECTAL PAIN: \"Does your rectum hurt when the stool " "comes out?\" If so, ask: \"Do you have hemorrhoids? How bad is the pain?\"  (Scale 1-10; or mild, moderate, severe)      no  4. STOOL COMPOSITION: \"Are the stools hard?\"       yes  5. BLOOD ON STOOLS: \"Has there been any blood on the toilet tissue or on the surface of the BM?\" If so, ask: \"When was the last time?\"       no  6. CHRONIC CONSTIPATION: \"Is this a new problem for you?\"  If no, ask: How long have you had this problem?\" (days, weeks, months)       No.  years  7. CHANGES IN DIET: \"Have there been any recent changes in your diet?\"       no  8. MEDICATIONS: \"Have you been taking any new medications?\"      no  9. LAXATIVES: \"Have you been using any laxatives or enemas?\"  If yes, ask \"What, how often, and when was the last time?\"      Used a laxative 2.5 weeks ago which helped her.  10. CAUSE: \"What do you think is causing the constipation?\"         Not sure  11. OTHER SYMPTOMS: \"Do you have any other symptoms?\" (e.g., abdominal pain, fever, vomiting)        no  12. PREGNANCY: \"Is there any chance you are pregnant?\" \"When was your last menstrual period?\"        no    Protocols used: CONSTIPATION-A-[place]    Michelle WHEAT RN  EP Triage    "

## 2020-06-03 ENCOUNTER — TELEPHONE (OUTPATIENT)
Dept: FAMILY MEDICINE | Facility: CLINIC | Age: 70
End: 2020-06-03

## 2020-06-03 DIAGNOSIS — H91.90 HEARING DECREASED, UNSPECIFIED LATERALITY: Primary | ICD-10-CM

## 2020-06-03 NOTE — TELEPHONE ENCOUNTER
Pt calling and requesting a referral to [facility] -[phone].  Pt is needing new hearing aides. Per pt's request.    This can be faxed to [phone].      Rich Beal working remotely and have no access to a fax machine.      Nohemi-Referrals

## 2020-06-04 NOTE — TELEPHONE ENCOUNTER
Routing to team to inform patient of MD note below.      Makayla Ward RN, BSN  [facility]

## 2020-07-07 ENCOUNTER — TRANSFERRED RECORDS (OUTPATIENT)
Dept: HEALTH INFORMATION MANAGEMENT | Facility: CLINIC | Age: 70
End: 2020-07-07

## 2020-07-13 ENCOUNTER — VIRTUAL VISIT (OUTPATIENT)
Dept: FAMILY MEDICINE | Facility: CLINIC | Age: 70
End: 2020-07-13
Payer: COMMERCIAL

## 2020-07-13 DIAGNOSIS — N95.1 SYMPTOMATIC MENOPAUSAL OR FEMALE CLIMACTERIC STATES: ICD-10-CM

## 2020-07-13 DIAGNOSIS — K59.01 SLOW TRANSIT CONSTIPATION: Primary | ICD-10-CM

## 2020-07-13 DIAGNOSIS — J45.30 MILD PERSISTENT ASTHMA WITHOUT COMPLICATION: ICD-10-CM

## 2020-07-13 PROCEDURE — 99214 OFFICE O/P EST MOD 30 MIN: CPT | Mod: 95 | Performed by: FAMILY MEDICINE

## 2020-07-13 RX ORDER — POLYETHYLENE GLYCOL 3350 17 G/17G
1 POWDER, FOR SOLUTION ORAL DAILY
Qty: 1 BOTTLE | Refills: 11 | Status: SHIPPED | OUTPATIENT
Start: 2020-07-13 | End: 2020-11-09

## 2020-07-13 NOTE — PROGRESS NOTES
"Aracelis Muñoz is a 70 year old female who is being evaluated via a billable video visit.      The patient has been notified of following:     \"This video visit will be conducted via a call between you and your physician/provider. We have found that certain health care needs can be provided without the need for an in-person physical exam.  This service lets us provide the care you need with a video conversation.  If a prescription is necessary we can send it directly to your pharmacy.  If lab work is needed we can place an order for that and you can then stop by our lab to have the test done at a later time.    Video visits are billed at different rates depending on your insurance coverage.  Please reach out to your insurance provider with any questions.    If during the course of the call the physician/provider feels a video visit is not appropriate, you will not be charged for this service.\"    Patient has given verbal consent for Video visit? Yes  How would you like to obtain your AVS? Heaven  Patient would like the video invitation sent by: Heaven  Will anyone else be joining your video visit? No    Subjective     Aracelis Muñoz is a 70 year old female who presents today via video visit for the following health issues:    HPI  Constipation     Onset: Worse starting March 2020 - has gotten better - symptoms are during episode     Description:   Frequency of bowel movements: it can be once a week if bad   Stool consistency: hard    Progression of Symptoms:  Improved after therapies below    Accompanying Signs & Symptoms:  Abdominal pain (cramping?): YES and gas pain, it has improved after Tums for a  day so she has stopped   Blood in stool: no   Rectal pain: no   Nausea/vomiting: no   Weight loss or gain: no    History:   History of abdominal surgery: Gallbladder removed many years ago     Precipitating factors:   Recent use of narcotics, anticholinergics, calcium channel blockers, antacids, or iron " supplements: no   Chronic Laxative Use: no   miralax helps        Therapies Tried and outcome: laxatives and Mirilax-gets very bloated - legs were swelling, TUMS helped a lot               Asthma Follow-Up    Was ACT completed today?  Yes       Do you have a cough?  No    Are you experiencing any wheezing in your chest?  No    Do you have any shortness of breath?  No     How often are you using a short-acting (rescue) inhaler or nebulizer, such as Albuterol?  Has not used in a while     How many days per week do you miss taking your asthma controller medication?  I  have been on Advair given previously and use it only when has seasonal issue with asthma, so  not suing currently and doing well     Please describe any recent triggers for your asthma: upper respiratory infections and seasoanl changes     Have you had any Emergency Room Visits, Urgent Care Visits, or Hospital Admissions since your last office visit?  No            Patient Active Problem List   Diagnosis     Menopausal LMP age 54     Diffuse cystic mastopathy     Slow transit constipation     Lumbar disc herniation with radiculopathy     Post-Nasal Drainage     Perennial allergic rhinitis     Environmental allergies     Osteopenia     Generalized anxiety disorder     Mild persistent intrinsic asthma without status asthmaticus with acute exacerbation     Serum calcium elevated     Hyperparathyroidism (H)     Vitamin D deficiency     Hypercalcemia     Other insomnia     Hyperlipidemia with target LDL less than 130     Mild persistent asthma without complication     Major depressive disorder, recurrent episode, mild (H)     Eczema, unspecified type     Left knee pain, unspecified chronicity     Acute midline low back pain with left-sided sciatica     Lumbago     Right knee pain     Right foot pain     Osteopenia of other site     Past Surgical History:   Procedure Laterality Date     C NONSPECIFIC PROCEDURE  1982    Cyst     C/SECTION, LOW TRANSVERSE       S/P C/S     CHOLECYSTECTOMY, OPEN      Cholecystectomy     HC EXCISION BREAST LESION, OPEN >=1      Benign mass right breast     HC MRI LUMBAR SPINE W/O CONTRAST  2010    multilevel degen disc disease/facet arthropathy, 5 mm caudally extruded left posterolateral disc herniation at L4-5 with impingement on the left L5 nerve root      HC PUNCTURE/ASPIRATION BREAST CYST, FIRST  ,,    bilateral '03, left '04     LIGATN/STRIP LONG & SHORT SAPHEN      varicose vein stripping     ORTHOPEDIC SURGERY         Social History     Tobacco Use     Smoking status: Never Smoker     Smokeless tobacco: Never Used   Substance Use Topics     Alcohol use: Yes     Alcohol/week: 0.0 standard drinks     Comment: 1-2 glasses of wine 1-2 times per week      Family History   Problem Relation Age of Onset     Thyroid Disease Mother      Hypertension Mother      Allergies Mother      Cerebrovascular Disease Mother         minor, May, 2016     Cancer - colorectal Father          age 65 colon cancer     Thyroid Disease Father      Allergies Father      Depression Father      Obesity Father      Genitourinary Problems Maternal Aunt         fibrocystic breast     Thyroid Disease Sister            Reviewed and updated as needed this visit by Provider         Review of Systems   Constitutional, HEENT, cardiovascular, pulmonary, GI, , musculoskeletal, neuro, skin, endocrine and psych systems are negative, except as otherwise noted.      Objective             Physical Exam     GENERAL: pleasant alert and no distress  EYES: Eyes grossly normal to inspection.    RESP: No audible wheeze, cough, and looks comfortable     PSYCH: Mentation appears normal, affect normal/bright, judgement and insight intact, normal speech and appearance well-groomed.          Assessment & Plan     (K59.01) Slow transit constipation  (primary encounter diagnosis)  Comment:   Plan: polyethylene glycol (MIRALAX) 17 GM/SCOOP         powder             Discussed cares concerns with  constipation. discussed high fibre / fluid diet to regulate Bm.gave refill on miralax   . Talked about warning s/s for which should be seen. Cares and symptomatic treatment discussed follow up if problem        (J45.30) Mild persistent asthma without complication  Comment:   Plan: stable and doing well. Not needing inhalers lately f/u as needed       refill sent.Cares and  treatment discussed.  follow. up if problem   Patient expressed understanding and agreement with treatment plan. All patient's questions were answered, will let me know if has more later.  Medications: Rx's: Reviewed the potential side effects/complications of medications prescribed.           Shalini Phan MD  [facility]      Video-Visit Details    Type of service:  Video Visit  Video Start Time: 11:58 AM  Video End Time:12:20 PM    Originating Location (pt. Location): Home    Distant Location (provider location):  [facility]     Platform used for Video Visit: Divina    No follow-ups on file.       Shalini Phan MD

## 2020-07-14 ENCOUNTER — TELEPHONE (OUTPATIENT)
Dept: FAMILY MEDICINE | Facility: CLINIC | Age: 70
End: 2020-07-14

## 2020-07-14 DIAGNOSIS — M85.88 OSTEOPENIA OF OTHER SITE: ICD-10-CM

## 2020-07-14 DIAGNOSIS — Z78.0 POSTMENOPAUSAL STATUS: ICD-10-CM

## 2020-07-14 DIAGNOSIS — N95.1 SYMPTOMATIC MENOPAUSAL OR FEMALE CLIMACTERIC STATES: Primary | ICD-10-CM

## 2020-07-14 ASSESSMENT — ASTHMA QUESTIONNAIRES: ACT_TOTALSCORE: 25

## 2020-07-14 NOTE — TELEPHONE ENCOUNTER
Patient calling to schedule Dexa but order not in the chart yet from yesterdays visit. Please order for patient.     Denisse Lowery RN   [facility] - Triage

## 2020-07-14 NOTE — TELEPHONE ENCOUNTER
S/w pt and gave Dr. Phan's reply below.  Pt would like to wait to schedule her well exam until after she has the dexa scan because then the results can be gone over at visit also.    Pt states understanding.    Michelle WHEAT RN  EP Triage

## 2020-07-14 NOTE — TELEPHONE ENCOUNTER
Oh, she told me that she wanted to wait till she schedule for her wellness exam  but anyway's order placed for dexa , so she can schedule whenever she wish to do it      ( she should schedule for wellness exam ,, any ways and can do virtual video visit if her insurance allow that)

## 2020-07-21 ENCOUNTER — HOSPITAL ENCOUNTER (OUTPATIENT)
Dept: BONE DENSITY | Facility: CLINIC | Age: 70
Discharge: HOME | End: 2020-07-21
Attending: FAMILY MEDICINE | Admitting: FAMILY MEDICINE
Payer: COMMERCIAL

## 2020-07-21 DIAGNOSIS — Z78.0 POSTMENOPAUSAL STATUS: ICD-10-CM

## 2020-07-21 DIAGNOSIS — M85.88 OSTEOPENIA OF OTHER SITE: ICD-10-CM

## 2020-07-21 PROCEDURE — 77085 DXA BONE DENSITY AXL VRT FX: CPT

## 2020-08-04 ENCOUNTER — OFFICE VISIT (OUTPATIENT)
Dept: FAMILY MEDICINE | Facility: CLINIC | Age: 70
End: 2020-08-04
Payer: COMMERCIAL

## 2020-08-04 VITALS
HEIGHT: 61 IN | HEART RATE: 77 BPM | SYSTOLIC BLOOD PRESSURE: 104 MMHG | BODY MASS INDEX: 31.53 KG/M2 | WEIGHT: 167 LBS | OXYGEN SATURATION: 99 % | DIASTOLIC BLOOD PRESSURE: 50 MMHG | TEMPERATURE: 98.7 F

## 2020-08-04 DIAGNOSIS — Z12.11 COLON CANCER SCREENING: ICD-10-CM

## 2020-08-04 DIAGNOSIS — Z00.00 ROUTINE HISTORY AND PHYSICAL EXAMINATION OF ADULT: Primary | ICD-10-CM

## 2020-08-04 DIAGNOSIS — K59.09 OTHER CONSTIPATION: ICD-10-CM

## 2020-08-04 PROCEDURE — 99213 OFFICE O/P EST LOW 20 MIN: CPT | Mod: 25 | Performed by: FAMILY MEDICINE

## 2020-08-04 PROCEDURE — 99397 PER PM REEVAL EST PAT 65+ YR: CPT | Performed by: FAMILY MEDICINE

## 2020-08-04 ASSESSMENT — ACTIVITIES OF DAILY LIVING (ADL): CURRENT_FUNCTION: NO ASSISTANCE NEEDED

## 2020-08-04 ASSESSMENT — MIFFLIN-ST. JEOR: SCORE: 1222.76

## 2020-08-04 NOTE — PROGRESS NOTES
"SUBJECTIVE:   Aracelis Muñoz is a 70 year old female who presents for Preventive Visit.      Are you in the first 12 months of your Medicare coverage?  No    Healthy Habits:    In general, how would you rate your overall health?  Good    Frequency of exercise:  None (Chair yoga - has not gone recently due to foot pain)    Do you usually eat at least 4 servings of fruit and vegetables a day, include whole grains    & fiber and avoid regularly eating high fat or \"junk\" foods?  Yes    Taking medications regularly:  Yes    Barriers to taking medications:  None    Medication side effects:  None    Ability to successfully perform activities of daily living:  No assistance needed    Hearing impairment symptoms: bilateral hearing aids, had a recent appt in June 2020.    In the past 6 months, have you been bothered by leaking of urine?  No    PHQ-2 Total Score:    Do you feel safe in your environment? Yes    Have you ever done Advance Care Planning? (For example, a Health Directive, POLST, or a discussion with a medical provider or your loved ones about your wishes): Yes, patient states has an Advance Care Planning document and will bring a copy to the clinic.        Hearing Assessment: normal  Fall risk  Fallen 2 or more times in the past year?: No  Any fall with injury in the past year?: No    Cognitive Screening   1) Repeat 3 items (Leader, Season, Table)    2) Clock draw: NORMAL  3) 3 item recall: Recalls 3 objects  Results: 3 items recalled: COGNITIVE IMPAIRMENT LESS LIKELY    Mini-CogTM Copyright TORITO Moyer. Licensed by the author for use in [facility]; reprinted with permission ([email or web address]@.[email or web address]). All rights reserved.      Do you have sleep apnea, excessive snoring or daytime drowsiness?: no    Reviewed and updated as needed this visit by clinical staff  Tobacco  Allergies  Meds         Reviewed and updated as needed this visit by Provider        Social History     Tobacco Use     Smoking status: Never " "Smoker     Smokeless tobacco: Never Used   Substance Use Topics     Alcohol use: Yes     Alcohol/week: 0.0 standard drinks     Comment: 1-2 glasses of wine 1-2 times per week          Alcohol Use 1/16/2017   Prescreen: >3 drinks/day or >7 drinks/week? The patient does not drink >3 drinks per day nor >7 drinks per week.           PROBLEMS TO ADD ON...    Concerned with ongoing issue with constipations, sometimes it really slows her down and goes every few days. stools can be hard and small, trying to take fiber, and miralax sometimes, although no significant recent change.no melena. No nausea vomiting, abdominal pain  etc. No significant weight gain but hard to lose weight.    She is due for her colonoscopy although she had difficult one previously but considering to schedule one now     Current providers sharing in care for this patient include:   Patient Care Team:  Shalini Phan MD as PCP - General (Family Practice)  Shalini Phan MD as Assigned PCP    The following health maintenance items are reviewed in Epic and correct as of today:  Health Maintenance   Topic Date Due     ZOSTER IMMUNIZATION (1 of 2) 05/28/2000     ADVANCE CARE PLANNING  04/25/2018     ASTHMA ACTION PLAN  12/21/2019     MEDICARE ANNUAL WELLNESS VISIT  05/30/2020     COLORECTAL CANCER SCREENING  08/25/2020     INFLUENZA VACCINE (1) 09/01/2020     MAMMO SCREENING  12/27/2020     ASTHMA CONTROL TEST  01/13/2021     PHQ-9  01/14/2021     CREATININE  03/04/2021     FALL RISK ASSESSMENT  07/13/2021     DEXA  07/21/2022     LIPID  05/30/2024     DTAP/TDAP/TD IMMUNIZATION (3 - Td) 01/16/2027     HEPATITIS C SCREENING  Completed     DEPRESSION ACTION PLAN  Completed     PNEUMOCOCCAL IMMUNIZATION 65+ LOW/MEDIUM RISK  Completed     IPV IMMUNIZATION  Aged Out     MENINGITIS IMMUNIZATION  Aged Out           Review of Systems      OBJECTIVE:   /50   Pulse 77   Temp 98.7  F (37.1  C) (Tympanic)   Ht 1.562 m (5' 1.5\")   Wt 75.8 kg " "(167 lb)   LMP 11/01/2004   SpO2 99%   BMI 31.05 kg/m   Estimated body mass index is 31.05 kg/m  as calculated from the following:    Height as of this encounter: 1.562 m (5' 1.5\").    Weight as of this encounter: 75.8 kg (167 lb).  Physical Exam  GENERAL: healthy, alert and no distress  EYES: Eyes grossly normal to inspection, PERRL and conjunctivae and sclerae normal  HENT: ear canals and TM's normal, nose and mouth without ulcers or lesions  NECK: no adenopathy, no asymmetry, masses, or scars and thyroid normal to palpation  RESP: lungs clear to auscultation - no rales, rhonchi or wheezes  BREAST: normal without masses, tenderness or nipple discharge and no palpable axillary masses or adenopathy  CV: regular rate and rhythm, normal S1 S2, no S3 or S4, no murmur, click or rub, no peripheral edema and peripheral pulses strong  ABDOMEN: soft, nontender, no hepatosplenomegaly, no masses and bowel sounds normal   (female): deferred  RECTAL: deferred  MS: no gross musculoskeletal defects noted, no edema  SKIN: no suspicious lesions or rashes  NEURO: Normal strength and tone, mentation intact and speech normal  PSYCH: mentation appears normal, affect normal/bright        ASSESSMENT / PLAN:   (Z00.00) Routine history and physical examination of adult  (primary encounter diagnosis)  Comment:   Plan: Lipid panel reflex to direct LDL Fasting,         **Comprehensive metabolic panel FUTURE anytime            (Z12.11) Colon cancer screening  Comment:   Plan: Fecal colorectal cancer screen (FIT)         GASTROENTEROLOGY ADULT REF PROCEDURE ONLY    (K59.09) Other constipation  Comment:   Plan: **TSH with free T4 reflex FUTURE anytime,            Discussed possible differential diagnosis for his  Symptoms/ constipation. discussed high fibre / fluid diet to regulate Bm.check labs . She is also due for routine screening colonoscopy so plans to schedule   Talked about warning S/S for which should be seen. Cares and symptomatic " "treatment discussed follow up if  recurrent or ongoing problem , consider further evaluation if needed.            COUNSELING:  Reviewed preventive health counseling, as reflected in patient instructions       Regular exercise       Healthy diet/nutrition       Vision screening       Dental care       Osteoporosis Prevention/Bone Health       Colon cancer screening       menopause management    Estimated body mass index is 31.05 kg/m  as calculated from the following:    Height as of this encounter: 1.562 m (5' 1.5\").    Weight as of this encounter: 75.8 kg (167 lb).         reports that she has never smoked. She has never used smokeless tobacco.      Appropriate preventive services were discussed with this patient, including applicable screening as appropriate for cardiovascular disease, diabetes, osteopenia/osteoporosis, and glaucoma.  As appropriate for age/gender, discussed screening for colorectal cancer, prostate cancer, breast cancer, and cervical cancer. Checklist reviewing preventive services available has been given to the patient.    Reviewed patients plan of care and provided an AVS. The Basic Care Plan (routine screening as documented in Health Maintenance) for Aracelis meets the Care Plan requirement. This Care Plan has been established and reviewed with the Patient.    Counseling Resources:  ATP IV Guidelines  Pooled Cohorts Equation Calculator  Breast Cancer Risk Calculator  FRAX Risk Assessment  ICSI Preventive Guidelines  Dietary Guidelines for Americans, 2010  [employer] MyPlate  ASA Prophylaxis  Lung CA Screening    Shalini Phan MD  [facility]    Identified Health Risks:  "

## 2020-08-05 ASSESSMENT — ASTHMA QUESTIONNAIRES: ACT_TOTALSCORE: 23

## 2020-08-11 ENCOUNTER — TRANSFERRED RECORDS (OUTPATIENT)
Dept: HEALTH INFORMATION MANAGEMENT | Facility: CLINIC | Age: 70
End: 2020-08-11

## 2020-08-12 ENCOUNTER — DOCUMENTATION ONLY (OUTPATIENT)
Dept: LAB | Facility: CLINIC | Age: 70
End: 2020-08-12

## 2020-08-12 NOTE — PROGRESS NOTES
Mailed the Fecal Colorectal Cancer Screening test directly to patient.    Specimen container lot number [id number] will  2020  and is documented in letter to the patient.

## 2020-09-08 DIAGNOSIS — F33.0 MAJOR DEPRESSIVE DISORDER, RECURRENT EPISODE, MILD (H): ICD-10-CM

## 2020-09-08 DIAGNOSIS — G47.09 OTHER INSOMNIA: ICD-10-CM

## 2020-09-08 NOTE — TELEPHONE ENCOUNTER
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  Trazodone 100 mg

## 2020-09-09 RX ORDER — TRAZODONE HYDROCHLORIDE 100 MG/1
TABLET ORAL
Qty: 90 TABLET | Refills: 2 | OUTPATIENT
Start: 2020-09-09

## 2020-09-11 DIAGNOSIS — Z12.11 COLON CANCER SCREENING: ICD-10-CM

## 2020-09-11 DIAGNOSIS — F33.0 MAJOR DEPRESSIVE DISORDER, RECURRENT EPISODE, MILD (H): ICD-10-CM

## 2020-09-11 DIAGNOSIS — G47.09 OTHER INSOMNIA: ICD-10-CM

## 2020-09-11 PROCEDURE — 82274 ASSAY TEST FOR BLOOD FECAL: CPT | Performed by: FAMILY MEDICINE

## 2020-09-11 RX ORDER — TRAZODONE HYDROCHLORIDE 100 MG/1
TABLET ORAL
Qty: 90 TABLET | Refills: 2 | Status: SHIPPED | OUTPATIENT
Start: 2020-09-11 | End: 2021-06-04

## 2020-09-13 LAB — HEMOCCULT STL QL IA: NEGATIVE

## 2020-09-21 DIAGNOSIS — K59.09 OTHER CONSTIPATION: ICD-10-CM

## 2020-09-21 DIAGNOSIS — Z00.00 ROUTINE HISTORY AND PHYSICAL EXAMINATION OF ADULT: ICD-10-CM

## 2020-09-21 LAB
ALBUMIN SERPL-MCNC: 3.9 G/DL (ref 3.4–5)
ALP SERPL-CCNC: 56 U/L (ref 40–150)
ALT SERPL W P-5'-P-CCNC: 23 U/L (ref 0–50)
ANION GAP SERPL CALCULATED.3IONS-SCNC: 6 MMOL/L (ref 3–14)
AST SERPL W P-5'-P-CCNC: 19 U/L (ref 0–45)
BILIRUB SERPL-MCNC: 1.1 MG/DL (ref 0.2–1.3)
BUN SERPL-MCNC: 10 MG/DL (ref 7–30)
CALCIUM SERPL-MCNC: 10 MG/DL (ref 8.5–10.1)
CHLORIDE SERPL-SCNC: 109 MMOL/L (ref 94–109)
CHOLEST SERPL-MCNC: 229 MG/DL
CO2 SERPL-SCNC: 24 MMOL/L (ref 20–32)
CREAT SERPL-MCNC: 0.9 MG/DL (ref 0.52–1.04)
GFR SERPL CREATININE-BSD FRML MDRD: 65 ML/MIN/{1.73_M2}
GLUCOSE SERPL-MCNC: 89 MG/DL (ref 70–99)
HDLC SERPL-MCNC: 75 MG/DL
LDLC SERPL CALC-MCNC: 134 MG/DL
NONHDLC SERPL-MCNC: 154 MG/DL
POTASSIUM SERPL-SCNC: 3.7 MMOL/L (ref 3.4–5.3)
PROT SERPL-MCNC: 6.9 G/DL (ref 6.8–8.8)
SODIUM SERPL-SCNC: 139 MMOL/L (ref 133–144)
TRIGL SERPL-MCNC: 98 MG/DL
TSH SERPL DL<=0.005 MIU/L-ACNC: 2.81 MU/L (ref 0.4–4)

## 2020-09-21 PROCEDURE — 80061 LIPID PANEL: CPT | Performed by: FAMILY MEDICINE

## 2020-09-21 PROCEDURE — 84443 ASSAY THYROID STIM HORMONE: CPT | Performed by: FAMILY MEDICINE

## 2020-09-21 PROCEDURE — 36415 COLL VENOUS BLD VENIPUNCTURE: CPT | Performed by: FAMILY MEDICINE

## 2020-09-21 PROCEDURE — 80053 COMPREHEN METABOLIC PANEL: CPT | Performed by: FAMILY MEDICINE

## 2020-10-15 ENCOUNTER — TRANSFERRED RECORDS (OUTPATIENT)
Dept: HEALTH INFORMATION MANAGEMENT | Facility: CLINIC | Age: 70
End: 2020-10-15

## 2020-10-21 DIAGNOSIS — Z11.59 ENCOUNTER FOR SCREENING FOR OTHER VIRAL DISEASES: Primary | ICD-10-CM

## 2020-10-29 DIAGNOSIS — Z11.59 ENCOUNTER FOR SCREENING FOR OTHER VIRAL DISEASES: Primary | ICD-10-CM

## 2020-11-01 DIAGNOSIS — Z11.59 ENCOUNTER FOR SCREENING FOR OTHER VIRAL DISEASES: ICD-10-CM

## 2020-11-01 PROCEDURE — U0003 INFECTIOUS AGENT DETECTION BY NUCLEIC ACID (DNA OR RNA); SEVERE ACUTE RESPIRATORY SYNDROME CORONAVIRUS 2 (SARS-COV-2) (CORONAVIRUS DISEASE [COVID-19]), AMPLIFIED PROBE TECHNIQUE, MAKING USE OF HIGH THROUGHPUT TECHNOLOGIES AS DESCRIBED BY CMS-2020-01-R: HCPCS | Performed by: COLON & RECTAL SURGERY

## 2020-11-02 LAB
SARS-COV-2 RNA SPEC QL NAA+PROBE: NOT DETECTED
SPECIMEN SOURCE: NORMAL

## 2020-11-04 ENCOUNTER — ANESTHESIA (OUTPATIENT)
Dept: GASTROENTEROLOGY | Facility: CLINIC | Age: 70
End: 2020-11-04
Payer: COMMERCIAL

## 2020-11-04 ENCOUNTER — HOSPITAL ENCOUNTER (OUTPATIENT)
Facility: CLINIC | Age: 70
Discharge: HOME | End: 2020-11-04
Attending: COLON & RECTAL SURGERY | Admitting: COLON & RECTAL SURGERY
Payer: COMMERCIAL

## 2020-11-04 ENCOUNTER — ANESTHESIA EVENT (OUTPATIENT)
Dept: GASTROENTEROLOGY | Facility: CLINIC | Age: 70
End: 2020-11-04
Payer: COMMERCIAL

## 2020-11-04 VITALS
HEART RATE: 88 BPM | HEIGHT: 62 IN | TEMPERATURE: 97.8 F | OXYGEN SATURATION: 97 % | DIASTOLIC BLOOD PRESSURE: 72 MMHG | RESPIRATION RATE: 15 BRPM | BODY MASS INDEX: 30.36 KG/M2 | SYSTOLIC BLOOD PRESSURE: 121 MMHG | WEIGHT: 165 LBS

## 2020-11-04 LAB — COLONOSCOPY: NORMAL

## 2020-11-04 PROCEDURE — 999N000010 HC STATISTIC ANES STAT CODE-CRNA PER MINUTE: Performed by: COLON & RECTAL SURGERY

## 2020-11-04 PROCEDURE — 370N000002 HC ANESTHESIA TECHNICAL FEE, EACH ADDTL 15 MIN: Performed by: COLON & RECTAL SURGERY

## 2020-11-04 PROCEDURE — 258N000003 HC RX IP 258 OP 636: Performed by: NURSE ANESTHETIST, CERTIFIED REGISTERED

## 2020-11-04 PROCEDURE — 370N000001 HC ANESTHESIA TECHNICAL FEE, 1ST 30 MIN: Performed by: COLON & RECTAL SURGERY

## 2020-11-04 PROCEDURE — 250N000011 HC RX IP 250 OP 636: Performed by: NURSE ANESTHETIST, CERTIFIED REGISTERED

## 2020-11-04 PROCEDURE — 45378 DIAGNOSTIC COLONOSCOPY: CPT | Performed by: COLON & RECTAL SURGERY

## 2020-11-04 PROCEDURE — 250N000009 HC RX 250: Performed by: NURSE ANESTHETIST, CERTIFIED REGISTERED

## 2020-11-04 RX ORDER — ONDANSETRON 2 MG/ML
4 INJECTION INTRAMUSCULAR; INTRAVENOUS
Status: DISCONTINUED | OUTPATIENT
Start: 2020-11-04 | End: 2020-11-04 | Stop reason: HOSPADM

## 2020-11-04 RX ORDER — PROPOFOL 10 MG/ML
INJECTION, EMULSION INTRAVENOUS PRN
Status: DISCONTINUED | OUTPATIENT
Start: 2020-11-04 | End: 2020-11-04

## 2020-11-04 RX ORDER — NALOXONE HYDROCHLORIDE 0.4 MG/ML
.1-.4 INJECTION, SOLUTION INTRAMUSCULAR; INTRAVENOUS; SUBCUTANEOUS
Status: DISCONTINUED | OUTPATIENT
Start: 2020-11-04 | End: 2020-11-04 | Stop reason: HOSPADM

## 2020-11-04 RX ORDER — EPHEDRINE SULFATE 50 MG/ML
INJECTION, SOLUTION INTRAMUSCULAR; INTRAVENOUS; SUBCUTANEOUS PRN
Status: DISCONTINUED | OUTPATIENT
Start: 2020-11-04 | End: 2020-11-04

## 2020-11-04 RX ORDER — LIDOCAINE HYDROCHLORIDE 20 MG/ML
INJECTION, SOLUTION INFILTRATION; PERINEURAL PRN
Status: DISCONTINUED | OUTPATIENT
Start: 2020-11-04 | End: 2020-11-04

## 2020-11-04 RX ORDER — ONDANSETRON 2 MG/ML
4 INJECTION INTRAMUSCULAR; INTRAVENOUS EVERY 30 MIN PRN
Status: DISCONTINUED | OUTPATIENT
Start: 2020-11-04 | End: 2020-11-04 | Stop reason: HOSPADM

## 2020-11-04 RX ORDER — ONDANSETRON 4 MG/1
4 TABLET, ORALLY DISINTEGRATING ORAL EVERY 30 MIN PRN
Status: DISCONTINUED | OUTPATIENT
Start: 2020-11-04 | End: 2020-11-04 | Stop reason: HOSPADM

## 2020-11-04 RX ORDER — PROPOFOL 10 MG/ML
INJECTION, EMULSION INTRAVENOUS CONTINUOUS PRN
Status: DISCONTINUED | OUTPATIENT
Start: 2020-11-04 | End: 2020-11-04

## 2020-11-04 RX ORDER — ONDANSETRON 2 MG/ML
INJECTION INTRAMUSCULAR; INTRAVENOUS PRN
Status: DISCONTINUED | OUTPATIENT
Start: 2020-11-04 | End: 2020-11-04

## 2020-11-04 RX ORDER — FENTANYL CITRATE 50 UG/ML
25-50 INJECTION, SOLUTION INTRAMUSCULAR; INTRAVENOUS
Status: DISCONTINUED | OUTPATIENT
Start: 2020-11-04 | End: 2020-11-04 | Stop reason: HOSPADM

## 2020-11-04 RX ORDER — LIDOCAINE 40 MG/G
CREAM TOPICAL
Status: DISCONTINUED | OUTPATIENT
Start: 2020-11-04 | End: 2020-11-04 | Stop reason: HOSPADM

## 2020-11-04 RX ORDER — SODIUM CHLORIDE, SODIUM LACTATE, POTASSIUM CHLORIDE, CALCIUM CHLORIDE 600; 310; 30; 20 MG/100ML; MG/100ML; MG/100ML; MG/100ML
INJECTION, SOLUTION INTRAVENOUS CONTINUOUS PRN
Status: DISCONTINUED | OUTPATIENT
Start: 2020-11-04 | End: 2020-11-04

## 2020-11-04 RX ORDER — SODIUM CHLORIDE, SODIUM LACTATE, POTASSIUM CHLORIDE, CALCIUM CHLORIDE 600; 310; 30; 20 MG/100ML; MG/100ML; MG/100ML; MG/100ML
INJECTION, SOLUTION INTRAVENOUS CONTINUOUS
Status: DISCONTINUED | OUTPATIENT
Start: 2020-11-04 | End: 2020-11-04 | Stop reason: HOSPADM

## 2020-11-04 RX ORDER — HYDROMORPHONE HYDROCHLORIDE 1 MG/ML
.3-.5 INJECTION, SOLUTION INTRAMUSCULAR; INTRAVENOUS; SUBCUTANEOUS EVERY 5 MIN PRN
Status: DISCONTINUED | OUTPATIENT
Start: 2020-11-04 | End: 2020-11-04 | Stop reason: HOSPADM

## 2020-11-04 RX ADMIN — PROPOFOL 150 MCG/KG/MIN: 10 INJECTION, EMULSION INTRAVENOUS at 09:52

## 2020-11-04 RX ADMIN — ONDANSETRON 4 MG: 2 INJECTION INTRAMUSCULAR; INTRAVENOUS at 09:52

## 2020-11-04 RX ADMIN — PROPOFOL 30 MG: 10 INJECTION, EMULSION INTRAVENOUS at 09:52

## 2020-11-04 RX ADMIN — Medication 10 MG: at 10:10

## 2020-11-04 RX ADMIN — LIDOCAINE HYDROCHLORIDE 80 MG: 20 INJECTION, SOLUTION INFILTRATION; PERINEURAL at 09:52

## 2020-11-04 RX ADMIN — DEXMEDETOMIDINE HYDROCHLORIDE 12 MCG: 100 INJECTION, SOLUTION INTRAVENOUS at 10:00

## 2020-11-04 RX ADMIN — SODIUM CHLORIDE, POTASSIUM CHLORIDE, SODIUM LACTATE AND CALCIUM CHLORIDE: 600; 310; 30; 20 INJECTION, SOLUTION INTRAVENOUS at 09:50

## 2020-11-04 ASSESSMENT — LIFESTYLE VARIABLES: TOBACCO_USE: 0

## 2020-11-04 ASSESSMENT — MIFFLIN-ST. JEOR: SCORE: 1213.75

## 2020-11-04 NOTE — ANESTHESIA PREPROCEDURE EVALUATION
Anesthesia Pre-Procedure Evaluation    Patient: Aracelis Muñoz   MRN: [id number] : 1950          Preoperative Diagnosis: Encounter for screening colonoscopy [Z12.11]    Procedure(s):  COLONOSCOPY    Past Medical History:   Diagnosis Date     Arthritis      Depressive disorder      Depressive disorder, not elsewhere classified     improved with trazadone     Diffuse cystic mastopathy     bilateral     Insomnia, unspecified      trazodone     Lumbar disc herniation with radiculopathy     left L4-5     Mild persistent asthma      Osteopenia      Perennial allergic rhinitis      Post-Nasal Drainage     chronic     Slow transit constipation      Past Surgical History:   Procedure Laterality Date     C/SECTION, LOW TRANSVERSE      S/P C/S     CHOLECYSTECTOMY, OPEN      Cholecystectomy     HC EXCISION BREAST LESION, OPEN >=1      Benign mass right breast     HC MRI LUMBAR SPINE W/O CONTRAST  2010    multilevel degen disc disease/facet arthropathy, 5 mm caudally extruded left posterolateral disc herniation at L4-5 with impingement on the left L5 nerve root      HC PUNCTURE/ASPIRATION BREAST CYST, FIRST  ,,    bilateral '03, left '04     LIGATN/STRIP LONG & SHORT SAPHEN      varicose vein stripping     ORTHOPEDIC SURGERY       [facility] NONSPECIFIC PROCEDURE  1982    Cyst       Anesthesia Evaluation     .             ROS/MED HX    ENT/Pulmonary:     (+)allergic rhinitis, asthma , . .   (-) tobacco use and sleep apnea   Neurologic:     (+)neuropathy     Cardiovascular:         METS/Exercise Tolerance:     Hematologic:         Musculoskeletal:   (+) arthritis,  -       GI/Hepatic:         Renal/Genitourinary:         Endo:         Psychiatric:     (+) psychiatric history depression      Infectious Disease:         Malignancy:         Other:                          Physical Exam  Normal systems: cardiovascular, pulmonary and dental    Airway   Mallampati: II  TM distance: >3 FB  Neck  "ROM: full    Dental     Cardiovascular       Pulmonary             Lab Results   Component Value Date    WBC 5.9 03/04/2020    HGB 13.6 03/04/2020    HCT 41.0 03/04/2020     03/04/2020    CRP 3.9 10/24/2019    SED 9 12/02/2009     09/21/2020    POTASSIUM 3.7 09/21/2020    CHLORIDE 109 09/21/2020    CO2 24 09/21/2020    BUN 10 09/21/2020    CR 0.90 09/21/2020    GLC 89 09/21/2020    MARIA FERNANDA 10.0 09/21/2020    PHOS 3.5 12/30/2013    ALBUMIN 3.9 09/21/2020    PROTTOTAL 6.9 09/21/2020    ALT 23 09/21/2020    AST 19 09/21/2020    ALKPHOS 56 09/21/2020    BILITOTAL 1.1 09/21/2020    TSH 2.81 09/21/2020    T4 0.96 12/30/2013       Preop Vitals  BP Readings from Last 3 Encounters:   08/04/20 104/50   03/06/20 118/60   03/04/20 (!) 153/63    Pulse Readings from Last 3 Encounters:   08/04/20 77   03/06/20 78   03/04/20 86      Resp Readings from Last 3 Encounters:   03/04/20 18   01/20/18 16   12/28/17 14    SpO2 Readings from Last 3 Encounters:   08/04/20 99%   03/06/20 99%   03/04/20 100%      Temp Readings from Last 1 Encounters:   08/04/20 37.1  C (98.7  F) (Tympanic)    Ht Readings from Last 1 Encounters:   08/04/20 1.562 m (5' 1.5\")      Wt Readings from Last 1 Encounters:   08/04/20 75.8 kg (167 lb)    Estimated body mass index is 31.05 kg/m  as calculated from the following:    Height as of 8/4/20: 1.562 m (5' 1.5\").    Weight as of 8/4/20: 75.8 kg (167 lb).       Anesthesia Plan      History & Physical Review  History and physical reviewed and following examination; no interval change.    ASA Status:  2 .    NPO Status:  > 8 hours    Plan for MAC            Postoperative Care  Postoperative pain management:  IV analgesics and Oral pain medications.      Consents  Anesthetic plan, risks, benefits and alternatives discussed with:  Patient..                 Adrian Sandra MD  "

## 2020-11-04 NOTE — ANESTHESIA CARE TRANSFER NOTE
Patient: Aracelis Muñoz    Procedure(s):  COLONOSCOPY    Diagnosis: Encounter for screening colonoscopy [Z12.11]  Diagnosis Additional Information: No value filed.    Anesthesia Type:   MAC     Note:  Airway :Room Air  Patient transferred to:Phase II  Comments: After procedure in [facility] [place] under monitored anesthesia care (MAC), patient exhibited spontaneous respirations, patient breathing room air with oxygen saturation maintained greater than 95%, patient brought to [facility] [place] recovery bay for postprocedure recovery, SpO2, NiBP, and EKG monitors and alarms on and functioning, report on patient's clinical status given to PACU RN, RN questions answered.  Handoff Report: Identifed the Patient, Identified the Reponsible Provider, Reviewed the pertinent medical history, Discussed the surgical course, Reviewed Intra-OP anesthesia mangement and issues during anesthesia, Set expectations for post-procedure period and Allowed opportunity for questions and acknowledgement of understanding      Vitals: (Last set prior to Anesthesia Care Transfer)    CRNA VITALS  11/4/2020 0955 - 11/4/2020 1030      11/4/2020             Pulse:  93    Ht Rate:  93    SpO2:  97 %    Resp Rate (set):  10                Electronically Signed By: CANDY Hoff CRNA  November 4, 2020  10:30 AM

## 2020-11-04 NOTE — ANESTHESIA POSTPROCEDURE EVALUATION
Patient: Aracelis Muñoz    Procedure(s):  COLONOSCOPY    Diagnosis:Encounter for screening colonoscopy [Z12.11]  Diagnosis Additional Information: No value filed.    Anesthesia Type:  MAC    Note:  Anesthesia Post Evaluation    Patient location during evaluation: PACU  Patient participation: Able to fully participate in evaluation  Level of consciousness: awake and alert  Pain management: adequate  Airway patency: patent  Cardiovascular status: acceptable  Respiratory status: acceptable  Hydration status: acceptable  PONV: none     Anesthetic complications: None          Last vitals:  Vitals:    11/04/20 0911 11/04/20 1030 11/04/20 1040   BP: 135/68 135/60 121/72   Pulse:  97 88   Resp: 16 24 15   Temp: 36.6  C (97.8  F)     SpO2: 100% 97% 97%         Electronically Signed By: Adrian Sandra MD  November 4, 2020  11:51 AM

## 2020-11-04 NOTE — H&P
Colon & Rectal Surgery History and Physical  Pre-Endoscopy Procedure Note    History of Present Illness   I have been asked by Dr. Phan to evaluate this 70 year old female for recent change in bowel habits.  She had a normal screening colonoscopy in 2006. She has noticed her stools are becomng more pencil thin and she is having a harder time passing stools. She denies any abdominal pain, weight loss,or bleeding per rectum.    Past Medical History  Diagnosis Date     Arthritis      Depressive disorder 1980     Diffuse cystic mastopathy     bilateral     Insomnia, unspecified      trazodone     Lumbar disc herniation with radiculopathy 2010    left L4-5     Mild persistent asthma      Osteopenia 2008     Perennial allergic rhinitis      Post-Nasal Drainage     chronic     Slow transit constipation        Past Surgical History  Procedure Laterality Date     C/SECTION, LOW TRANSVERSE       CHOLECYSTECTOMY, OPEN  1985     EXCISION BREAST LESION, OPEN >=1  1972    Benign mass right breast     MRI LUMBAR SPINE W/O CONTRAST  4/2010    multilevel degen disc disease/facet arthropathy, 5 mm caudally extruded left posterolateral disc herniation at L4-5 with impingement on the left L5 nerve root      PUNCTURE/ASPIRATION BREAST CYST, FIRST  1995,12/03,8/04    bilateral '03, left '04     LIGATN/STRIP LONG & SHORT SAPHEN      varicose vein stripping     ORTHOPEDIC SURGERY          Medications  Medication Sig     albuterol (PROAIR HFA/PROVENTIL HFA/VENTOLIN HFA) 108 (90 Base) MCG/ACT inhaler Inhale 2 puffs into the lungs every 4 hours as needed for shortness of breath / dyspnea or wheezing     alendronate (FOSAMAX) 70 MG tablet TAKE 1 TABLET (70 MG) BY MOUTH EVERY 7 DAYS     ALPRAZolam (XANAX) 0.25 MG tablet Take 1 tablet (0.25 mg) by mouth 3 times daily AS NEEDED FOR ANXIETY     azelastine (OPTIVAR) 0.05 % ophthalmic solution Place 1 drop into both eyes daily as needed In both eyes as needed     fluticasone-salmeterol (ADVAIR)  100-50 MCG/DOSE inhaler Inhale 1 puff into the lungs every 12 hours      polyethylene glycol (MIRALAX) 17 GM/SCOOP powder Take 17 g (1 capful) by mouth daily     traZODone (DESYREL) 100 MG tablet TAKE 1 TABLET BY MOUTH AT BEDTIME       Allergies   Allergen Reactions     Nabumetone      Hair loss     Nefazodone Hydrochloride Swelling     (serzone) sore swollen tongue     Sulfa Drugs Swelling     Sore swollen tongue     Prochlorperazine Anxiety     (Compazine) became agitated        Family History   Family history includes Allergies in her father and mother; Cancer - colorectal in her father; Cerebrovascular Disease in her mother; Depression in her father; Genitourinary Problems in her maternal aunt; Hypertension in her mother; Obesity in her father; Thyroid Disease in her father, mother, and sister.     Social History    . Retired in Jun 2017. She reports that she has never smoked. She has never used smokeless tobacco. She reports current alcohol use. She reports that she does not use drugs.    Review of Systems   Constitutional:  No fever, weight change or fatigue.    Eyes:     No dry eyes or vision changes.   Ears/Nose/Throat/Neck:  No oral ulcers, sore throat or voice change.    Cardiovascular:   No palpitations, syncope, angina or edema.   Respiratory:    No chest pain, excessive sleepiness, shortness of breath or hemoptysis.    Gastrointestinal:   No abdominal pain, nausea, vomiting, diarrhea or heartburn.    Genitourinary:   No dysuria, hematuria, urinary retention or urinary frequency.   Musculoskeletal:  No joint swelling or arthralgias.    Dermatologic:  No skin rash or other skin changes.   Neurologic:    No focal weakness or numbness. No neuropathy.   Psychiatric:    No depression, anxiety, suicidal ideation, or paranoid ideation.   Endocrine:   No cold or heat intolerance, polydipsia, hirsutism, change in libido, or flushing.   Hematology/Lymphatic:  No bleeding or lymphadenopathy.  "   Allergy/Immunology:  No rhinitis or hives.     Physical Exam   Vitals:  /68, HR 68, temperature 97.8  F (36.6  C), RR 16, height 1.562 m (5' 1.5\"), weight 74.8 kg (165 lb), last menstrual period 11/01/2004, SpO2 100 %, not currently breastfeeding.    General:  Alert and oriented to person, place and time   Airway: Normal oropharyngeal airway and neck mobility   Lungs:  Clear bilaterally   Heart:  Regular rate and rhythm   Abdomen: Soft, NT, ND, no masses   Rectal:  Perianal skin without excoriation, hemorrhoidal disease or anal fissure        Digital rectal examination reveals normal sphincter tone without masses    ASA Grade: II (mild systemic disease)    Impression: Cleared for use of conscious sedation for evaluation of worsening constipation    Plan: Proceed with colonoscopy     Alona Mancini MD, MD  [facility]  [phone]  "

## 2020-11-08 NOTE — Clinical Note
Fax the preop note. Thanks  Left message on voicemail with negative covid PCR results.  Call back number provided for any additional questions.

## 2020-11-09 ENCOUNTER — VIRTUAL VISIT (OUTPATIENT)
Dept: FAMILY MEDICINE | Facility: CLINIC | Age: 70
End: 2020-11-09
Payer: COMMERCIAL

## 2020-11-09 DIAGNOSIS — K57.30 DIVERTICULOSIS OF SIGMOID COLON: Primary | ICD-10-CM

## 2020-11-09 DIAGNOSIS — K59.00 CONSTIPATION, UNSPECIFIED CONSTIPATION TYPE: ICD-10-CM

## 2020-11-09 PROCEDURE — 99213 OFFICE O/P EST LOW 20 MIN: CPT | Mod: 95 | Performed by: FAMILY MEDICINE

## 2020-11-09 RX ORDER — POLYETHYLENE GLYCOL 3350 17 G/17G
1 POWDER, FOR SOLUTION ORAL DAILY
Qty: 1 BOTTLE | Refills: 11 | Status: SHIPPED | OUTPATIENT
Start: 2020-11-09 | End: 2021-01-26

## 2020-11-09 NOTE — PROGRESS NOTES
"Aracelis Muñoz is a 70 year old female who is being evaluated via a billable video visit.      The patient has been notified of following:     \"This video visit will be conducted via a call between you and your physician/provider. We have found that certain health care needs can be provided without the need for an in-person physical exam.  This service lets us provide the care you need with a video conversation.  If a prescription is necessary we can send it directly to your pharmacy.  If lab work is needed we can place an order for that and you can then stop by our lab to have the test done at a later time.    Video visits are billed at different rates depending on your insurance coverage.  Please reach out to your insurance provider with any questions.    If during the course of the call the physician/provider feels a video visit is not appropriate, you will not be charged for this service.\"    Patient has given verbal consent for Video visit? Yes  How would you like to obtain your AVS? MyChart  If you are dropped from the video visit, the video invite should be resent to: Text to cell phone: [phone]  Will anyone else be joining your video visit? No    Subjective     Aracelis Muñoz is a 70 year old female who presents today via video visit for the following health issues:    HPI     DISCUSS COLONOSCOPY RESULTS ( done 11/04/2020) , has issue with chronic constipation     Findings:        The perianal and digital rectal examinations were normal.        A few medium-mouthed diverticula were found in the sigmoid colon.        The exam was otherwise without abnormality.                                                                                     Impression:               - Diverticulosis in the sigmoid colon.                             - The examination was otherwise normal.                             - No specimens collected.   Recommendation:           - Discharge patient to home (ambulatory).     "                         - Repeat colonoscopy is not recommended due to                             current age (66 years or older) for screening                             purposes.           Video Start Time: 12:10 PM        Review of Systems   Constitutional, HEENT, cardiovascular, pulmonary, GI, , musculoskeletal, neuro, skin, endocrine and psych systems are negative, except as otherwise noted.      Objective           Vitals:  No vitals were obtained today due to virtual visit.    Physical Exam     GENERAL: Healthy, alert and no distress  EYES: Eyes grossly normal to inspection.  No discharge or erythema, or obvious scleral/conjunctival abnormalities.  RESP: No audible wheeze, cough, or visible cyanosis.  No visible retractions or increased work of breathing.    SKIN: Visible skin clear. No significant rash, abnormal pigmentation or lesions.  NEURO: Cranial nerves grossly intact.  Mentation and speech appropriate for age.  PSYCH: Mentation appears normal, affect normal/bright, judgement and insight intact, normal speech and appearance well-groomed.              Assessment & Plan     Diverticulosis of sigmoid colon    Aimee recent colonoscopy otherwise duong. No need for further colonoscopy bc of her normal and age etc     Constipation, unspecified constipation type    - polyethylene glycol (MIRALAX) 17 GM/Dose powder; Take 17 g (1 capful) by mouth daily        Discussed colonoscopy results, has sigmoid Diverticulosis/  Talked about risk of diverticulitis , S/S etc   talked about diet / constipation etc to prevent problem  . discussed high fibre / fluid diet to regulate Bm.  Continue with miralax also may take probiotic to help .   Talked about warning S/S for which should be seen. Cares and symptomatic treatment discussed. follow up if  Problem     Cares and  treatment discussed follow. up if problem   Patient expressed understanding and agreement with treatment plan. All patient's questions were answered,  will let me know if has more later.  Medications: Rx's: Reviewed the potential side effects/complications of medications prescribed.         Shalini Phan MD  [facility] [place]      Video-Visit Details    Type of service:  Video Visit    Video End Time:12:37 PM    Originating Location (pt. Location): Home    Distant Location (provider location):  [facility] [place]     Platform used for Video Visit: AmWell did not work so used doximity

## 2020-11-12 ENCOUNTER — OFFICE VISIT (OUTPATIENT)
Dept: FAMILY MEDICINE | Facility: CLINIC | Age: 70
End: 2020-11-12
Payer: COMMERCIAL

## 2020-11-12 VITALS
DIASTOLIC BLOOD PRESSURE: 70 MMHG | HEIGHT: 62 IN | HEART RATE: 68 BPM | BODY MASS INDEX: 30.55 KG/M2 | WEIGHT: 166 LBS | OXYGEN SATURATION: 99 % | TEMPERATURE: 98.3 F | SYSTOLIC BLOOD PRESSURE: 118 MMHG

## 2020-11-12 DIAGNOSIS — K59.00 CONSTIPATION, UNSPECIFIED CONSTIPATION TYPE: ICD-10-CM

## 2020-11-12 DIAGNOSIS — M79.671 RIGHT FOOT PAIN: ICD-10-CM

## 2020-11-12 DIAGNOSIS — Z01.818 PREOP GENERAL PHYSICAL EXAM: Primary | ICD-10-CM

## 2020-11-12 DIAGNOSIS — E21.3 HYPERPARATHYROIDISM (H): ICD-10-CM

## 2020-11-12 LAB
ERYTHROCYTE [DISTWIDTH] IN BLOOD BY AUTOMATED COUNT: 12.6 % (ref 10–15)
HCT VFR BLD AUTO: 41 % (ref 35–47)
HGB BLD-MCNC: 13.5 G/DL (ref 11.7–15.7)
MCH RBC QN AUTO: 30 PG (ref 26.5–33)
MCHC RBC AUTO-ENTMCNC: 32.9 G/DL (ref 31.5–36.5)
MCV RBC AUTO: 91 FL (ref 78–100)
PLATELET # BLD AUTO: 284 10E9/L (ref 150–450)
PTH-INTACT SERPL-MCNC: 156 PG/ML (ref 18–80)
RBC # BLD AUTO: 4.5 10E12/L (ref 3.8–5.2)
WBC # BLD AUTO: 5.2 10E9/L (ref 4–11)

## 2020-11-12 PROCEDURE — 80053 COMPREHEN METABOLIC PANEL: CPT | Performed by: FAMILY MEDICINE

## 2020-11-12 PROCEDURE — 83970 ASSAY OF PARATHORMONE: CPT | Performed by: FAMILY MEDICINE

## 2020-11-12 PROCEDURE — 36415 COLL VENOUS BLD VENIPUNCTURE: CPT | Performed by: FAMILY MEDICINE

## 2020-11-12 PROCEDURE — 85027 COMPLETE CBC AUTOMATED: CPT | Performed by: FAMILY MEDICINE

## 2020-11-12 PROCEDURE — 99214 OFFICE O/P EST MOD 30 MIN: CPT | Performed by: FAMILY MEDICINE

## 2020-11-12 ASSESSMENT — MIFFLIN-ST. JEOR: SCORE: 1218.28

## 2020-11-12 NOTE — PROGRESS NOTES
[address] [address]  Phone: [phone]  Primary Provider: Shalini Phan      PREOPERATIVE EVALUATION:  Today's date: 11/12/2020    Aracelis Muñoz is a 70 year old female who presents for a preoperative evaluation.    Surgical Information:  Surgery/Procedure: Right foot/bon spur removal / Screw removal from previous surgery   Surgery Location: [facility]   Surgeon: Arik Surgery Date: 12/2/2020  Time of Surgery: TBD   Where patient plans to recover: At home alone  Fax number for surgical facility: WILL CALL #     Type of Anesthesia Anticipated: to be determined    Subjective     HPI related to upcoming procedure: has ongoing issue with foot, and had surgery couple of years ago , lately has been having issue with more pain, so now scheduled for surgery     Preop Questions 11/11/2020   1. Have you ever had a heart attack or stroke? No   2. Have you ever had surgery on your heart or blood vessels, such as a stent placement, a coronary artery bypass, or surgery on an artery in your head, neck, heart, or legs? UNKNOWN - no   3. Do you have chest pain with activity? No   4. Do you have a history of  heart failure? No   5. Do you currently have a cold, bronchitis or symptoms of other infection? No   6. Do you have a cough, shortness of breath, or wheezing? No   7. Do you or anyone in your family have previous history of blood clots? No   8. Do you or does anyone in your family have a serious bleeding problem such as prolonged bleeding following surgeries or cuts? No   9. Have you ever had problems with anemia or been told to take iron pills? No   10. Have you had any abnormal blood loss such as black, tarry or bloody stools, or abnormal vaginal bleeding? No   11. Have you ever had a blood transfusion? YES - long times ago after c- section   11a. Have you ever had a transfusion reaction? No   12. Are you willing to have a blood transfusion  "if it is medically needed before, during, or after your surgery? Yes   13. Have you or any of your relatives ever had problems with anesthesia? No   14. Do you have sleep apnea, excessive snoring or daytime drowsiness? No   15. Do you have any artifical heart valves or other implanted medical devices like a pacemaker, defibrillator, or continuous glucose monitor? No   16. Do you have artificial joints? No   17. Are you allergic to latex? No       Health Care Directive:  Patient does not have a Health Care Directive or Living Will: Patient states has Advance Directive and will bring in a copy to clinic.    Preoperative Review of :        Status of Chronic Conditions:  See problem list for active medical problems.  Problems all longstanding and stable, except as noted/documented.    See ROS for pertinent symptoms related to these conditions.    Has ongoing issue with chronic constipation although had normal colonoscopy but she is bothered by it more lately,  so she feels uncomfortable sometimes , stools look thin , so concerned and wanted to get checked. Sh goes may be once a week for long time although not any worse,  but she think she has more discomfort now despite taking miralax. Feels bloated gassy feeling and sometimes has discomfort but not sharp pain,  no melena etc. She also had issue with parathyroid and had seen endo in [place] in the past,     although she had cancelled the appointment so willing to proceed with blood test today    She had colonoscopy recently and it was ok except she has sigmoid diverticulosis. She was also told that \" she just has very long colon \"      Review of Systems  CONSTITUTIONAL: NEGATIVE for fever, chills, change in weight  INTEGUMENTARY/SKIN: NEGATIVE for worrisome rashes, moles or lesions  EYES: NEGATIVE for vision changes or irritation  ENT/MOUTH: NEGATIVE for ear, mouth and throat problems  RESP: NEGATIVE for significant cough or SOB  CV: NEGATIVE for chest pain, " palpitations or peripheral edema  GI: NEGATIVE for nausea, abdominal pain, heartburn, or change in bowel habits  : NEGATIVE for frequency, dysuria, or hematuria  MUSCULOSKELETAL:NEGATIVE for significant arthralgias or myalgia and except as per HPI   NEURO: NEGATIVE for weakness, dizziness or paresthesias  ENDOCRINE: NEGATIVE for temperature intolerance, skin/hair changes  HEME: NEGATIVE for bleeding problems  PSYCHIATRIC: NEGATIVE for changes in mood or affect    Patient Active Problem List    Diagnosis Date Noted     Diverticulosis of sigmoid colon 11/09/2020     Priority: Medium            - Diverticulosis in the sigmoid colon.                             - otherwise normal. per colonoscopy 2020                                  Osteopenia of other site 06/25/2018     Priority: Medium     As per 2016 dexa- report   1. Prominent osteopenia in the left femoral neck, minimally improved.  2. Moderate-prominent osteopenia in the right femoral neck, mildly  improved.  3. Findings consistent with moderate osteopenia in the lumbar spine,  slightly worse.       Right foot pain 04/05/2018     Priority: Medium     seeing ortho , ankle/ foot arthritis        Lumbago 01/19/2017     Priority: Medium     Right knee pain 01/19/2017     Priority: Medium     Left knee pain, unspecified chronicity 01/16/2017     Priority: Medium     Acute midline low back pain with left-sided sciatica 01/16/2017     Priority: Medium     Mild persistent asthma without complication 06/27/2016     Priority: Medium     Major depressive disorder, recurrent episode, mild (H) 06/27/2016     Priority: Medium     Eczema, unspecified type 06/27/2016     Priority: Medium     rt leg        Hyperlipidemia with target LDL less than 130 01/28/2016     Priority: Medium     Other insomnia 12/21/2015     Priority: Medium     Hypercalcemia 10/30/2013     Priority: Medium     Vitamin D deficiency 09/30/2013     Priority: Medium     Hyperparathyroidism (H) 09/24/2013      Priority: Medium     Serum calcium elevated 09/22/2013     Priority: Medium     Mild persistent intrinsic asthma without status asthmaticus with acute exacerbation 04/25/2013     Priority: Medium     Generalized anxiety disorder 05/12/2011     Priority: Medium     Diagnosis updated by automated process. Provider to review and confirm.       Environmental allergies 04/22/2011     Priority: Medium     Osteopenia      Priority: Medium     Diffuse cystic mastopathy      Priority: Medium     bilateral       Constipation, unspecified constipation type      Priority: Medium     Lumbar disc herniation with radiculopathy      Priority: Medium     left L4-5       Post-Nasal Drainage      Priority: Medium     chronic       Perennial allergic rhinitis      Priority: Medium     Menopausal LMP age 54      Priority: Medium      Past Medical History:   Diagnosis Date     Arthritis      Depressive disorder 1980     Depressive disorder, not elsewhere classified     improved with trazadone     Diffuse cystic mastopathy     bilateral     Insomnia, unspecified      trazodone     Lumbar disc herniation with radiculopathy 2010    left L4-5     Mild persistent asthma      Osteopenia 2008     Perennial allergic rhinitis      Post-Nasal Drainage     chronic     Slow transit constipation      Past Surgical History:   Procedure Laterality Date     C/SECTION, LOW TRANSVERSE      S/P C/S     CHOLECYSTECTOMY, OPEN  1985    Cholecystectomy     COLONOSCOPY N/A 11/4/2020    Procedure: COLONOSCOPY;  Surgeon: Alona Mancini MD;  Location:  GI     HC EXCISION BREAST LESION, OPEN >=1  1972    Benign mass right breast     HC MRI LUMBAR SPINE W/O CONTRAST  4/2010    multilevel degen disc disease/facet arthropathy, 5 mm caudally extruded left posterolateral disc herniation at L4-5 with impingement on the left L5 nerve root      HC PUNCTURE/ASPIRATION BREAST CYST, FIRST  1995,12/03,8/04    bilateral '03, left '04     LIGATN/STRIP LONG & SHORT  SAPHEN      varicose vein stripping     ORTHOPEDIC SURGERY      foot fusion of joint     ZZC NONSPECIFIC PROCEDURE  1982    Cyst     Current Outpatient Medications   Medication Sig Dispense Refill     albuterol (PROAIR HFA/PROVENTIL HFA/VENTOLIN HFA) 108 (90 Base) MCG/ACT inhaler Inhale 2 puffs into the lungs every 4 hours as needed for shortness of breath / dyspnea or wheezing 8.5 g 1     alendronate (FOSAMAX) 70 MG tablet TAKE 1 TABLET (70 MG) BY MOUTH EVERY 7 DAYS 12 tablet 3     ALPRAZolam (XANAX) 0.25 MG tablet Take 1 tablet (0.25 mg) by mouth 3 times daily AS NEEDED FOR ANXIETY (Patient not taking: Reported on 2020) 10 tablet 0     azelastine (OPTIVAR) 0.05 % ophthalmic solution Place 1 drop into both eyes daily as needed In both eyes as needed 1 Bottle prn     fluticasone-salmeterol (ADVAIR) 100-50 MCG/DOSE inhaler Inhale 1 puff into the lungs every 12 hours (Patient not taking: Reported on 2020) 1 Inhaler 1     polyethylene glycol (MIRALAX) 17 GM/Dose powder Take 17 g (1 capful) by mouth daily 1 Bottle 11     traZODone (DESYREL) 100 MG tablet TAKE 1 TABLET BY MOUTH AT BEDTIME 90 tablet 2       Allergies   Allergen Reactions     Nabumetone      Other reaction(s): Other (see comments)  Hair loss  Hair loss       Nefazodone Hydrochloride Swelling     (serzone) sore swollen tongue     Sulfa Drugs Swelling     Sore swollen tongue     Prochlorperazine Anxiety     (Compazine) became agitated        Social History     Tobacco Use     Smoking status: Never Smoker     Smokeless tobacco: Never Used   Substance Use Topics     Alcohol use: Yes     Alcohol/week: 0.0 standard drinks     Comment: 1-2 glasses of wine 1-2 times per week      Family History   Problem Relation Age of Onset     Thyroid Disease Mother      Hypertension Mother      Allergies Mother      Cerebrovascular Disease Mother         minor, May, 2016     Cancer - colorectal Father          age 65 colon cancer     Thyroid Disease Father       Allergies Father      Depression Father      Obesity Father      Genitourinary Problems Maternal Aunt         fibrocystic breast     Thyroid Disease Sister      History   Drug Use No         Objective     LMP 11/01/2004     Physical Exam    GENERAL APPEARANCE: healthy, alert and no distress     EYES: EOMI, PERRL     HENT: ear canals and TM's normal and nose and mouth without ulcers or lesions     NECK: no adenopathy, no asymmetry, masses, or scars and thyroid normal to palpation     RESP: lungs clear to auscultation - no rales, rhonchi or wheezes     CV: regular rates and rhythm, normal S1 S2, no S3 or S4 and no murmur,      ABDOMEN:  soft, nontender, no HSM or masses and bowel sounds normal     MS: no  ankle edema     SKIN: no suspicious lesions or rashes     NEURO: Normal strength and tone, sensory exam grossly normal, mentation intact and speech normal     PSYCH: mentation appears normal. and affect normal/bright    Recent Labs   Lab Test 09/21/20  0852 03/04/20  1906 10/24/19  1336   HGB  --  13.6 13.5   PLT  --  310 343    138  --    POTASSIUM 3.7 4.0  --    CR 0.90 0.62  --         Diagnostics:  Labs pending at this time.  Results will be reviewed when available.   No EKG required for low risk surgery (cataract, skin procedure, breast biopsy, etc).    Revised Cardiac Risk Index (RCRI):  The patient has the following serious cardiovascular risks for perioperative complications:   - No serious cardiac risks = 0 points              Assessment & Plan   The proposed surgical procedure is considered INTERMEDIATE risk.       (Z01.818) Preop general physical exam  (primary encounter diagnosis)  Comment:   Plan: Comprehensive metabolic panel, CBC with         platelets            (M79.671) Right foot pain  Comment:   Plan: ok for surgery     (K59.00) Constipation, unspecified constipation type  Comment: chronic, but some reecnt worsening   Plan: GASTROENTEROLOGY ADULT REF CONSULT ONLY,         Parathyroid Hormone  Intact, Comprehensive         metabolic panel, CBC with platelets   check labs. Discussed high fibre fluids die etc.continue with miralax also probiotic to help.    Gave refferl to see GI as well to help because of her ongoing concerns . F/u here as needed       (E21.3) Hyperparathyroidism (H)  Comment: being followed by  [facility] in [place].   Plan: Parathyroid Hormone Intact, Comprehensive         metabolic panel    RECOMMENDATION:  APPROVAL GIVEN to proceed with proposed procedure, without further diagnostic evaluation.    Signed Electronically by: Shalini Phan MD    Copy of this evaluation report is provided to requesting physician.    Preop [facility] Preop Guidelines    Revised Cardiac Risk Index

## 2020-11-13 ENCOUNTER — TELEPHONE (OUTPATIENT)
Dept: FAMILY MEDICINE | Facility: CLINIC | Age: 70
End: 2020-11-13

## 2020-11-13 LAB
ALBUMIN SERPL-MCNC: 3.6 G/DL (ref 3.4–5)
ALP SERPL-CCNC: 60 U/L (ref 40–150)
ALT SERPL W P-5'-P-CCNC: 19 U/L (ref 0–50)
ANION GAP SERPL CALCULATED.3IONS-SCNC: 3 MMOL/L (ref 3–14)
AST SERPL W P-5'-P-CCNC: 16 U/L (ref 0–45)
BILIRUB SERPL-MCNC: 0.9 MG/DL (ref 0.2–1.3)
BUN SERPL-MCNC: 11 MG/DL (ref 7–30)
CALCIUM SERPL-MCNC: 9.7 MG/DL (ref 8.5–10.1)
CHLORIDE SERPL-SCNC: 107 MMOL/L (ref 94–109)
CO2 SERPL-SCNC: 27 MMOL/L (ref 20–32)
CREAT SERPL-MCNC: 0.77 MG/DL (ref 0.52–1.04)
GFR SERPL CREATININE-BSD FRML MDRD: 78 ML/MIN/{1.73_M2}
GLUCOSE SERPL-MCNC: 95 MG/DL (ref 70–99)
POTASSIUM SERPL-SCNC: 4.2 MMOL/L (ref 3.4–5.3)
PROT SERPL-MCNC: 7 G/DL (ref 6.8–8.8)
SODIUM SERPL-SCNC: 137 MMOL/L (ref 133–144)

## 2020-11-13 NOTE — TELEPHONE ENCOUNTER
General Call:     Who is calling:  Pt     Reason for Call:  Fax # to send  Pre Op     What are your questions or concerns:  Pt called to provide fax # for pre op [phone] [facility]     Date of last appointment with provider: 11/13    Okay to leave a detailed message:Yes at Home number on file [phone] (home)

## 2020-11-13 NOTE — TELEPHONE ENCOUNTER
Pre-Op has been faxed to [facility].    .Allison VELAZQUEZ    [facility] [place]

## 2020-11-23 ENCOUNTER — TELEPHONE (OUTPATIENT)
Dept: UROLOGY | Facility: CLINIC | Age: 70
End: 2020-11-23

## 2020-11-23 NOTE — TELEPHONE ENCOUNTER
"[facility] Call Center    Phone Message    May a detailed message be left on voicemail: yes     Reason for Call: Other: Pt requesting a call back to discuss if any of our providers would administer \"Reclast-infusion medication\". She would like a call back either way so she knows if she needs to call somewhere else. Thank you     Action Taken: Message routed to:  [facility]    Travel Screening: Not Applicable                                                                        "

## 2020-12-15 ENCOUNTER — TRANSFERRED RECORDS (OUTPATIENT)
Dept: HEALTH INFORMATION MANAGEMENT | Facility: CLINIC | Age: 70
End: 2020-12-15

## 2021-01-21 ENCOUNTER — TRANSFERRED RECORDS (OUTPATIENT)
Dept: HEALTH INFORMATION MANAGEMENT | Facility: CLINIC | Age: 71
End: 2021-01-21

## 2021-01-22 ENCOUNTER — TELEPHONE (OUTPATIENT)
Dept: FAMILY MEDICINE | Facility: CLINIC | Age: 71
End: 2021-01-22

## 2021-01-22 NOTE — TELEPHONE ENCOUNTER
Patient calling in stating imaging contacted her stating her pcp placed a Diagnostic imaging referral but still needs to put the order in for US. Please updated and TC will contact patient.      .Allison VELAZQUEZ    [facility] [place]

## 2021-01-25 NOTE — TELEPHONE ENCOUNTER
Patient reports pain in the area of her left nipple. Reports as 1-2/10. A little itchy. Reports 2 tiny dots near the left nipple. States that they appeared 2 weeks ago. Thinks that it has improved.  Patient scheduled in clinic appointment with Dr. Phan for tomorrow at 5 pm. Cass Fraire RN

## 2021-01-25 NOTE — TELEPHONE ENCOUNTER
Huddle with PCP who requests we contact patient for more information.    Patient Contact    Attempt # 1    Was call answered?  No.  Left message on voicemail with information to call triage back.    On call back:     - Why does she need a diagnostic mammogram?   - Did she have mammo somewhere else where they requested diagnostic?   - Did she feel a lump? If so, Dr. Phan recommends she schedule in-person for evaluation first.

## 2021-01-26 ENCOUNTER — OFFICE VISIT (OUTPATIENT)
Dept: FAMILY MEDICINE | Facility: CLINIC | Age: 71
End: 2021-01-26
Payer: COMMERCIAL

## 2021-01-26 VITALS
HEIGHT: 62 IN | WEIGHT: 164 LBS | OXYGEN SATURATION: 100 % | HEART RATE: 75 BPM | DIASTOLIC BLOOD PRESSURE: 62 MMHG | BODY MASS INDEX: 30.18 KG/M2 | TEMPERATURE: 97.9 F | SYSTOLIC BLOOD PRESSURE: 118 MMHG

## 2021-01-26 DIAGNOSIS — F41.1 GENERALIZED ANXIETY DISORDER: ICD-10-CM

## 2021-01-26 DIAGNOSIS — Z12.31 BREAST CANCER SCREENING BY MAMMOGRAM: Primary | ICD-10-CM

## 2021-01-26 DIAGNOSIS — J45.20 MILD INTERMITTENT ASTHMA WITHOUT COMPLICATION: ICD-10-CM

## 2021-01-26 DIAGNOSIS — F33.0 MAJOR DEPRESSIVE DISORDER, RECURRENT EPISODE, MILD (H): ICD-10-CM

## 2021-01-26 DIAGNOSIS — G47.09 OTHER INSOMNIA: ICD-10-CM

## 2021-01-26 DIAGNOSIS — D18.01 CHERRY ANGIOMA: ICD-10-CM

## 2021-01-26 PROCEDURE — 99214 OFFICE O/P EST MOD 30 MIN: CPT | Performed by: FAMILY MEDICINE

## 2021-01-26 RX ORDER — ALBUTEROL SULFATE 90 UG/1
2 AEROSOL, METERED RESPIRATORY (INHALATION) EVERY 6 HOURS PRN
Qty: 1 INHALER | Refills: 1 | Status: SHIPPED | OUTPATIENT
Start: 2021-01-26 | End: 2022-09-14

## 2021-01-26 RX ORDER — ALPRAZOLAM 0.25 MG
0.25 TABLET ORAL 2 TIMES DAILY PRN
Qty: 10 TABLET | Refills: 0 | Status: SHIPPED | OUTPATIENT
Start: 2021-01-26 | End: 2021-09-30

## 2021-01-26 ASSESSMENT — PATIENT HEALTH QUESTIONNAIRE - PHQ9
SUM OF ALL RESPONSES TO PHQ QUESTIONS 1-9: 1
5. POOR APPETITE OR OVEREATING: NOT AT ALL

## 2021-01-26 ASSESSMENT — ANXIETY QUESTIONNAIRES
7. FEELING AFRAID AS IF SOMETHING AWFUL MIGHT HAPPEN: NOT AT ALL
GAD7 TOTAL SCORE: 2
1. FEELING NERVOUS, ANXIOUS, OR ON EDGE: SEVERAL DAYS
5. BEING SO RESTLESS THAT IT IS HARD TO SIT STILL: NOT AT ALL
3. WORRYING TOO MUCH ABOUT DIFFERENT THINGS: NOT AT ALL
2. NOT BEING ABLE TO STOP OR CONTROL WORRYING: NOT AT ALL
6. BECOMING EASILY ANNOYED OR IRRITABLE: SEVERAL DAYS

## 2021-01-26 ASSESSMENT — MIFFLIN-ST. JEOR: SCORE: 1209.21

## 2021-01-26 NOTE — PROGRESS NOTES
Assessment & Plan      (D18.01) Cherry angioma  Comment: left breast  new one but has multiple other on trunk   Plan: reassured about benign nature of this, she is comfortable watching. F/u as needed       (Z12.31) Breast cancer screening by mammogram  (primary encounter diagnosis)  Comment: duong exam ,   Plan: *MA Screening Digital Bilateral           She will schedule routine mammogram     (F33.0) Major depressive disorder, recurrent episode, mild (H)  Comment:   Plan: doing well without Celexa and comfortable watching           (F41.1) Generalized anxiety disorder  Comment: has been stable except occasional anxiety sx situational  So wants xanax to use only as needed   Plan: ALPRAZolam (XANAX) 0.25 MG tablet            (G47.09) Other insomnia  Comment:   Plan:   Stable on trazodone               Discussed cares, talked about signs and symptoms of anxiety/ depression and treatment options. Comfortable watching gave few xanax to use only as needed hopefully infrequent    Pros/ cons of med's discussed . encouraged to see  to help if needed.  spent sometimes counseling patient. Follow up in 4-6 months, sooner if problem.       (J45.20) Mild intermittent asthma without complication  Comment: stable, rarely needs albuterol  Plan: albuterol (PROAIR HFA/PROVENTIL HFA/VENTOLIN         HFA) 108 (90 Base) MCG/ACT inhaler    Discussed /asthma, med's / management and symptomatic cares.     Need to watch the need for albuterol  Inhaler.,   follow up if needing frequently.          Script  sent.Cares and  treatment discussed. follow up if problem   Patient expressed understanding and agreement with treatment plan. All patient's questions were answered, will let me know if has more later.  Medications: Rx's: Reviewed the potential side effects/complications of medications prescribed.           Shalini Phan MD  [facility] [place]    [place]     Maile is a 70 year old who presents to  "clinic today for the following health issues  accompanied by her :    HPI       Breast Concern  Onset/Duration: x 2 weeks   Description:   Location: left breast - has 2 red spots   Pain or tenderness: YES- sensitive left nipple earlier but it resolved  has two  red spots on left breast noted couple of weeks ago  So she got concerned.     It has not caused any problem otherwise , although she though skin was slightly  itchy in the beginning so she got a bit concerned and wanted to get checked. Although she thins it is feeling fine now,  Redness: YES  Intensity: mild  Progression of Symptoms: improving.   Accompanying Signs & Symptoms: she was trying to schedule her routine mammogram,  so they refused to schedule and wanted her to get checked first   Any lumps in axillary region: no  Movable: no  Nipple discharge: n/a  Changes in the skin or nipple: red tiny dots   On Hormone therapy: no  Previous history of similar problem:   First degree relative with breast cancer: a negative family history for breast cancer.  Precipitating factors:           Worsened by:   Alleviating factors:            Improved by:   Therapies tried and outcome: None  Patient's last menstrual period was 11/01/2004.    Depression and Anxiety Follow-Up    How are you doing with your depression since your last visit? No change mood has been fine and sleep ok with trazodone. Has been off Celexa for a while and she does not think she needs it although wants refill on xanax for occasional use. Having some stressful situation with her sister who is going through some health  problem and has bit tough relationship with her \" so everytime I  talks to her it make me anxious and she feels the need for xanax sometimes\" so just want few handy  just in case     How are you doing with your anxiety since your last visit?  Worsened     Are you having other symptoms that might be associated with depression or anxiety? No    Have you had a significant life event? " Relationship Concerns As above     Do you have any concerns with your use of alcohol or other drugs? No    Social History     Tobacco Use     Smoking status: Never Smoker     Smokeless tobacco: Never Used   Substance Use Topics     Alcohol use: Yes     Alcohol/week: 0.0 standard drinks     Comment: 1-2 glasses of wine 1-2 times per week      Drug use: No     PHQ 2/25/2020 7/21/2020 1/26/2021   PHQ-9 Total Score 6 3 1   Q9: Thoughts of better off dead/self-harm past 2 weeks Not at all Not at all Not at all     MARCELLE-7 SCORE 1/3/2019 4/18/2019 1/26/2021   Total Score - - -   Total Score 3 10 2     Last PHQ-9 1/26/2021   1.  Little interest or pleasure in doing things 0   2.  Feeling down, depressed, or hopeless 1   3.  Trouble falling or staying asleep, or sleeping too much 0   4.  Feeling tired or having little energy 0   5.  Poor appetite or overeating 0   6.  Feeling bad about yourself 0   7.  Trouble concentrating 0   8.  Moving slowly or restless 0   Q9: Thoughts of better off dead/self-harm past 2 weeks 0   PHQ-9 Total Score 1   Difficulty at work, home, or with people Not difficult at all     MARCELLE-7  1/26/2021   1. Feeling nervous, anxious, or on edge 1   2. Not being able to stop or control worrying 0   3. Worrying too much about different things 0   4. Trouble relaxing 0   5. Being so restless that it is hard to sit still 0   6. Becoming easily annoyed or irritable 1   7. Feeling afraid, as if something awful might happen 0   MARCELLE-7 Total Score 2   If you checked any problems, how difficult have they made it for you to do your work, take care of things at home, or get along with other people? -       Suicide Assessment Five-step Evaluation and Treatment (SAFE-T)    Asthma Follow-Up    Was ACT completed today?    Yes  , has not used any  inhaler in along time has been off advair for a while too as her asthma has improved over last few years .   ACT Total Scores 1/26/2021   ACT TOTAL SCORE -   ASTHMA ER VISITS -    ASTHMA HOSPITALIZATIONS -   ACT TOTAL SCORE (Goal Greater than or Equal to 20) 25   In the past 12 months, how many times did you visit the emergency room for your asthma without being admitted to the hospital? -   In the past 12 months, how many times were you hospitalized overnight because of your asthma? -          How many days per week do you miss taking your asthma controller medication?  I do not have an asthma controller medication    Please describe any recent triggers for your asthma: upper respiratory infections can trigger asthma but denies any current sx. Ca use refill just in case if it may have  although has not used albuterol in a long time     Have you had any Emergency Room Visits, Urgent Care Visits, or Hospital Admissions since your last office visit?  No        Review of Systems   Constitutional, HEENT, cardiovascular, pulmonary, GI, , musculoskeletal, neuro, skin, endocrine and psych systems are negative, except as otherwise noted.      Objective    LMP 2004   There is no height or weight on file to calculate BMI.  Physical Exam   GENERAL: healthy, alert and no distress  EYES: Eyes grossly normal to inspection,   NECK: no adenopathy,   RESP: lungs clear to auscultation - no rales, rhonchi or wheezes  BREAST: normal without masses, tenderness or nipple discharge and no palpable axillary masses or adenopathy, she has two small new cherry angiomas on her left breast that but really no other skin changes   CV: regular rate and rhythm, normal S1 S2, no S3 or S4,   ABDOMEN: soft, nontender, no hepatosplenomegaly, no masses and bowel sounds normal  SKIN: no suspicious lesions or rashes except multiple small scattered cherry  Angiomas on her  - and upper chest and trunk  NEURO: Normal strength and tone, mentation intact and speech normal  PSYCH: mentation appears normal, affect normal

## 2021-01-27 ASSESSMENT — ANXIETY QUESTIONNAIRES: GAD7 TOTAL SCORE: 2

## 2021-02-03 DIAGNOSIS — Z91.09 ENVIRONMENTAL ALLERGIES: ICD-10-CM

## 2021-02-04 RX ORDER — AZELASTINE HYDROCHLORIDE 0.5 MG/ML
SOLUTION/ DROPS OPHTHALMIC
Qty: 6 ML | Refills: 3 | Status: SHIPPED | OUTPATIENT
Start: 2021-02-04 | End: 2022-06-30

## 2021-02-22 ENCOUNTER — VIRTUAL VISIT (OUTPATIENT)
Dept: FAMILY MEDICINE | Facility: CLINIC | Age: 71
End: 2021-02-22
Payer: COMMERCIAL

## 2021-02-22 ENCOUNTER — HOSPITAL ENCOUNTER (OUTPATIENT)
Dept: MAMMOGRAPHY | Facility: CLINIC | Age: 71
Discharge: HOME | End: 2021-02-22
Attending: FAMILY MEDICINE | Admitting: FAMILY MEDICINE
Payer: COMMERCIAL

## 2021-02-22 DIAGNOSIS — Z12.31 VISIT FOR SCREENING MAMMOGRAM: ICD-10-CM

## 2021-02-22 DIAGNOSIS — H66.90 EAR INFECTION: ICD-10-CM

## 2021-02-22 DIAGNOSIS — H92.02 OTALGIA, LEFT: Primary | ICD-10-CM

## 2021-02-22 PROCEDURE — 77063 BREAST TOMOSYNTHESIS BI: CPT

## 2021-02-22 PROCEDURE — 99213 OFFICE O/P EST LOW 20 MIN: CPT | Mod: 95 | Performed by: FAMILY MEDICINE

## 2021-02-22 RX ORDER — AZITHROMYCIN 250 MG/1
TABLET, FILM COATED ORAL
Qty: 6 TABLET | Refills: 0 | Status: SHIPPED | OUTPATIENT
Start: 2021-02-22 | End: 2021-02-27

## 2021-02-22 NOTE — PROGRESS NOTES
Maile is a 70 year old who is being evaluated via a billable video visit.      How would you like to obtain your AVS? MyChart  If the video visit is dropped, the invitation should be resent by: Text to cell phone: [phone]  Will anyone else be joining your video visit? No      Video Start Time: 3:46 PM    Assessment & Plan     (H92.02) Otalgia, left  (primary encounter diagnosis)  Comment:   Plan: azithromycin (ZITHROMAX) 250 MG tablet            (H66.90) Ear infection  Comment:   Plan: azithromycin (ZITHROMAX) 250 MG tablet          Pt with some ongoing sx, and worried about possible ear infection as she has had ear infection in the past. Has some nasal congestion as well with some sinus pressure as well. Willing to proceed with antibiotic to see if helps. Cares and symptomatic treatment discussed follow up if problem     Check labs. refill sent.Cares and  treatment discussed follow. up if problem   Patient expressed understanding and agreement with treatment plan. All patient's questions were answered, will let me know if has more later.  Medications: Rx's: Reviewed the medications prescribed.       Shalini Phan MD  [facility]    Juventino Gr is a 70 year old who presents for the following health issues     HPI         Acute Illness  Acute illness concerns: ear ache   Onset/Duration: few days   Symptoms:  Fever: no  Chills/Sweats: no  Headache (location?): no  Sinus Pressure: YES, mostly left side of face and behind the left eye , always has some nasal congestion and runny nose, which is not new   Conjunctivitis:  no  Ear Pain: YES: left, hurts feels like deep pain inside on and off was more noticeable this morning, has hx of ear infection although a while ago so concerned   Rhinorrhea: YES  Congestion: YES  Sore Throat: no  Cough: no  Wheeze: no  Decreased Appetite: no  Nausea: no  Vomiting: no  Diarrhea: no  Dysuria/Freq.: no  Dysuria or Hematuria:  no  Fatigue/Achiness: no  Sick/Strep Exposure: no  Therapies tried and outcome: tylenol     Review of Systems   Constitutional, HEENT, cardiovascular, pulmonary, GI, , musculoskeletal, neuro, skin, endocrine and psych systems are negative, except as otherwise noted.      Objective           Vitals:  No vitals were obtained today due to virtual visit.    Physical Exam   GENERAL: Healthy, alert and no distress  EYES: Eyes grossly normal to inspection.  No discharge or erythema, or obvious scleral/conjunctival abnormalities.  RESP: No audible wheeze, cough, or visible cyanosis.  No visible retractions or increased work of breathing.    SKIN: Visible skin clear. No significant rash, abnormal pigmentation or lesions.  NEURO: Cranial nerves grossly intact.  Mentation and speech appropriate for age.  PSYCH: Mentation appears normal, affect normal/bright, judgement and insight intact, normal speech and appearance well-groomed.            Video-Visit Details    Type of service:  Video Visit    Video End Time:4:08 PM    Originating Location (pt. Location): Home    Distant Location (provider location):  [facility]     Platform used for Video Visit: [employer]

## 2021-02-25 ENCOUNTER — HOSPITAL ENCOUNTER (OUTPATIENT)
Dept: MAMMOGRAPHY | Facility: CLINIC | Age: 71
Discharge: HOME | End: 2021-02-25
Attending: FAMILY MEDICINE | Admitting: FAMILY MEDICINE
Payer: COMMERCIAL

## 2021-02-25 DIAGNOSIS — R92.8 ABNORMAL MAMMOGRAM: ICD-10-CM

## 2021-02-25 PROCEDURE — 76642 ULTRASOUND BREAST LIMITED: CPT | Mod: LT

## 2021-03-10 ENCOUNTER — TRANSFERRED RECORDS (OUTPATIENT)
Dept: HEALTH INFORMATION MANAGEMENT | Facility: CLINIC | Age: 71
End: 2021-03-10

## 2021-04-06 ENCOUNTER — TRANSFERRED RECORDS (OUTPATIENT)
Dept: HEALTH INFORMATION MANAGEMENT | Facility: CLINIC | Age: 71
End: 2021-04-06

## 2021-09-03 DIAGNOSIS — F33.0 MAJOR DEPRESSIVE DISORDER, RECURRENT EPISODE, MILD (H): ICD-10-CM

## 2021-09-03 DIAGNOSIS — G47.09 OTHER INSOMNIA: ICD-10-CM

## 2021-09-03 NOTE — TELEPHONE ENCOUNTER
Routing refill request to provider for review/approval because:  Drug warning    Michelle WHEAT RN  EP Triage

## 2021-09-08 NOTE — TELEPHONE ENCOUNTER
PT called @ 11:53 today and would like to have a nurse call her back to get an update on the refill. PT only has a few day left.

## 2021-09-09 RX ORDER — TRAZODONE HYDROCHLORIDE 100 MG/1
TABLET ORAL
Qty: 90 TABLET | Refills: 1 | Status: SHIPPED | OUTPATIENT
Start: 2021-09-09 | End: 2022-03-04

## 2021-09-10 ENCOUNTER — NURSE TRIAGE (OUTPATIENT)
Dept: FAMILY MEDICINE | Facility: CLINIC | Age: 71
End: 2021-09-10

## 2021-09-10 NOTE — TELEPHONE ENCOUNTER
Patient Contact    Called patient. Relayed provider message. She states she has had no issues with taking trazodone.

## 2021-09-10 NOTE — TELEPHONE ENCOUNTER
"Patient mentioning she had mammogram done within last year where they noted increased fluid in her left breast. She states they weren't concerned about it, but she was told to call if it became bothersome.     She has noticed increased discomfort and itching in left breast. Rates discomfort as 4-5/10 and intermittent. States the symptoms have been worse over last 2-3 weeks.     Scheduled appt with Dr. Phan for next week. Instructed to call breast center to see if she can follow-up there or if she needs referral with Dr. Phan first. She will call back with further questions.    Reason for Disposition    Change in shape or appearance of breast    Additional Information    Negative: Chest pain    Negative: Breastfeeding questions about baby    Negative: Breastfeeding questions about mother (breast symptoms or feeling sick)    Negative: Breastfeeding questions about mother's medicines and diet    Negative: Postpartum breast pain and swelling, not breastfeeding    Negative: Small spot, skin growth or mole    Negative: SEVERE breast pain and fever > 103 F (39.4 C)    Negative: Patient sounds very sick or weak to the triager    Negative: Breast looks infected (spreading redness, feels hot or painful to touch) and fever    Negative: Breast looks infected (spreading redness, feels hot or painful to touch) and no fever    Negative: Painful rash and multiple small blisters grouped together (i.e., dermatomal distribution or \"band\" or \"stripe\")    Negative: Cuts, burns, or bruises of breasts and suspicious history for the injury    Negative: Breast lump    Negative: Nipple discharge and bloody    Negative: Nipple is inverted (i.e., points inward)  (Exception: long-term physical characteristic, present for many years)    Negative: Dry flaking-peeling skin of nipple    Answer Assessment - Initial Assessment Questions  1. SYMPTOM: \"What's the main symptom you're concerned about?\"  (e.g., lump, pain, rash, nipple discharge)      " "  Discomfort - rates as 4-5/10 and intermittent   Itching   No discharge  No lump  No rash    2. LOCATION: \"Where is the discomfort /itching located?\"        Left  Feels more swollen than the right     3. ONSET: \"When did discomfort  start?\"        Off and on at time of mammogram  - they didn't seem bothered by it  - recently has started to bother her more (2-3 weeks)    4. PRIOR HISTORY: \"Do you have any history of prior problems with your breasts?\" (e.g., lumps, cancer, fibrocystic breast disease)        Has had cysts drained in the past (estimates ~15 years ago)     5. CAUSE: \"What do you think is causing this symptom?\"    Fluid in breast was noted on Mammogram        6. OTHER SYMPTOMS: \"Do you have any other symptoms?\" (e.g., fever, breast pain, redness or rash, nipple discharge)    No         7. PREGNANCY-BREASTFEEDING: \"Is there any chance you are pregnant?\" \"When was your last menstrual period?\" \"Are you breastfeeding?\"        No    Protocols used: BREAST SYMPTOMS-A-[place]      "

## 2021-09-14 ENCOUNTER — OFFICE VISIT (OUTPATIENT)
Dept: FAMILY MEDICINE | Facility: CLINIC | Age: 71
End: 2021-09-14
Payer: COMMERCIAL

## 2021-09-14 VITALS
DIASTOLIC BLOOD PRESSURE: 76 MMHG | BODY MASS INDEX: 30.18 KG/M2 | TEMPERATURE: 97.5 F | HEIGHT: 62 IN | WEIGHT: 164 LBS | HEART RATE: 68 BPM | OXYGEN SATURATION: 100 % | SYSTOLIC BLOOD PRESSURE: 118 MMHG

## 2021-09-14 DIAGNOSIS — E21.3 HYPERPARATHYROIDISM (H): Primary | ICD-10-CM

## 2021-09-14 DIAGNOSIS — J45.20 MILD INTERMITTENT ASTHMA WITHOUT COMPLICATION: ICD-10-CM

## 2021-09-14 DIAGNOSIS — N60.02 BREAST CYST, LEFT: ICD-10-CM

## 2021-09-14 PROCEDURE — 99214 OFFICE O/P EST MOD 30 MIN: CPT | Performed by: FAMILY MEDICINE

## 2021-09-14 ASSESSMENT — PATIENT HEALTH QUESTIONNAIRE - PHQ9
10. IF YOU CHECKED OFF ANY PROBLEMS, HOW DIFFICULT HAVE THESE PROBLEMS MADE IT FOR YOU TO DO YOUR WORK, TAKE CARE OF THINGS AT HOME, OR GET ALONG WITH OTHER PEOPLE: NOT DIFFICULT AT ALL
SUM OF ALL RESPONSES TO PHQ QUESTIONS 1-9: 5
SUM OF ALL RESPONSES TO PHQ QUESTIONS 1-9: 5

## 2021-09-14 ASSESSMENT — ANXIETY QUESTIONNAIRES
4. TROUBLE RELAXING: NOT AT ALL
GAD7 TOTAL SCORE: 1
7. FEELING AFRAID AS IF SOMETHING AWFUL MIGHT HAPPEN: NOT AT ALL
7. FEELING AFRAID AS IF SOMETHING AWFUL MIGHT HAPPEN: NOT AT ALL
5. BEING SO RESTLESS THAT IT IS HARD TO SIT STILL: NOT AT ALL
GAD7 TOTAL SCORE: 1
GAD7 TOTAL SCORE: 1
1. FEELING NERVOUS, ANXIOUS, OR ON EDGE: NOT AT ALL
8. IF YOU CHECKED OFF ANY PROBLEMS, HOW DIFFICULT HAVE THESE MADE IT FOR YOU TO DO YOUR WORK, TAKE CARE OF THINGS AT HOME, OR GET ALONG WITH OTHER PEOPLE?: NOT DIFFICULT AT ALL
2. NOT BEING ABLE TO STOP OR CONTROL WORRYING: NOT AT ALL
3. WORRYING TOO MUCH ABOUT DIFFERENT THINGS: NOT AT ALL
6. BECOMING EASILY ANNOYED OR IRRITABLE: SEVERAL DAYS

## 2021-09-14 ASSESSMENT — MIFFLIN-ST. JEOR: SCORE: 1204.21

## 2021-09-14 NOTE — PROGRESS NOTES
"    Assessment & Plan       (N60.02) Breast cyst, left  Comment: per breats u/s 02/2021   Plan: Cares, concerns  and symptomatic treatment discussed.  Patient with history of breast cyst in the past and with aspirations previously as well.    Has noted 1 small in the left breast recently per ultrasound.  it is benign.    Patient is not too concerned  about the symptoms and comfortable watching at this point.  If symptoms persist or ongoing issues may consider aspiration.  she will  follow up if problem         (J45.20) Mild intermittent asthma without complication  Comment:   Plan: Discussed allergies/asthma,doing well. Rarely needs albuterol . Need to watch the need for albuterol  Inhaler.   follow up if needing frequently.   Info give follow up as needed       (E21.3) Hyperparathyroidism (H)  (primary encounter diagnosis)  Comment: stable   Plan: seeing endo     .Cares and  treatment discussed follow. up if problem   Patient expressed understanding and agreement with treatment plan. All patient's questions were answered, will let me know if has more later.  Medications: Rx's: Reviewed .                BMI:   Estimated body mass index is 30.49 kg/m  as calculated from the following:    Height as of this encounter: 1.562 m (5' 1.5\").    Weight as of this encounter: 74.4 kg (164 lb).   Weight management plan: Discussed healthy diet and exercise guidelines      Shalini Phan MD  [facility]    Juventino Gr is a 71 year old who presents for the following health issues     HPI     Concern - breast itching / has cyst per breats u/s   Onset: x 2-3 weeks   Description: left breast- Patient mentioning she had mammogram done within last year where they noted \" fluid cyst\"   in her left breast.   She states they weren't concerned about it, but she was told to call if it became bothersome.   She jsut notice slight itching and discomfort in the left breast area so she got a bit concerned.  " "no actual skin changes, any noticeable lump  etc and feels ok otherwise .   She thinsk her left breasts is always bigger then rt but it has not changed   Intensity: moderate  Progression of Symptoms:  worsening  Accompanying Signs & Symptoms:   Previous history of similar problem:   No change in shape and appearance of breast .  Although had  mamogram 02/2021 showed some abnormality followed by u/s and it confirmed \" Benign cyst is seen at 3:00 retroareolar LEFT breast. This measures up  to 1.5 cm in maximal dimension and accounts for the mammographic  finding. No concerning findings. \"     Precipitating factors:        Worsened by:   Alleviating factors:        Improved by:   Therapies tried and outcome: None    Asthma Follow-Up    Was ACT completed today?    Yes  , doing well and had not used inhaler for a while   ACT Total Scores 1/26/2021   ACT TOTAL SCORE -   ASTHMA ER VISITS -   ASTHMA HOSPITALIZATIONS -   ACT TOTAL SCORE (Goal Greater than or Equal to 20) 25   In the past 12 months, how many times did you visit the emergency room for your asthma without being admitted to the hospital? -   In the past 12 months, how many times were you hospitalized overnight because of your asthma? -          How many days per week do you miss taking your asthma controller medication?  I do not have an asthma controller medication any more     Please describe any recent triggers for your asthma: None    Have you had any Emergency Room Visits, Urgent Care Visits, or Hospital Admissions since your last office visit?  No        Review of Systems   Constitutional, HEENT, cardiovascular, pulmonary, GI, , musculoskeletal, neuro, skin, endocrine and psych systems are negative, except as otherwise noted.      Objective    LMP 11/01/2004   There is no height or weight on file to calculate BMI.  Physical Exam   GENERAL: healthy, alert and no distress  NECK: no adenopathy and no asymmetry, masses, or scars  RESP: lungs clear to " auscultation - no rales, rhonchi or wheezes  BREAST: has soemewht dense braest abd fibrocystic but otherwise  normal breast without masses,or nipple discharge and no palpable axillary masses or adenopathy   except left breast  has mild palpable discomfort/ and minimal  tenderness periaereolora area , has soemewht dense braest and her usual  fibrocystic feeling but no significantly large palpable cyst at this time . No nipple discharge, no skin changes ,  no palpable left axillary masses or adenopathy.   PSYCH: mentation appears normal, affect normal/bright          Answers for HPI/ROS submitted by the patient on 9/14/2021  If you checked off any problems, how difficult have these problems made it for you to do your work, take care of things at home, or get along with other people?: Not difficult at all  PHQ9 TOTAL SCORE: 5  MARCELLE 7 TOTAL SCORE: 1

## 2021-09-15 ASSESSMENT — ANXIETY QUESTIONNAIRES: GAD7 TOTAL SCORE: 1

## 2021-09-15 ASSESSMENT — ASTHMA QUESTIONNAIRES: ACT_TOTALSCORE: 25

## 2021-09-15 ASSESSMENT — PATIENT HEALTH QUESTIONNAIRE - PHQ9: SUM OF ALL RESPONSES TO PHQ QUESTIONS 1-9: 5

## 2021-09-27 ASSESSMENT — ENCOUNTER SYMPTOMS
PALPITATIONS: 0
CHILLS: 0
CONSTIPATION: 0
ABDOMINAL PAIN: 0
NERVOUS/ANXIOUS: 0
COUGH: 0
DIZZINESS: 0
EYE PAIN: 0
SHORTNESS OF BREATH: 0
SORE THROAT: 0
HEARTBURN: 0
NAUSEA: 0
DYSURIA: 0
BREAST MASS: 0
PARESTHESIAS: 0
HEMATURIA: 0
DIARRHEA: 0
HEMATOCHEZIA: 0
FEVER: 0
JOINT SWELLING: 0
MYALGIAS: 1
ARTHRALGIAS: 1
FREQUENCY: 0
WEAKNESS: 0
HEADACHES: 0

## 2021-09-27 ASSESSMENT — ACTIVITIES OF DAILY LIVING (ADL): CURRENT_FUNCTION: NO ASSISTANCE NEEDED

## 2021-09-30 ENCOUNTER — OFFICE VISIT (OUTPATIENT)
Dept: FAMILY MEDICINE | Facility: CLINIC | Age: 71
End: 2021-09-30
Payer: COMMERCIAL

## 2021-09-30 VITALS
HEART RATE: 81 BPM | OXYGEN SATURATION: 97 % | SYSTOLIC BLOOD PRESSURE: 102 MMHG | HEIGHT: 62 IN | WEIGHT: 163 LBS | BODY MASS INDEX: 30 KG/M2 | DIASTOLIC BLOOD PRESSURE: 58 MMHG | TEMPERATURE: 97.6 F

## 2021-09-30 DIAGNOSIS — Z00.00 ROUTINE ADULT HEALTH MAINTENANCE: Primary | ICD-10-CM

## 2021-09-30 DIAGNOSIS — Z23 FLU VACCINE NEED: ICD-10-CM

## 2021-09-30 DIAGNOSIS — E78.5 HYPERLIPIDEMIA WITH TARGET LDL LESS THAN 130: ICD-10-CM

## 2021-09-30 LAB
ALBUMIN SERPL-MCNC: 3.8 G/DL (ref 3.4–5)
ALP SERPL-CCNC: 61 U/L (ref 40–150)
ALT SERPL W P-5'-P-CCNC: 25 U/L (ref 0–50)
ANION GAP SERPL CALCULATED.3IONS-SCNC: 5 MMOL/L (ref 3–14)
AST SERPL W P-5'-P-CCNC: 21 U/L (ref 0–45)
BILIRUB SERPL-MCNC: 1 MG/DL (ref 0.2–1.3)
BUN SERPL-MCNC: 14 MG/DL (ref 7–30)
CALCIUM SERPL-MCNC: 10.4 MG/DL (ref 8.5–10.1)
CHLORIDE BLD-SCNC: 107 MMOL/L (ref 94–109)
CHOLEST SERPL-MCNC: 211 MG/DL
CO2 SERPL-SCNC: 26 MMOL/L (ref 20–32)
CREAT SERPL-MCNC: 0.8 MG/DL (ref 0.52–1.04)
FASTING STATUS PATIENT QL REPORTED: YES
GFR SERPL CREATININE-BSD FRML MDRD: 74 ML/MIN/1.73M2
GLUCOSE BLD-MCNC: 87 MG/DL (ref 70–99)
HDLC SERPL-MCNC: 77 MG/DL
LDLC SERPL CALC-MCNC: 116 MG/DL
NONHDLC SERPL-MCNC: 134 MG/DL
POTASSIUM BLD-SCNC: 3.9 MMOL/L (ref 3.4–5.3)
PROT SERPL-MCNC: 7.2 G/DL (ref 6.8–8.8)
SODIUM SERPL-SCNC: 138 MMOL/L (ref 133–144)
TRIGL SERPL-MCNC: 91 MG/DL

## 2021-09-30 PROCEDURE — 80061 LIPID PANEL: CPT | Performed by: FAMILY MEDICINE

## 2021-09-30 PROCEDURE — 36415 COLL VENOUS BLD VENIPUNCTURE: CPT | Performed by: FAMILY MEDICINE

## 2021-09-30 PROCEDURE — 99397 PER PM REEVAL EST PAT 65+ YR: CPT | Mod: 25 | Performed by: FAMILY MEDICINE

## 2021-09-30 PROCEDURE — 80053 COMPREHEN METABOLIC PANEL: CPT | Performed by: FAMILY MEDICINE

## 2021-09-30 PROCEDURE — G0008 ADMIN INFLUENZA VIRUS VAC: HCPCS | Performed by: FAMILY MEDICINE

## 2021-09-30 PROCEDURE — 90662 IIV NO PRSV INCREASED AG IM: CPT | Performed by: FAMILY MEDICINE

## 2021-09-30 ASSESSMENT — ENCOUNTER SYMPTOMS
SHORTNESS OF BREATH: 0
EYE PAIN: 0
CONSTIPATION: 0
HEADACHES: 0
PALPITATIONS: 0
FEVER: 0
PARESTHESIAS: 0
WEAKNESS: 0
FREQUENCY: 0
SORE THROAT: 0
HEMATOCHEZIA: 0
COUGH: 0
DIARRHEA: 0
JOINT SWELLING: 0
BREAST MASS: 0
ABDOMINAL PAIN: 0
NERVOUS/ANXIOUS: 0
DYSURIA: 0
CHILLS: 0
ARTHRALGIAS: 1
MYALGIAS: 1
HEARTBURN: 0
NAUSEA: 0
HEMATURIA: 0
DIZZINESS: 0

## 2021-09-30 ASSESSMENT — MIFFLIN-ST. JEOR: SCORE: 1199.67

## 2021-09-30 ASSESSMENT — ACTIVITIES OF DAILY LIVING (ADL): CURRENT_FUNCTION: NO ASSISTANCE NEEDED

## 2021-09-30 NOTE — PATIENT INSTRUCTIONS
Patient Education   Personalized Prevention Plan  You are due for the preventive services outlined below.  Your care team is available to assist you in scheduling these services.  If you have already completed any of these items, please share that information with your care team to update in your medical record.  Health Maintenance Due   Topic Date Due     ANNUAL REVIEW OF HM ORDERS  Never done     Annual Wellness Visit  08/04/2021     FALL RISK ASSESSMENT  08/04/2021     Flu Vaccine (1) 09/01/2021        Patient Education     Understanding Your Cholesterol Numbers     Blood flows easily when arteries are clear.      Less blood flows when cholesterol builds up in artery walls.   The higher your blood cholesterol, the greater your risk for heart attack, cardiovascular disease, or stroke. High cholesterol can cause any artery in your body to become narrow. That s why you need to know your cholesterol level. If it s high, you can take steps to bring it down. Eating the right foods and getting enough exercise can help. Some people also need medicine to control their cholesterol. Your healthcare provider can help you identify your personal risk through screenings tests and a discussion about different factors that many increase your chance for heart disease and stroke. These include family history, age, gender, ethnicity, and current health. Your healthcare provider can help you get started on a plan to control your cholesterol.  Checking your cholesterol  Your cholesterol is checked with a simple blood test. The results tell you how much cholesterol you have in your blood. Get checked as often as your healthcare provider suggests. Start monitoring your cholesterol levels regularly after age 20. Check it earlier if you have an increased risk for either high cholesterol or heart disease. If you have diabetes or high cholesterol, you may need your blood tested as often as every 3 months. As you work to lower your  "cholesterol, your numbers will change slowly. But they will change. Be patient and stay on track. Discuss your numbers with your healthcare provider.   Your total cholesterol number  A blood test will give you a number for the total amount of cholesterol in your blood. The higher this number, the more likely it is that cholesterol will build up in your blood vessels. Even if your cholesterol is just slightly high, you are at increased risk for health problems.  My total cholesterol is: ________________   Your lipid numbers  Total cholesterol is just one part of the story. Cholesterol is made up of different kinds of fats (lipids). If your total cholesterol is high, knowing your lipid profile is important. The 2 most important lipids are HDL and LDL. Your healthcare provider will tell you if you should fast before having your lipids checked. Fasting means you don t eat for a certain amount of time before the test is done. Along with cholesterol, triglyceride can also lead to blocked arteries. Triglycerides are another type of fat. Knowing your HDL, LDL, and triglyceride numbers, as well as your total cholesterol gives you a more complete picture of your cholesterol level:    HDL is called the  good  cholesterol. It moves the \"bad\" cholesterol out of the bloodstream and does not block your blood vessels. HDL levels are affected by how much you exercise and what you eat. This is the type of cholesterol that you don't want too little of. The higher the HDL, the better.My HDL cholesterol is:  ________________    LDL is called the  bad  cholesterol. This is because it can stick to your artery walls and block blood flow. LDL levels are most affected by what you eat and by your genes. LDL cholesterol is clustered with other cholesterol types including apolipoprotein B (apoB) and lipoprotein a [Lp(a)]. Higher levels of these cholesterol types can increase your risk for atherosclerosis.My LDL cholesterol is: "  ________________    Triglyceride is a type of fat the body uses to store energy. Too much triglyceride can increase your risk for heart disease. Triglyceride levels should be under 150. If your triglyceride level is above 200 mg/dL your provider may want to look at another cholesterol type called apolipoprotein B (apoB).My triglyceride is:  ________________  High cholesterol is only one of the big risk factor for heart disease heart attack, stroke, and peripheral artery disease. If your cholesterol levels are higher than normal, your healthcare provider will help you with steps to take to lower your levels. Steps may include lifestyle changes such as diet, physical activity, and quitting smoking. Your provider may also prescribe medicine to lower bad cholesterol levels. Based on your other health issues and risk factors, your healthcare provider may have specific goals for treating your cholesterol. You may need to use different kinds of medicines that work on the different types of cholesterol.  Some people may need to take medicines called statins to control their cholesterol. This is in addition to eating a healthy diet and getting regular exercise. Statins can help you stay healthy. They can also help prevent a heart attack or stroke. Also ask your provider about any side effects your medicines may cause. Let your provider know about any side effects you have. Make a plan to have regular cholesterol checks.  Work with your provider to know and understand what your cholesterol numbers mean, what your risk factors are and what are your treatment options and goals. Make sure you understand why these goals are important, based on your own health history and your family history of heart disease or high cholesterol. Stick with your treatment plan to reach the goals you set.  [email or web address] last reviewed this educational content on 7/1/2019 2000-2021 [employer]. All rights reserved. This information is not  intended as a substitute for professional medical care. Always follow your healthcare professional's instructions.           Patient Education     Understanding Food and Cholesterol  Having a high cholesterol level puts you at risk for heart disease and other health problems. What you eat has a big effect on your body s cholesterol level. Eating certain foods can raise your cholesterol. Other foods can help you lower it. Watching what you eat can help you get your cholesterol level under control.  Know which foods are high in saturated fat and trans fat  Foods high in saturated fat and trans fat can raise your LDL (bad) cholesterol. It s important to know which foods are high in these fats, and eat less of them. This can help you manage your cholesterol levels.  Foods high in these fats are:    Animal products, including beef, lamb, pork, and poultry with skin on    Cold cuts, tenorio, and sausage    Creamy sauces and fatty gravies    Cookies, donuts, muffins, and pastries    Fried foods    Shortening, butter, stick margarine, coconut oil, palm oil, cocoa butter, partially hydrogenated oils (read labels)    High-fat dairy products, such as whole milk, cheese, cream cheese, and ice cream  Better choices:    Lean beef, skinless white-meat poultry, and fish    Tomato sauce, vegetable puree, and avocado    Dried fruit, nuts, and whole wheat bread with sliced fruit on top    Baked, broiled, steamed, or roasted foods    Soft (tub) margarine, canola oil, and olive oil in moderate amounts    Low-fat or nonfat dairy products, such as 1% or fat-free milk and reduced-fat cheese    Use fiber to help control cholesterol  Foods high in fiber can help you keep your cholesterol down. Good sources of fiber are:    Oats    Barley    Whole grains    Beans    Vegetables    Cornmeal    Popcorn    Berries, apples, and other fruits  [employer] last reviewed this educational content on 11/1/2019 2000-2021 [employer]. All  rights reserved. This information is not intended as a substitute for professional medical care. Always follow your healthcare professional's instructions.           Patient Education     High Cholesterol  High cholesterol is also called hypercholesterolemia. Cholesterol and dietary fat are not the same thing. But, it s important to understand how the fat in your diet affects your cholesterol level.   Your body needs cholesterol to build new cells and make certain hormones. There are 2 main kinds of cholesterol in your body:     HDL (high-density lipoprotein or  good ) cholesterol stops fatty deposits (plaque) from building up in your arteries. In this way it protects you against heart disease and stroke.    LDL (low-density lipoprotein or  bad ) cholesterol stays in your body and sticks to artery walls. It may later block blood flow to your heart and brain. This can cause a heart attack (acute myocardial infarction) or stroke.  Your body makes all the cholesterol it needs. But you also get cholesterol from many of the foods you eat. This is why you want to limit how much cholesterol you get in your diet  and how much fat you eat. That s because the cholesterol your body makes from the fat you eat creates the most risk for disease. The type of fat you eat has the biggest influence on how much cholesterol your body makes.    Fats come in 2 kinds:    Good fats are the unsaturated fats. These are also called monounsaturated and polyunsaturated fats. They raise the level of good cholesterol and lower the level of bad cholesterol. Good fats are found in vegetable oils like olive, sunflower, corn, and soybean oils, and in nuts and seeds.    Bad fats are saturated fats and trans fats. These raise the risk for disease. They lower the good cholesterol and raise the level of bad cholesterol. Bad fats are found in all red meat and whole-milk dairy products. Some plants also have a lot of saturated fats such as coconut and palm  plants. Trans fats are found in stick margarines and many fast foods and commercially baked goods. Soft margarine sold in tubs has less trans fat and is safer to use. Trans fat in particular raise bad cholesterol and lowers good cholesterol.  You can have high blood cholesterol if you eat a diet high in saturated fat and don t get much exercise. In many cases, your family history plays a role. Your healthcare provider can diagnose high cholesterol with blood tests. Treatment consists of a diet low in saturated fat, weight loss, and exercise. If these efforts don t lower your cholesterol, your provider may prescribe medicines. They must be taken daily to keep your cholesterol levels low. Being overweight also raises the risk for high cholesterol and heart disease. Losing even a small amount of weight can help lower your risk.   High-risk groups  Certain groups of people should talk to their healthcare provider about using cholesterol-lowering statin medicines for controlling their cholesterol to stay healthy or to prevent future heart attacks or stroke. It may be beneficial to take a medicine in addition to eating a healthy diet and exercising regularly for these groups. The major groups include:     Adults who have had a heart attack or stroke or some other atherosclerotic disease (such as peripheral vascular disease), a transient ischemic attack (TIA), stable or unstable angina, and anyone who has had a procedure to restore blood flow through a blocked artery such as percutaneous coronary intervention, angioplasty, stent, open-heart bypass surgery.    Adults who have diabetes or an elevated calculated risk of having a heart attack or stroke (7.5% within the next 10 years) and an elevated level of LDL cholesterol 70 to 189 mg/dL.    People who are 21 years of age and older who have an elevated LDL cholesterol level of 190 mg/dL or higher  Home care  Follow these guidelines when caring for yourself at home:    Talk  with your healthcare provider before starting a low-cholesterol diet or weight-loss program.    In general, a low-cholesterol diet means that you eat less saturated fat (red meat and regular dairy) and less cholesterol each day. You may eat foods with unsaturated fats (vegetable oils, nuts, and seeds). Eat more fruits, vegetables, fish, and whole grains, or other high-fiber foods.    Learn to read food labels so you know what you are eating.    A registered dietitian can teach you how to plan meals and change your diet. You can ask your provider for a referral.    Aim for 40 minutes of moderate to vigorous physical activity 3 to 4 times a week. Pick activities you enjoy. Walking is a good choice if you want to lose weight. If you have diabetes, high blood pressure, or heart disease, talk with your provider to see what activities he or she recommends.    If your provider has prescribed medicines, take them as directed.    If you smoke, talk with your provider about how to quit smoking. Smoking lowers good cholesterol levels and can increase damage done by bad cholesterol. Smoking is a major risk factor for heart attack, stroke, and atherosclerotic disease.    Limit how much alcohol you drink.    If you have diabetes, talk with your provider and a dietitian about other food and lifestyle changes you can make to lower your risk for heart disease and stroke and control your diabetes.  Follow-up care  Follow up with your healthcare provider, or as advised. It takes at least 3 months for dietary changes to show a result in your blood cholesterol. Have repeat blood testing as advised by your provider.   If an X-ray, ECG (electrocardiogram), or other test was done, a specialist will look at it. You will be told of any new findings that may affect your care.   Talk with your healthcare provider about your treatment goals. Make sure you understand how cholesterol affects you based on your personal health history and family  history of heart disease or high cholesterol. Plan to have regular monitoring and follow up on any side effects that you may develop to the cholesterol-lowering medicines. Be aware that sometimes you may need more than one medicine to reach your cholesterol goals. Also make sure you understand how to prepare for your cholesterol testing which may or may not require fasting   Call 911  Call 911 if you have any of these     Chest, arm, shoulder, neck, or upper back pain    Shortness of breath    Weakness or numbness of an arm, leg, or one side of the face    Trouble with speech or vision    Weakness, dizziness, or fainting  [employer] last reviewed this educational content on 12/1/2019 2000-2021 [employer]. All rights reserved. This information is not intended as a substitute for professional medical care. Always follow your healthcare professional's instructions.

## 2021-09-30 NOTE — PROGRESS NOTES
"SUBJECTIVE:   Aracelis Muñoz is a 71 year old female who presents for Preventive Visit.    Patient has been advised of split billing requirements and indicates understanding: Yes   Are you in the first 12 months of your Medicare coverage?  No    Healthy Habits:     In general, how would you rate your overall health?  Good    Frequency of exercise:  1 day/week    Duration of exercise:  15-30 minutes    Do you usually eat at least 4 servings of fruit and vegetables a day, include whole grains    & fiber and avoid regularly eating high fat or \"junk\" foods?  No    Taking medications regularly:  No    Medication side effects:  None    Ability to successfully perform activities of daily living:  No assistance needed    Home Safety:  Lack of grab bars in the bathroom    Hearing Impairment:  Difficulty following dialogue in the theater    In the past 6 months, have you been bothered by leaking of urine?  No    In general, how would you rate your overall mental or emotional health?  Good      PHQ-2 Total Score: 0    Additional concerns today:  Yes       Do you feel safe in your environment? Yes    Have you ever done Advance Care Planning? (For example, a Health Directive, POLST, or a discussion with a medical provider or your loved ones about your wishes): Yes, advance care planning is on file.       Fall risk  Fallen 2 or more times in the past year?: No  Any fall with injury in the past year?: No    Cognitive Screening   1) Repeat 3 items (Leader, Season, Table)    2) Clock draw: NORMAL  3) 3 item recall: Recalls 3 objects  Results: 3 items recalled: COGNITIVE IMPAIRMENT LESS LIKELY    Mini-CogTM Copyright TORITO Moyer. Licensed by the author for use in [facility]; reprinted with permission (bianka@.[email or web address]). All rights reserved.      Do you have sleep apnea, excessive snoring or daytime drowsiness?: no    Reviewed and updated as needed this visit by clinical staff                 Reviewed and updated as needed " this visit by Provider                Social History     Tobacco Use     Smoking status: Never Smoker     Smokeless tobacco: Never Used   Substance Use Topics     Alcohol use: Yes     Alcohol/week: 0.0 standard drinks     Comment: 1-2 glasses of wine 1-2 times per week      If you drink alcohol do you typically have >3 drinks per day or >7 drinks per week? No    Alcohol Use 9/27/2021   Prescreen: >3 drinks/day or >7 drinks/week? No   Prescreen: >3 drinks/day or >7 drinks/week? -         Current providers sharing in care for this patient include:   Patient Care Team:  Shalini Phan MD as PCP - General (Family Practice)  Shalini Phan MD as Assigned PCP    The following health maintenance items are reviewed in Epic and correct as of today:  Health Maintenance Due   Topic Date Due     ANNUAL REVIEW OF HM ORDERS  Never done     MEDICARE ANNUAL WELLNESS VISIT  08/04/2021     FALL RISK ASSESSMENT  08/04/2021     INFLUENZA VACCINE (1) 09/01/2021     Patient Active Problem List   Diagnosis     Menopausal LMP age 54     Diffuse cystic mastopathy     Constipation, unspecified constipation type     Lumbar disc herniation with radiculopathy     Post-Nasal Drainage     Perennial allergic rhinitis     Environmental allergies     Osteopenia     Generalized anxiety disorder     Mild intermittent asthma without complication     Serum calcium elevated     Hyperparathyroidism (H)     Vitamin D deficiency     Hypercalcemia     Other insomnia     Hyperlipidemia with target LDL less than 130     Major depressive disorder, recurrent episode, mild (H)     Eczema, unspecified type     Left knee pain, unspecified chronicity     Acute midline low back pain with left-sided sciatica     Lumbago     Right knee pain     Right foot pain     Osteopenia of other site     Diverticulosis of sigmoid colon     Past Surgical History:   Procedure Laterality Date     C/SECTION, LOW TRANSVERSE      S/P C/S     CHOLECYSTECTOMY, OPEN  1985     Cholecystectomy     COLONOSCOPY N/A 2020    Procedure: COLONOSCOPY;  Surgeon: Alona Mancini MD;  Location: [facility]     HC EXCISION BREAST LESION, OPEN >=1  1972    Benign mass right breast     HC MRI LUMBAR SPINE W/O CONTRAST  2010    multilevel degen disc disease/facet arthropathy, 5 mm caudally extruded left posterolateral disc herniation at L4-5 with impingement on the left L5 nerve root      HC PUNCTURE/ASPIRATION BREAST CYST, FIRST  ,,    bilateral '03, left 04     LIGATN/STRIP LONG & SHORT SAPHEN      varicose vein stripping     ORTHOPEDIC SURGERY      foot fusion of joint     ZZC NONSPECIFIC PROCEDURE      Cyst       Social History     Tobacco Use     Smoking status: Never Smoker     Smokeless tobacco: Never Used   Substance Use Topics     Alcohol use: Yes     Alcohol/week: 0.0 standard drinks     Comment: 1-2 glasses of wine 1-2 times per week      Family History   Problem Relation Age of Onset     Thyroid Disease Mother      Hypertension Mother      Allergies Mother      Cerebrovascular Disease Mother         minor, May, 2016     Cancer - colorectal Father          age 65 colon cancer     Thyroid Disease Father      Allergies Father      Depression Father      Obesity Father      Genitourinary Problems Maternal Aunt         fibrocystic breast     Thyroid Disease Sister          Pneumonia Vaccine:Adults age 65+ who received Pneumovax (PPSV23) at 65 years or older: Should be given PCV13 > 1 year after their most recent PPSV23  Mammogram Screening: Mammogram Screening: Recommended mammography every 1-2 years with patient discussion and risk factor consideration        Review of Systems   Constitutional: Negative for chills and fever.   HENT: Positive for hearing loss. Negative for congestion, ear pain and sore throat.    Eyes: Negative for pain and visual disturbance.   Respiratory: Negative for cough and shortness of breath.    Cardiovascular: Negative for chest pain,  "palpitations and peripheral edema.   Gastrointestinal: Negative for abdominal pain, constipation, diarrhea, heartburn, hematochezia and nausea.   Breasts:  Positive for tenderness. Negative for breast mass and discharge.   Genitourinary: Negative for dysuria, frequency, genital sores, hematuria, pelvic pain, urgency, vaginal bleeding and vaginal discharge.   Musculoskeletal: Positive for arthralgias and myalgias. Negative for joint swelling.   Skin: Negative for rash.   Neurological: Negative for dizziness, weakness, headaches and paresthesias.   Psychiatric/Behavioral: Negative for mood changes. The patient is not nervous/anxious.       Has hearing aid - had audiologist adjusted in summer   Has left shoulder pain lately she was doing lot os lifting etc , so has see ortho and doing PT now, so improving   CONSTITUTIONAL: NEGATIVE for fever, chills, change in weight  INTEGUMENTARY/SKIN: NEGATIVE for worrisome rashes, moles or lesions  EYES: NEGATIVE for vision changes or irritation  ENT/MOUTH: NEGATIVE for ear, mouth and throat problems  RESP: NEGATIVE for significant cough or SOB  BREAST: NEGATIVE for masses, tenderness or discharge  CV: NEGATIVE for chest pain, palpitations or peripheral edema  GI: NEGATIVE for nausea, abdominal pain, heartburn, or change in bowel habits  : NEGATIVE for frequency, dysuria, or hematuria  MUSCULOSKELETAL: NEGATIVE for significant arthralgias or myalgia  MUSCULOSKELETAL:NEGATIVE for significant arthralgias or myalgia and as above   NEURO: NEGATIVE for weakness, dizziness or paresthesias  ENDOCRINE: NEGATIVE for temperature intolerance, skin/hair changes  HEME: NEGATIVE for bleeding problems  PSYCHIATRIC: NEGATIVE for changes in mood or affect    OBJECTIVE:   [facility] 11/01/2004  Estimated body mass index is 30.49 kg/m  as calculated from the following:    Height as of 9/14/21: 1.562 m (5' 1.5\").    Weight as of 9/14/21: 74.4 kg (164 lb).  Physical Exam  GENERAL: healthy, alert and no " distress  EYES: Eyes grossly normal to inspection, PERRL and conjunctivae and sclerae normal  HENT: ear canals and TM's normal, nose and mouth without ulcers or lesions  NECK: no adenopathy, no asymmetry, masses, or scars and thyroid normal to palpation  RESP: lungs clear to auscultation - no rales, rhonchi or wheezes  BREAST: normal without masses, tenderness or nipple discharge and no palpable axillary masses or adenopathy  CV: regular rate and rhythm, normal S1 S2, no S3 or S4, no murmur, click or rub, no peripheral edema   ABDOMEN: soft, nontender, no hepatosplenomegaly, no masses and bowel sounds normal   (female): deferred  RECTAL: deferred  MS: no gross musculoskeletal defects noted, no edema  SKIN: no suspicious lesions or rashes  NEURO: Normal strength and tone, mentation intact and speech normal  PSYCH: mentation appears normal, affect normal/bright        ASSESSMENT / PLAN:       (Z00.00) Routine adult health maintenance  (primary encounter diagnosis)  Comment:   Plan:     (E78.5) Hyperlipidemia with target LDL less than 130  Comment:has not been paying attention to diet as much could do better   Plan: Lipid panel reflex to direct LDL Fasting,         Comprehensive metabolic panel        Normal  liver function tests, kidney functions, glucose and  electrolytes(various salts in your body)     Lipid has been borderline  mildly elevated previously. Mom had TIA at old age , so concerned    Discussed  diet high in fat , genetics and being overweight can contribute to this.      exercising  and eating a low saturated fat/low carbohydrate diet are helpful to improve this.  She thinks if is not better, she plans to be more aggressive with diet/ exercise and will do  recheck  In 6 months. If remains elevated may consider starting treatment later if needed          will be willing to take med's if needed but could do be  (Z23) Flu vaccine need  Comment:   Plan: INFLUENZA, QUAD, HIGH DOSE, PF, 65YR + (FLUZONE       "  HD)            Check labs. refill sent.Cares and  treatment discussed follow. up if problem   Patient expressed understanding and agreement with treatment plan. All patient's questions were answered, will let me know if has more later.  Medications: Rx's: Reviewed the potential side effects/complications of medications prescribed.       COUNSELING:  Reviewed preventive health counseling, as reflected in patient instructions       Regular exercise       Healthy diet/nutrition       Vision screening       Hearing screening       Dental care       Bladder control       Fall risk prevention       Immunizations    Vaccinated for: Influenza             Osteoporosis prevention/bone health       Colon cancer screening       Advanced Planning     Estimated body mass index is 30.49 kg/m  as calculated from the following:    Height as of 9/14/21: 1.562 m (5' 1.5\").    Weight as of 9/14/21: 74.4 kg (164 lb).    Weight management plan: Discussed healthy diet and exercise guidelines    She reports that she has never smoked. She has never used smokeless tobacco.      Appropriate preventive services were discussed with this patient, including applicable screening as appropriate for cardiovascular disease, diabetes, osteopenia/osteoporosis, and glaucoma.  As appropriate for age/gender, discussed screening for colorectal cancer, prostate cancer, breast cancer, and cervical cancer. Checklist reviewing preventive services available has been given to the patient.    Reviewed patients plan of care and provided an AVS. The Basic Care Plan (routine screening as documented in Health Maintenance) for Aracelis meets the Care Plan requirement. This Care Plan has been established and reviewed with the Patient.    Counseling Resources:  ATP IV Guidelines  Pooled Cohorts Equation Calculator  Breast Cancer Risk Calculator  Breast Cancer: Medication to Reduce Risk  FRAX Risk Assessment  ICSI Preventive Guidelines  Dietary Guidelines for " Americans, 2010  USDA's MyPlate  ASA Prophylaxis  Lung CA Screening    Shalini Phan MD  [facility]    Identified Health Risks:

## 2021-10-20 ENCOUNTER — TELEPHONE (OUTPATIENT)
Dept: FAMILY MEDICINE | Facility: CLINIC | Age: 71
End: 2021-10-20

## 2021-10-20 DIAGNOSIS — N60.02 BREAST CYST, LEFT: Primary | ICD-10-CM

## 2021-10-20 NOTE — TELEPHONE ENCOUNTER
Looks like she already had the negative biopsy , so she just needs to schedule for cyst aspiration, she can just contact breast center and they can schedule procedure for her

## 2021-10-20 NOTE — TELEPHONE ENCOUNTER
Pt calling about update on breast it has become troublesome. It is in regards to the cyst on left breast and it is getting uncomfortable. Pt describes it as itchy and pin prick feeling. Pt is hoping for referral to breast center  Once referral is placed please route back to TC to call to update pt. Okay to leave detailed voicemail.   Anna Suárez

## 2021-10-20 NOTE — TELEPHONE ENCOUNTER
Awaiting labs to give all results together   TC called to update pt on note from Sylvester below and gave her number to get scheduled.   Anna Suárez

## 2021-10-25 NOTE — TELEPHONE ENCOUNTER
Patient calling to inform that the breast center states that the patient needs a new order from her doctor. Please order cyst aspiration so that the patient can schedule.    Call patient when ordered.  Can we leave a detailed message on this number? YES  Phone number patient can be reached at: Home number on file [phone] (home)    Cass Fraire RN  [facility] Triage     Cass Fraire RN

## 2022-01-17 ENCOUNTER — TELEPHONE (OUTPATIENT)
Dept: FAMILY MEDICINE | Facility: CLINIC | Age: 72
End: 2022-01-17
Payer: COMMERCIAL

## 2022-01-17 DIAGNOSIS — Z12.31 BREAST CANCER SCREENING BY MAMMOGRAM: Primary | ICD-10-CM

## 2022-01-17 NOTE — TELEPHONE ENCOUNTER
she was recommended to do routine annual mammogram that she can schedule towards the end of February.  Order placed

## 2022-01-17 NOTE — TELEPHONE ENCOUNTER
Pt needs to schedule a mammogram. The last mammogram was done in February 2021 and then she had to do an US also. At the US, they found fluid in the L breast. Pt states they did not have time to deal with I at that time and said they would contact her to schedule. Pt states the Breast Center never called to follow up. When she has the upcoming mammogram, she would like to have an order in her chart so she can have the fluid drained immediately. Any questions, pt can be reached at [phone]. Thank you.  Griselda Ocasio,

## 2022-01-26 ENCOUNTER — TRANSFERRED RECORDS (OUTPATIENT)
Dept: HEALTH INFORMATION MANAGEMENT | Facility: CLINIC | Age: 72
End: 2022-01-26
Payer: COMMERCIAL

## 2022-01-27 ENCOUNTER — VIRTUAL VISIT (OUTPATIENT)
Dept: FAMILY MEDICINE | Facility: CLINIC | Age: 72
End: 2022-01-27
Payer: COMMERCIAL

## 2022-01-27 DIAGNOSIS — G44.89 OTHER HEADACHE SYNDROME: ICD-10-CM

## 2022-01-27 DIAGNOSIS — E21.3 HYPERPARATHYROIDISM (H): ICD-10-CM

## 2022-01-27 DIAGNOSIS — F33.0 MAJOR DEPRESSIVE DISORDER, RECURRENT EPISODE, MILD (H): ICD-10-CM

## 2022-01-27 DIAGNOSIS — G47.09 OTHER INSOMNIA: ICD-10-CM

## 2022-01-27 DIAGNOSIS — K62.9 DISORDER OF RECTUM AND ANUS: Primary | ICD-10-CM

## 2022-01-27 PROCEDURE — 99214 OFFICE O/P EST MOD 30 MIN: CPT | Mod: 95 | Performed by: FAMILY MEDICINE

## 2022-01-27 RX ORDER — TRAZODONE HYDROCHLORIDE 100 MG/1
TABLET ORAL
Qty: 90 TABLET | Refills: 1 | Status: CANCELLED | OUTPATIENT
Start: 2022-01-27

## 2022-01-27 RX ORDER — HYDROCORTISONE 25 MG/G
CREAM TOPICAL 2 TIMES DAILY PRN
Qty: 30 G | Refills: 0 | Status: SHIPPED | OUTPATIENT
Start: 2022-01-27 | End: 2022-11-10

## 2022-01-27 ASSESSMENT — PAIN SCALES - GENERAL: PAINLEVEL: NO PAIN (0)

## 2022-01-27 NOTE — PROGRESS NOTES
Maile is a 71 year old who is being evaluated via a billable video visit.      How would you like to obtain your AVS? MyChart  If the video visit is dropped, the invitation should be resent by: Text to cell phone: [phone]  Will anyone else be joining your video visit? No    Video Start Time: 2:02 PM    Assessment & Plan     (K62.9) Disorder of rectum and anus  (primary encounter diagnosis)  Comment: mucous discharge   Plan: hydrocortisone, Perianal, (HYDROCORTISONE) 2.5         % cream, Colorectal Surgery Referral        Discussed cares and concerns. Gave  Anusol cream to use , to see if that would help.       She had a normal colonoscopy last year.  No previous rectal issues, she admits to having a bit of a sphincter problem as so wonder if it could be related to leaky sphincter , although I have given her referral  for the colorectal specialist, and she is       going to follow-up with them to further evaluate for  any ongoing concerns or problem.     (F33.0) Major depressive disorder, recurrent episode, mild (H)  Comment: She think she is improved taking medication for mood  And doing fine   Plan: Stable on current med's.       (G44.89) Other headache syndrome  Comment:   Plan: Discussed possible differential diagnosis for her symptoms.  Sound like a mild scalp tenderness  Or may be mild tension [place].     she denies any skin lesion in that area and it could be just a muscle tightness, she is comfortable watching and taking OTC pain med's.  Cares and symptomatic treatment discussed including  Neck  Stretching/ massage and OTC pain med's for pain control etc, to see if that would help.    If no improvement or any ongoing problem .she was advised to do follow-up to further evaluation.     (G47.09) Other insomnia  Comment:   Plan: Stable and doing well on trazodone.     (E21.3) Hyperparathyroidism (H)  Comment: seeing endo- seen recently 12/2021   Plan: planning for surgery later this year              script  sent. Referral placed Cares and  treatment discussed follow. up if problem   Patient expressed understanding and agreement with treatment plan. All patient's questions were answered, will let me know if has more later.  Medications: Rx's: Reviewed the potential side effects/complications of medications prescribed.         Shalini Phan MD  [facility] DELORIS Gr is a 71 year old who presents for the following health issues     HPI     Rectal Problem  Onset/Duration: 2+ months. Mucus like discharge from rectum  Description:   Ana-anal lump: no  Pain: no  Itching: no  Accompanying Signs & Symptoms: has discharge on and off last couple of months, comes and  goes . No rectal , no blood.  bm is normal lately  although her sx may have started when she had constipation couple of months ago while travelling.  BM is back to normal now.  Denies any diarrhea.  No abdominal pain etc... She admits that probably she may have slightly loose sphincter, and mucus discharge leaks sometimes on and off , but usually no leakage of the stool.   Blood in stool: no  Changes in stool pattern: YES- constipated for many years and the last 4-6 months has been normal  History:   Any previous GI studies done:Colonoscopy 2020  Family History of colon cancer: YES  Precipitating factors:   None  Alleviating factors:  None  Therapies tried and outcome: probiotic gives some relief     She also complaining of mild headache on the left side of her scalp on and off for last few weeks.  Is not a bad headache does feel like achy and uncomfortable comes and goes, OTC Tylenol does help.  She has no associated nausea vomiting any visual problems.  No double vision blurred vision like flashes etc..  Except that she thinks is just feels tired may be left more than the right, she already has scheduled to see the eye doctor to make sure everything is fine.  She denies any unusual stress, mood is fine and she sleeps  okay.  In fact she thinks her stress level is much better since her  has moved out and they are  she has been much more happier and relaxed enjoying herself.  She is still taking her  trazodone for sleep but  , not taking any anxiety medication and feeling pretty comfortable at this time.             Review of Systems   Constitutional, HEENT, cardiovascular, pulmonary, GI, , musculoskeletal, neuro, skin, endocrine and psych systems are negative, except as otherwise noted.      Objective    Vitals - Patient Reported  Pain Score: No Pain (0)      Vitals:  No vitals were obtained today due to virtual visit.    Physical Exam   GENERAL: Healthy, alert and no distress  EYES: Eyes grossly normal to inspection.  No discharge or erythema, or obvious scleral/conjunctival abnormalities.  RESP: No audible wheeze, cough, or visible cyanosis.  No visible retractions or increased work of breathing.    SKIN: Visible skin clear. No significant rash, abnormal pigmentation or lesions.  NEURO: Cranial nerves grossly intact.  Mentation and speech appropriate for age.  PSYCH: Mentation appears normal, affect normal/bright, judgement and insight intact, normal speech and appearance well-groomed.          Video-Visit Details    Type of service:  Video Visit    Video End Time:2:35 PM    Originating Location (pt. Location): Home    Distant Location (provider location):  [facility]     Platform used for Video Visit: Recognition PRO

## 2022-02-07 ENCOUNTER — TELEPHONE (OUTPATIENT)
Dept: FAMILY MEDICINE | Facility: CLINIC | Age: 72
End: 2022-02-07
Payer: COMMERCIAL

## 2022-02-07 DIAGNOSIS — N64.4 BREAST PAIN, LEFT: Primary | ICD-10-CM

## 2022-02-07 NOTE — TELEPHONE ENCOUNTER
Pt calling and is requesting a diagnostic mammogram order. She has pain in her L breast. Please place a new order if appropriate. Thank you.  Griselda Ocasio,

## 2022-02-28 ENCOUNTER — PRE VISIT (OUTPATIENT)
Dept: SURGERY | Facility: CLINIC | Age: 72
End: 2022-02-28
Payer: COMMERCIAL

## 2022-02-28 NOTE — TELEPHONE ENCOUNTER
Diagnosis, Referred by & from: recurrent mucoid discharge/ also possible problem with sphincter control refered by Shalini Phan [facility]   Appt date: 03/11/22   NOTES STATUS DETAILS   OFFICE NOTE from referring provider Internal 01/27/22- Dr. Shalini Phan   OFFICE NOTE from other specialist N/A    DISCHARGE SUMMARY from hospital N/A    DISCHARGE REPORT from the ER N/A    OPERATIVE REPORT N/A    MEDICATION LIST Internal    LABS     ANAL PAP/CEA N/A    BIOPSIES/PATHOLOGY RELATED TO DIAGNOSIS N/A    DIAGNOSTIC PROCEDURES     PFC TESTING (from the Pelvic floor center includes Manometry, PDNL, EMG, etc.) N/A    COLONOSCOPY Internal 11/04/20, 06/15/2006 -[facility]     UPPER ENDOSCOPY (EGD) N/A    FLEX SIGMOIDOSCOPY N/A    ERCP N/A    IMAGING (DISC & REPORT)      CT N/A    MRI N/A    XRAY Internal 07/21/20, 06/27/18 DX HIP/PELVIS/SPINE W LAT FRACTION ANALYSIS     ULTRASOUND  (ENDOANAL/ENDORECTAL) N/A

## 2022-03-04 DIAGNOSIS — G47.09 OTHER INSOMNIA: ICD-10-CM

## 2022-03-04 DIAGNOSIS — F33.0 MAJOR DEPRESSIVE DISORDER, RECURRENT EPISODE, MILD (H): ICD-10-CM

## 2022-03-04 NOTE — TELEPHONE ENCOUNTER
Routing refill request to provider for review/approval because:  High Drug interaction warning    Bria Kumar RN

## 2022-03-08 ENCOUNTER — HOSPITAL ENCOUNTER (OUTPATIENT)
Dept: MAMMOGRAPHY | Facility: CLINIC | Age: 72
End: 2022-03-08
Attending: PHYSICIAN ASSISTANT
Payer: COMMERCIAL

## 2022-03-08 DIAGNOSIS — N64.4 BREAST PAIN, LEFT: ICD-10-CM

## 2022-03-08 PROCEDURE — 76642 ULTRASOUND BREAST LIMITED: CPT | Mod: LT

## 2022-03-08 PROCEDURE — 77066 DX MAMMO INCL CAD BI: CPT

## 2022-03-08 RX ORDER — TRAZODONE HYDROCHLORIDE 100 MG/1
TABLET ORAL
Qty: 90 TABLET | Refills: 1 | Status: SHIPPED | OUTPATIENT
Start: 2022-03-08 | End: 2022-09-02

## 2022-03-08 NOTE — TELEPHONE ENCOUNTER
Pt calling requesting this refill. Order and pharmacy pended. Please review and advise as soon as possible.     Tiffanie TRONCOSO RN  [facility]

## 2022-06-27 DIAGNOSIS — Z91.09 ENVIRONMENTAL ALLERGIES: ICD-10-CM

## 2022-06-30 RX ORDER — AZELASTINE HYDROCHLORIDE 0.5 MG/ML
SOLUTION/ DROPS OPHTHALMIC
Qty: 6 ML | Refills: 3 | Status: SHIPPED | OUTPATIENT
Start: 2022-06-30 | End: 2023-05-25

## 2022-06-30 NOTE — TELEPHONE ENCOUNTER
Prescription approved per [facility] Refill Protocol.  Edwar Middleton RN  [facility] Triage Nurse

## 2022-08-04 ENCOUNTER — TRANSFERRED RECORDS (OUTPATIENT)
Dept: FAMILY MEDICINE | Facility: CLINIC | Age: 72
End: 2022-08-04

## 2022-08-29 ENCOUNTER — NURSE TRIAGE (OUTPATIENT)
Dept: FAMILY MEDICINE | Facility: CLINIC | Age: 72
End: 2022-08-29

## 2022-08-29 NOTE — TELEPHONE ENCOUNTER
Provider Recommendation Follow Up:   Unable to reach patient/caregiver. Left detailed message with PCP message below. Pt told to call clinic with further concerns or questions.    Melissa ALONZO RN  EP Triage

## 2022-08-29 NOTE — TELEPHONE ENCOUNTER
Ankle swelling should be investigated right away.  I would recommend going to an urgent care or ER.    Dejon Black MD  [facility], [facility] [place]

## 2022-08-29 NOTE — TELEPHONE ENCOUNTER
"Nurse Triage SBAR    Is this a 2nd Level Triage? YES, LICENSED PRACTITIONER REVIEW IS REQUIRED    Situation: Patient is a 72 year old female, usually seen by Dr. Phan, calling regarding swelling in her left ankle and sores on her right leg that have been there for a couple months.    Background: Patient has been having swelling in her left ankle for 2 weeks. Patient describes the swelling as her \"ankle flowing over her shoe\". Patient does report having some pain in the ankle but was unsure if it was due to the bone spur. Patient denies any swelling in her right ankle.     Assessment: See below    Protocol Recommended Disposition:   Go To Office Now    Recommendation: Per protocol it is recommended for patient to go to ED, UC, or office with PCP approval. Please review and advise.     Routed to provider    Does the patient meet one of the following criteria for ADS visit consideration? 16+ years old, with an [facility] PCP     TIP  Providers, please consider if this condition is appropriate for management at one of our Acute and Diagnostic Services sites.     If patient is a good candidate, please use dotphrase <dot>triageresponse and select Refer to ADS to document.    Okay to leave detailed VM.    Reason for Disposition    Thigh, calf, or ankle swelling and only 1 side    Additional Information    Negative: Chest pain    Negative: Followed an ankle injury    Negative: Followed a bee sting and has localized swelling (e.g., small area of puffy or swollen skin)    Negative: Followed an insect bite and has localized swelling (e.g., small area of puffy or swollen skin)    Negative: Ankle pain is main symptom    Negative: Swelling of both ankles (i.e., pedal edema)    Negative: Swelling of calf or leg is main symptom    Negative: Difficulty breathing    Negative: Entire foot is cool or blue in comparison to other side    Negative: Ankle pain and fever    Negative: Ankle redness and fever    Negative: Patient sounds very sick " "or weak to the triager    Negative: SEVERE pain (e.g., excruciating, unable to walk) and not improved after 2 hours of pain medicine    Negative: Redness and painful when touched and no fever    Negative: Red area or streak > 2 inches (or 5 cm)    Negative: Thigh or calf pain and only 1 side and present > 1 hour    Answer Assessment - Initial Assessment Questions  1. LOCATION: \"Which ankle is swollen?\" \"Where is the swelling?\"        Left ankle just around the ankle    2. ONSET: \"When did the swelling start?\"        José Miguel has a history of her legs swelling while traveling but patient has notice for the past two weeks her left ankle as been swollen and has not gone away.     3. SWELLING: \"How bad is the swelling?\" Or, \"How large is it?\" (e.g., mild, moderate, severe; size of localized swelling)     - NONE: No joint swelling.    - LOCALIZED: Localized; small area of puffy or swollen skin (e.g., insect bite, skin irritation).    - MILD: Joint looks or feels mildly swollen or puffy.    - MODERATE: Swollen; interferes with normal activities (e.g., work or school); decreased range of movement; may be limping.    - SEVERE: Very swollen; can't move swollen joint at all; limping a lot or unable to walk.        Mild; patient describes the swelling as \"ankle is flowing over her shoe\"    4. PAIN: \"Is there any pain?\" If Yes, ask: \"How bad is it?\" (Scale 1-10; or mild, moderate, severe)    - NONE (0): no pain.    - MILD (1-3): doesn't interfere with normal activities.     - MODERATE (4-7): interferes with normal activities (e.g., work or school) or awakens from sleep, limping.     - SEVERE (8-10): excruciating pain, unable to do any normal activities, unable to walk.         Patient rates the pain 5-6/10 in the left ankle; patient also has a bone spur on that ankle and is unsure if that is what is causing the pain or the swelling    5. CAUSE: \"What do you think caused the ankle swelling?\"        Unsure; patient did stated that " "her mother had a history of swelling in her ankles due to fluid    6. OTHER SYMPTOMS: \"Do you have any other symptoms?\" (e.g., fever, chest pain, difficulty breathing, calf pain)        None    7. PREGNANCY: \"Is there any chance you are pregnant?\" \"When was your last menstrual period?\"        No    Protocols used: ANKLE SWELLING-A-OH    CALL BACK IF:  * You develop a fever  * Ankle becomes red or very painful  * Ankle becomes very swollen  * You become worse        Patient/Caregiver understands and will follow care advice? Other, see documentation     Malgorzata Ascencio RN  [facility] Triage Team    "

## 2022-09-01 DIAGNOSIS — F33.0 MAJOR DEPRESSIVE DISORDER, RECURRENT EPISODE, MILD (H): ICD-10-CM

## 2022-09-01 DIAGNOSIS — G47.09 OTHER INSOMNIA: ICD-10-CM

## 2022-09-02 RX ORDER — TRAZODONE HYDROCHLORIDE 100 MG/1
TABLET ORAL
Qty: 90 TABLET | Refills: 0 | Status: SHIPPED | OUTPATIENT
Start: 2022-09-02 | End: 2022-11-30

## 2022-09-02 NOTE — TELEPHONE ENCOUNTER
Due for office visit, only 90-day supply submitted.      Please call and inform pt to schedule an annual exam. AMW due 9/30/2022 or LATER, recommend she schedule this now based on PCP schedule.

## 2022-09-02 NOTE — TELEPHONE ENCOUNTER
Routing refill request to provider for review/approval because:  Drug interaction warning    Malgorzata Ascencio RN

## 2022-09-11 ASSESSMENT — ASTHMA QUESTIONNAIRES
QUESTION_1 LAST FOUR WEEKS HOW MUCH OF THE TIME DID YOUR ASTHMA KEEP YOU FROM GETTING AS MUCH DONE AT WORK, SCHOOL OR AT HOME: NONE OF THE TIME
ACT_TOTALSCORE: 22
QUESTION_4 LAST FOUR WEEKS HOW OFTEN HAVE YOU USED YOUR RESCUE INHALER OR NEBULIZER MEDICATION (SUCH AS ALBUTEROL): NOT AT ALL
QUESTION_3 LAST FOUR WEEKS HOW OFTEN DID YOUR ASTHMA SYMPTOMS (WHEEZING, COUGHING, SHORTNESS OF BREATH, CHEST TIGHTNESS OR PAIN) WAKE YOU UP AT NIGHT OR EARLIER THAN USUAL IN THE MORNING: ONCE OR TWICE
QUESTION_5 LAST FOUR WEEKS HOW WOULD YOU RATE YOUR ASTHMA CONTROL: WELL CONTROLLED
ACT_TOTALSCORE: 22
QUESTION_2 LAST FOUR WEEKS HOW OFTEN HAVE YOU HAD SHORTNESS OF BREATH: ONCE OR TWICE A WEEK

## 2022-09-14 ENCOUNTER — OFFICE VISIT (OUTPATIENT)
Dept: FAMILY MEDICINE | Facility: CLINIC | Age: 72
End: 2022-09-14
Payer: COMMERCIAL

## 2022-09-14 DIAGNOSIS — J45.20 MILD INTERMITTENT ASTHMA WITHOUT COMPLICATION: ICD-10-CM

## 2022-09-14 DIAGNOSIS — M25.473 ANKLE EDEMA: Primary | ICD-10-CM

## 2022-09-14 DIAGNOSIS — R21 RASH AND NONSPECIFIC SKIN ERUPTION: ICD-10-CM

## 2022-09-14 DIAGNOSIS — E83.52 SERUM CALCIUM ELEVATED: ICD-10-CM

## 2022-09-14 PROCEDURE — 80053 COMPREHEN METABOLIC PANEL: CPT | Performed by: FAMILY MEDICINE

## 2022-09-14 PROCEDURE — 36415 COLL VENOUS BLD VENIPUNCTURE: CPT | Performed by: FAMILY MEDICINE

## 2022-09-14 PROCEDURE — 99214 OFFICE O/P EST MOD 30 MIN: CPT | Performed by: FAMILY MEDICINE

## 2022-09-14 RX ORDER — TRIAMCINOLONE ACETONIDE 1 MG/G
CREAM TOPICAL 2 TIMES DAILY PRN
Qty: 15 G | Refills: 0 | Status: SHIPPED | OUTPATIENT
Start: 2022-09-14 | End: 2023-01-02

## 2022-09-14 RX ORDER — FUROSEMIDE 20 MG
10 TABLET ORAL DAILY PRN
Qty: 30 TABLET | Refills: 1 | Status: SHIPPED | OUTPATIENT
Start: 2022-09-14 | End: 2022-11-08

## 2022-09-14 RX ORDER — FINASTERIDE 5 MG/1
TABLET, FILM COATED ORAL
COMMUNITY
Start: 2022-08-30 | End: 2023-08-07

## 2022-09-14 RX ORDER — ALBUTEROL SULFATE 90 UG/1
2 AEROSOL, METERED RESPIRATORY (INHALATION) EVERY 6 HOURS PRN
Qty: 18 G | Refills: 1 | Status: SHIPPED | OUTPATIENT
Start: 2022-09-14 | End: 2024-02-28

## 2022-09-14 RX ORDER — MINOXIDIL 2.5 MG/1
TABLET ORAL
COMMUNITY
Start: 2022-08-01 | End: 2023-08-07

## 2022-09-14 ASSESSMENT — PATIENT HEALTH QUESTIONNAIRE - PHQ9
SUM OF ALL RESPONSES TO PHQ QUESTIONS 1-9: 4
SUM OF ALL RESPONSES TO PHQ QUESTIONS 1-9: 4
10. IF YOU CHECKED OFF ANY PROBLEMS, HOW DIFFICULT HAVE THESE PROBLEMS MADE IT FOR YOU TO DO YOUR WORK, TAKE CARE OF THINGS AT HOME, OR GET ALONG WITH OTHER PEOPLE: NOT DIFFICULT AT ALL

## 2022-09-14 NOTE — PROGRESS NOTES
"  Assessment & Plan     (M25.473) Ankle edema  (primary encounter diagnosis)  Comment: left more then rt   Plan: furosemide (LASIX) 20 MG tablet       She had some on and off issues previously as well.  Arthralgias stockings  .  Wants to get a refill on the Lasix as needed use any other concerns she will do follow-up. .       (R21) Rash and nonspecific skin eruption  Comment: rt leg , has small 2-3 mm dry spot inflamed irritated like scab fell-  rt - leg shin area  not signs of infection     Plan: triamcinolone (KENALOG) 0.1 % external cream        Skin Cares and symptomatic treatment discussed. She will  follow up if no improvement or problem         (J45.20) Mild intermittent asthma without complication  Comment: has improved over years .  rarely needs inhaler - stable  Plan: albuterol (PROAIR HFA/PROVENTIL HFA/VENTOLIN             ) 108 (90 Base) MCG/ACT inhaler            (E83.52) Serum calcium elevated  Comment: has been seeing endo for parathyroid issue - due for  labs   Plan: Comprehensive metabolic panel            Check labs. refill sent.Cares and  treatment discussed follow. up if problem   Patient expressed understanding and agreement with treatment plan. All patient's questions were answered, will let me know if has more later.  Medications: Rx's: Reviewed the potential side effects/complications of medications prescribed.         BMI:   Estimated body mass index is 30.3 kg/m  as calculated from the following:    Height as of 9/30/21: 1.562 m (5' 1.5\").    Weight as of 9/30/21: 73.9 kg (163 lb).   Weight management plan: Discussed healthy diet and exercise guidelines      Shalini Phan MD  [facility] DELORIS Gr is a 72 year old  presenting for the following health issues:  No chief complaint on file.      History of Present Illness       Reason for visit:  Swelling of foot    She eats 2-3 servings of fruits and vegetables daily.She consumes 0 sweetened " beverage(s) daily.She exercises with enough effort to increase her heart rate 9 or less minutes per day.  She exercises with enough effort to increase her heart rate 3 or less days per week.   She is taking medications regularly.    Today's PHQ-9         PHQ-9 Total Score: 4    PHQ-9 Q9 Thoughts of better off dead/self-harm past 2 weeks :   Not at all    How difficult have these problems made it for you to do your work, take care of things at home, or get along with other people: Not difficult at all     The swelling of the ankle has been going on for years left more mostly . Patient's parent had the same condition and was prescribed a dieretic, which seemed to work for her.   Sitting for long periods of time seems to make it worse. No new pain  but long drive has aggravated swelling , she has another trip  comin up so wants refill on med's     Pt also has some  Skin bump' on her right leg that she would like looked at.  jsut looks irritated and not going  away , has been there for a while.      Asthma Follow-Up    Was ACT completed today?    Yes  .  She has improved over the years, and now rarely needs inhaler.  She thinks she has not used one  in a while.  But she could use a refill on albuterol, just in case he needs it  ACT Total Scores 9/11/2022   ACT TOTAL SCORE -   ASTHMA ER VISITS -   ASTHMA HOSPITALIZATIONS -   ACT TOTAL SCORE (Goal Greater than or Equal to 20) 22   In the past 12 months, how many times did you visit the emergency room for your asthma without being admitted to the hospital? 0   In the past 12 months, how many times were you hospitalized overnight because of your asthma? 0          How many days per week do you miss taking your asthma controller medication?  I do not have an asthma controller medication    Please describe any recent triggers for your asthma: None    Have you had any Emergency Room Visits, Urgent Care Visits, or Hospital Admissions since your last office visit?   No        Review of Systems   Constitutional, HEENT, cardiovascular, pulmonary, GI, , musculoskeletal, neuro, skin, endocrine and psych systems are negative, except as otherwise noted.      Objective    LMP 11/01/2004   There is no height or weight on file to calculate BMI.  Physical Exam   GENERAL: healthy, alert and no distress  RESP: lungs clear to auscultation - no rales, rhonchi or wheezes  CV: regular rate and rhythm, normal S1 S2, no S3 or S4, no murmur, click or rub, no peripheral edema and peripheral pulses strong  ABDOMEN: soft, nontender, no hepatosplenomegaly, no masses and bowel sounds normal  MS:  Trace  edema and no tenderness to palpation slightly prominent varicose vein but not tender   SKIN: no suspicious lesions or rashes - has small 2-3 mm dry spot inflamed irritated like scab fell-  rt - leg shin area not signs of infection   NEURO: Normal strength and tone, mentation intact and speech normal  PSYCH: mentation appears normal, affect normal/

## 2022-09-15 LAB
ALBUMIN SERPL-MCNC: 3.8 G/DL (ref 3.4–5)
ALP SERPL-CCNC: 49 U/L (ref 40–150)
ALT SERPL W P-5'-P-CCNC: 25 U/L (ref 0–50)
ANION GAP SERPL CALCULATED.3IONS-SCNC: 4 MMOL/L (ref 3–14)
AST SERPL W P-5'-P-CCNC: 24 U/L (ref 0–45)
BILIRUB SERPL-MCNC: 0.8 MG/DL (ref 0.2–1.3)
BUN SERPL-MCNC: 14 MG/DL (ref 7–30)
CALCIUM SERPL-MCNC: 10.4 MG/DL (ref 8.5–10.1)
CHLORIDE BLD-SCNC: 107 MMOL/L (ref 94–109)
CO2 SERPL-SCNC: 25 MMOL/L (ref 20–32)
CREAT SERPL-MCNC: 0.69 MG/DL (ref 0.52–1.04)
GFR SERPL CREATININE-BSD FRML MDRD: >90 ML/MIN/1.73M2
GLUCOSE BLD-MCNC: 89 MG/DL (ref 70–99)
POTASSIUM BLD-SCNC: 4.4 MMOL/L (ref 3.4–5.3)
PROT SERPL-MCNC: 7 G/DL (ref 6.8–8.8)
SODIUM SERPL-SCNC: 136 MMOL/L (ref 133–144)

## 2022-10-11 ENCOUNTER — TRANSFERRED RECORDS (OUTPATIENT)
Dept: HEALTH INFORMATION MANAGEMENT | Facility: CLINIC | Age: 72
End: 2022-10-11

## 2022-10-15 ENCOUNTER — HOSPITAL ENCOUNTER (EMERGENCY)
Facility: CLINIC | Age: 72
Discharge: HOME | End: 2022-10-15
Payer: COMMERCIAL

## 2022-10-15 ENCOUNTER — HEALTH MAINTENANCE LETTER (OUTPATIENT)
Age: 72
End: 2022-10-15

## 2022-10-15 VITALS
TEMPERATURE: 97.3 F | DIASTOLIC BLOOD PRESSURE: 54 MMHG | OXYGEN SATURATION: 98 % | WEIGHT: 165 LBS | HEART RATE: 74 BPM | SYSTOLIC BLOOD PRESSURE: 127 MMHG | HEIGHT: 61 IN | RESPIRATION RATE: 17 BRPM | BODY MASS INDEX: 31.15 KG/M2

## 2022-10-15 NOTE — ED TRIAGE NOTES
Pt reports having history of constipation, has had small bowel movements, nothing much in about more than one week. Pt reports two weeks ago had two episodes of nausea and vomiting and felt much better

## 2022-11-05 DIAGNOSIS — M25.473 ANKLE EDEMA: ICD-10-CM

## 2022-11-08 RX ORDER — FUROSEMIDE 20 MG
10 TABLET ORAL DAILY PRN
Qty: 45 TABLET | Refills: 1 | Status: SHIPPED | OUTPATIENT
Start: 2022-11-08 | End: 2023-05-03

## 2022-11-08 NOTE — TELEPHONE ENCOUNTER
Prescription approved per [facility] Refill Protocol.  Mary Hutson RN

## 2022-11-09 ENCOUNTER — E-VISIT (OUTPATIENT)
Dept: URGENT CARE | Facility: CLINIC | Age: 72
End: 2022-11-09
Payer: COMMERCIAL

## 2022-11-09 ENCOUNTER — NURSE TRIAGE (OUTPATIENT)
Dept: FAMILY MEDICINE | Facility: CLINIC | Age: 72
End: 2022-11-09

## 2022-11-09 DIAGNOSIS — R19.7 DIARRHEA, UNSPECIFIED TYPE: Primary | ICD-10-CM

## 2022-11-09 PROCEDURE — 99207 PR NON-BILLABLE SERV PER CHARTING: CPT | Performed by: NURSE PRACTITIONER

## 2022-11-09 ASSESSMENT — ENCOUNTER SYMPTOMS
ARTHRALGIAS: 1
HEADACHES: 0
CONSTIPATION: 1
SHORTNESS OF BREATH: 0
SORE THROAT: 0
DIARRHEA: 0
COUGH: 0
DYSURIA: 0
FEVER: 0
FREQUENCY: 0
BREAST MASS: 0
HEMATURIA: 0
WEAKNESS: 0
NAUSEA: 0
ABDOMINAL PAIN: 0
JOINT SWELLING: 1
NERVOUS/ANXIOUS: 0
HEARTBURN: 0
CHILLS: 0
DIZZINESS: 0
PARESTHESIAS: 0
HEMATOCHEZIA: 0
MYALGIAS: 0
EYE PAIN: 0
PALPITATIONS: 0

## 2022-11-09 ASSESSMENT — ACTIVITIES OF DAILY LIVING (ADL): CURRENT_FUNCTION: NO ASSISTANCE NEEDED

## 2022-11-09 NOTE — PATIENT INSTRUCTIONS
Joshua Gr  We do not have a way of ordering stool cultures through evisits  You would need to be seen in primary care or urgent care to be assessed and then they would order what is appropriate  There is no charge for this visit    CANDY Sin CNP      Diarrhea with Uncertain Cause (Adult)    Diarrhea is when stools are loose and watery. This can be caused by:  Viral infections  Bacterial infections  Food poisoning  Parasites  Irritable bowel syndrome (IBS)  Inflammatory bowel diseases such as ulcerative colitis, Crohn's disease, and celiac disease  Food intolerance, such as to lactose, the sugar found in milk and milk products  Reaction to medicines like antibiotics, laxatives, cancer drugs, and antacids  Along with diarrhea, you may also have:  Abdominal pain and cramping  Nausea and vomiting  Loss of bowel control  Fever and chills  Bloody stools  In some cases, antibiotics may help to treat diarrhea. You may have a stool sample test. This is done to see what is causing your diarrhea, and if antibiotics will help treat it. The results of a stool sample test may take up to 2 days. The healthcare provider may not give you antibiotics until he or she has the stool test results.  Diarrhea can cause dehydration. This is the loss of too much water and other fluids from the body. When this occurs, body fluid must be replaced. This can be done with oral rehydration solutions. Oral rehydration solutions are available at drugstores and grocery stores without a prescription. Sports drinks are not the best choice if you are very dehydrated. They have too much sugar and not enough electrolytes.  Home care  Follow all instructions given by your healthcare provider. Rest at home for the next 24 hours, or until you feel better. Avoid caffeine, tobacco, and alcohol. These can make diarrhea, cramping, and pain worse.  If taking medicines:  Over-the-counter nausea and diarrhea medicines are generally OK unless you  experience fever or blood stool. Check with your doctor first in those circumstances.  You may use acetaminophen or NSAID medicines like ibuprofen or naproxen to reduce pain and fever. Don t use these if you have chronic liver or kidney disease, or ever had a stomach ulcer or gastrointestinal bleeding. Don't use NSAID medicines if you are already taking one for another condition (like arthritis) or are on daily aspirin therapy (such as for heart disease or after a stroke). Talk with your healthcare provider first.  If antibiotics were prescribed, be sure you take them until they are finished. Don t stop taking them even when you feel better. Antibiotics must be taken as a full course.  To prevent the spread of illness:  Remember that washing with soap and water and using alcohol-based  is the best way to prevent the spread of infection. Dry your hands with a single use towel (like a paper towel).  Clean the toilet after each use.  Wash your hands before eating.  Wash your hands before and after preparing food. Keep in mind that people with diarrhea or vomiting should not prepare food for others.  Wash your hands after using cutting boards, countertops, and knives that have been in contact with raw foods.  Wash and then peel fruits and vegetables.  Keep uncooked meats away from cooked and ready-to-eat foods.  Use a food thermometer when cooking. Cook poultry to at least 165 F (74 C). Cook ground meat (beef, veal, pork, lamb) to at least 160 F (71 C). Cook fresh beef, veal, lamb, and pork to at least 145 F (63 C).  Don t eat raw or undercooked eggs (poached or jacky side up), poultry, meat, or unpasteurized milk and juices.  Food and drinks  The main goal while treating vomiting or diarrhea is to prevent dehydration. This is done by taking small amounts of liquids often.  Keep in mind that liquids are more important than food right now.  Drink only small amounts of liquids at a time.  Don t force yourself to  eat, especially if you are having cramping, vomiting, or diarrhea. Don t eat large amounts at a time, even if you are hungry.  If you eat, avoid fatty, greasy, spicy, or fried foods.  Don t eat dairy foods or drink milk if you have diarrhea. These can make diarrhea worse.  During the first 24 hours you can try:  Oral rehydration solutions.  Sports drinks may be used if you are not too dehydrated and are otherwise healthy.  Soft drinks without caffeine  Ginger ale  Water (plain or flavored)  Decaf tea or coffee  Clear broth, consommé, or bouillon  Gelatin, popsicles, or frozen fruit juice bars  The second 24 hours, if you are feeling better, you can add:  Hot cereal, plain toast, bread, rolls, or crackers  Plain noodles, rice, mashed potatoes, chicken noodle soup, or rice soup  Unsweetened canned fruit (no pineapple)  Bananas  As you recover:  Limit fat intake to less than 15 grams per day. Don t eat margarine, butter, oils, mayonnaise, sauces, gravies, fried foods, peanut butter, meat, poultry, or fish.  Limit fiber. Don t eat raw or cooked vegetables, fresh fruits except bananas, or bran cereals.  Limit caffeine and chocolate.  Limit dairy.  Don t use spices or seasonings except salt.  Go back to your normal diet over time, as you feel better and your symptoms improve.  If the symptoms come back, go back to a simple diet or clear liquids.  Follow-up care  Follow up with your healthcare provider, or as advised. If a stool sample was taken or cultures were done, call the healthcare provider for the results as instructed.  Call 911  Call 911 if you have any of these symptoms:  Trouble breathing  Confusion  Extreme drowsiness or trouble walking  Loss of consciousness  Rapid heart rate  Chest pain  Stiff neck  Seizure  When to seek medical advice  Call your healthcare provider right away if any of these occur:  Abdominal pain that gets worse  Constant lower right abdominal pain  Continued vomiting and inability to keep  liquids down  Diarrhea more than 5 times a day  Blood in vomit or stool  Dark urine or no urine for 8 hours, dry mouth and tongue, tiredness, weakness, or dizziness  Drowsiness  New rash  You don t get better in 2 to 3 days  Fever of 100.4 F (38 C) or higher, or as directed by your healthcare provider  Nidia last reviewed this educational content on 6/1/2018 2000-2021 [employer]. All rights reserved. This information is not intended as a substitute for professional medical care. Always follow your healthcare professional's instructions.

## 2022-11-09 NOTE — TELEPHONE ENCOUNTER
"Received call from Patient. Patient expressed frustration at not being heard and not being helped by submitting an E-visit. Patient states that she will go to the  in [place] today 11//8/22.  Patient requested a PCP is informed of what has been going on.     Patient reports that she does not have diarrhea and felt that the protocol used earlier was not fitting for her. Patient states will have times when she is very constipated (will go days without a BM for example Sept 24-October 15 and times when it all comes out and she will vomit.  Patient reports she provided a lengthy description during her E-vist:    \"These symptoms have occurred multiple times including 9/9. 9/24, 9/21, and today.  On 9/24 I had fish and chips for lunch, On 9/21 I had lobster pasta (was in [place]) and today I had a Lean Cuisine.  On Oct 15 I went to the ER for signficant constipation but was not seen.  Started Mirolax.   I had a few hard stools and then stringy, soft narrow stools since 11/1.  Today my stools were soft but not as narrow.  I spoke to a nurse at the [facility] location and she suggested I try an e-visit and hopefully get a stool sample kit.  I will say it is doubtful I will have stool for a few days.\"    Patient reports her father had colon cancer and is concerned for her health.    Patient wanting to be seen by PCP or another provider sooner scheduled visit for Pre-op office visit. Offered virtual visits on Friday 11/10/22 but patient states she did not want to wait that long.     Appointments in Next Year     [phone]   Nov 16, 2022 11:30 AM  (Arrive by 11:10 AM)  Pre-Operative Physical with Shalini Phan MD  [facility] ([facility] ) [phone]        Routing to PCP as SANJAY.    Gela Chavez RN      "

## 2022-11-09 NOTE — TELEPHONE ENCOUNTER
Pt calling clinic again stating that she went to  and was told that it was a 2 + hour was upset and did not feel like waiting to be seen. Pt offered appoint at [facility] walk in clinic. Pt declined at this time. Pt scheduled for VV with PCP on Monday at 0810. Please advise if this is not appropriate.    Please advise.    Melissa ALONZO RN  EP Triage

## 2022-11-09 NOTE — TELEPHONE ENCOUNTER
"Patient will go to urgent care today or will start an e-visits.     Reason for Disposition    [1] SEVERE diarrhea (e.g., 7 or more times / day more than normal) AND [2] present > 24 hours (1 day)    Additional Information    Negative: Shock suspected (e.g., cold/pale/clammy skin, too weak to stand, low BP, rapid pulse)    Negative: Difficult to awaken or acting confused (e.g., disoriented, slurred speech)    Negative: Sounds like a life-threatening emergency to the triager    Negative: Vomiting also present and worse than the diarrhea    Negative: [1] Blood in stool AND [2] without diarrhea    Negative: Diarrhea in a cancer patient who is currently (or recently) receiving chemotherapy or radiation therapy, or cancer patient who has metastatic or end-stage cancer and is receiving palliative care    Negative: [1] SEVERE abdominal pain (e.g., excruciating) AND [2] present > 1 hour    Negative: [1] SEVERE abdominal pain AND [2] age > 60 years    Negative: [1] Blood in the stool AND [2] moderate or large amount of blood    Negative: Black or tarry bowel movements (Exception: chronic-unchanged black-grey bowel movements AND is taking iron pills or Pepto-bismol)    Negative: [1] Drinking very little AND [2] dehydration suspected (e.g., no urine > 12 hours, very dry mouth, very lightheaded)    Negative: Patient sounds very sick or weak to the triager    Negative: [1] SEVERE diarrhea (e.g., 7 or more times / day more than normal) AND [2] age > 60 years    Negative: [1] Constant abdominal pain AND [2] present > 2 hours    Negative: [1] Fever > 103 F (39.4 C) AND [2] not able to get the fever down using Fever Care Advice    Answer Assessment - Initial Assessment Questions  1. DIARRHEA SEVERITY: \"How bad is the diarrhea?\" \"How many more stools have you had in the past 24 hours than normal?\"     - NO DIARRHEA (SCALE 0)    - MILD (SCALE 1-3): Few loose or mushy BMs; increase of 1-3 stools over normal daily number of stools; mild " "increase in ostomy output.    -  MODERATE (SCALE 4-7): Increase of 4-6 stools daily over normal; moderate increase in ostomy output.  * SEVERE (SCALE 8-10; OR 'WORST POSSIBLE'): Increase of 7 or more stools daily over normal; moderate increase in ostomy output; incontinence.      Diarrhea after seafood .  2. ONSET: \"When did the diarrhea begin?\"       The end of October.   3. BM CONSISTENCY: \"How loose or watery is the diarrhea?\"       Loose to constipated.   4. VOMITING: \"Are you also vomiting?\" If Yes, ask: \"How many times in the past 24 hours?\"       Yes, clear.   5. ABDOMINAL PAIN: \"Are you having any abdominal pain?\" If Yes, ask: \"What does it feel like?\" (e.g., crampy, dull, intermittent, constant)       No  6. ABDOMINAL PAIN SEVERITY: If present, ask: \"How bad is the pain?\"  (e.g., Scale 1-10; mild, moderate, or severe)    - MILD (1-3): doesn't interfere with normal activities, abdomen soft and not tender to touch     - MODERATE (4-7): interferes with normal activities or awakens from sleep, abdomen tender to touch     - SEVERE (8-10): excruciating pain, doubled over, unable to do any normal activities        Moderate.   7. ORAL INTAKE: If vomiting, \"Have you been able to drink liquids?\" \"How much liquids have you had in the past 24 hours?\"      Yes.   8. HYDRATION: \"Any signs of dehydration?\" (e.g., dry mouth [not just dry lips], too weak to stand, dizziness, new weight loss) \"When did you last urinate?\"      Yes  9. EXPOSURE: \"Have you traveled to a foreign country recently?\" \"Have you been exposed to anyone with diarrhea?\" \"Could you have eaten any food that was spoiled?\"      Yes  10. ANTIBIOTIC USE: \"Are you taking antibiotics now or have you taken antibiotics in the past 2 months?\"        No   11. OTHER SYMPTOMS: \"Do you have any other symptoms?\" (e.g., fever, blood in stool)        No  12. PREGNANCY: \"Is there any chance you are pregnant?\" \"When was your last menstrual period?\"        N/A    Protocols " used: DIARRHEA-A-    Syeda Butler ,RN  [facility]

## 2022-11-10 ENCOUNTER — OFFICE VISIT (OUTPATIENT)
Dept: FAMILY MEDICINE | Facility: CLINIC | Age: 72
End: 2022-11-10
Payer: COMMERCIAL

## 2022-11-10 VITALS
RESPIRATION RATE: 19 BRPM | BODY MASS INDEX: 30.61 KG/M2 | HEART RATE: 80 BPM | SYSTOLIC BLOOD PRESSURE: 120 MMHG | TEMPERATURE: 97.5 F | DIASTOLIC BLOOD PRESSURE: 60 MMHG | OXYGEN SATURATION: 99 % | WEIGHT: 162 LBS

## 2022-11-10 DIAGNOSIS — K57.30 DIVERTICULOSIS OF SIGMOID COLON: ICD-10-CM

## 2022-11-10 DIAGNOSIS — K59.00 CONSTIPATION, UNSPECIFIED CONSTIPATION TYPE: ICD-10-CM

## 2022-11-10 DIAGNOSIS — M85.88 OSTEOPENIA OF OTHER SITE: Primary | ICD-10-CM

## 2022-11-10 PROCEDURE — 99214 OFFICE O/P EST MOD 30 MIN: CPT | Performed by: FAMILY MEDICINE

## 2022-11-10 ASSESSMENT — PAIN SCALES - GENERAL: PAINLEVEL: NO PAIN (0)

## 2022-11-10 NOTE — TELEPHONE ENCOUNTER
Patient agreed to double booking appointment. She is aware that she may have to wait to see Dr. Phan. Cass Fraire RN

## 2022-11-10 NOTE — PROGRESS NOTES
Assessment & Plan       (K59.00) Constipation, unspecified constipation type  Comment:   Plan:     (K57.30) Diverticulosis of sigmoid colon  Comment:   Plan:       Discussed most common cause/  diagnosis for your suggested  symptoms is  likely bc as er pt , she has tortuous colon , causing  constipation.  Also not very regular and taking MiraLAX.  She symptom end up taking Imodium and she is backed up, and that probably can lead to loose BM or  frequent BM.   Explained to the patient , sometime she is not going going every day and then it is  hard for her to evacuate completely, that is still considered as constipation.  Talked about high fibre / fluid diet helps to regulate your bowl movement .    Talked about using MiraLAX regularly, to help with regulating bowel and to avoid constipation, although she might has to figure out the right dose that works for her  .  She can probably take a smaller dose every other day, which might just be enough for her , bc  she thinks if she takes a full dose every day it can cause more bruising more frequent..  Also talked about using squatty potty might also help.  May even try probiotics to help.  She has been given referral to colorectal specialist previously, she plans to schedule for that as well.   Talked about possible  warning signs and symptoms for which should be seen urgently.  So I recommend  symptomatic treatment at this time but make sure to do  follow up if  recurrent or ongoing problem. We should  consider further evaluation if needed.     Patient expressed understanding and agreement with treatment plan. All patient's questions were answered, will let me know if has more later.  Medications: Rx's: Reviewed the potential side effects/complications of medications prescribed.   Spent 30 min with patient and more then 50% of the time spent counseling and coordination of cares       Shalini Phan MD  [facility] [place]    Subjective   Maile  is a 72 year old, presenting for the following health issues:  Constipation      History of Present Illness       Reason for visit:  Constipation    She eats 2-3 servings of fruits and vegetables daily.She consumes 0 sweetened beverage(s) daily.She exercises with enough effort to increase her heart rate 9 or less minutes per day.  She exercises with enough effort to increase her heart rate 3 or less days per week.   She is taking medications regularly.       She is mostly concerned with ongoing issues with constipation.  She states that she has a tortuous colon, and she was told at a very young age as well that this can be a problem and concurrent constipation issues for her.  She also has some rectal issues sometimes, she has to digitally evacuate her bowel to relieve her constipation.    so she knows that she is constipated always although lately symptoms have been worse.  Had a colonoscopy 2 years ago,Except that it is confirmed some diverticulosis:.     She had some similar episodes back in September when she Backed up and constipated that led to some nausea and vomiting.  She got sick and she had to go to the emergency room to be evaluated  End of October she was in [place], so she got similar symptoms she was a bit nauseated and felt to full, she has had few BMs but each time she had small amount completely evacuated her.  She also vomited few times, but she think maybe she also had food poisoning from eating a lobster.  She eventually improved and was better, but felt similar symptoms again last couple days.  She went to urgent care, although advised to follow so she did not want to wait that long.  She called yesterday feeling frustrated because of the ongoing symptoms, so we gave her the appointment for today to get a checkup.   She thinks at least she is feeling better.    Later yesterday she had a more normal bowel movement finally, and thinks that looks more like her normal stool and normal color.       She admits that she has not been taking her MiraLAX as regular, sometimes might be too much for her.    Sometimes she ends up getting backed up, then she takes Imodium, so then she ends up  going more frequent, although she really does not have any liquidy watery diarrhea, no blood or mucus or black stools.  No rectal pain or rectal swelling.  she eats well otherwise.        Review of Systems   Constitutional, HEENT, cardiovascular, pulmonary, GI, , musculoskeletal, neuro, skin, endocrine and psych systems are negative, except as otherwise noted.      Objective    /60   Pulse 80   Temp 97.5  F (36.4  C) (Tympanic)   Resp 19   Wt 73.5 kg (162 lb)   LMP 11/01/2004   SpO2 99%   BMI 30.61 kg/m    Body mass index is 30.61 kg/m .  Physical Exam   GENERAL: healthy, alert and no distress  EYES: Eyes grossly normal to inspection, conjunctivae and sclerae normal  HENT:  mouth without ulcers or lesions, oral mucosa moist.   NECK: no adenopathy,   RESP: lungs clear to auscultation - no rales, rhonchi or wheezes  CV: regular rate and rhythm, normal S1 S2, no S3 or S4,   ABDOMEN: soft, nontender, no hepatosplenomegaly, no masses and bowel sounds normal  MS: no edema  NEURO: Normal strength and tone, mentation intact and speech normal  PSYCH: mentation appears normal, affect normal/bright

## 2022-11-14 ENCOUNTER — VIRTUAL VISIT (OUTPATIENT)
Dept: FAMILY MEDICINE | Facility: CLINIC | Age: 72
End: 2022-11-14
Payer: COMMERCIAL

## 2022-11-14 DIAGNOSIS — K59.00 CONSTIPATION, UNSPECIFIED CONSTIPATION TYPE: Primary | ICD-10-CM

## 2022-11-14 DIAGNOSIS — M85.88 OSTEOPENIA OF OTHER SITE: ICD-10-CM

## 2022-11-14 PROCEDURE — 99213 OFFICE O/P EST LOW 20 MIN: CPT | Mod: 95 | Performed by: FAMILY MEDICINE

## 2022-11-14 NOTE — PROGRESS NOTES
Maile is a 72 year old who is being evaluated via a billable video visit.      How would you like to obtain your AVS? MyChart  If the video visit is dropped, the invitation should be resent by: Text to cell phone: [phone]  Will anyone else be joining your video visit? No          Assessment & Plan     (K59.00) Constipation, unspecified constipation type  (primary encounter diagnosis)  Comment:   Plan: CBC with platelets        Ongoing issue with constipation.  She tried to schedule with colorectal specialist but they are both still much.  I gave her referral for GI to see if she can get in with them sooner.  Also asked her to continue to work with  high-fiber fluid diet. It is also worth  trying physical therapy to help. referral given.         (M85.88) Osteopenia of other site  Comment: she is due for f/u check. Per pt she is scheduled at [place] for a follow up   Plan: Physical Therapy Referral, Adult GI          Referral - Consult Only            Check labs.  Referral given.   Jones to continue to.Cares and  treatment discussed. follow up if problem   Patient expressed understanding and agreement with treatment plan. All patient's questions were answered, will let me know if has more later.  Medications: Rx's: Reviewed the potential side effects/complications of medications prescribed.       Shalini Phan MD  [facility]   Maile is a 72 year old{ , presenting for the following health issues:  No chief complaint on file.      History of Present Illness       Reason for visit:  Constipation    She eats 2-3 servings of fruits and vegetables daily.She consumes 0 sweetened beverage(s) daily.She exercises with enough effort to increase her heart rate 9 or less minutes per day.  She exercises with enough effort to increase her heart rate 3 or less days per week.   She is taking medications regularly.       Was seen last week for ongoing recurrent issue with  constipation and sometimes it becomes very uncomfortable and she has some abdominal discomfort and pain, nausea mostly but has had episodes when she can vomit.   Had similiar episode again couple of days ago feeling constipated and felt cramp . So used suppository and that helped . But no bm since then . She has been going may be once a week. Sometimes it is not painful, no nausea vomiting this time. But concerned that stool get stuck .    She is also due for bone density, but she states she is already scheduled at [place]. So she is comfortable waiting till then      Review of Systems   Constitutional, HEENT, cardiovascular, pulmonary, GI, , musculoskeletal, neuro, skin, endocrine and psych systems are negative, except as otherwise noted.      Objective         Vitals:  No vitals were obtained today due to virtual visit.  Physical Exam   GENERAL: Healthy, alert and no distress  EYES: Eyes grossly normal to inspection.  No discharge or erythema, or obvious scleral/conjunctival abnormalities.  RESP: No audible wheeze, cough, or visible cyanosis.  No visible retractions or increased work of breathing.    SKIN: Visible skin clear. No significant rash, abnormal pigmentation or lesions.  NEURO: Cranial nerves grossly intact.  Mentation and speech appropriate for age.  PSYCH: Mentation appears normal, affect normal/bright, judgement and insight intact, normal speech and appearance well-groomed.          Video-Visit Details    Video Start Time: 8:15 AM    Type of service:  Video Visit    Video End Time:8:40 AM    Originating Location (pt. Location): Home    Distant Location (provider location):  Off-site    Platform used for Video Visit: Embedster

## 2022-11-16 ENCOUNTER — OFFICE VISIT (OUTPATIENT)
Dept: FAMILY MEDICINE | Facility: CLINIC | Age: 72
End: 2022-11-16
Payer: COMMERCIAL

## 2022-11-16 VITALS
DIASTOLIC BLOOD PRESSURE: 70 MMHG | HEIGHT: 61 IN | OXYGEN SATURATION: 99 % | BODY MASS INDEX: 30.93 KG/M2 | SYSTOLIC BLOOD PRESSURE: 136 MMHG | WEIGHT: 163.8 LBS | HEART RATE: 78 BPM

## 2022-11-16 DIAGNOSIS — Z01.818 PREOP GENERAL PHYSICAL EXAM: Primary | ICD-10-CM

## 2022-11-16 DIAGNOSIS — M25.572 PAIN IN JOINT INVOLVING ANKLE AND FOOT, LEFT: ICD-10-CM

## 2022-11-16 PROCEDURE — 99214 OFFICE O/P EST MOD 30 MIN: CPT | Performed by: FAMILY MEDICINE

## 2022-11-16 ASSESSMENT — ENCOUNTER SYMPTOMS
CONSTIPATION: 1
DIZZINESS: 0
SORE THROAT: 0
HEMATURIA: 0
ABDOMINAL PAIN: 0
MYALGIAS: 0
DYSURIA: 0
SHORTNESS OF BREATH: 0
DIARRHEA: 0
ARTHRALGIAS: 1
JOINT SWELLING: 1
CHILLS: 0
WEAKNESS: 0
FEVER: 0
PALPITATIONS: 0
NAUSEA: 0
FREQUENCY: 0
EYE PAIN: 0
COUGH: 0
NERVOUS/ANXIOUS: 0
HEADACHES: 0

## 2022-11-16 ASSESSMENT — PAIN SCALES - GENERAL: PAINLEVEL: NO PAIN (0)

## 2022-11-16 NOTE — TELEPHONE ENCOUNTER
Diagnosis, Referred by & from: has ongoing issue with constipation   Appt date: 11/28/2022   NOTES STATUS DETAILS   OFFICE NOTE from referring provider Internal 11/14/2022 [facility]   OFFICE NOTE from other specialist Internal 11/9/2020 [facility]   DISCHARGE SUMMARY from hospital N/A    DISCHARGE REPORT from the ER N/A    OPERATIVE REPORT N/A    MEDICATION LIST N/A    LABS     ANAL PAP/CEA N/A    BIOPSIES/PATHOLOGY RELATED TO DIAGNOSIS N/A    DIAGNOSTIC PROCEDURES     PFC TESTING (from the Pelvic floor center includes Manometry, PDNL, EMG, etc.) N/A    COLONOSCOPY Internal 11/4/2020 - [facility]    UPPER ENDOSCOPY (EGD) N/A    FLEX SIGMOIDOSCOPY N/A    ERCP N/A    IMAGING (DISC & REPORT)      CT N/A    MRI N/A    XRAY N/A    ULTRASOUND  (ENDOANAL/ENDORECTAL) N/A      Records Requested  11/16/22    Facility     Outcome

## 2022-11-16 NOTE — PROGRESS NOTES
[address] [address]  Phone: [phone]  Primary Provider: Johnathon Phan  Pre-op Performing Provider: JOHNATHON PHAN      PREOPERATIVE EVALUATION:  Today's date: 11/16/2022    Aracelis Muñoz is a 72 year old female who presents for a preoperative evaluation.    Surgical Information:  Surgery/Procedure: Foot surgery  Surgery Location: O  Surgeon:   Surgery Date: 11/30/2022  Time of Surgery: n/a   Where patient plans to recover: At home with family  Fax number for surgical facility: [phone]    Type of Anesthesia Anticipated: to be determined    Assessment & Plan     The proposed surgical procedure is considered LOW risk.    (Z01.818) Preop general physical exam  (primary encounter diagnosis)  Comment:   Plan: : EKG 12-lead complete w/read - Clinics            (M25.572) Pain in joint involving ankle and foot, left  Comment:   plan: ok for surgery       .Cares and  treatment discussed.  follow up if problem   Patient expressed understanding and agreement with treatment plan. All patient's questions were answered, will let me know if has more later.  Medications: Rx's: Reviewed the potential side effects/complications of medications prescribed.         Medication Instructions:   - ibuprofen (Advil, Motrin): HOLD 1 day before surgery.     RECOMMENDATION:  APPROVAL GIVEN to proceed with proposed procedure, without further diagnostic evaluation.            Subjective     HPI related to upcoming procedure: scheduled for left foot/ ankle - surgery for ongoing pain issue     Preop Questions 11/9/2022   1. Have you ever had a heart attack or stroke? No   2. Have you ever had surgery on your heart or blood vessels, such as a stent placement, a coronary artery bypass, or surgery on an artery in your head, neck, heart, or legs? No   3. Do you have chest pain with activity? No   4. Do you have a history of  heart failure? No    5. Do you currently have a cold, bronchitis or symptoms of other infection? No   6. Do you have a cough, shortness of breath, or wheezing? No   7. Do you or anyone in your family have previous history of blood clots? No   8. Do you or does anyone in your family have a serious bleeding problem such as prolonged bleeding following surgeries or cuts? No   9. Have you ever had problems with anemia or been told to take iron pills? No   10. Have you had any abnormal blood loss such as black, tarry or bloody stools, or abnormal vaginal bleeding? No   11. Have you ever had a blood transfusion? YES - 1982 after c section    11a. Have you ever had a transfusion reaction? No   12. Are you willing to have a blood transfusion if it is medically needed before, during, or after your surgery? Yes   13. Have you or any of your relatives ever had problems with anesthesia? No   14. Do you have sleep apnea, excessive snoring or daytime drowsiness? No   15. Do you have any artifical heart valves or other implanted medical devices like a pacemaker, defibrillator, or continuous glucose monitor? No   16. Do you have artificial joints? No   17. Are you allergic to latex? No       Health Care Directive:  Patient does not have a Health Care Directive or Living Will: Patient states has Advance Directive and will bring in a copy to clinic.        Status of Chronic Conditions:  See problem list for active medical problems.  Problems all longstanding and stable, except as noted/documented.  See ROS for pertinent symptoms related to these conditions.      Review of Systems   Constitutional: Negative for chills and fever.   HENT: Positive for hearing loss. Negative for congestion, ear pain and sore throat.    Eyes: Negative for pain and visual disturbance.   Respiratory: Negative for cough and shortness of breath.    Cardiovascular: Negative for chest pain and palpitations.   Gastrointestinal: Positive for constipation. Negative for abdominal  pain, diarrhea and nausea.   Genitourinary: Negative for dysuria, frequency, genital sores, hematuria, pelvic pain, urgency, vaginal bleeding and vaginal discharge.   Musculoskeletal: Positive for arthralgias and joint swelling. Negative for myalgias.   Skin: Negative for rash.   Neurological: Negative for dizziness, weakness and headaches.   Psychiatric/Behavioral: The patient is not nervous/anxious.      CONSTITUTIONAL: NEGATIVE for fever, chills, change in weight  INTEGUMENTARY/SKIN: NEGATIVE for worrisome rashes, moles or lesions  EYES: NEGATIVE for vision changes or irritation  ENT/MOUTH: NEGATIVE for ear, mouth and throat problems  RESP: NEGATIVE for significant cough or SOB  CV: NEGATIVE for chest pain, palpitations or peripheral edema  GI: NEGATIVE for nausea, abdominal pain, heartburn, or change in bowel habits  GI: POSITIVE for has ongoing constipation issue, no new chnage recently  and constipation  : NEGATIVE for frequency, dysuria, or hematuria  MUSCULOSKELETAL: NEGATIVE for significant arthralgias or myalgia  NEURO: NEGATIVE for weakness, dizziness or paresthesias  ENDOCRINE: NEGATIVE for temperature intolerance, skin/hair changes  HEME: NEGATIVE for bleeding problems  PSYCHIATRIC: NEGATIVE for changes in mood or affect    Patient Active Problem List    Diagnosis Date Noted     Diverticulosis of sigmoid colon 11/09/2020     Priority: Medium            - Diverticulosis in the sigmoid colon.                             - otherwise normal. per colonoscopy 2020                                  Osteopenia of other site 06/25/2018     Priority: Medium     As per 2016 dexa- report   1. Prominent osteopenia in the left femoral neck, minimally improved.  2. Moderate-prominent osteopenia in the right femoral neck, mildly  improved.  3. Findings consistent with moderate osteopenia in the lumbar spine,  slightly worse.       Right foot pain 04/05/2018     Priority: Medium     seeing ortho , ankle/ foot arthritis         Lumbago 01/19/2017     Priority: Medium     Right knee pain 01/19/2017     Priority: Medium     Left knee pain, unspecified chronicity 01/16/2017     Priority: Medium     Acute midline low back pain with left-sided sciatica 01/16/2017     Priority: Medium     Major depressive disorder, recurrent episode, mild (H) 06/27/2016     Priority: Medium     Eczema, unspecified type 06/27/2016     Priority: Medium     rt leg        Hyperlipidemia with target LDL less than 130 01/28/2016     Priority: Medium     Other insomnia 12/21/2015     Priority: Medium     Hypercalcemia 10/30/2013     Priority: Medium     Vitamin D deficiency 09/30/2013     Priority: Medium     Hyperparathyroidism (H) 09/24/2013     Priority: Medium     Serum calcium elevated 09/22/2013     Priority: Medium     Mild intermittent asthma without complication 12/11/2012     Priority: Medium     Generalized anxiety disorder 05/12/2011     Priority: Medium     Diagnosis updated by automated process. Provider to review and confirm.       Environmental allergies 04/22/2011     Priority: Medium     Osteopenia      Priority: Medium     Diffuse cystic mastopathy      Priority: Medium     bilateral       Constipation, unspecified constipation type      Priority: Medium     Lumbar disc herniation with radiculopathy      Priority: Medium     left L4-5       Post-Nasal Drainage      Priority: Medium     chronic       Perennial allergic rhinitis      Priority: Medium     Menopausal LMP age 54      Priority: Medium      Past Medical History:   Diagnosis Date     Arthritis      Depressive disorder 1980     Depressive disorder, not elsewhere classified     improved with trazadone     Diffuse cystic mastopathy     bilateral     Insomnia, unspecified      trazodone     Lumbar disc herniation with radiculopathy 2010    left L4-5     Mild persistent asthma      Osteopenia 2008     Perennial allergic rhinitis      Post-Nasal Drainage     chronic     Slow transit  constipation      Past Surgical History:   Procedure Laterality Date     C/SECTION, LOW TRANSVERSE      S/P C/S     CHOLECYSTECTOMY, OPEN  1985    Cholecystectomy     COLONOSCOPY N/A 11/4/2020    Procedure: COLONOSCOPY;  Surgeon: Alona Mancini MD;  Location: [facility]     HC EXCISION BREAST LESION, OPEN >=1  1972    Benign mass right breast     HC MRI LUMBAR SPINE W/O CONTRAST  4/2010    multilevel degen disc disease/facet arthropathy, 5 mm caudally extruded left posterolateral disc herniation at L4-5 with impingement on the left L5 nerve root      HC PUNCTURE/ASPIRATION BREAST CYST, FIRST  1995,12/03,8/04    bilateral '03, left '04     LIGATN/STRIP LONG & SHORT SAPHEN      varicose vein stripping     ORTHOPEDIC SURGERY      foot fusion of joint     ZZC NONSPECIFIC PROCEDURE  1982    Cyst     Current Outpatient Medications   Medication Sig Dispense Refill     albuterol (PROAIR HFA/PROVENTIL HFA/VENTOLIN HFA) 108 (90 Base) MCG/ACT inhaler Inhale 2 puffs into the lungs every 6 hours as needed for shortness of breath / dyspnea or wheezing As needed 18 g 1     azelastine (OPTIVAR) 0.05 % ophthalmic solution PLACE 1 DROP INTO BOTH EYES DAILY AS NEEDED 6 mL 3     finasteride (PROSCAR) 5 MG tablet TAKE 1/2 TABLET BY MOUTH DAILY       furosemide (LASIX) 20 MG tablet TAKE 0.5 TABLETS (10 MG) BY MOUTH DAILY AS NEEDED (LEG SWELLING AS NEEDED) 45 tablet 1     minoxidil (LONITEN) 2.5 MG tablet TAKE 1/4 TABLETS (0.625 MG TOTAL) BY MOUTH DAILY.       traZODone (DESYREL) 100 MG tablet TAKE 1 TABLET BY MOUTH EVERYDAY AT BEDTIME 90 tablet 0     triamcinolone (KENALOG) 0.1 % external cream Apply topically 2 times daily as needed for irritation X 2 weeks max 15 g 0       Allergies   Allergen Reactions     Nabumetone      Other reaction(s): Other (see comments)  Hair loss  Hair loss       Nefazodone Hydrochloride Swelling     (serzone) sore swollen tongue     Sulfa Drugs Swelling     Sore swollen tongue     Prochlorperazine Anxiety  "    (Compazine) became agitated        Social History     Tobacco Use     Smoking status: Never     Smokeless tobacco: Never   Substance Use Topics     Alcohol use: Yes     Alcohol/week: 0.0 standard drinks     Comment: 1-2 glasses of wine 1-2 times per week      Family History   Problem Relation Age of Onset     Thyroid Disease Mother      Hypertension Mother      Allergies Mother      Cerebrovascular Disease Mother         minor, May, 2016     Cancer - colorectal Father          age 65 colon cancer     Thyroid Disease Father      Allergies Father      Depression Father      Obesity Father      Genitourinary Problems Maternal Aunt         fibrocystic breast     Thyroid Disease Sister      History   Drug Use No         Objective     /70   Pulse 78   Ht 1.549 m (5' 1\")   Wt 74.3 kg (163 lb 12.8 oz)   LMP 2004   SpO2 99%   BMI 30.95 kg/m      Physical Exam    GENERAL APPEARANCE: healthy, alert and no distress     EYES: EOMI, PERRL     HENT: ear canals and TM's normal and nose and mouth without ulcers or lesions     NECK: no adenopathy, no asymmetry, masses, or scars and thyroid normal to palpation     RESP: lungs clear to auscultation - no rales, rhonchi or wheezes     CV: regular rates and rhythm, normal S1 S2, no S3 or S4      ABDOMEN:  soft, nontender, no HSM or masses and bowel sounds normal     MS:  noted,no edema      SKIN: no suspicious lesions or rashes     NEURO: Normal strength and tone, sensory exam grossly normal, mentation intact and speech normal     PSYCH: mentation appears normal. and affect normal/bright    Recent Labs   Lab Test 22  1224 21  0938    138   POTASSIUM 4.4 3.9   CR 0.69 0.80        Diagnostics:  No labs were ordered during this visit.   No EKG required, no history of coronary heart disease, significant arrhythmia, peripheral arterial disease or other structural heart disease.    Revised Cardiac Risk Index (RCRI):  The patient has the following " "serious cardiovascular risks for perioperative complications:   - No serious cardiac risks = 0 points           Signed Electronically by: Shalini Phan MD  Copy of this evaluation report is provided to requesting physician.      Answers for HPI/ROS submitted by the patient on 11/9/2022  In general, how would you rate your overall physical health?: good  Frequency of exercise:: None  Do you usually eat at least 4 servings of fruit and vegetables a day, include whole grains & fiber, and avoid regularly eating high fat or \"junk\" foods? : No  Taking medications regularly:: Yes  Medication side effects:: Other  Activities of Daily Living: no assistance needed  Home safety: lack of grab bars in the bathroom  Hearing Impairment:: find that men's voices are easier to understand than woman's, difficulty understanding soft or whispered speech  In the past 6 months, have you been bothered by leaking of urine?: Yes  Blood in stool: No  heartburn: No  peripheral edema: No  mood changes: No  Skin sensation changes: No  tenderness: No  breast mass: No  breast discharge: No  In general, how would you rate your overall mental or emotional health?: excellent  Additional concerns today:: Yes      "

## 2022-11-27 ENCOUNTER — HEALTH MAINTENANCE LETTER (OUTPATIENT)
Age: 72
End: 2022-11-27

## 2022-11-27 DIAGNOSIS — F33.0 MAJOR DEPRESSIVE DISORDER, RECURRENT EPISODE, MILD (H): ICD-10-CM

## 2022-11-27 DIAGNOSIS — G47.09 OTHER INSOMNIA: ICD-10-CM

## 2022-11-28 ENCOUNTER — PRE VISIT (OUTPATIENT)
Dept: GASTROENTEROLOGY | Facility: CLINIC | Age: 72
End: 2022-11-28

## 2022-11-28 ENCOUNTER — VIRTUAL VISIT (OUTPATIENT)
Dept: GASTROENTEROLOGY | Facility: CLINIC | Age: 72
End: 2022-11-28
Attending: FAMILY MEDICINE
Payer: COMMERCIAL

## 2022-11-28 DIAGNOSIS — K59.09 CHRONIC CONSTIPATION: Primary | ICD-10-CM

## 2022-11-28 PROCEDURE — 99205 OFFICE O/P NEW HI 60 MIN: CPT | Mod: 95 | Performed by: PHYSICIAN ASSISTANT

## 2022-11-28 NOTE — PROGRESS NOTES
Maile is a 72 year old who is being evaluated via a billable video visit.      How would you like to obtain your AVS? MyChart  If the video visit is dropped, the invitation should be resent by: Send to e-mail at: [email or web address]  Will anyone else be joining your video visit? No          GASTROENTEROLOGY NEW PATIENT VIDEO VISIT     CC/REFERRING MD:    Shalini Phan    REASON FOR CONSULTATION:   Referred by Shalini Phan for New Patient      HISTORY OF PRESENT ILLNESS:    Aracelis Muñoz is 72 year old female who presents for evaluation of chronic constipation.    She reports having troubles with bowel movements for a long time but notes symptoms have recently become exacerbated.  She started having troubles in early September with constipation.  She was so backed up and not able to have a bowel movement.  When she finally was able to have a bowel movement she was straining and having emesis.  These episodes would occur on a weekly basis.  Once she was able to pass bowel movement, initial BM would be very hard with the remaining stool being very large amount.  She has tried taking MiraLAX but has had difficulty with it.  She finds that a full capful will lead to diarrhea.  She settled on half a capful daily but still had difficulty with consistency of stools with stools being stringy and thinner.  She does occasionally have a bulkier stool.    She traveled last week for [place] and notes that symptoms were more difficult to manage with travel.  She was taking miralax at 1/2 capful prior to travel but developed diarrhea at the airport on day of travel. She therefore stopped MiraLAX while traveling and restarted upon her return several days ago. She however reports she has not had a good bowel movement in the past week.  She might of only had a very small BM in the last week.  She has tried increasing miralax to 3/4 capful but still no BM in the past few days.     There is a  family history of colon cancer in her father.  He was diagnosed at age 63 or 64 with metastatic cancer.     These last screening colonoscopy was in 2020 which besides diverticulosis was unrevealing.  She was advised that she would not need any more screening colonoscopies.  She reports maybe having 2 to 3 pounds weight loss but otherwise reports her weight is stable.  There is no blood in stool.    She does report having food poisoning last month in October while visiting [place]. She was having n/v/d and lost a few pounds at that time but did recover from symptoms and regained that weight.       Maile  has a past medical history of Arthritis, Depressive disorder (1980), Depressive disorder, not elsewhere classified, Diffuse cystic mastopathy, Insomnia, unspecified, Lumbar disc herniation with radiculopathy (2010), Mild persistent asthma, Osteopenia (2008), Perennial allergic rhinitis, Post-Nasal Drainage, and Slow transit constipation.    She  has a past surgical history that includes cholecystectomy, open (1985); EXCISION BREAST LESION, OPEN >=1 (1972); NONSPECIFIC PROCEDURE (1982); c/section, low transverse; PUNCTURE/ASPIRATION BREAST CYST, FIRST (1995,12/03,8/04); LIGATN/STRIP LONG & SHORT SAPHEN; MRI LUMBAR SPINE W/O CONTRAST (4/2010); orthopedic surgery; and Colonoscopy (N/A, 11/4/2020).    She  reports that she has never smoked. She has never used smokeless tobacco. She reports current alcohol use. She reports that she does not use drugs.    Her family history includes Allergies in her father and mother; Cancer - colorectal in her father; Cerebrovascular Disease in her mother; Depression in her father; Genitourinary Problems in her maternal aunt; Hypertension in her mother; Obesity in her father; Thyroid Disease in her father, mother, and sister.    ALLERGIES:  Nabumetone, Nefazodone hydrochloride, Sulfa drugs, and Prochlorperazine      PREVIOUS ENDOSCOPY:    COLONOSCOPY 11/4/2020  Findings:        The  perianal and digital rectal examinations were normal.        A few medium-mouthed diverticula were found in the sigmoid colon.        The exam was otherwise without abnormality.                                                                                     Impression:               - Diverticulosis in the sigmoid colon.                             - The examination was otherwise normal.                             - No specimens collected.   Alona Mancini MD   11/4/2020 10:30:03 AM    PERTINENT MEDICATIONS:    Current Outpatient Medications:      albuterol (PROAIR HFA/PROVENTIL HFA/VENTOLIN HFA) 108 (90 Base) MCG/ACT inhaler, Inhale 2 puffs into the lungs every 6 hours as needed for shortness of breath / dyspnea or wheezing As needed, Disp: 18 g, Rfl: 1     azelastine (OPTIVAR) 0.05 % ophthalmic solution, PLACE 1 DROP INTO BOTH EYES DAILY AS NEEDED, Disp: 6 mL, Rfl: 3     finasteride (PROSCAR) 5 MG tablet, TAKE 1/2 TABLET BY MOUTH DAILY, Disp: , Rfl:      furosemide (LASIX) 20 MG tablet, TAKE 0.5 TABLETS (10 MG) BY MOUTH DAILY AS NEEDED (LEG SWELLING AS NEEDED), Disp: 45 tablet, Rfl: 1     minoxidil (LONITEN) 2.5 MG tablet, TAKE 1/4 TABLETS (0.625 MG TOTAL) BY MOUTH DAILY., Disp: , Rfl:      traZODone (DESYREL) 100 MG tablet, TAKE 1 TABLET BY MOUTH EVERYDAY AT BEDTIME, Disp: 90 tablet, Rfl: 0     triamcinolone (KENALOG) 0.1 % external cream, Apply topically 2 times daily as needed for irritation X 2 weeks max, Disp: 15 g, Rfl: 0      PHYSICAL EXAMINATION:  Constitutional: aaox3, cooperative, pleasant, not dyspneic/diaphoretic, no acute distress      GENERAL: Healthy, alert and no distress  EYES: Eyes grossly normal to inspection.  No discharge or erythema, or obvious scleral/conjunctival abnormalities.  RESP: No audible wheeze, cough, or visible cyanosis.  No visible retractions or increased work of breathing.    SKIN: Visible skin clear. No significant rash, abnormal pigmentation or lesions.  NEURO: Cranial  nerves grossly intact.  Mentation and speech appropriate for age.  PSYCH: Mentation appears normal, affect normal/bright, judgement and insight intact, normal speech and appearance well-groomed.          PERTINENT STUDIES: (I personally reviewed these laboratory studies today)  Most recent CBC:   Recent Labs   Lab Test 11/12/20  1035 03/04/20  1906   WBC 5.2 5.9   HGB 13.5 13.6   HCT 41.0 41.0    310     Most recent hepatic panel:  Recent Labs   Lab Test 09/14/22  1224 09/30/21  0938   ALT 25 25   AST 24 21     Most recent creatinine:  Recent Labs   Lab Test 09/14/22  1224 09/30/21  0938   CR 0.69 0.80       ASSESSMENT/PLAN:      1. Chronic constipation  - linaclotide (LINZESS) 72 MCG capsule; Take 1 capsule (72 mcg) by mouth every morning (before breakfast)  Dispense: 30 capsule; Refill: 1      Aracelis Muñoz is a 72 year old female who presents for evaluation of constipation. Long standing history irregular bowel movements however with worsening symptoms recently. Constipation with occ diarrheal episodes have occurred over the past 2-3 months. Current bowel regimen with miralax has been ineffective. Will trial linzess 72 mcg daily. Can add miralax in the evenings if linzess alone is not effective. We will follow up in 2-3 weeks to check her progress.     Of note, she is scheduled for foot surgery in 2 days. Warned that constipation can worsen with surgery as mobility is decreased and potential pain medications. Can add senna as needed during this time frame.       Return in 17 days (on 12/15/2022) for follow up.       Thank you for this consultation.  It was a pleasure to participate in the care of this patient; please contact us with any further questions.        This note was created with voice recognition software, and while reviewed for accuracy, typos may remain.     60 minutes spent on the date of the encounter doing chart review, history and exam, documentation and further activities per the  note      Jorge A Estrada PA-C  Division of Gastroenterology, Hepatology and Nutrition   [facility]            Video-Visit Details      Video Start Time: 7:34 AM    Type of service:  Video Visit    Video End Time:8:17 AM    Originating Location (pt. Location): Home    Distant Location (provider location):  Off-site    Platform used for Video Visit: [employer]

## 2022-11-28 NOTE — PATIENT INSTRUCTIONS
It was a pleasure taking care of you today.  I've included a brief summary of our discussion and care plan from today's visit below.  Please review this information with your primary care provider.  _______________________________________________________________________     My recommendations are summarized as follows:    - start taking linzess 72 mcg for constipation. This is best taken in the mornings about 30 minutes before breakfast.   - if the linzess alone is not producing a bowel movement by day 2 then restart miralax in the evenings   - follow up on December 15th at 8:30AM as scheduled.     What to do if you are not having a bowel movement (BM) for several days:   - if no BM for 3 days, then take 2 doses of miralax or a 1 stimulant laxative (sennakot or colace with senna)  - if no BM for 4-5 days, then take a glycerin supp   - if no BM for 6-7 day, then Fleets enemas twice a day   - consider gastrograffin enema if no BM for 10 days or marked symptoms           ______________________________________________________________________     How do I schedule labs, imaging studies, or procedures that were ordered in clinic today?      Labs: To schedule lab appointment you can contact your local [facility] or call [phone] to schedule at any convenient [facility] location.     Procedures: If a colonoscopy, upper endoscopy, breath test, esophageal manometry, or pH impedence was ordered today, our endoscopy team will call you to schedule this. If you have not heard from our endoscopy team within a week, please call [phone] to schedule.      Imaging Studies: If you were scheduled for a CT scan, X-ray, MRI, ultrasound, HIDA scan or other imaging study, please call [phone] to have this scheduled.      Referral: If a referral to another specialty was ordered, expect a phone call or follow instructions above. If you have not heard from anyone regarding your referral in a week, please call our  clinic to check the status.      Who do I call with any questions after my visit?  Please be in touch if there are any further questions that arise following today's visit.  There are multiple ways to contact your gastroenterology care team.       During business hours, you may reach a Gastroenterology nurse at [phone]     To schedule or reschedule an appointment, please call [phone].      You can always send a secure message through [employer].  [employer] messages are answered by your nurse or doctor typically within 24 hours.  Please allow extra time on weekends and holidays.       For urgent/emergent questions after business hours, you may reach the on-call GI Fellow by contacting the [facility]  at [phone].     How will I get the results of any tests ordered?    You will receive all of your results.  If you have signed up for [employer], any tests ordered at your visit will be available to you after your physician reviews them.  Typically this takes 1-2 weeks.  If there are urgent results that require a change in your care plan, your physician or nurse will call you to discuss the next steps.       What is [employer]?  [employer] is a secure way for you to access all of your healthcare records from the [facility].  It is a web based computer program, so you can sign on to it from any location.  It also allows you to send secure messages to your care team.  I recommend signing up for [employer] access if you have not already done so and are comfortable with using a computer.       How to I schedule a follow-up visit?  If you did not schedule a follow-up visit today, please call [phone] to schedule a follow-up office visit.      Jorge A Estrada PA-C  Division of Gastroenterology, Hepatology and Nutrition   [facility]

## 2022-11-30 RX ORDER — TRAZODONE HYDROCHLORIDE 100 MG/1
TABLET ORAL
Qty: 90 TABLET | Refills: 0 | Status: SHIPPED | OUTPATIENT
Start: 2022-11-30 | End: 2023-03-18

## 2022-12-05 ASSESSMENT — ENCOUNTER SYMPTOMS
SORE THROAT: 0
FEVER: 0
MYALGIAS: 0
NAUSEA: 0
NERVOUS/ANXIOUS: 0
SHORTNESS OF BREATH: 0
COUGH: 0
DIZZINESS: 0
EYE PAIN: 0
ARTHRALGIAS: 1
FREQUENCY: 0
CONSTIPATION: 1
DIARRHEA: 0
WEAKNESS: 0
DYSURIA: 0
ABDOMINAL PAIN: 0
CHILLS: 0
HEMATURIA: 0
HEARTBURN: 0
PALPITATIONS: 0
PARESTHESIAS: 0
BREAST MASS: 0
HEADACHES: 0
JOINT SWELLING: 1
HEMATOCHEZIA: 0

## 2022-12-05 ASSESSMENT — ACTIVITIES OF DAILY LIVING (ADL): CURRENT_FUNCTION: NO ASSISTANCE NEEDED

## 2022-12-12 ENCOUNTER — VIRTUAL VISIT (OUTPATIENT)
Dept: FAMILY MEDICINE | Facility: CLINIC | Age: 72
End: 2022-12-12
Payer: COMMERCIAL

## 2022-12-12 DIAGNOSIS — Z00.00 ROUTINE GENERAL MEDICAL EXAMINATION AT A HEALTH CARE FACILITY: Primary | ICD-10-CM

## 2022-12-12 DIAGNOSIS — M85.88 OSTEOPENIA OF OTHER SITE: ICD-10-CM

## 2022-12-12 DIAGNOSIS — K59.00 CONSTIPATION, UNSPECIFIED CONSTIPATION TYPE: ICD-10-CM

## 2022-12-12 DIAGNOSIS — E21.3 HYPERPARATHYROIDISM (H): ICD-10-CM

## 2022-12-12 PROCEDURE — 99397 PER PM REEVAL EST PAT 65+ YR: CPT | Mod: 95 | Performed by: FAMILY MEDICINE

## 2022-12-12 ASSESSMENT — ENCOUNTER SYMPTOMS
BREAST MASS: 0
CHILLS: 0
COUGH: 0
HEMATOCHEZIA: 0
SORE THROAT: 0
CONSTIPATION: 1
NAUSEA: 0
DIZZINESS: 0
DIARRHEA: 0
FEVER: 0
PARESTHESIAS: 0
WEAKNESS: 0
HEMATURIA: 0
HEADACHES: 0
FREQUENCY: 0
HEARTBURN: 0
NERVOUS/ANXIOUS: 0
DYSURIA: 0
MYALGIAS: 0
JOINT SWELLING: 1
SHORTNESS OF BREATH: 0
ABDOMINAL PAIN: 0
ARTHRALGIAS: 1
EYE PAIN: 0
PALPITATIONS: 0

## 2022-12-12 ASSESSMENT — ACTIVITIES OF DAILY LIVING (ADL): CURRENT_FUNCTION: NO ASSISTANCE NEEDED

## 2022-12-12 NOTE — PROGRESS NOTES
"SUBJECTIVE:   Maile is a 72 year old who presents for Preventive Visit.    Are you in the first 12 months of your Medicare coverage?  No    Healthy Habits:     In general, how would you rate your overall health?  Good    Frequency of exercise:  None    Do you usually eat at least 4 servings of fruit and vegetables a day, include whole grains    & fiber and avoid regularly eating high fat or \"junk\" foods?  No    Taking medications regularly:  Yes    Ability to successfully perform activities of daily living:  No assistance needed    Home Safety:  Lack of grab bars in the bathroom    Hearing Impairment:  Difficulty following dialogue in the theater, find that men's voices are easier to understand than woman's and difficulty understanding soft or whispered speech    In the past 6 months, have you been bothered by leaking of urine? Yes    In general, how would you rate your overall mental or emotional health?  Good      PHQ-2 Total Score: 1    Additional concerns today:  No      Have you ever done Advance Care Planning? (For example, a Health Directive, POLST, or a discussion with a medical provider or your loved ones about your wishes): Yes, advance care planning has one , but she is redoing it with her son and  ,so she will bring a copy     Hearing Test not Done - has hearing aids- working ok   Fall risk  Fallen 2 or more times in the past year?: No  Any fall with injury in the past year?: No    Cognitive Screening Unable to complete due to virtual visit; need for additional assessment in future face-to-face visit    Do you have sleep apnea, excessive snoring or daytime drowsiness?: yes    Reviewed and updated as needed this visit by clinical staff                  Reviewed and updated as needed this visit by Provider                 Social History     Tobacco Use     Smoking status: Never     Smokeless tobacco: Never   Substance Use Topics     Alcohol use: Yes     Alcohol/week: 0.0 standard drinks     Comment: " 1-2 glasses of wine 1-2 times per week          Alcohol Use 12/5/2022   Prescreen: >3 drinks/day or >7 drinks/week? No   Prescreen: >3 drinks/day or >7 drinks/week? -         PROBLEMS TO ADD ON...      Current providers sharing in care for this patient include:   Patient Care Team:  Shalini Phan MD as PCP - General (Family Practice)  Shalini Phan MD as Assigned PCP  Desmond Musa MD as MD (Colon & Rectal)  Jorge A Estrada PA-C as Physician Assistant (Gastroenterology)    The following health maintenance items are reviewed in Epic and correct as of today:  Health Maintenance   Topic Date Due     ZOSTER IMMUNIZATION (1 of 2) Never done     ASTHMA ACTION PLAN  11/12/2021     DEXA  07/21/2022     COVID-19 Vaccine (5 - Booster for Pfizer series) 09/20/2022     MEDICARE ANNUAL WELLNESS VISIT  09/30/2022     MAMMO SCREENING  03/08/2023     ASTHMA CONTROL TEST  03/14/2023     PHQ-9  03/14/2023     CREATININE  09/14/2023     ANNUAL REVIEW OF HM ORDERS  09/14/2023     FALL RISK ASSESSMENT  12/12/2023     LIPID  09/30/2026     ADVANCE CARE PLANNING  09/30/2026     DTAP/TDAP/TD IMMUNIZATION (3 - Td or Tdap) 01/16/2027     COLORECTAL CANCER SCREENING  11/04/2030     HEPATITIS C SCREENING  Completed     DEPRESSION ACTION PLAN  Completed     INFLUENZA VACCINE  Completed     Pneumococcal Vaccine: 65+ Years  Completed     IPV IMMUNIZATION  Aged Out     MENINGITIS IMMUNIZATION  Aged Out     Patient Active Problem List   Diagnosis     Menopausal LMP age 54     Diffuse cystic mastopathy     Constipation, unspecified constipation type     Lumbar disc herniation with radiculopathy     Post-Nasal Drainage     Perennial allergic rhinitis     Environmental allergies     Osteopenia     Generalized anxiety disorder     Mild intermittent asthma without complication     Serum calcium elevated     Hyperparathyroidism (H)     Vitamin D deficiency     Hypercalcemia     Other insomnia     Hyperlipidemia with target LDL  less than 130     Major depressive disorder, recurrent episode, mild (H)     Eczema, unspecified type     Left knee pain, unspecified chronicity     Acute midline low back pain with left-sided sciatica     Lumbago     Right knee pain     Right foot pain     Osteopenia of other site     Diverticulosis of sigmoid colon     Past Surgical History:   Procedure Laterality Date     C/SECTION, LOW TRANSVERSE      S/P C/S     CHOLECYSTECTOMY, OPEN      Cholecystectomy     COLONOSCOPY N/A 2020    Procedure: COLONOSCOPY;  Surgeon: Alona Mancini MD;  Location: [facility]     HC EXCISION BREAST LESION, OPEN >=1      Benign mass right breast     HC MRI LUMBAR SPINE W/O CONTRAST  2010    multilevel degen disc disease/facet arthropathy, 5 mm caudally extruded left posterolateral disc herniation at L4-5 with impingement on the left L5 nerve root      HC PUNCTURE/ASPIRATION BREAST CYST, FIRST  ,,    bilateral '03, left '     LIGATN/STRIP LONG & SHORT SAPHEN      varicose vein stripping     ORTHOPEDIC SURGERY      foot fusion of joint     ZZC NONSPECIFIC PROCEDURE      Cyst       Social History     Tobacco Use     Smoking status: Never     Smokeless tobacco: Never   Substance Use Topics     Alcohol use: Yes     Alcohol/week: 0.0 standard drinks     Comment: 1-2 glasses of wine 1-2 times per week      Family History   Problem Relation Age of Onset     Thyroid Disease Mother      Hypertension Mother      Allergies Mother      Cerebrovascular Disease Mother         minor, May, 2016     Cancer - colorectal Father          age 65 colon cancer     Thyroid Disease Father      Allergies Father      Depression Father      Obesity Father      Genitourinary Problems Maternal Aunt         fibrocystic breast     Thyroid Disease Sister          Pneumonia Vaccine:For adults 65 years or older who do not have an immunocompromising condition, cerebrospinal fluid leak, or cochlear implant and want to receive  PCV13 AND PPSV23: Administer 1 dose of PCV13 first then give 1 dose of PPSV23 at least 1 year later. If the patient already received PPSV23, give the dose of PCV13 at least 1 year after they received the most recent dose of PPSV23. Anyone who received any doses of PPSV23 before age 65 should receive 1 final dose of the vaccine at age 65 or older. Administer this last dose at least 5 years after the prior PPSV23 dose.  Mammogram Screening: Mammogram Screening: Recommended mammography every 1-2 years with patient discussion and risk factor consideration        Review of Systems   Constitutional: Negative for chills and fever.   HENT: Negative for congestion, ear pain, hearing loss and sore throat.    Eyes: Negative for pain and visual disturbance.   Respiratory: Negative for cough and shortness of breath.    Cardiovascular: Negative for chest pain, palpitations and peripheral edema.   Gastrointestinal: Positive for constipation. Negative for abdominal pain, diarrhea, heartburn, hematochezia and nausea.   Breasts:  Negative for tenderness, breast mass and discharge.   Genitourinary: Negative for dysuria, frequency, genital sores, hematuria, pelvic pain, urgency, vaginal bleeding and vaginal discharge.   Musculoskeletal: Positive for arthralgias and joint swelling. Negative for myalgias.   Skin: Negative for rash.   Neurological: Negative for dizziness, weakness, headaches and paresthesias.   Psychiatric/Behavioral: Negative for mood changes. The patient is not nervous/anxious.      CONSTITUTIONAL: NEGATIVE for fever, chills, change in weight  INTEGUMENTARY/SKIN: NEGATIVE for worrisome rashes, moles or lesions  EYES: NEGATIVE for vision changes or irritation  ENT/MOUTH: NEGATIVE for ear, mouth and throat problems  RESP: NEGATIVE for significant cough or SOB  BREAST: NEGATIVE for masses, tenderness or discharge  CV: NEGATIVE for chest pain, palpitations or peripheral edema  GI: NEGATIVE for nausea, abdominal pain,  "heartburn, or change in bowel habits  GI: Constipation, unspecified constipation type- possible IBS, sometimes get diarrhea too, so  still has on and off sx.           got worse after recent foot  surgery but now better. started linziness recently , so stable   Plan: started linziness recently , so stable and wants to watch a bit longer now   : NEGATIVE for frequency, dysuria, or hematuria  MUSCULOSKELETAL: NEGATIVE for significant arthralgias or myalgia  NEURO: NEGATIVE for weakness, dizziness or paresthesias  ENDOCRINE: NEGATIVE for temperature intolerance, skin/hair changes  HEME: NEGATIVE for bleeding problems  PSYCHIATRIC: NEGATIVE for changes in mood or affect    OBJECTIVE:   LMP 11/01/2004  Estimated body mass index is 30.95 kg/m  as calculated from the following:    Height as of 11/16/22: 1.549 m (5' 1\").    Weight as of 11/16/22: 74.3 kg (163 lb 12.8 oz).  Physical Exam  GENERAL APPEARANCE: healthy, alert and no distress  EYES: Eyes grossly normal to inspection, PERRL and conjunctivae and sclerae normal  HENT: ear canals and TM's normal, nose and mouth without ulcers or lesions, oropharynx clear and oral mucous membranes moist  NECK: no adenopathy, no asymmetry, masses, or scars and thyroid normal to palpation  RESP: lungs clear to auscultation - no rales, rhonchi or wheezes  BREAST: normal without masses, tenderness or nipple discharge and no palpable axillary masses or adenopathy  CV: regular rate and rhythm, normal S1 S2, no S3 or S4, no murmur, click or rub, no peripheral edema and peripheral pulses strong  ABDOMEN: soft, nontender, no hepatosplenomegaly, no masses and bowel sounds normal   (female): deferred  MS: no musculoskeletal defects are noted and gait is age appropriate without ataxia  SKIN: no suspicious lesions or rashes  NEURO: Normal strength and tone, sensory exam grossly normal, mentation intact and speech normal  PSYCH: mentation appears normal and affect " normal/bright        ASSESSMENT / PLAN:   (Z00.00) Routine general medical examination at a health care facility  (primary encounter diagnosis)  Comment:   Plan:     (M85.88) Osteopenia of other site  Comment: per previous dexa at [facility] - 11/ 22- improving on injectable- seeing endo   Plan:     (K59.00) Constipation, unspecified constipation type  Comment: still has on and off sx. got worse after  surgery but now better. started linziness recently , so stable   Plan: started linziness recently , so stable and wants to watch a bit longer now     (E21.3) Hyperparathyroidism (H)  Comment: seeing endo at [place]  Plan: now scheduled for surgery jan 2023       Cares and  treatment discussed.  follow up if problem   Patient expressed understanding and agreement with treatment plan. All patient's questions were answered, will let me know if has more later.  Medications: Rx's: Reviewed the potential side effects/complications of medications prescribed.         COUNSELING:  Reviewed preventive health counseling, as reflected in patient instructions       Regular exercise       Healthy diet/nutrition       Vision screening       Hearing screening       Dental care       Bladder control       Fall risk prevention       Immunizations    Declined: Covid-19 due to Other will schedule soon at the clinic , zoster at pharmacy                Alcohol Use        Osteoporosis prevention/bone health       Colon cancer screening       (Ana)menopause management       Advanced Planning         She reports that she has never smoked. She has never used smokeless tobacco.      Appropriate preventive services were discussed with this patient, including applicable screening as appropriate for cardiovascular disease, diabetes, osteopenia/osteoporosis, and glaucoma.  As appropriate for age/gender, discussed screening for colorectal cancer, prostate cancer, breast cancer, and cervical cancer. Checklist reviewing preventive services available has been  given to the patient.    Reviewed patients plan of care and provided an AVS. The Basic Care Plan (routine screening as documented in Health Maintenance) for Aracelis meets the Care Plan requirement. This Care Plan has been established and reviewed with the Patient.      Shalini Phan MD  [facility]    Identified Health Risks:

## 2022-12-15 ENCOUNTER — TRANSFERRED RECORDS (OUTPATIENT)
Dept: HEALTH INFORMATION MANAGEMENT | Facility: CLINIC | Age: 72
End: 2022-12-15

## 2022-12-22 ENCOUNTER — TRANSFERRED RECORDS (OUTPATIENT)
Dept: HEALTH INFORMATION MANAGEMENT | Facility: CLINIC | Age: 72
End: 2022-12-22

## 2022-12-28 ENCOUNTER — THERAPY VISIT (OUTPATIENT)
Dept: PHYSICAL THERAPY | Facility: CLINIC | Age: 72
End: 2022-12-28
Payer: COMMERCIAL

## 2022-12-28 DIAGNOSIS — M99.05 PELVIC SOMATIC DYSFUNCTION: ICD-10-CM

## 2022-12-28 DIAGNOSIS — M85.88 OSTEOPENIA OF OTHER SITE: ICD-10-CM

## 2022-12-28 PROCEDURE — 97535 SELF CARE MNGMENT TRAINING: CPT | Mod: GP | Performed by: PHYSICAL THERAPIST

## 2022-12-28 PROCEDURE — 97161 PT EVAL LOW COMPLEX 20 MIN: CPT | Mod: GP | Performed by: PHYSICAL THERAPIST

## 2022-12-28 PROCEDURE — 97530 THERAPEUTIC ACTIVITIES: CPT | Mod: GP | Performed by: PHYSICAL THERAPIST

## 2022-12-28 NOTE — PROGRESS NOTES
[facility]    OUTPATIENT Physical Therapy ORTHOPEDIC EVALUATION  PLAN OF TREATMENT FOR OUTPATIENT REHABILITATION  (COMPLETE FOR INITIAL CLAIMS ONLY)  Patient's Last Name, First Name, M.I.  YOB: 1950  Aracelis Muñoz    Provider s Name:  [facility]   Medical Record No.  [id number]   Start of Care Date:  12/28/22   Onset Date:   11/14/22 (MD order date)   Treatment Diagnosis:  pelvic floor dysfunction/constipation Medical Diagnosis:     Osteopenia of other site  Pelvic somatic dysfunction       Goals:     12/28/22 0700   Constipation/Obstructive Defecation   Previous Functional Level No issues   Current Functional Level Frequency of bowel movements   Performance Level 1 time a week or every other week   STG Target Performance Increase frequency of bowel movements to   Performance Level 2 times a week   Rationale Work toward normal voiding or evacuation patterns to focus on ADLS, work, or school   Due Date 01/27/23   LTG Target Performance Increase frequency of bowel movements to   Performance Level 3 times a week   Rationale Work toward normal voiding or evacuation patterns to focus on ADLS, work, or school   Due Date 02/26/23         Therapy Frequency:  1 x a week  Predicted Duration of Therapy Intervention:  3 weeks, tapering to 2 times a month x 2 months    Kenyon Rodriguez, PT                 I CERTIFY THE NEED FOR THESE SERVICES FURNISHED UNDER        THIS PLAN OF TREATMENT AND WHILE UNDER MY CARE     (Physician co-signature of this document indicates review and certification of the therapy plan).                     Certification Date From:  12/28/22   Certification Date To:  03/27/23    Referring Provider:  Shalini Phan    Initial Assessment        See Epic Evaluation SOC Date: 12/28/22

## 2022-12-28 NOTE — PROGRESS NOTES
Physical Therapy Initial Evaluation  Subjective:  HPI   SUBJECTIVE:  Patient reports onset of symptoms of constipation worsening in the last 4 months for unknown reasons.  Pt reports she can go 10 days to 2 weeks without having BMs  She can be so backed up and will have emesis.  She feels urges sometimes but can't go. S he feels she has stool on her rectum.  Treatments she has tried is Linzess 1 time a day which helps minimally.  She is also taking Miralax daily 1/2 capful daily.  She did stop for 2 days as was on antibiotics for her L foot due to an infection after surgery on 11/30/2022 for bone spur removal.  Pt denies pelvic pain.  Symptoms include having to strain with BMs.  She will go extremes of constipation and than diarrhea.  Pt has had to use suppositories to help start a BM.  She did use a suppository this Monday.  Last colonoscopy was 2020 and was normal except for some diverticulus.  [facility] [place] [facility], this affects quality of life in that she was concerned about bowels and bought Depends.  She reports also in Oct was in in [place] and had food poisoning and diarrhea and emesis.   Since onset symptoms have been getting better, worse or staying the same? Same.    PMHx includes 2 foot surgeries on R January 2019,  recent foot surgery on L.    Urination:  Do you leak on the way to the bathroom or with a strong urge to void? No    Do you leak with cough,sneeze, jumping, running?sometimes but rare   Any other activities that cause leaking? NA  Do you have triggers that make you feel you can't wait to go to the bathroom? No .  Type of pad and number used per day? NA  When you leak what is the amount? small    How long can you delay the need to urinate? 10-15 min, reports she voids about 3 times a day.    How many times do you get up to urinate at night? 2 times  Can you stop the flow of urine when on the toilet? Yes  Is the volume of urine passed usually: medium. (8sec rule=  250ml with  average bladder storing  400-600ml)    Do you strain to pass urine? No  Do you have a slow or hesitant urinary stream? No  Do you have difficulty initiating the urine stream? No    How many bladder infections have you had in last 12 months?0    Fluid intake(one glass is 8oz or one cup) 6 -8 glasses/day, 1 coffee/day  1 alcohol glasses/day.    Bowel habits:  Frequency of bowel movements?1 times a/week with use of Miralax and linzess.  Prior use to be every other week  Consistancy of stool? soft, soft formed, hard, [employer] Stool Scale can have type 1 often, with miralax having more type 4.    Do you ignore the urge to defecate? No  Do you strain to pass stool? Yes    Pelvic Pain:  When do you have pelvic pain? NA  Given birth? Yes Any complications?no, # of vaginal delieveries?0, # of C-sections?1, after , did have to go in and take scar tissue out.  Are you sexually active?No- recalls discomfort in the past with activity  Have you ever been worried for your physical safety? No  Any abdominal or pelvic surgeries? Gallbladder 1980  Are you having any regular exercise?no - limited due to B knee and feet pain.    Have you practiced the PF(kegel) exercises for 4 or more weeks?no                           Objective:  System                                 Pelvic Dysfunction Evaluation:        Flexibility:  Flexibility pelvic: sitting flexed lumbar spine with toileting position.      Abdominal Wall:  Abdominal wall pelvic: strain/sucking in of abdomen when similating BM.          Pelvic Clock Exam:  Pelvic clock exam: next.  due to time constraints, performed sitting external palpation over clothes for PFM isolation.                External Assessment:  External assessment pelvic: external palpation sitting medial to ischial tuberosity was able to feel fair lift of perineum and drop with relaxation.  Does compensate with gluts but after cuing, she was able to isolate her PFM independently.  Pt does demo paradoxic  coordination.              Internal Assessment:  NA (deferred today due to time)                                   General     ROS    Assessment/Plan:    Patient is a 72 year old female with pelvic complaints.    Patient has the following significant findings with corresponding treatment plan.                Diagnosis 1:  Pelvic floor dysfunction/constipation  Impaired muscle performance - biofeedback, neuro re-education and home program  Decreased function - therapeutic activities and home program    Therapy Evaluation Codes:   1) History comprised of:   Personal factors that impact the plan of care:      Time since onset of symptoms.    Comorbidity factors that impact the plan of care are:      Bowel/bladder changes and Osteoarthritis.     Medications impacting care: None.  2) Examination of Body Systems comprised of:   Body structures and functions that impact the plan of care:      Pelvis.   Activity limitations that impact the plan of care are:      constipation.  3) Clinical presentation characteristics are:   Stable/Uncomplicated.  4) Decision-Making    Low complexity using standardized patient assessment instrument and/or measureable assessment of functional outcome.  Cumulative Therapy Evaluation is: Low complexity.    Previous and current functional limitations:  (See Goal Flow Sheet for this information)    Short term and Long term goals: (See Goal Flow Sheet for this information)     Communication ability:  Patient appears to be able to clearly communicate and understand verbal and written communication and follow directions correctly.  Treatment Explanation - The following has been discussed with the patient:   RX ordered/plan of care  Anticipated outcomes  Possible risks and side effects  This patient would benefit from PT intervention to resume normal activities.   Rehab potential is good.    Frequency:  1 X week, once daily  Duration:  for 3 weeks tapering to 2 X a month over 2 months  Discharge  Plan:  Achieve all LTG.  Independent in home treatment program.  Reach maximal therapeutic benefit.    Please refer to the daily flowsheet for treatment today, total treatment time and time spent performing 1:1 timed codes.

## 2022-12-29 NOTE — PROGRESS NOTES
Physical Therapy Initial Evaluation  Subjective:    Patient Health History             Pertinent medical history includes: osteoarthritis and thyroid problems.   Red flags:  Changes in bowel and bladder habits.     Surgeries include:  Orthopedic surgery (Feet).    Current medications:  Sleep medication.    Current occupation is Retired.   Primary job tasks include:  Prolonged sitting.                                    Objective:  System    Physical Exam    General     ROS    Assessment/Plan:

## 2023-01-02 ENCOUNTER — VIRTUAL VISIT (OUTPATIENT)
Dept: GASTROENTEROLOGY | Facility: CLINIC | Age: 73
End: 2023-01-02
Payer: COMMERCIAL

## 2023-01-02 VITALS — BODY MASS INDEX: 30.8 KG/M2 | WEIGHT: 163 LBS

## 2023-01-02 DIAGNOSIS — K59.09 CHRONIC CONSTIPATION: Primary | ICD-10-CM

## 2023-01-02 PROCEDURE — 99214 OFFICE O/P EST MOD 30 MIN: CPT | Mod: 95 | Performed by: PHYSICIAN ASSISTANT

## 2023-01-02 ASSESSMENT — PAIN SCALES - GENERAL: PAINLEVEL: NO PAIN (0)

## 2023-01-02 NOTE — PROGRESS NOTES
Maile is a 72 year old who is being evaluated via a billable video visit.      How would you like to obtain your AVS? MyChart  If the video visit is dropped, the invitation should be resent by: Text to cell phone: [phone]  Will anyone else be joining your video visit? No        GASTROENTEROLOGY FOLLOW UP VIDEO VISIT     CC/REFERRING MD:    Shalini Phan    REASON FOR CONSULTATION: Video Visit      HISTORY OF PRESENT ILLNESS:    Aracelis Muñoz is 72 year old female who presents for follow up of chronic constipation. She has a long history of constipation that unfortunately became exacerbated several months ago. She was having difficulty regulating her bowel movements with miralax alone. We therefore started her on linzess 72 mcg daily every morning. She returns today for follow up noting that linzess alone didn't work as well. She did add miralax back in to her regimen at 1/2 capful daily and with the combination is able to have daily bowel movements. She really has only been on the combination for past few days. She was apprehensive to start the miralax sooner due to being on an abx after recent foot surgery.     She is also working with physical therapy for pelvic floor and has seen improvement with better positioning. Overall pleased with her progress currently.     She does have a family hx of colon cancer in her father who was diagnosed with metastasis at age 63 or 64. She denies alarming symptoms such as rectal bleeding and unintentional weight loss. Her last screening colonoscopy was in November 2020 which besides diverticulosis was unrevealing.     Maile  has a past medical history of Arthritis, Depressive disorder (1980), Depressive disorder, not elsewhere classified, Diffuse cystic mastopathy, Insomnia, unspecified, Lumbar disc herniation with radiculopathy (2010), Mild persistent asthma, Osteopenia (2008), Perennial allergic rhinitis, Post-Nasal Drainage, and Slow transit  constipation.    She  has a past surgical history that includes cholecystectomy, open (1985); EXCISION BREAST LESION, OPEN >=1 (1972); NONSPECIFIC PROCEDURE (1982); c/section, low transverse; PUNCTURE/ASPIRATION BREAST CYST, FIRST (1995,12/03,8/04); LIGATN/STRIP LONG & SHORT SAPHEN; MRI LUMBAR SPINE W/O CONTRAST (4/2010); orthopedic surgery; and Colonoscopy (N/A, 11/4/2020).    She  reports that she has never smoked. She has never used smokeless tobacco. She reports current alcohol use. She reports that she does not use drugs.    Her family history includes Allergies in her father and mother; Cancer - colorectal in her father; Cerebrovascular Disease in her mother; Depression in her father; Genitourinary Problems in her maternal aunt; Hypertension in her mother; Obesity in her father; Thyroid Disease in her father, mother, and sister.    ALLERGIES:  Nabumetone, Nefazodone hydrochloride, Sulfa drugs, and Prochlorperazine        PERTINENT MEDICATIONS:    Current Outpatient Medications:      albuterol (PROAIR HFA/PROVENTIL HFA/VENTOLIN HFA) 108 (90 Base) MCG/ACT inhaler, Inhale 2 puffs into the lungs every 6 hours as needed for shortness of breath / dyspnea or wheezing As needed, Disp: 18 g, Rfl: 1     azelastine (OPTIVAR) 0.05 % ophthalmic solution, PLACE 1 DROP INTO BOTH EYES DAILY AS NEEDED, Disp: 6 mL, Rfl: 3     finasteride (PROSCAR) 5 MG tablet, TAKE 1/2 TABLET BY MOUTH DAILY, Disp: , Rfl:      furosemide (LASIX) 20 MG tablet, TAKE 0.5 TABLETS (10 MG) BY MOUTH DAILY AS NEEDED (LEG SWELLING AS NEEDED), Disp: 45 tablet, Rfl: 1     linaclotide (LINZESS) 72 MCG capsule, Take 1 capsule (72 mcg) by mouth every morning (before breakfast), Disp: 30 capsule, Rfl: 1     minoxidil (LONITEN) 2.5 MG tablet, TAKE 1/4 TABLETS (0.625 MG TOTAL) BY MOUTH DAILY., Disp: , Rfl:      traZODone (DESYREL) 100 MG tablet, TAKE 1 TABLET BY MOUTH EVERYDAY AT BEDTIME, Disp: 90 tablet, Rfl: 0    PHYSICAL EXAMINATION:  Constitutional: aaox3,  cooperative, pleasant, not dyspneic/diaphoretic, no acute distress      GENERAL: Healthy, alert and no distress  EYES: Eyes grossly normal to inspection.  No discharge or erythema, or obvious scleral/conjunctival abnormalities.  RESP: No audible wheeze, cough, or visible cyanosis.  No visible retractions or increased work of breathing.    SKIN: Visible skin clear. No significant rash, abnormal pigmentation or lesions.  NEURO: Cranial nerves grossly intact.  Mentation and speech appropriate for age.  PSYCH: Mentation appears normal, affect normal/bright, judgement and insight intact, normal speech and appearance well-groomed.          PERTINENT STUDIES: (I personally reviewed these laboratory studies today)  Most recent CBC:   Recent Labs   Lab Test 11/12/20  1035 03/04/20  1906   WBC 5.2 5.9   HGB 13.5 13.6   HCT 41.0 41.0    310     Most recent hepatic panel:  Recent Labs   Lab Test 09/14/22  1224 09/30/21  0938   ALT 25 25   AST 24 21     Most recent creatinine:  Recent Labs   Lab Test 09/14/22  1224 09/30/21  0938   CR 0.69 0.80       TSH   Date Value Ref Range Status   09/21/2020 2.81 0.40 - 4.00 mU/L Final       ASSESSMENT/PLAN:    1. Chronic constipation  - linaclotide (LINZESS) 72 MCG capsule; Take 1 capsule (72 mcg) by mouth every morning (before breakfast)  Dispense: 90 capsule; Refill: 0      Aracelis Muñoz is a 72 year old female who presents for follow up of chronic constipation. Overall doing well with combination of linzess 72 mcg every morning and 1/2 capful of miralax in the evening as well as with pelvic floor therapy. Will continue this regimen and check back in several weeks.       Return in about 1 month (around 2/2/2023).      Thank you for this consultation.  It was a pleasure to participate in the care of this patient; please contact us with any further questions.        This note was created with voice recognition software, and while reviewed for accuracy, typos may remain.       30  minutes spent on the date of the encounter doing chart review, history and exam, documentation and further activities per the note      Jorge A Estrada PA-C  Division of Gastroenterology, Hepatology and Nutrition   [facility]            Video-Visit Details    Video Start Time: 11:58 AM    Type of service:  Video Visit    Video End Time:12:26 PM    Originating Location (pt. Location): Home    Distant Location (provider location):  Off-site    Platform used for Video Visit: [employer]

## 2023-01-02 NOTE — PATIENT INSTRUCTIONS
It was a pleasure visiting with you today.     Please continue linzess 72 mcg every morning and miralax 1/2 capful every evening.     Let's plan to follow up in about 4 weeks. Please call [phone] to schedule your follow up if you don't hear from our schedulers.      Jorge A Estrada PA-C  Division of Gastroenterology, Hepatology and Nutrition   [facility]

## 2023-01-11 ENCOUNTER — THERAPY VISIT (OUTPATIENT)
Dept: PHYSICAL THERAPY | Facility: CLINIC | Age: 73
End: 2023-01-11
Payer: COMMERCIAL

## 2023-01-11 DIAGNOSIS — M99.05 PELVIC SOMATIC DYSFUNCTION: Primary | ICD-10-CM

## 2023-01-11 DIAGNOSIS — K59.00 CONSTIPATION: ICD-10-CM

## 2023-01-11 PROCEDURE — 97110 THERAPEUTIC EXERCISES: CPT | Mod: GP | Performed by: PHYSICAL THERAPIST

## 2023-01-11 PROCEDURE — 97530 THERAPEUTIC ACTIVITIES: CPT | Mod: GP | Performed by: PHYSICAL THERAPIST

## 2023-01-30 NOTE — TELEPHONE ENCOUNTER
Diagnosis, Referred by & from: Shalini Phan MD// [facility]// Disorder of rectum and anus   Appt date: 3/10/2023   NOTES STATUS DETAILS   OFFICE NOTE from referring provider Internal 1/27/2022 VV with ANDREW Phan   LABS     ANAL PAP/CEA N/A    BIOPSIES/PATHOLOGY RELATED TO DIAGNOSIS N/A    DIAGNOSTIC PROCEDURES     PFC TESTING (from the Pelvic floor center includes Manometry, PDNL, EMG, etc.) N/A    COLONOSCOPY Internal 11/04/20, 06/15/2006 -[facility]   UPPER ENDOSCOPY (EGD) N/A    FLEX SIGMOIDOSCOPY N/A    ERCP N/A    IMAGING (DISC & REPORT)      CT N/A    MRI N/A    XRAY Internal 07/21/20, 06/27/18 DX HIP/PELVIS/SPINE W LAT FRACTION ANALYSIS     ULTRASOUND  (ENDOANAL/ENDORECTAL) N/A      Records Requested  01/30/23    Facility    Fax:    Outcome

## 2023-02-01 ENCOUNTER — VIRTUAL VISIT (OUTPATIENT)
Dept: GASTROENTEROLOGY | Facility: CLINIC | Age: 73
End: 2023-02-01
Payer: COMMERCIAL

## 2023-02-01 VITALS — BODY MASS INDEX: 30.21 KG/M2 | WEIGHT: 160 LBS | HEIGHT: 61 IN

## 2023-02-01 DIAGNOSIS — K59.09 CHRONIC CONSTIPATION: Primary | ICD-10-CM

## 2023-02-01 PROCEDURE — 99215 OFFICE O/P EST HI 40 MIN: CPT | Mod: 95 | Performed by: PHYSICIAN ASSISTANT

## 2023-02-01 ASSESSMENT — PAIN SCALES - GENERAL: PAINLEVEL: NO PAIN (0)

## 2023-02-01 NOTE — PATIENT INSTRUCTIONS
It was a pleasure taking care of you today.  I've included a brief summary of our discussion and care plan from today's visit below.  Please review this information with your primary care provider.  _______________________________________________________________________     My recommendations are summarized as follows:     - decrease linzess from daily use to every other day  - continue miralax 1/2 capful daily   - send me a Sunday[employer] message next week with an update on how things are going with this new regimen   - follow up in 3 weeks as scheduled on February 21st at 3pm.    ______________________________________________________________________     How do I schedule labs, imaging studies, or procedures that were ordered in clinic today?      Labs: To schedule lab appointment you can contact your local [facility] or call [phone] to schedule at any convenient [facility] location.     Procedures: If a colonoscopy, upper endoscopy, breath test, esophageal manometry, or pH impedence was ordered today, our endoscopy team will call you to schedule this. If you have not heard from our endoscopy team within a week, please call [phone] to schedule.      Imaging Studies: If you were scheduled for a CT scan, X-ray, MRI, ultrasound, HIDA scan or other imaging study, please call [phone] to have this scheduled.      Referral: If a referral to another specialty was ordered, expect a phone call or follow instructions above. If you have not heard from anyone regarding your referral in a week, please call our clinic to check the status.      Who do I call with any questions after my visit?  Please be in touch if there are any further questions that arise following today's visit.  There are multiple ways to contact your gastroenterology care team.       During business hours, you may reach a Gastroenterology nurse at [phone]     To schedule or reschedule an appointment, please call [phone].       You can always send a secure message through Help [employer].  Help [employer]ed[employer] messages are answered by your nurse or doctor typically within 24 hours.  Please allow extra time on weekends and holidays.       For urgent/emergent questions after business hours, you may reach the on-call GI Fellow by contacting the [facility]  at [phone].     How will I get the results of any tests ordered?    You will receive all of your results.  If you have signed up for [place] [employer], any tests ordered at your visit will be available to you after your physician reviews them.  Typically this takes 1-2 weeks.  If there are urgent results that require a change in your care plan, your physician or nurse will call you to discuss the next steps.       What is Help [employer]ed[employer]?  Help Remedies is a secure way for you to access all of your healthcare records from the [facility].  It is a web based computer program, so you can sign on to it from any location.  It also allows you to send secure messages to your care team.  I recommend signing up for Help Remedies access if you have not already done so and are comfortable with using a computer.       How to I schedule a follow-up visit?  If you did not schedule a follow-up visit today, please call [phone] to schedule a follow-up office visit.      Jorge A Estrada PA-C  Division of Gastroenterology, Hepatology and Nutrition   [facility]

## 2023-02-01 NOTE — PROGRESS NOTES
Aracelis Muñoz is a 72 year old who is being evaluated via a billable video visit.      How would you like to obtain your AVS? MyChart  If the video visit is dropped, the invitation should be resent by: Send to e-mail at: [email or web address]  Will anyone else be joining your video visit? No          GI FOLLOW UP - VIDEO VISIT         HPI : Aracelis Muñoz is 72 year old female who presents for follow-up of constipation.  She was recently started on Linzess 72 mcg daily with half capful of MiraLAX daily for treatment of her chronic constipation.  At first this regimen seemed to be working well however in the last 2 weeks she has noted more frequent bowel movements.  Stools can vary from [place] stool 2 to [place] 6.  Primarily stools are very soft and easy to pass.  She is easily having multiple bowel movements a day.  On some days she has had numerous loose bowel movements and felt cleaned out.  She went out to [place] with friends one morning and found herself in the bathroom several times because of the frequent bowel movements.  Yesterday she happened to have vomiting after bouts of diarrhea but otherwise has not had any issues with nausea or vomiting.    While she is happy to see that she is able to go to the bathroom much easier she is concerned by the frequency and the consistency of her stools.  She reports that it is unmanageable especially if she is out of the house.  She also hopes to be traveling in the coming weeks and is concerned about that.    No changes to past medical history, surgical history, social history or family history      ALLERGIES:  Nabumetone, Nefazodone hydrochloride, Sulfa drugs, and Prochlorperazine      PERTINENT MEDICATIONS:    Current Outpatient Medications:      albuterol (PROAIR HFA/PROVENTIL HFA/VENTOLIN HFA) 108 (90 Base) MCG/ACT inhaler, Inhale 2 puffs into the lungs every 6 hours as needed for shortness of breath / dyspnea or wheezing As needed, Disp: 18 g, Rfl: 1      azelastine (OPTIVAR) 0.05 % ophthalmic solution, PLACE 1 DROP INTO BOTH EYES DAILY AS NEEDED, Disp: 6 mL, Rfl: 3     finasteride (PROSCAR) 5 MG tablet, TAKE 1/2 TABLET BY MOUTH DAILY, Disp: , Rfl:      furosemide (LASIX) 20 MG tablet, TAKE 0.5 TABLETS (10 MG) BY MOUTH DAILY AS NEEDED (LEG SWELLING AS NEEDED), Disp: 45 tablet, Rfl: 1     linaclotide (LINZESS) 72 MCG capsule, Take 1 capsule (72 mcg) by mouth every morning (before breakfast), Disp: 30 capsule, Rfl: 1     minoxidil (LONITEN) 2.5 MG tablet, TAKE 1/4 TABLETS (0.625 MG TOTAL) BY MOUTH DAILY., Disp: , Rfl:      traZODone (DESYREL) 100 MG tablet, TAKE 1 TABLET BY MOUTH EVERYDAY AT BEDTIME, Disp: 90 tablet, Rfl: 0        PHYSICAL EXAMINATION:  Constitutional: aaox3, cooperative, pleasant, not dyspneic/diaphoretic, no acute distress      GENERAL: Healthy, alert and no distress  EYES: Eyes grossly normal to inspection.  No discharge or erythema, or obvious scleral/conjunctival abnormalities.  RESP: No audible wheeze, cough, or visible cyanosis.  No visible retractions or increased work of breathing.    SKIN: Visible skin clear. No significant rash, abnormal pigmentation or lesions.  NEURO: Cranial nerves grossly intact.  Mentation and speech appropriate for age.  PSYCH: Mentation appears normal, affect normal/bright, judgement and insight intact, normal speech and appearance well-groomed.          PERTINENT STUDIES: (I personally reviewed these laboratory studies today)  Most recent CBC:   Recent Labs   Lab Test 11/12/20  1035 03/04/20  1906   WBC 5.2 5.9   HGB 13.5 13.6   HCT 41.0 41.0    310     Most recent hepatic panel:  Recent Labs   Lab Test 09/14/22  1224 09/30/21  0938   ALT 25 25   AST 24 21     Most recent creatinine:  Recent Labs   Lab Test 09/14/22  1224 09/30/21  0938   CR 0.69 0.80       TSH   Date Value Ref Range Status   09/21/2020 2.81 0.40 - 4.00 mU/L Final           ASSESSMENT/PLAN:    1. Chronic constipation      Aracelis Muñoz is  a 72 year old female who presents for follow-up of chronic constipation.  Current regimen of Linzess 72 mcg and half capful of MiraLAX daily leading to very frequent bowel movements and on occasion feeling that she is cleaned out.  We will decrease her Linzess to every other day dosing.  I will have her continue with MiraLAX at half capful daily.  I am concerned that with decreasing the Linzess dose she may not empty out as well but we can consider increasing the MiraLAX to 1 capful if needed.  I have advised patient to reach [facility] to [facility] next week [facility] giving update on how things are going and to determine if we need further dose adjustment. We will otherwise plan to have a video visit follow-up in 3 weeks.     Follow up in 3 weeks, scheduled for February 21st.     Thank you for this consultation.  It was a pleasure to participate in the care of this patient; please contact us with any further questions.        This note was created with voice recognition software, and while reviewed for accuracy, typos may remain.       45 minutes spent on the date of the encounter doing chart review, history and exam, documentation and further activities per the note      Jorge A Estrada PA-C  Division of Gastroenterology, Hepatology and Nutrition   [facility]            Video-Visit Details    Video Start Time: 1:02 PM    Type of service:  Video Visit    Video End Time:1:31 PM    Originating Location (pt. Location): Home    Distant Location (provider location):  Off-site    Platform used for Video Visit: Divina

## 2023-02-15 ENCOUNTER — TELEPHONE (OUTPATIENT)
Dept: SURGERY | Facility: CLINIC | Age: 73
End: 2023-02-15
Payer: COMMERCIAL

## 2023-02-15 NOTE — TELEPHONE ENCOUNTER
Called to r/s 03/10 Gaertner appt. YEN with call center #. Will also send MyChart.    Schedule with either Lencho or Nitin.

## 2023-02-16 ENCOUNTER — TRANSFERRED RECORDS (OUTPATIENT)
Dept: HEALTH INFORMATION MANAGEMENT | Facility: CLINIC | Age: 73
End: 2023-02-16

## 2023-02-17 NOTE — TELEPHONE ENCOUNTER
Diagnosis, Referred by & from: Recurrent Mucoid Discharge, possible problem with sphincter control; referred by Dr. Phan   Appt date: 2023   NOTES STATUS DETAILS   OFFICE NOTE from referring provider Internal [place] - B[place]u [place]:  22, 22 - PCC OV with Dr. Phan   OFFICE NOTE from other specialist Internal        Care Everywhere  [place] - M23, 23 - GI OV with AMANEUL [place] - [place]:  23 - PT OV with Kenyon Rodriguez PT      Division of Gastroenterology in [place], [place]    3/2/23 OV Adrian Rosales M.D.     DISCHARGE SUMMARY from hospital N/A    DISCHARGE REPORT from the ER N/A    OPERATIVE REPORT N/A    MEDICATION LIST Internal    LABS     ANAL PAP/CEA N/A    BIOPSIES/PATHOLOGY RELATED TO DIAGNOSIS N/A    DIAGNOSTIC PROCEDURES     PFC TESTING (from the Pelvic floor center includes Manometry, PDNL, EMG, etc.) N/A    COLONOSCOPY Internal ealth:  20 - Colonoscopy   UPPER ENDOSCOPY (EGD) N/A    FLEX SIGMOIDOSCOPY N/A    ERCP N/A    IMAGING (DISC & REPORT)      CT N/A    MRI N/A    XRAY N/A    ULTRASOUND  (ENDOANAL/ENDORECTAL) N/A

## 2023-02-17 NOTE — TELEPHONE ENCOUNTER
Called back pt to offer sooner appt than May. LVM with K pod #.    Offer Goffredo 03/21 in 30 mins of the [facility] slots

## 2023-02-21 ENCOUNTER — VIRTUAL VISIT (OUTPATIENT)
Dept: GASTROENTEROLOGY | Facility: CLINIC | Age: 73
End: 2023-02-21
Payer: COMMERCIAL

## 2023-02-21 VITALS — BODY MASS INDEX: 30.61 KG/M2 | WEIGHT: 162 LBS

## 2023-02-21 DIAGNOSIS — K59.09 CHRONIC CONSTIPATION: Primary | ICD-10-CM

## 2023-02-21 PROCEDURE — 99214 OFFICE O/P EST MOD 30 MIN: CPT | Mod: VID | Performed by: PHYSICIAN ASSISTANT

## 2023-02-21 RX ORDER — SENNOSIDES 8.6 MG
1 TABLET ORAL DAILY PRN
Qty: 30 TABLET | Refills: 1 | Status: SHIPPED | OUTPATIENT
Start: 2023-02-21 | End: 2023-10-03

## 2023-02-21 ASSESSMENT — PAIN SCALES - GENERAL: PAINLEVEL: NO PAIN (0)

## 2023-02-21 NOTE — PROGRESS NOTES
GI FOLLOW UP VIDEO VISIT     CC/REFERRING MD:    Shalini Phan MD     REASON FOR VISIT: FOLLOW UP     HPI: Aracelis Muñoz is 72 year old female who presents for follow up of constipation.     She was recently started on Linzess 72 mcg daily with half capful of MiraLAX daily for treatment of her chronic constipation. At first this regimen seemed to be working well however she then developed more frequent stool with stools varying from [place] stool 2 to [place] 6.  Stools were becoming too frequent and too loose. We therefore advised her to decrease the linzess to every other day dosing and discussed potentially increasing miralax to full capful daily.     She is here today for 3 week follow up. We have corresponded since then and she raised concerns with miralax causing hair loss so has discontinued. We also discussed stool softeners as an option but there were similar concerns. She has alrady had some hair loss recently, thinks miralax is playing a role, as well as some stress (sister has dementia). She is now on linzess every day and finds that this has been working fairly well. She does report having harder stools today so would like to know what she can add in to her regimen besides miralax.       ALLERGIES:  Nabumetone, Nefazodone hydrochloride, Sulfa drugs, and Prochlorperazine          PERTINENT MEDICATIONS:    Current Outpatient Medications:      albuterol (PROAIR HFA/PROVENTIL HFA/VENTOLIN HFA) 108 (90 Base) MCG/ACT inhaler, Inhale 2 puffs into the lungs every 6 hours as needed for shortness of breath / dyspnea or wheezing As needed, Disp: 18 g, Rfl: 1     azelastine (OPTIVAR) 0.05 % ophthalmic solution, PLACE 1 DROP INTO BOTH EYES DAILY AS NEEDED, Disp: 6 mL, Rfl: 3     finasteride (PROSCAR) 5 MG tablet, TAKE 1/2 TABLET BY MOUTH DAILY, Disp: , Rfl:      furosemide (LASIX) 20 MG tablet, TAKE 0.5 TABLETS (10 MG) BY MOUTH DAILY AS NEEDED (LEG SWELLING AS NEEDED), Disp: 45 tablet, Rfl: 1      linaclotide (LINZESS) 72 MCG capsule, Take 1 capsule (72 mcg) by mouth every morning (before breakfast), Disp: 30 capsule, Rfl: 1     minoxidil (LONITEN) 2.5 MG tablet, TAKE 1/4 TABLETS (0.625 MG TOTAL) BY MOUTH DAILY., Disp: , Rfl:      traZODone (DESYREL) 100 MG tablet, TAKE 1 TABLET BY MOUTH EVERYDAY AT BEDTIME, Disp: 90 tablet, Rfl: 0        PHYSICAL EXAMINATION:  Constitutional: aaox3, cooperative, pleasant, not dyspneic/diaphoretic, no acute distress      GENERAL: Healthy, alert and no distress  EYES: Eyes grossly normal to inspection.  No discharge or erythema, or obvious scleral/conjunctival abnormalities.  RESP: No audible wheeze, cough, or visible cyanosis.  No visible retractions or increased work of breathing.    SKIN: Visible skin clear. No significant rash, abnormal pigmentation or lesions.  NEURO: Cranial nerves grossly intact.  Mentation and speech appropriate for age.  PSYCH: Mentation appears normal, affect normal/bright, judgement and insight intact, normal speech and appearance well-groomed.          PERTINENT STUDIES: (I personally reviewed these laboratory studies today)  Most recent CBC:   Recent Labs   Lab Test 11/12/20  1035 03/04/20  1906   WBC 5.2 5.9   HGB 13.5 13.6   HCT 41.0 41.0    310     Most recent hepatic panel:  Recent Labs   Lab Test 09/14/22  1224 09/30/21  0938   ALT 25 25   AST 24 21     Most recent creatinine:  Recent Labs   Lab Test 09/14/22  1224 09/30/21  0938   CR 0.69 0.80       TSH   Date Value Ref Range Status   09/21/2020 2.81 0.40 - 4.00 mU/L Final           ASSESSMENT/PLAN:    1. Chronic constipation  - sennosides (SENOKOT) 8.6 MG tablet; Take 1 tablet by mouth daily as needed for constipation  Dispense: 30 tablet; Refill: 1        Aracelis Muñoz is a 72 year old female who presents for follow up of constipation. We have been adjusting linzess dose to find what will work best. We were additionally using miralax but she has concerns with this causing hair  loss so has discontinued. She is currently on linzess 72mcg daily but still some mild constipation on occ so will add in sennakot for prn use 1-2 times a week.     Follow up in 4 weeks to check progress.         Thank you for this consultation.  It was a pleasure to participate in the care of this patient; please contact us with any further questions.        This note was created with voice recognition software, and while reviewed for accuracy, typos may remain.       35 minutes spent on the date of the encounter doing chart review, history and exam, documentation and further activities per the note      Jorge A Estrada PA-C  Division of Gastroenterology, Hepatology and Nutrition   [facility]            Video-Visit Details    Video Start Time: 2:57 PM    Type of service:  Video Visit    Video End Time: 3:20 PM    Originating Location (pt. Location): Home    Distant Location (provider location):  Off-site    Platform used for Video Visit: Divina

## 2023-02-21 NOTE — PROGRESS NOTES
Aracelis Muñoz is a 72 year old female who is being evaluated via a billable video visit.      Is the patient currently in the state of [place]? YES    Visit mode:VIDEO    If the visit is dropped, the patient can be reconnected by: VIDEO VISIT: Text to cell phone: [phone]    Will anyone else be joining the visit? NO      How would you like to obtain your AVS? MyChart    Are changes needed to the allergy or medication list? NO    Reason for visit: Constipation

## 2023-03-10 ENCOUNTER — PRE VISIT (OUTPATIENT)
Dept: SURGERY | Facility: CLINIC | Age: 73
End: 2023-03-10

## 2023-03-16 ENCOUNTER — TRANSFERRED RECORDS (OUTPATIENT)
Dept: HEALTH INFORMATION MANAGEMENT | Facility: CLINIC | Age: 73
End: 2023-03-16

## 2023-03-18 DIAGNOSIS — F33.0 MAJOR DEPRESSIVE DISORDER, RECURRENT EPISODE, MILD (H): ICD-10-CM

## 2023-03-18 DIAGNOSIS — G47.09 OTHER INSOMNIA: ICD-10-CM

## 2023-03-18 RX ORDER — TRAZODONE HYDROCHLORIDE 100 MG/1
100 TABLET ORAL AT BEDTIME
Qty: 90 TABLET | Refills: 0 | Status: SHIPPED | OUTPATIENT
Start: 2023-03-18 | End: 2023-08-31

## 2023-03-18 NOTE — TELEPHONE ENCOUNTER
"Last Written Prescription Date:  11/30/22  Last Fill Quantity: 90,  # refills: 0   Last office visit provider:  12/12/22     Requested Prescriptions   Pending Prescriptions Disp Refills     traZODone (DESYREL) 100 MG tablet 90 tablet 0     Sig: Take 1 tablet (100 mg) by mouth At Bedtime       Serotonin Modulators Passed - 3/18/2023  8:15 AM        Passed - Recent (12 mo) or future (30 days) visit within the authorizing provider's specialty     Patient has had an office visit with the authorizing provider or a provider within the authorizing providers department within the previous 12 mos or has a future within next 30 days. See \"Patient Info\" tab in inbasket, or \"Choose Columns\" in Meds & Orders section of the refill encounter.              Passed - Medication is active on med list        Passed - Patient is age 18 or older        Passed - No active pregnancy on record        Passed - No positive pregnancy test in past 12 months             Peggy Poole RN 03/18/23 8:16 AM    "

## 2023-04-11 ASSESSMENT — ASTHMA QUESTIONNAIRES
QUESTION_2 LAST FOUR WEEKS HOW OFTEN HAVE YOU HAD SHORTNESS OF BREATH: NOT AT ALL
QUESTION_5 LAST FOUR WEEKS HOW WOULD YOU RATE YOUR ASTHMA CONTROL: COMPLETELY CONTROLLED
QUESTION_3 LAST FOUR WEEKS HOW OFTEN DID YOUR ASTHMA SYMPTOMS (WHEEZING, COUGHING, SHORTNESS OF BREATH, CHEST TIGHTNESS OR PAIN) WAKE YOU UP AT NIGHT OR EARLIER THAN USUAL IN THE MORNING: NOT AT ALL
ACT_TOTALSCORE: 25
ACT_TOTALSCORE: 25
QUESTION_1 LAST FOUR WEEKS HOW MUCH OF THE TIME DID YOUR ASTHMA KEEP YOU FROM GETTING AS MUCH DONE AT WORK, SCHOOL OR AT HOME: NONE OF THE TIME
QUESTION_4 LAST FOUR WEEKS HOW OFTEN HAVE YOU USED YOUR RESCUE INHALER OR NEBULIZER MEDICATION (SUCH AS ALBUTEROL): NOT AT ALL

## 2023-04-12 ENCOUNTER — VIRTUAL VISIT (OUTPATIENT)
Dept: FAMILY MEDICINE | Facility: CLINIC | Age: 73
End: 2023-04-12
Payer: COMMERCIAL

## 2023-04-12 DIAGNOSIS — F41.1 GENERALIZED ANXIETY DISORDER: Primary | ICD-10-CM

## 2023-04-12 DIAGNOSIS — G47.09 OTHER INSOMNIA: ICD-10-CM

## 2023-04-12 PROCEDURE — 99214 OFFICE O/P EST MOD 30 MIN: CPT | Mod: VID | Performed by: FAMILY MEDICINE

## 2023-04-12 RX ORDER — ALPRAZOLAM 0.25 MG
0.25 TABLET ORAL 2 TIMES DAILY PRN
Qty: 20 TABLET | Refills: 0 | Status: SHIPPED | OUTPATIENT
Start: 2023-04-12 | End: 2024-02-28

## 2023-04-12 ASSESSMENT — PATIENT HEALTH QUESTIONNAIRE - PHQ9: SUM OF ALL RESPONSES TO PHQ QUESTIONS 1-9: 7

## 2023-04-12 NOTE — PROGRESS NOTES
"Maile is a 72 year old who is being evaluated via a billable video visit.      How would you like to obtain your AVS? MyChart  If the video visit is dropped, the invitation should be resent by: Text to cell phone: [phone]  Will anyone else be joining your video visit? No          Assessment & Plan     (F41.1) Generalized anxiety disorder  (primary encounter diagnosis)  Comment:   Plan: Adult Mental Health  Referral,         ALPRAZolam (XANAX) 0.25 MG tablet            (G47.09) Other insomnia  Comment:   Plan: Adult Mental Health  Referral,         ALPRAZolam (XANAX) 0.25 MG tablet              Discussed cares, talked about signs and symptoms of anxiety/ depression and treatment options.  anxiety has been  aggravated lately for some reason      Willing to try xanax only and reluctant to go back on lexapro or any long term med's yet.        Has trazadoen for sleep .        Pros/ cons of med's discussed .        encouraged to see  to help and referral given    spent sometimes counseling patient. Follow up in 3-4 weeks  sooner if problem.         her anxiety has been aggaravted lately/ family stress , feeling anxioud and overwhelmed      BMI:   Estimated body mass index is 30.61 kg/m  as calculated from the following:    Height as of 2/1/23: 1.549 m (5' 1\").    Weight as of 2/21/23: 73.5 kg (162 lb).   Weight management plan: Discussed healthy diet and exercise guidelines        Shalini Phan MD  [facility]   Maile is a 72 year old, presenting for the following health issues:  Anxiety        4/12/2023     9:28 AM   Additional Questions   Roomed by Brenda Moreno     History of Present Illness       Reason for visit:  AnxietyXiomara consumes 0 sweetened beverage(s) daily.She exercises with enough effort to increase her heart rate 30 to 60 minutes per day.  She exercises with enough effort to increase her heart rate 3 or less days per week.   She " is taking medications regularly.       Depression and Anxiety Follow-Up    How are you doing with your depression since your last visit? Worsened.  unsure why, but  Has some  family stress with sister having dementia but unsure if that is the main cause , hard to focus reading feels anxious and overwhelmed does not feel derpressed but hard to relax . sleep is not good despite taking trazadone  she can fall aslpee but wakes up in the middle of night and soemtimes hard to go back to asleep but some days she can     How are you doing with your anxiety since your last visit?  Worsened as per HPI     Are you having other symptoms that might be associated with depression or anxiety? No    Have you had a significant life event? Relationship Concerns and OTHER: family tress related to sister illness      Do you have any concerns with your use of alcohol or other drugs? No    Social History     Tobacco Use     Smoking status: Never     Smokeless tobacco: Never   Vaping Use     Vaping status: Never Used   Substance Use Topics     Alcohol use: Yes     Alcohol/week: 0.0 standard drinks of alcohol     Comment: 1-2 glasses of wine 1-2 times per week      Drug use: No         9/14/2021     1:27 PM 9/14/2022    11:15 AM 4/12/2023     9:35 AM   PHQ   PHQ-9 Total Score 5 4 7   Q9: Thoughts of better off dead/self-harm past 2 weeks Not at all Not at all Not at all         4/18/2019    11:31 AM 1/26/2021     6:06 PM 9/14/2021     1:27 PM   MARCELLE-7 SCORE   Total Score   1 (minimal anxiety)   Total Score 10 2 1         4/12/2023     9:35 AM   Last PHQ-9   1.  Little interest or pleasure in doing things 1   2.  Feeling down, depressed, or hopeless 0   3.  Trouble falling or staying asleep, or sleeping too much 3   4.  Feeling tired or having little energy 1   5.  Poor appetite or overeating 0   6.  Feeling bad about yourself 0   7.  Trouble concentrating 2   8.  Moving slowly or restless 0   Q9: Thoughts of better off dead/self-harm past 2  weeks 0   PHQ-9 Total Score 7   Difficulty at work, home, or with people Somewhat difficult         9/14/2021     1:27 PM   MARCELLE-7    1. Feeling nervous, anxious, or on edge 0   2. Not being able to stop or control worrying 0   3. Worrying too much about different things 0   4. Trouble relaxing 0   5. Being so restless that it is hard to sit still 0   6. Becoming easily annoyed or irritable 1   7. Feeling afraid, as if something awful might happen 0   MARCELLE-7 Total Score 1       Suicide Assessment Five-step Evaluation and Treatment (SAFE-T)        Review of Systems   Constitutional, HEENT, cardiovascular, pulmonary, GI, , musculoskeletal, neuro, skin, endocrine and psych systems are negative, except as otherwise noted.      Objective           Vitals:  No vitals were obtained today due to virtual visit.    Physical Exam   GENERAL: Healthy, alert and no distress  EYES: Eyes grossly normal to inspection.  No discharge or erythema, or obvious scleral/conjunctival abnormalities.  RESP: No audible wheeze, cough, or visible cyanosis.  No visible retractions or increased work of breathing.    SKIN: Visible skin clear. No significant rash, abnormal pigmentation or lesions.  NEURO: Cranial nerves grossly intact.  Mentation and speech appropriate for age.  PSYCH: mentation appears normal, affect normal/bright, anxious, speech pressured, judgement and insight impaired and appearance well groomed                Video-Visit Details    Type of service:  Video Visit   Video Start Time: 10:39 AM  Video End Time:10:42 AM    Originating Location (pt. Location): Home    Distant Location (provider location):  On-site  Platform used for Video Visit: [employer]

## 2023-04-12 NOTE — PATIENT INSTRUCTIONS
Take medications as directed.  Refferal given to mentel [facility] provider as well   Cares and symptomatic cares discussed   Follow up if problem or concern

## 2023-04-25 ENCOUNTER — HOSPITAL ENCOUNTER (OUTPATIENT)
Dept: MAMMOGRAPHY | Facility: CLINIC | Age: 73
Discharge: HOME | End: 2023-04-25
Attending: FAMILY MEDICINE | Admitting: FAMILY MEDICINE
Payer: COMMERCIAL

## 2023-04-25 DIAGNOSIS — Z12.31 VISIT FOR SCREENING MAMMOGRAM: ICD-10-CM

## 2023-04-25 PROCEDURE — 77067 SCR MAMMO BI INCL CAD: CPT

## 2023-04-27 ENCOUNTER — VIRTUAL VISIT (OUTPATIENT)
Dept: GASTROENTEROLOGY | Facility: CLINIC | Age: 73
End: 2023-04-27
Payer: COMMERCIAL

## 2023-04-27 DIAGNOSIS — K59.09 CHRONIC CONSTIPATION: Primary | ICD-10-CM

## 2023-04-27 PROCEDURE — 99215 OFFICE O/P EST HI 40 MIN: CPT | Mod: VID | Performed by: PHYSICIAN ASSISTANT

## 2023-04-27 NOTE — NURSING NOTE
Is the patient currently in the state of [place]? YES    Visit mode:VIDEO    If the visit is dropped, the patient can be reconnected by: VIDEO VISIT: Text to cell phone: [phone]    Will anyone else be joining the visit? NO      How would you like to obtain your AVS? MyChart    Are changes needed to the allergy or medication list? NO    Reason for visit: Video Visit

## 2023-04-27 NOTE — PATIENT INSTRUCTIONS
It was a pleasure taking care of you today.  I've included a brief summary of our discussion and care plan from today's visit below.  Please review this information with your primary care provider.  _______________________________________________________________________     My recommendations are summarized as follows:     - Decrease linzess to every 2 days.   - continue with fiber gummies.   - If the above adjustments do now work then try adjusting your fiber regimen  - referral to pelvic floor center       [address]      [place] [address]        Phone: [phone]     ______________________________________________________________________     How do I schedule labs, imaging studies, or procedures that were ordered in clinic today?      Labs: To schedule lab appointment you can contact your local [facility] or call [phone] to schedule at any convenient [facility] location.     Procedures: If a colonoscopy, upper endoscopy, breath test, esophageal manometry, or pH impedence was ordered today, our endoscopy team will call you to schedule this. If you have not heard from our endoscopy team within a week, please call [phone] to schedule.      Imaging Studies: If you were scheduled for a CT scan, X-ray, MRI, ultrasound, HIDA scan or other imaging study, please call [phone] to have this scheduled.      Referral: If a referral to another specialty was ordered, expect a phone call or follow instructions above. If you have not heard from anyone regarding your referral in a week, please call our clinic to check the status.      Who do I call with any questions after my visit?  Please be in touch if there are any further questions that arise following today's visit.  There are multiple ways to contact your gastroenterology care team.       During business hours, you may reach a Gastroenterology nurse at [phone]     To schedule or reschedule an appointment,  please call [phone].      You can always send a secure message through Bevii.  Bevii messages are answered by your nurse or doctor typically within 24 hours.  Please allow extra time on weekends and holidays.       For urgent/emergent questions after business hours, you may reach the on-call GI Fellow by contacting the [facility]  at [phone].     How will I get the results of any tests ordered?    You will receive all of your results.  If you have signed up for [employer], any tests ordered at your visit will be available to you after your physician reviews them.  Typically this takes 1-2 weeks.  If there are urgent results that require a change in your care plan, your physician or nurse will call you to discuss the next steps.       What is Bevii?  Bevii is a secure way for you to access all of your healthcare records from the [facility].  It is a web based computer program, so you can sign on to it from any location.  It also allows you to send secure messages to your care team.  I recommend signing up for Bevii access if you have not already done so and are comfortable with using a computer.       How to I schedule a follow-up visit?  If you did not schedule a follow-up visit today, please call [phone] to schedule a follow-up office visit.      Jorge A Estrada PA-C  Division of Gastroenterology, Hepatology and Nutrition   [facility]

## 2023-04-27 NOTE — PROGRESS NOTES
GI FOLLOW UP VISIT     CC/REFERRING MD:    Shalini Phan MD     REASON FOR VISIT: FOLLOW UP       HPI: Aracelis Muñoz is 72 year old female who presents for follow up of constipation.  We have been trying to manage with Linzess as.  At first Linzess 72 mcg daily with a half capful of MiraLAX daily was working very well for her.  She unfortunately experienced hair loss which she attributed to the MiraLAX so has discontinued.  We have tried adjusting her linzess since then with varying degrees of success.  She has met with [facility] since our last visit and is currently taking a combination of Linzess every other day and fiber Gummies daily.  At first this seemed to be working well however now she is experiencing looser and more frequent stools.      There is also question of pelvic floor dysfunction.  She does have a visit with colorectal specialist but this is not until May.      Maile  has a past medical history of Arthritis, Depressive disorder (1980), Depressive disorder, not elsewhere classified, Diffuse cystic mastopathy, Insomnia, unspecified, Lumbar disc herniation with radiculopathy (2010), Mild persistent asthma, Osteopenia (2008), Perennial allergic rhinitis, Post-Nasal Drainage, and Slow transit constipation.    She  has a past surgical history that includes cholecystectomy, open (1985); EXCISION BREAST LESION, OPEN >=1 (1972); NONSPECIFIC PROCEDURE (1982); c/section, low transverse; PUNCTURE/ASPIRATION BREAST CYST, FIRST (1995,12/03,8/04); LIGATN/STRIP LONG & SHORT SAPHEN; MRI LUMBAR SPINE W/O CONTRAST (4/2010); orthopedic surgery; and Colonoscopy (N/A, 11/4/2020).    She  reports that she has never smoked. She has never used smokeless tobacco. She reports current alcohol use. She reports that she does not use drugs.    Her family history includes Allergies in her father and mother; Cancer - colorectal in her father; Cerebrovascular Disease in her mother; Depression in her  father; Genitourinary Problems in her maternal aunt; Hypertension in her mother; Obesity in her father; Thyroid Disease in her father, mother, and sister.    ALLERGIES:  Nabumetone, Nefazodone hydrochloride, Sulfa antibiotics, and Prochlorperazine          PERTINENT MEDICATIONS:    Current Outpatient Medications:      albuterol (PROAIR HFA/PROVENTIL HFA/VENTOLIN HFA) 108 (90 Base) MCG/ACT inhaler, Inhale 2 puffs into the lungs every 6 hours as needed for shortness of breath / dyspnea or wheezing As needed (Patient not taking: Reported on 4/12/2023), Disp: 18 g, Rfl: 1     ALPRAZolam (XANAX) 0.25 MG tablet, Take 1 tablet (0.25 mg) by mouth 2 times daily as needed for anxiety, Disp: 20 tablet, Rfl: 0     azelastine (OPTIVAR) 0.05 % ophthalmic solution, PLACE 1 DROP INTO BOTH EYES DAILY AS NEEDED, Disp: 6 mL, Rfl: 3     finasteride (PROSCAR) 5 MG tablet, TAKE 1/2 TABLET BY MOUTH DAILY, Disp: , Rfl:      furosemide (LASIX) 20 MG tablet, TAKE 0.5 TABLETS (10 MG) BY MOUTH DAILY AS NEEDED (LEG SWELLING AS NEEDED) (Patient not taking: Reported on 4/12/2023), Disp: 45 tablet, Rfl: 1     linaclotide (LINZESS) 72 MCG capsule, Take 1 capsule (72 mcg) by mouth every morning (before breakfast), Disp: 30 capsule, Rfl: 1     minoxidil (LONITEN) 2.5 MG tablet, TAKE 1/4 TABLETS (0.625 MG TOTAL) BY MOUTH DAILY., Disp: , Rfl:      sennosides (SENOKOT) 8.6 MG tablet, Take 1 tablet by mouth daily as needed for constipation, Disp: 30 tablet, Rfl: 1     traZODone (DESYREL) 100 MG tablet, Take 1 tablet (100 mg) by mouth At Bedtime, Disp: 90 tablet, Rfl: 0        PHYSICAL EXAMINATION:  Constitutional: aaox3, cooperative, pleasant, not dyspneic/diaphoretic, no acute distress      GENERAL: Healthy, alert and no distress  EYES: Eyes grossly normal to inspection.  No discharge or erythema, or obvious scleral/conjunctival abnormalities.  RESP: No audible wheeze, cough, or visible cyanosis.  No visible retractions or increased work of breathing.     SKIN: Visible skin clear. No significant rash, abnormal pigmentation or lesions.  NEURO: Cranial nerves grossly intact.  Mentation and speech appropriate for age.  PSYCH: Mentation appears normal, affect normal/bright, judgement and insight intact, normal speech and appearance well-groomed.      ASSESSMENT/PLAN:    1. Chronic constipation  - Adult GI Clinic Follow-Up Order (Blank); Future      Aracelis Muñoz is a 72 year old female who presents for follow-up of constipation.  She has had varying degrees of success with different combinations of Linzess.  She is currently on Linzess 72 mcg every other day and fiber Gummies daily which totals about 6 g of fiber daily.  She is currently having more frequent and loose stools.  We discussed adjusting her regimen.  She could decrease the use of Linzess to see if this would help.  She may also benefit from adjusting her fiber gummy.  She currently feels that her dietary fiber has increased so perhaps she needs less of the fiber supplement.  If however we find that to be unsuccessful and/or she is lacking in dietary fiber then we should increase a fiber supplement further.     I will also be placing a referral to the pelvic floor center for evaluation of pelvic floor dysfunction contributing to her chronic constipation.     Follow up in 2 months, sooner if needed.     Thank you for this consultation.  It was a pleasure to participate in the care of this patient; please contact us with any further questions.        This note was created with voice recognition software, and while reviewed for accuracy, typos may remain.       60 minutes spent by me on the date of the encounter doing chart review, history and exam, documentation and further activities per the note      Jorge A Estrada PA-C  Division of Gastroenterology, Hepatology and Nutrition   [facility]            Video-Visit Details    Video Start Time: 2:31 PM    Type of  service:  Video Visit    Video End Time:3:06 PM    Originating Location (pt. Location): Home    Distant Location (provider location):  Off-site    Platform used for Video Visit: Divina

## 2023-05-03 ENCOUNTER — HOSPITAL ENCOUNTER (OUTPATIENT)
Dept: MAMMOGRAPHY | Facility: CLINIC | Age: 73
Discharge: HOME | End: 2023-05-03
Attending: FAMILY MEDICINE
Payer: COMMERCIAL

## 2023-05-03 DIAGNOSIS — R92.8 ABNORMAL MAMMOGRAM: ICD-10-CM

## 2023-05-03 PROCEDURE — 77061 BREAST TOMOSYNTHESIS UNI: CPT | Mod: RT

## 2023-05-16 ENCOUNTER — MEDICAL CORRESPONDENCE (OUTPATIENT)
Dept: HEALTH INFORMATION MANAGEMENT | Facility: CLINIC | Age: 73
End: 2023-05-16
Payer: COMMERCIAL

## 2023-05-16 ENCOUNTER — TRANSFERRED RECORDS (OUTPATIENT)
Dept: HEALTH INFORMATION MANAGEMENT | Facility: CLINIC | Age: 73
End: 2023-05-16
Payer: COMMERCIAL

## 2023-05-23 ENCOUNTER — PRE VISIT (OUTPATIENT)
Dept: SURGERY | Facility: CLINIC | Age: 73
End: 2023-05-23

## 2023-06-19 ENCOUNTER — TELEPHONE (OUTPATIENT)
Dept: GASTROENTEROLOGY | Facility: CLINIC | Age: 73
End: 2023-06-19
Payer: COMMERCIAL

## 2023-06-19 NOTE — TELEPHONE ENCOUNTER
received a message from VALARIE DESIR to help get Pt scheduled for an appointment with Jorge A Estrada, video or in person.  called and offer Pt an appointment on 6/20/2023 at 3pm, video visit. Pt accepted the appointment.

## 2023-06-20 ENCOUNTER — VIRTUAL VISIT (OUTPATIENT)
Dept: GASTROENTEROLOGY | Facility: CLINIC | Age: 73
End: 2023-06-20
Payer: COMMERCIAL

## 2023-06-20 DIAGNOSIS — K59.04 CHRONIC IDIOPATHIC CONSTIPATION: Primary | ICD-10-CM

## 2023-06-20 PROCEDURE — 99215 OFFICE O/P EST HI 40 MIN: CPT | Mod: VID | Performed by: PHYSICIAN ASSISTANT

## 2023-06-20 ASSESSMENT — PAIN SCALES - GENERAL: PAINLEVEL: NO PAIN (0)

## 2023-06-20 NOTE — PROGRESS NOTES
GI FOLLOW UP     CC/REFERRING MD:    Shalini Phan MD     REASON FOR VISIT: FOLLOW UP     HISTORY OF PRESENT ILLNESS: Aracelis Muñoz is 73 year old female who presents for follow up of constipation.     She continues to have varying consistencies with her bowel movements. Stools are very unpredictable. Bowel movements can range from several days without BM followed by explosive and urgent diarrhea. She recently had a rough experience with frequent urgent loose stools last week while out with family and friends even though she has reduced linzess to every 2-3 days. She is also taking fiber gummies daily which is helping. Fiber gummies on it's own however doesn't manage the constipation. She was previously on miralax regularly but discontinued due to personal concerns with hair loss. She did however take a small dose of miralax about 1/4 capful in the last week due to several days without a BM.     She did have eval by pelvic floor center and she was found to have pelvic floor dysfunction and has started physical therapy and biofeedback therapy.     She does have hyperparathyroidism with mild hypercalcemia and is being followed by [facility]. She has plans for focused parathyroidectomy this fall.         ALLERGIES:  Nabumetone, Nefazodone hydrochloride, Sulfa antibiotics, and Prochlorperazine        PERTINENT MEDICATIONS:    Current Outpatient Medications:      ALPRAZolam (XANAX) 0.25 MG tablet, Take 1 tablet (0.25 mg) by mouth 2 times daily as needed for anxiety, Disp: 20 tablet, Rfl: 0     azelastine (OPTIVAR) 0.05 % ophthalmic solution, PLACE 1 DROP INTO BOTH EYES DAILY AS NEEDED., Disp: 6 mL, Rfl: 2     finasteride (PROSCAR) 5 MG tablet, TAKE 1/2 TABLET BY MOUTH DAILY, Disp: , Rfl:      furosemide (LASIX) 20 MG tablet, TAKE 0.5 TABLETS (10 MG) BY MOUTH DAILY AS NEEDED (LEG SWELLING AS NEEDED), Disp: 45 tablet, Rfl: 0     linaclotide (LINZESS) 72 MCG capsule, Take 1 capsule (72 mcg) by mouth every  morning (before breakfast), Disp: 30 capsule, Rfl: 1     minoxidil (LONITEN) 2.5 MG tablet, TAKE 1/4 TABLETS (0.625 MG TOTAL) BY MOUTH DAILY., Disp: , Rfl:      traZODone (DESYREL) 100 MG tablet, Take 1 tablet (100 mg) by mouth At Bedtime, Disp: 90 tablet, Rfl: 0     albuterol (PROAIR HFA/PROVENTIL HFA/VENTOLIN HFA) 108 (90 Base) MCG/ACT inhaler, Inhale 2 puffs into the lungs every 6 hours as needed for shortness of breath / dyspnea or wheezing As needed (Patient not taking: Reported on 4/12/2023), Disp: 18 g, Rfl: 1     sennosides (SENOKOT) 8.6 MG tablet, Take 1 tablet by mouth daily as needed for constipation (Patient not taking: Reported on 6/20/2023), Disp: 30 tablet, Rfl: 1      PHYSICAL EXAMINATION:  Constitutional: aaox3, cooperative, pleasant, not dyspneic/diaphoretic, no acute distress  GENERAL: Healthy, alert and no distress  EYES: Eyes grossly normal to inspection.  No discharge or erythema, or obvious scleral/conjunctival abnormalities.  RESP: No audible wheeze, cough, or visible cyanosis.  No visible retractions or increased work of breathing.    SKIN: Visible skin clear. No significant rash, abnormal pigmentation or lesions.  NEURO: Cranial nerves grossly intact.  Mentation and speech appropriate for age.  PSYCH: Mentation appears normal, affect normal/bright, judgement and insight intact, normal speech and appearance well-groomed.      PERTINENT STUDIES: (I personally reviewed these laboratory studies today)  Most recent CBC:   Recent Labs   Lab Test 11/12/20  1035 03/04/20  1906   WBC 5.2 5.9   HGB 13.5 13.6   HCT 41.0 41.0    310     Most recent hepatic panel:  Recent Labs   Lab Test 09/14/22  1224 09/30/21  0938   ALT 25 25   AST 24 21     Most recent creatinine:  Recent Labs   Lab Test 09/14/22  1224 09/30/21  0938   CR 0.69 0.80       TSH   Date Value Ref Range Status   09/21/2020 2.81 0.40 - 4.00 mU/L Final         ASSESSMENT/PLAN:    1. Chronic idiopathic constipation    Aracelis LYNN  Wanda is a 73 year old female who presents for follow up of chronic constipation. Recent pelvic floor testing confirms pelvic floor dysfunction. Currently starting physical therapy and biofeedback therapy with [facility]. Also has hyperparathyroidism with mild hypercalcemia that may be contributing to her constipation. Hyperparathyroid is being followed by Isabela at [facility] and has also received reclast for osteoporosis. She is currently on linzess every 2-3 days and fiber gummies daily. Bowel movements are variable and unpredictable. At times days without a BM and at other times she has explosive diarrhea. We will have her discontinue the linzess. We discussed switching to alternative agent such as a low dose of amitiza but she is apprehensive that she may run into the same trouble. We will have her continue with fiber gummies and add in a low dose of miralax daily. Historically even 1 capful of miralax could lead to times of diarrhea so she is more comfortable with 1/4 to 1/2 capful daily and titrating to desired effect. She does have a trip to [place] coming up soon and is hesitant about the unpredictability and troubles with her bowel movements. Continue with physical therapy/biofeedback therapy per [facility].     I have advised her to send me an update with her progress next week so we can help make any adjustments needed prior to her trip.         Thank you for this consultation.  It was a pleasure to participate in the care of this patient; please contact us with any further questions.        This note was created with voice recognition software, and while reviewed for accuracy, typos may remain.       65 minutes spent by me on the date of the encounter doing chart review, history and exam, documentation and further activities per the note      Jorge A Estrada PA-C  Division of Gastroenterology, Hepatology and Nutrition   [facility]            Video-Visit Details    Video Start  Time: 2:59 PM    Type of service:  Video Visit    Video End Time:3:37 PM    Originating Location (pt. Location): Home    Distant Location (provider location):  Off-site    Platform used for Video Visit: Divina

## 2023-06-20 NOTE — NURSING NOTE
Is the patient currently in the state of [place]? YES    Visit mode:VIDEO    If the visit is dropped, the patient can be reconnected by: VIDEO VISIT: Send to e-mail at: [email or web address]    Will anyone else be joining the visit? NO      How would you like to obtain your AVS? MyChart    Are changes needed to the allergy or medication list? NO    Reason for visit: No chief complaint on file.

## 2023-06-20 NOTE — PATIENT INSTRUCTIONS
It was a pleasure taking care of you today.  I've included a brief summary of our discussion and care plan from today's visit below.  Please review this information with your primary care provider.  _______________________________________________________________________     My recommendations are summarized as follows:    - Continue fiber gummies   - take miralax 1/4 to 1/2 capful daily. Titrate up or down based off of your bowel movements   - continue with physical therapy and biofeedback therapy of pelvic floor   - update me by next week on your progress with the miralax   ______________________________________________________________________     How do I schedule labs, imaging studies, or procedures that were ordered in clinic today?      Labs: To schedule lab appointment you can contact your local [facility] or call [phone] to schedule at any convenient [facility] location.     Procedures: If a colonoscopy, upper endoscopy, breath test, esophageal manometry, or pH impedence was ordered today, our endoscopy team will call you to schedule this. If you have not heard from our endoscopy team within a week, please call [phone] to schedule.      Imaging Studies: If you were scheduled for a CT scan, X-ray, MRI, ultrasound, HIDA scan or other imaging study, please call [phone] to have this scheduled.      Referral: If a referral to another specialty was ordered, expect a phone call or follow instructions above. If you have not heard from anyone regarding your referral in a week, please call our clinic to check the status.      Who do I call with any questions after my visit?  Please be in touch if there are any further questions that arise following today's visit.  There are multiple ways to contact your gastroenterology care team.       During business hours, you may reach a Gastroenterology nurse at [phone]     To schedule or reschedule an appointment, please call [phone].       You can always send a secure message through Grovo.  Grovo messages are answered by your nurse or doctor typically within 24 hours.  Please allow extra time on weekends and holidays.       For urgent/emergent questions after business hours, you may reach the on-call GI Fellow by contacting the [facility]  at [phone].     How will I get the results of any tests ordered?    You will receive all of your results.  If you have signed up for VOSShart, any tests ordered at your visit will be available to you after your physician reviews them.  Typically this takes 1-2 weeks.  If there are urgent results that require a change in your care plan, your physician or nurse will call you to discuss the next steps.       What is Grovo?  Grovo is a secure way for you to access all of your healthcare records from the [facility].  It is a web based computer program, so you can sign on to it from any location.  It also allows you to send secure messages to your care team.  I recommend signing up for Grovo access if you have not already done so and are comfortable with using a computer.       How to I schedule a follow-up visit?  If you did not schedule a follow-up visit today, please call [phone] to schedule a follow-up office visit.      Jorge A Estrada PA-C  Division of Gastroenterology, Hepatology and Nutrition   [facility]

## 2023-07-11 ENCOUNTER — MEDICAL CORRESPONDENCE (OUTPATIENT)
Dept: HEALTH INFORMATION MANAGEMENT | Facility: CLINIC | Age: 73
End: 2023-07-11
Payer: COMMERCIAL

## 2023-07-11 ENCOUNTER — TRANSFERRED RECORDS (OUTPATIENT)
Dept: HEALTH INFORMATION MANAGEMENT | Facility: CLINIC | Age: 73
End: 2023-07-11
Payer: COMMERCIAL

## 2023-07-12 ENCOUNTER — TELEPHONE (OUTPATIENT)
Dept: OTHER | Facility: CLINIC | Age: 73
End: 2023-07-12
Payer: COMMERCIAL

## 2023-07-12 NOTE — TELEPHONE ENCOUNTER
Referral received via fax on 7/11/23.    Pt referred to [facility] by Dr. Elise for left foot swelling.    Pt needs to be scheduled for NEW VASCULAR PATIENT consult with Dr. Oropeza or Dr. Galindo.  Will route to scheduling to coordinate an appointment at next available.    Referral requests Dr. Cat, however referring provider notes peripheral pulses intact, thus no arterial imaging ordered and per scheduling protocol, pt to see Vascular Medicine.    Appt note: Ref by Dr. Elise for left foot swelling; notes sent to [facility].    Vivian Montiel, BSN, RN-[facility] [place]

## 2023-07-13 NOTE — TELEPHONE ENCOUNTER
Future Appointments   Date Time Provider Department Center   8/2/2023 12:00 PM Ermelinda Oropeza MD [facility]

## 2023-07-24 ENCOUNTER — NURSE TRIAGE (OUTPATIENT)
Dept: NURSING | Facility: CLINIC | Age: 73
End: 2023-07-24
Payer: COMMERCIAL

## 2023-07-24 DIAGNOSIS — M25.473 ANKLE EDEMA: ICD-10-CM

## 2023-07-24 RX ORDER — FUROSEMIDE 20 MG
10 TABLET ORAL DAILY PRN
Qty: 45 TABLET | Refills: 0 | Status: SHIPPED | OUTPATIENT
Start: 2023-07-24 | End: 2023-10-03

## 2023-07-24 NOTE — TELEPHONE ENCOUNTER
RN COVID TREATMENT VISIT  07/24/23      The patient has been triaged and does not require a higher level of care.    Aracelis Larryr  73 year old  Current weight? 162 lbs    Has the patient been seen by a primary care provider at an [facility] or [facility] within the past two years? Yes.   Have you been in close proximity to/do you have a known exposure to a person with a confirmed case of influenza? No.     General treatment eligibility:  Date of positive COVID test (PCR or at home)?  7/24/2023    Are you or have you been hospitalized for this COVID-19 infection? No.   Have you received monoclonal antibodies or antiviral treatment for COVID-19 since this positive test? No.   Do you have any of the following conditions that place you at risk of being very sick from COVID-19?   - Age 50 years or older  Yes, patient has at least one high risk condition as noted above.     Current COVID symptoms:   - cough  - fatigue  - congestion or runny nose  Yes. Patient has at least one symptom as selected.     How many days since symptoms started? 5 days or less. Established patient, 12 years or older weighing at least 88.2 lbs, who has symptoms that started in the past 5 days, has not been hospitalized nor received treatment already, and is at risk for being very sick from COVID-19.     Treatment eligibility by RN:  Are you currently pregnant or nursing? No  Do you have a clinically significant hypersensitivity to nirmatrelvir or ritonavir, or toxic epidermal necrolysis (TEN) or Meier-Isaiah Syndrome? No  Do you have a history of hepatitis, any hepatic impairment on the Problem List (such as Child-Villareal Class C, cirrhosis, fatty liver disease, alcoholic liver disease), or was the last liver lab (hepatic panel, ALT, AST, ALK Phos, bilirubin) elevated in the past 6 months? No  Do you have any history of severe renal impairment (eGFR < 30mL/min)? No    Is patient eligible to continue? Yes, patient  meets all eligibility requirements for the RN COVID treatment (as denoted by all no responses above).     Current Outpatient Medications   Medication Sig Dispense Refill    albuterol (PROAIR HFA/PROVENTIL HFA/VENTOLIN HFA) 108 (90 Base) MCG/ACT inhaler Inhale 2 puffs into the lungs every 6 hours as needed for shortness of breath / dyspnea or wheezing As needed (Patient not taking: Reported on 4/12/2023) 18 g 1    ALPRAZolam (XANAX) 0.25 MG tablet Take 1 tablet (0.25 mg) by mouth 2 times daily as needed for anxiety 20 tablet 0    azelastine (OPTIVAR) 0.05 % ophthalmic solution PLACE 1 DROP INTO BOTH EYES DAILY AS NEEDED. 6 mL 2    finasteride (PROSCAR) 5 MG tablet TAKE 1/2 TABLET BY MOUTH DAILY      furosemide (LASIX) 20 MG tablet TAKE 0.5 TABLETS (10 MG) BY MOUTH DAILY AS NEEDED (LEG SWELLING AS NEEDED) 45 tablet 0    linaclotide (LINZESS) 72 MCG capsule Take 1 capsule (72 mcg) by mouth every morning (before breakfast) 30 capsule 1    lubiprostone (AMITIZA) 8 MCG capsule Take 1 capsule (8 mcg) by mouth 2 times daily (with meals) 60 capsule 1    minoxidil (LONITEN) 2.5 MG tablet TAKE 1/4 TABLETS (0.625 MG TOTAL) BY MOUTH DAILY.      sennosides (SENOKOT) 8.6 MG tablet Take 1 tablet by mouth daily as needed for constipation (Patient not taking: Reported on 6/20/2023) 30 tablet 1    traZODone (DESYREL) 100 MG tablet Take 1 tablet (100 mg) by mouth At Bedtime 90 tablet 0       Medications from List 1 of the standing order (on medications that exclude the use of Paxlovid) that patient is taking: NONE. Is patient taking Bruna's Wort? No  Is patient taking Woodmore's Wort or any meds from List 1? No.   Medications from List 2 of the standing order (on meds that provider needs to adjust) that patient is taking: NONE. Is patient on any of the meds from List 2? No.   Medications from List 3 of standing order (on meds that a RN needs to adjust) that patient is taking: trazodone (Desyrel): Instructed patient to stop taking  trazodone while taking Paxlovid and restart trazodone 3 days after the completion of Paxlovid.  Is patient on any meds from List 3? Yes. Patient is on meds from list 3. No meds require a provider visit and at least one med required RN to adjust.     Paxlovid has an approximate 90% reduction in hospitalization. Paxlovid can possibly cause altered sense of taste, diarrhea (loose, watery stools), high blood pressure, muscle aches.     Would patient like a Paxlovid prescription?   No. Patient informed there are no other treatment options at this time.     Pt opted not to be placed on Paxlovid due to having to stop taking Trazodone. Home care discussed instead.  Keke Andrew RN

## 2023-07-24 NOTE — TELEPHONE ENCOUNTER
[facility].    Malgorzata GANDARA RN  [facility] Triage Team

## 2023-07-24 NOTE — TELEPHONE ENCOUNTER
I just got back from a cruise. Just tested positive for covid.  Requesting treatment.    COVID Positive/Requesting COVID treatment    Patient is positive for COVID and requesting treatment options.    Date of positive COVID test (PCR or at home)? 7/24/23 home test.Current COVID symptoms: fatigue    Date COVID symptoms began: 7/21/23    Message should be routed to clinic RN pool. Best phone number to use for call back: [phone].      Reason for Disposition   [1] HIGH RISK for severe COVID complications (e.g., weak immune system, age > 64 years, obesity with BMI of 30 or higher, pregnant, chronic lung disease or other chronic medical condition) AND [2] COVID symptoms (e.g., cough, fever)  (Exceptions: Already seen by PCP and no new or worsening symptoms.)    Additional Information   Negative: SEVERE difficulty breathing (e.g., struggling for each breath, speaks in single words)   Negative: Difficult to awaken or acting confused (e.g., disoriented, slurred speech)   Negative: Bluish (or gray) lips or face now   Negative: Shock suspected (e.g., cold/pale/clammy skin, too weak to stand, low BP, rapid pulse)   Negative: Sounds like a life-threatening emergency to the triager   Negative: SEVERE or constant chest pain or pressure  (Exception: Mild central chest pain, present only when coughing.)   Negative: MODERATE difficulty breathing (e.g., speaks in phrases, SOB even at rest, pulse 100-120)   Negative: Headache and stiff neck (can't touch chin to chest)   Negative: Oxygen level (e.g., pulse oximetry) 90 percent or lower   Negative: Chest pain or pressure  (Exception: MILD central chest pain, present only when coughing)   Negative: Patient sounds very sick or weak to the triager   Negative: MILD difficulty breathing (e.g., minimal/no SOB at rest, SOB with walking, pulse <100)   Negative: Fever > 103 F (39.4 C)   Negative: [1] Fever > 101 F (38.3 C) AND [2] over 60 years of age   Negative: [1] Fever > 100.0 F (37.8 C)  AND [2] bedridden (e.g., nursing home patient, CVA, chronic illness, recovering from surgery)    Protocols used: Coronavirus (COVID-19) Diagnosed or [facility]-K-USELEAZAR ZUÑIGA RN [facility]

## 2023-07-27 ENCOUNTER — VIRTUAL VISIT (OUTPATIENT)
Dept: GASTROENTEROLOGY | Facility: CLINIC | Age: 73
End: 2023-07-27
Attending: PHYSICIAN ASSISTANT
Payer: COMMERCIAL

## 2023-07-27 VITALS — WEIGHT: 156 LBS | BODY MASS INDEX: 29.48 KG/M2

## 2023-07-27 DIAGNOSIS — R10.84 ABDOMINAL PAIN, GENERALIZED: Primary | ICD-10-CM

## 2023-07-27 DIAGNOSIS — K59.09 CHRONIC CONSTIPATION: ICD-10-CM

## 2023-07-27 PROCEDURE — 99215 OFFICE O/P EST HI 40 MIN: CPT | Mod: 95 | Performed by: PHYSICIAN ASSISTANT

## 2023-07-27 ASSESSMENT — PATIENT HEALTH QUESTIONNAIRE - PHQ9: SUM OF ALL RESPONSES TO PHQ QUESTIONS 1-9: 10

## 2023-07-27 ASSESSMENT — PAIN SCALES - GENERAL: PAINLEVEL: NO PAIN (0)

## 2023-07-27 NOTE — NURSING NOTE
Is the patient currently in the state of [place]? YES    Visit mode:VIDEO    If the visit is dropped, the patient can be reconnected by: VIDEO VISIT: Send to e-mail at: [email or web address]    Will anyone else be joining the visit? NO      How would you like to obtain your AVS? MyChart    Are changes needed to the allergy or medication list? NO    Elevated PHQ-stated it is probably situational as  PT states she is recovering from COVID-she is not feeling well and is very fatigued.    Reason for visit: RECHECK     Lisa Hamilton

## 2023-07-27 NOTE — PROGRESS NOTES
GI FOLLOW UP VISIT    CC/REFERRING MD:    Shalini Phan MD       REASON FOR VISIT: follow up       HISTORY OF PRESENT ILLNESS: Aracelis Muñoz is 73 year old female with hyperparathyroidism who presents for follow up of constipation. She recently traveled to [place] for a cruise. She unfortunately fell ill at the start of her trip and was diagnosed with a gastroenteritis by cruise medics. She was experiencing significant abdominal pain. She improved after antibiotics and IV fluids. S She also tested positive for covid infection upon her return and is currently having symptoms with cough. She has decreased appetite, food doesn't taste good. She plans to reach out to her primary care to determine if there are treatment options for her. She couldn't take paxlovid because it interacted with trazodone.    She had a large BM at the start of her trip but was without a BM for a week until her return when she took linzess. She is taking linzess every other day and a teaspoon of miralax daily and fiber gummies daily. We tried switching to amitiza but this wasn't covered by insurance. BM's continue to be variable at this time. No abdominal pain at this time. No rectal bleeding.     She did have eval by pelvic floor center and she was found to have pelvic floor dysfunction and has started physical therapy and biofeedback therapy. She does update me that this has been helping.     Maile  has a past medical history of Arthritis, Depressive disorder (1980), Depressive disorder, not elsewhere classified, Diffuse cystic mastopathy, Insomnia, unspecified, Lumbar disc herniation with radiculopathy (2010), Mild persistent asthma, Osteopenia (2008), Perennial allergic rhinitis, Post-Nasal Drainage, and Slow transit constipation.    She  has a past surgical history that includes cholecystectomy, open (1985); EXCISION BREAST LESION, OPEN >=1 (1972); NONSPECIFIC PROCEDURE (1982); c/section, low transverse; PUNCTURE/ASPIRATION  BREAST CYST, FIRST (1995,12/03,8/04); LIGATN/STRIP LONG & SHORT SAPHEN; MRI LUMBAR SPINE W/O CONTRAST (4/2010); orthopedic surgery; and Colonoscopy (N/A, 11/4/2020).    She  reports that she has never smoked. She has never used smokeless tobacco. She reports current alcohol use. She reports that she does not use drugs.    Her family history includes Allergies in her father and mother; Cancer - colorectal in her father; Cerebrovascular Disease in her mother; Depression in her father; Genitourinary Problems in her maternal aunt; Hypertension in her mother; Obesity in her father; Thyroid Disease in her father, mother, and sister.    ALLERGIES:  Nabumetone, Nefazodone hydrochloride, Sulfa antibiotics, and Prochlorperazine      PERTINENT MEDICATIONS:    Current Outpatient Medications:     albuterol (PROAIR HFA/PROVENTIL HFA/VENTOLIN HFA) 108 (90 Base) MCG/ACT inhaler, Inhale 2 puffs into the lungs every 6 hours as needed for shortness of breath / dyspnea or wheezing As needed (Patient not taking: Reported on 4/12/2023), Disp: 18 g, Rfl: 1    ALPRAZolam (XANAX) 0.25 MG tablet, Take 1 tablet (0.25 mg) by mouth 2 times daily as needed for anxiety, Disp: 20 tablet, Rfl: 0    azelastine (OPTIVAR) 0.05 % ophthalmic solution, PLACE 1 DROP INTO BOTH EYES DAILY AS NEEDED., Disp: 6 mL, Rfl: 2    finasteride (PROSCAR) 5 MG tablet, TAKE 1/2 TABLET BY MOUTH DAILY, Disp: , Rfl:     furosemide (LASIX) 20 MG tablet, TAKE 0.5 TABLETS (10 MG) BY MOUTH DAILY AS NEEDED (LEG SWELLING AS NEEDED), Disp: 45 tablet, Rfl: 0    linaclotide (LINZESS) 72 MCG capsule, Take 1 capsule (72 mcg) by mouth every morning (before breakfast), Disp: 30 capsule, Rfl: 1    lubiprostone (AMITIZA) 8 MCG capsule, Take 1 capsule (8 mcg) by mouth 2 times daily (with meals), Disp: 60 capsule, Rfl: 1    minoxidil (LONITEN) 2.5 MG tablet, TAKE 1/4 TABLETS (0.625 MG TOTAL) BY MOUTH DAILY., Disp: , Rfl:     sennosides (SENOKOT) 8.6 MG tablet, Take 1 tablet by mouth daily  as needed for constipation (Patient not taking: Reported on 6/20/2023), Disp: 30 tablet, Rfl: 1    traZODone (DESYREL) 100 MG tablet, Take 1 tablet (100 mg) by mouth At Bedtime, Disp: 90 tablet, Rfl: 0      PHYSICAL EXAMINATION:  Constitutional: aaox3, cooperative, pleasant, not dyspneic/diaphoretic, no acute distress      GENERAL: Healthy, alert and no distress  EYES: Eyes grossly normal to inspection.  No discharge or erythema, or obvious scleral/conjunctival abnormalities.  RESP: No audible wheeze, cough, or visible cyanosis.  No visible retractions or increased work of breathing.    SKIN: Visible skin clear. No significant rash, abnormal pigmentation or lesions.  NEURO: Cranial nerves grossly intact.  Mentation and speech appropriate for age.  PSYCH: Mentation appears normal, affect normal/bright, judgement and insight intact, normal speech and appearance well-groomed.        ASSESSMENT/PLAN:    1. Chronic constipation  - Adult GI Clinic Follow-Up Order (Blank)  - CT Abdomen Pelvis w Contrast; Future    2. Abdominal pain, generalized  - CT Abdomen Pelvis w Contrast; Future        Aracelis Muñoz is a 73 year old female with hyperparathyroidism who presents for follow up of chronic constipation. She is taking linzess every other day, 1 teaspoon of miralax daily and fiber gummies daily. She continues to have variability with her BM's. We have tried making adjustments with her doses of medication without much success. We also tried switching from linzess to amitiza but this wasn't covered well by her insurance. She recently contracted COVID and also had a GI illness while traveling to [place] for a cruise. She plans to follow up with her primary care in regards to the COVID. She doesn't have any abdominal pain at this time but did report having significant abdominal pain during her travel and was concerned as well with her persistent constipation. We discussed checking a CT scan to rule an underlying concern. We  discussed that her hyperparathyroidism and mild hypercalcemia may be contributing to her constipation. She does plan to follow up with endo at [place] and plans for surgery. There is also a pelvic floor dysfunction which is being treated with PT and biofeedback therapy with positive results.     Recommendations:   -- ct scan abd/pelvis   -- continue current bowel regimen   -- increase linzess to daily for persistent constipation (2 days or more without BM)   -- increase miralax as needed   -- continue with pelvic floor therapy and biofeedback therapy  -- report any new or worsening symptoms   -- follow up in ~ 6 weeks.       Thank you for this consultation.  It was a pleasure to participate in the care of this patient; please contact us with any further questions.        This note was created with voice recognition software, and while reviewed for accuracy, typos may remain.       I spent a total of 50 minutes on the day of the visit.   Time spent by me doing chart review, history and exam, documentation and further activities per the note      Jorge A Estrada PA-C  Division of Gastroenterology, Hepatology and Nutrition   [facility]            Video-Visit Details    Video Start Time: 2:31 PM    Type of service:  Video Visit    Video End Time:2:58 PM    Originating Location (pt. Location): Home    Distant Location (provider location):  Off-site    Platform used for Video Visit: Divina

## 2023-07-27 NOTE — PATIENT INSTRUCTIONS
It was a pleasure taking care of you today.  I've included a brief summary of our discussion and care plan from today's visit below.  Please review this information with your primary care provider.  _______________________________________________________________________     My recommendations are summarized as follows:     - schedule a CT scan of your abdomen at your earliest convenience  - continue your current bowel regimen (linzess every other day, small dose of miralax daily and fiber gummies daily)  - consider increasing linzess to daily use or increase dose of miralax for worsening constipation   - continue with pelvic floor therapy and biofeedback therapy   - follow up as scheduled.    ______________________________________________________________________     How do I schedule labs, imaging studies, or procedures that were ordered in clinic today?      Labs: To schedule lab appointment you can contact your local [facility] or call [phone] to schedule at any convenient [facility] location.     Procedures: If a colonoscopy, upper endoscopy, breath test, esophageal manometry, or pH impedence was ordered today, our endoscopy team will call you to schedule this. If you have not heard from our endoscopy team within a week, please call [phone] to schedule.      Imaging Studies: If you were scheduled for a CT scan, X-ray, MRI, ultrasound, HIDA scan or other imaging study, please call [phone] to have this scheduled.      Referral: If a referral to another specialty was ordered, expect a phone call or follow instructions above. If you have not heard from anyone regarding your referral in a week, please call our clinic to check the status.      Who do I call with any questions after my visit?  Please be in touch if there are any further questions that arise following today's visit.  There are multiple ways to contact your gastroenterology care team.       During business hours, you may  reach a Gastroenterology nurse at [phone]     To schedule or reschedule an appointment, please call [phone].      You can always send a secure message through ZinMobi.  ZinMobi messages are answered by your nurse or doctor typically within 24 hours.  Please allow extra time on weekends and holidays.       For urgent/emergent questions after business hours, you may reach the on-call GI Fellow by contacting the [facility] at [phone].     How will I get the results of any tests ordered?    You will receive all of your results.  If you have signed up for Bandhappyhart, any tests ordered at your visit will be available to you after your physician reviews them.  Typically this takes 1-2 weeks.  If there are urgent results that require a change in your care plan, your physician or nurse will call you to discuss the next steps.       What is ZinMobi?  ZinMobi is a secure way for you to access all of your healthcare records from the [facility].  It is a web based computer program, so you can sign on to it from any location.  It also allows you to send secure messages to your care team.  I recommend signing up for ZinMobi access if you have not already done so and are comfortable with using a computer.       How to I schedule a follow-up visit?  If you did not schedule a follow-up visit today, please call [phone] to schedule a follow-up office visit.      Jorge A Estrada PA-C  Division of Gastroenterology, Hepatology and Nutrition   [facility]

## 2023-08-01 ENCOUNTER — OFFICE VISIT (OUTPATIENT)
Dept: URGENT CARE | Facility: URGENT CARE | Age: 73
End: 2023-08-01
Payer: COMMERCIAL

## 2023-08-01 ENCOUNTER — ANCILLARY PROCEDURE (OUTPATIENT)
Dept: GENERAL RADIOLOGY | Facility: CLINIC | Age: 73
End: 2023-08-01
Attending: EMERGENCY MEDICINE
Payer: COMMERCIAL

## 2023-08-01 ENCOUNTER — NURSE TRIAGE (OUTPATIENT)
Dept: FAMILY MEDICINE | Facility: CLINIC | Age: 73
End: 2023-08-01

## 2023-08-01 VITALS
SYSTOLIC BLOOD PRESSURE: 126 MMHG | DIASTOLIC BLOOD PRESSURE: 68 MMHG | OXYGEN SATURATION: 100 % | HEART RATE: 89 BPM | TEMPERATURE: 98.6 F

## 2023-08-01 DIAGNOSIS — J40 BRONCHITIS: Primary | ICD-10-CM

## 2023-08-01 DIAGNOSIS — U07.1 INFECTION DUE TO 2019 NOVEL CORONAVIRUS: ICD-10-CM

## 2023-08-01 DIAGNOSIS — J40 BRONCHITIS: ICD-10-CM

## 2023-08-01 PROCEDURE — 99214 OFFICE O/P EST MOD 30 MIN: CPT | Performed by: EMERGENCY MEDICINE

## 2023-08-01 PROCEDURE — 71046 X-RAY EXAM CHEST 2 VIEWS: CPT | Mod: TC | Performed by: RADIOLOGY

## 2023-08-01 RX ORDER — BENZONATATE 100 MG/1
100 CAPSULE ORAL 2 TIMES DAILY PRN
Qty: 20 CAPSULE | Refills: 0 | Status: SHIPPED | OUTPATIENT
Start: 2023-08-01 | End: 2023-10-03

## 2023-08-01 NOTE — TELEPHONE ENCOUNTER
To PCP:  sent patient to UC. However, Patient stated that if wait too long she would call back to try to get into the clinic. Patient agreeable to go to [facility].     Nurse consulted [facility] provider and Provider recommended UC first prior to being seen at [facility]. Nurse recommended [facility] in case of need for imaging.    Nurse Triage SBAR    Is this a 2nd Level Triage? YES, LICENSED PRACTITIONER REVIEW IS REQUIRED    Situation:  Patient having SOB when climbing stairs and has chest pressure. Has a current cough. Patient also feeling lightheaded when getting up from a sitting position.     Background:  Patient had Covid a week or so ago and unable to get paxlovid.     Assessment:  Patient needs to be seen today     Protocol Recommended Disposition:   See in Office Today    Recommendation: sent patient to UC. However, Patient stated that if wait too long she would call back to try to get into the clinic.     Nurse consulted [facility] provider and Provider recommended UC first prior to being seen at [facility]. Nurse recommended [facility] in case of need for imaging.      Routed to provider    VALARIE Artis  [facility] Triage       Does the patient meet one of the following criteria for ADS visit consideration? 16+ years old, with an [facility] PCP     TIP  Providers, please consider if this condition is appropriate for management at one of our Acute and Diagnostic Services sites.     If patient is a good candidate, please use dotphrase <dot>triageresponse and select Refer to ADS to document.   Reason for Disposition   MODERATE dizziness (e.g., interferes with normal activities) (Exception: dizziness caused by heat exposure, sudden standing, or poor fluid intake)    Additional Information   Negative: SEVERE difficulty breathing (e.g., struggling for each breath, speaks in single words)   Negative: Passed out (i.e., fainted, collapsed and was not responding)   Negative: Difficult to awaken or acting confused (e.g., disoriented, slurred  speech)   Negative: Shock suspected (e.g., cold/pale/clammy skin, too weak to stand, low BP, rapid pulse)   Negative: Chest pain lasting longer than 5 minutes and ANY of the following:* Over 44 years old* Over 30 years old and at least one cardiac risk factor (e.g., diabetes mellitus, high blood pressure, high cholesterol, smoker, or strong family history of heart disease)* History of heart disease (i.e., angina, heart attack, heart failure, bypass surgery, takes nitroglycerin)* Pain is crushing, pressure-like, or heavy   Negative: Heart beating < 50 beats per minute OR > 140 beats per minute   Negative: Visible sweat on face or sweat dripping down face   Negative: Sounds like a life-threatening emergency to the triager   Negative: SEVERE difficulty breathing (e.g., struggling for each breath, speaks in single words)   Negative: Shock suspected (e.g., cold/pale/clammy skin, too weak to stand, low BP, rapid pulse)   Negative: Difficult to awaken or acting confused (e.g., disoriented, slurred speech)   Negative: Fainted, and still feels dizzy afterwards   Negative: Overdose (accidental or intentional) of medications   Negative: New neurologic deficit that is present now: * Weakness of the face, arm, or leg on one side of the body * Numbness of the face, arm, or leg on one side of the body * Loss of speech or garbled speech   Negative: Heart beating < 50 beats per minute OR > 140 beats per minute   Negative: Sounds like a life-threatening emergency to the triager   Negative: Chest pain   Negative: Rectal bleeding, bloody stool, or tarry-black stool   Negative: Vomiting is main symptom   Negative: Diarrhea is main symptom   Negative: Headache is main symptom   Negative: Heat exhaustion suspected (i.e., dehydration from heat exposure)   Negative: Patient states that they are having an anxiety or panic attack   Negative: Dizziness from low blood sugar (i.e., < 60 mg/dl or 3.5 mmol/l)   Negative: SEVERE dizziness (e.g.,  "unable to stand, requires support to walk, feels like passing out now)   Negative: SEVERE headache or neck pain   Negative: Spinning or tilting sensation (vertigo) present now and one or more stroke risk factors (i.e., hypertension, diabetes mellitus, prior stroke/TIA, heart attack, age over 60) (Exception: prior physician evaluation for this AND no different/worse than usual)   Negative: Neurologic deficit that was brief (now gone), ANY of the following:* Weakness of the face, arm, or leg on one side of the body* Numbness of the face, arm, or leg on one side of the body* Loss of speech or garbled speech   Negative: Loss of vision or double vision  (Exception: Similar to previous migraines.)   Negative: Extra heart beats OR irregular heart beating (i.e., 'palpitations')   Negative: Difficulty breathing   Negative: Drinking very little and has signs of dehydration (e.g., no urine > 12 hours, very dry mouth, very lightheaded)   Negative: Follows bleeding (e.g., stomach, rectum, vagina)  (Exception: Became dizzy from sight of small amount blood.)   Negative: Patient sounds very sick or weak to the triager    Answer Assessment - Initial Assessment Questions  1. LOCATION: \"Where does it hurt?\"        Tightness- right in the middle of chest  2. RADIATION: \"Does the pain go anywhere else?\" (e.g., into neck, jaw, arms, back)      No   3. ONSET: \"When did the chest pain begin?\" (Minutes, hours or days)       Had since she was covid- began with the diagnosis of covid, better for a few days but never whent away  4. PATTERN \"Does the pain come and go, or has it been constant since it started?\"  \"Does it get worse with exertion?\"       Pressure is always there, worse with exertion   5. DURATION: \"How long does it last\" (e.g., seconds, minutes, hours)      Consistent- relief when coughing   6. SEVERITY: \"How bad is the pain?\"  (e.g., Scale 1-10; mild, moderate, or severe)     - MILD (1-3): doesn't interfere with normal activities " "     - MODERATE (4-7): interferes with normal activities or awakens from sleep     - SEVERE (8-10): excruciating pain, unable to do any normal activities        Moderate- not painful but just tight   7. CARDIAC RISK FACTORS: \"Do you have any history of heart problems or risk factors for heart disease?\" (e.g., angina, prior heart attack; diabetes, high blood pressure, high cholesterol, smoker, or strong family history of heart disease)      Nope  8. PULMONARY RISK FACTORS: \"Do you have any history of lung disease?\"  (e.g., blood clots in lung, asthma, emphysema, birth control pills)      No- was diagnosed with asthma,   9. CAUSE: \"What do you think is causing the chest pain?\"      Post Covid chest   10. OTHER SYMPTOMS: \"Do you have any other symptoms?\" (e.g., dizziness, nausea, vomiting, sweating, fever, difficulty breathing, cough)        Dizziness, lightheaded, cough  11. PREGNANCY: \"Is there any chance you are pregnant?\" \"When was your last menstrual period?\"    Answer Assessment - Initial Assessment Questions  1. DESCRIPTION: \"Describe your dizziness.\"      Get up and start walking, not solid on feet   2. LIGHTHEADED: \"Do you feel lightheaded?\" (e.g., somewhat faint, woozy, weak upon standing)      Yes   3. VERTIGO: \"Do you feel like either you or the room is spinning or tilting?\" (i.e. vertigo)      No   4. SEVERITY: \"How bad is it?\"  \"Do you feel like you are going to faint?\" \"Can you stand and walk?\"    - MILD: Feels slightly dizzy, but walking normally.    - MODERATE: Feels unsteady when walking, but not falling; interferes with normal activities (e.g., school, work).    - SEVERE: Unable to walk without falling, or requires assistance to walk without falling; feels like passing out now.       Moderate   5. ONSET:  \"When did the dizziness begin?\"      When the first symptoms of Covid started   6. AGGRAVATING FACTORS: \"Does anything make it worse?\" (e.g., standing, change in head position)      Standing   7. " "HEART RATE: \"Can you tell me your heart rate?\" \"How many beats in 15 seconds?\"  (Note: not all patients can do this)        No   8. CAUSE: \"What do you think is causing the dizziness?\"      I don't know - incredibly thirsty  9. RECURRENT SYMPTOM: \"Have you had dizziness before?\" If Yes, ask: \"When was the last time?\" \"What happened that time?\"      Once - not that type of dizzyness- vertigo like  10. OTHER SYMPTOMS: \"Do you have any other symptoms?\" (e.g., fever, chest pain, vomiting, diarrhea, bleeding)        Nope  11. PREGNANCY: \"Is there any chance you are pregnant?\" \"When was your last menstrual period?\"        No    Protocols used: Chest Pain-A-OH, Dizziness-A-OH    "

## 2023-08-01 NOTE — PROGRESS NOTES
Assessment & Plan     Diagnosis:    ICD-10-CM    1. Bronchitis  J40 XR Chest 2 Views     fluticasone (FLOVENT DISKUS) 100 MCG/ACT inhaler     benzonatate (TESSALON) 100 MG capsule      2. Infection due to 2019 novel coronavirus  U07.1 XR Chest 2 Views            Medical Decision Making:  Aracelis Muñoz is a 73 year old female who presents for evaluation of persistent cough, was diagnosed with COVID last week.  This is consistent with an upper respiratory tract infection and bronchitis.  There is no signs at this point of serious bacterial infection such as OM, RPA, epiglottitis, PTA, sinusitis, meningitis, serious bacterial infection.      Given duration of symptoms, I did a CXR to eval for pneumonia. This shows no acute infiltrate or effusion on my read, radiology notes as per below. There are no signs of complications of COVID-19 bronchitis at this point such as ARDS, empyema, hypoxia, respiratory failure or compromise.     There are no gastrointestinal symptoms at this point and no signs of dehydration.  Close followup with PCP is indicated.  Go to ED for fever > 102 F, protracted vomiting, worsening shortness of breath, chest pain or other concerns.  Patient verbalized understanding and agreement with the plan. Patient was discharged from clinic in stable condition.      Joao Chawla PA-C  [facility] U[facility]    Subjective     Aracelis Muñoz is a 73 year old female who presents to clinic today for the following health issues:  Chief Complaint   Patient presents with    Urgent Care    Sinus Problem     Sinus issues, cough,chest tightness. Pt tested positive for covid on 7/24.       HPI  Patient reports that for the past 9 days she been experiencing cough, body aches, fevers and chills, symptoms have been improving.  She was diagnosed with COVID on 7/24/2023.  She not take any medications or antivirals.  She is over the last couple of days she has just had persistent coughing fits, feel slightly  short of breath only when coughing and describes chest tightness.  She notes no shortness of breath at rest or with minimal exertion.  No swallowing difficulties, chest pain, leg swelling, history of DVT/PE or lightheadedness/dizziness.  She notes she is just more bothered by the cough and chest tightness, has tried using her albuterol inhaler at home which is not helping.  She is not wheezing or feeling she is having an asthma exacerbation.  She does have some sinus nasal congestion which has improved since this morning.    Review of Systems    See HPI    Objective      Vitals: /68   Pulse 89   Temp 98.6  F (37  C) (Tympanic)   LMP 11/01/2004   SpO2 100%   Resp: 16    Patient Vitals for the past 24 hrs:   BP Temp Temp src Pulse SpO2   08/01/23 1229 126/68 98.6  F (37  C) Tympanic 89 100 %       Vital signs reviewed by: Joao Chawla PA-C    Physical Exam   Constitutional: Alert and active. Non-toxic appearing.  No acute distress.  HENT:   Right Ear: External ear normal. TM: normal  Left Ear: External ear normal. TM: normal  Nose: Nose normal.    Eyes: Conjunctivae, EOM and lids are normal.   Mouth: Mucous membranes are moist. Posterior oropharynx is clear. No exudates. Normal tongue and tonsil. Uvula is midline.. No submandibular swelling or erythema.  Neck: Normal ROM. No meningismus  Cardiovascular: Regular rate and rhythm  Pulmonary/Chest: Effort normal. No respiratory distress. Lungs are clear to auscultation throughout.  GI: Abdomen is soft and non-tender throughout.   Musculoskeletal: Normal range of motion. No lower extremity tenderness or asymmetric edema.  Neurological: Alert and oriented x3.  Skin: No rash noted on visualized skin.       Labs/Imaging:  Results for orders placed or performed in visit on 08/01/23   XR Chest 2 Views     Status: None    Narrative    XR CHEST 2 VIEWS 8/1/2023 1:10 PM    HISTORY: Infection due to 2019 novel coronavirus; Bronchitis    COMPARISON: X-ray 1/20/2018       Impression    IMPRESSION: No acute findings. The lungs are clear and there are no  pleural effusions. Normal heart size. Spinal degenerative changes.    MILO CA MD         SYSTEM ID:  [id number]     Reading per radiology      Joao Chawla PA-C, August 1, 2023

## 2023-08-07 ENCOUNTER — VIRTUAL VISIT (OUTPATIENT)
Dept: FAMILY MEDICINE | Facility: CLINIC | Age: 73
End: 2023-08-07
Payer: COMMERCIAL

## 2023-08-07 DIAGNOSIS — J40 BRONCHITIS: ICD-10-CM

## 2023-08-07 DIAGNOSIS — E21.3 HYPERPARATHYROIDISM (H): ICD-10-CM

## 2023-08-07 DIAGNOSIS — F33.0 MAJOR DEPRESSIVE DISORDER, RECURRENT EPISODE, MILD (H): ICD-10-CM

## 2023-08-07 DIAGNOSIS — L65.9 HAIR LOSS: ICD-10-CM

## 2023-08-07 DIAGNOSIS — J45.20 MILD INTERMITTENT REACTIVE AIRWAY DISEASE WITHOUT COMPLICATION: Primary | ICD-10-CM

## 2023-08-07 DIAGNOSIS — U07.1 INFECTION DUE TO 2019 NOVEL CORONAVIRUS: ICD-10-CM

## 2023-08-07 PROCEDURE — 99214 OFFICE O/P EST MOD 30 MIN: CPT | Mod: VID | Performed by: FAMILY MEDICINE

## 2023-08-07 RX ORDER — MINOXIDIL 2.5 MG/1
TABLET ORAL
COMMUNITY
Start: 2023-08-07

## 2023-08-07 RX ORDER — FINASTERIDE 5 MG/1
2.5 TABLET, FILM COATED ORAL
COMMUNITY
Start: 2023-08-07

## 2023-08-07 NOTE — PROGRESS NOTES
Maile is a 73 year old who is being evaluated via a billable video visit.      How would you like to obtain your AVS? MyChart  If the video visit is dropped, the invitation should be resent by: Text to cell phone: [phone]  Will anyone else be joining your video visit? No          Assessment & Plan         (U07.1) Infection due to 2019 novel coronavirus  Comment: improving .   Plan:  Cares and symptomatic treatment discussed follow up if problem     (J40) Bronchitis  Comment: cough is improving  Plan: Cares and symptomatic treatment discussed follow up if problem       (J45.20) Mild intermittent reactive airway disease without complication  (primary encounter diagnosis)  Comment:   Plan: clonically improving , needing albuterol less frequently now   She can continue her Flovent for next couple of weeks and if she does not need long term then may not to continue   Discussed a asthma and treatment.  Refill give on albuterol to use as needed.   Need to monitor the need for albuterol . Cares and symptomatic treatment discussed.   follow up  In 4 -6 weeks sooner, if problem        (F33.0) Major depressive disorder, recurrent episode, mild (H)  Comment: was doing ok , just more tired now since Covid and mood is ok.   Plan: trazodone helps with sleep . Comfortable watching for now. Follow up as needed     (E21.3) Hyperparathyroidism (H)  Comment: scheduled for surgery in September   Plan: will be scheduling a pre op soon     (L65.9) Hair loss  Comment: doing better on med's   Plan: finasteride (PROSCAR) 5 MG tablet, minoxidil         (LONITEN) 2.5 MG tablet            .Cares and  treatment discussed. follow.up if problem   Patient expressed understanding and agreement with treatment plan. All patient's questions were answered, will let me know if has more later.  Medications: Rx's: Reviewed the potential side effects/complications of medications prescribed.        MED REC REQUIRED  Post Medication Reconciliation  Status: discharge medications reconciled, continue medications without change      Shalini Phan MD  [facility] DELORIS Gr is a 73 year old, presenting for the following health issues:  No chief complaint on file.      8/7/2023     9:42 AM   Additional Questions   Roomed by Kishor       History of Present Illness       Reason for visit:  Followup on bronchitis    She eats 2-3 servings of fruits and vegetables daily.She consumes 0 sweetened beverage(s) daily.She exercises with enough effort to increase her heart rate 20 to 29 minutes per day.  She exercises with enough effort to increase her heart rate 3 or less days per week.   She is taking medications regularly.     She had Covid first, tat she probably got fro her recent cruise trip , but she did not take paxlovid bc she takes trazodone. Although she was very fatigued mostly a, may be feverish sweaty and jsutslep most of the time ear.ierbut her cough and congestion WA getting worse and felt somewhat sob, so she end up going to urgent care and was treated with albuterol inhaler and also started  on Flovent , and taking cough drops she is over al getting much better. Still has some cough and fatigue and breathing is improved and has not felt the need for albuterol last couple  of days.       Depression and Anxiety Follow-Up  How are you doing with your depression since your last visit? Improved , although still feeling tired bc of recovering for her illness with Covid . But trazodone helps with sleep . She has not taken   How are you doing with your anxiety since your last visit?  Improved   Are you having other symptoms that might be associated with depression or anxiety? No  Have you had a significant life event? No   Do you have any concerns with your use of alcohol or other drugs? No    Social History     Tobacco Use    Smoking status: Never    Smokeless tobacco: Never   Vaping Use    Vaping Use: Never used    Substance Use Topics    Alcohol use: Yes     Alcohol/week: 0.0 standard drinks of alcohol     Comment: 1-2 glasses of wine 1-2 times per week     Drug use: No         9/14/2022    11:15 AM 4/12/2023     9:35 AM 7/27/2023     2:18 PM   PHQ   PHQ-9 Total Score 4 7 10   Q9: Thoughts of better off dead/self-harm past 2 weeks Not at all Not at all Not at all         4/18/2019    11:31 AM 1/26/2021     6:06 PM 9/14/2021     1:27 PM   MARCELLE-7 SCORE   Total Score   1 (minimal anxiety)   Total Score 10 2 1         7/27/2023     2:18 PM   Last PHQ-9   1.  Little interest or pleasure in doing things 2   2.  Feeling down, depressed, or hopeless 1   3.  Trouble falling or staying asleep, or sleeping too much 3   4.  Feeling tired or having little energy 3   5.  Poor appetite or overeating 1   6.  Feeling bad about yourself 0   7.  Trouble concentrating 0   8.  Moving slowly or restless 0   Q9: Thoughts of better off dead/self-harm past 2 weeks 0   PHQ-9 Total Score 10         9/14/2021     1:27 PM   MARCELLE-7    1. Feeling nervous, anxious, or on edge 0   2. Not being able to stop or control worrying 0   3. Worrying too much about different things 0   4. Trouble relaxing 0   5. Being so restless that it is hard to sit still 0   6. Becoming easily annoyed or irritable 1   7. Feeling afraid, as if something awful might happen 0   MARCELLE-7 Total Score 1       Suicide Assessment Five-step Evaluation and Treatment (SAFE-T)    Asthma Follow-Up    Was ACT completed today?  No    Do you have a cough?  YES  Are you experiencing any wheezing in your chest?  YES but improving as per HPI   Do you have any shortness of breath?  Not much now and she has no sx before she got sick with Covid so over all her sx have been well controlled and she only get sick with uri . Has not been on any other controller med's for a long time    How often are you using a short-acting (rescue) inhaler or nebulizer, such as Albuterol?  Never  How many days per week do  you miss taking your asthma controller medication?  I do not have an asthma controller medication  Please describe any recent triggers for your asthma: upper respiratory infections  Have you had any Emergency Room Visits, Urgent Care Visits, or Hospital Admissions since your last office visit?  Yes  Number of ER or Urgent Care visits for asthma: 0  Number of hospitalizations for asthma:0      additional problems       was doing ok , just more tired now since Covid and mood is ok. trazodone helps with sleep   Review of Systems   Constitutional, HEENT, cardiovascular, pulmonary, GI, , musculoskeletal, neuro, skin, endocrine and psych systems are negative, except as otherwise noted.      Objective           Vitals:  No vitals were obtained today due to virtual visit.    Physical Exam   GENERAL: Healthy, alert and no distress but coughing  occasionally   EYES: Eyes grossly normal to inspection.  No discharge or erythema, or obvious scleral/conjunctival abnormalities.  RESP: No audible wheeze, cough, or visible cyanosis.  No visible retractions or increased work of breathing.    SKIN: Visible skin clear. No significant rash, abnormal pigmentation or lesions.  NEURO: Cranial nerves grossly intact.  Mentation and speech appropriate for age.  PSYCH: Mentation appears normal, affect normal/bright, judgement and insight intact, normal speech and appearance well-groomed.    Video-Visit Details    Type of service:  Video Visit       Originating Location (pt. Location): Home    Distant Location (provider location):  Off-site  Platform used for Video Visit: beatlab

## 2023-08-10 ENCOUNTER — TELEPHONE (OUTPATIENT)
Dept: OTHER | Facility: CLINIC | Age: 73
End: 2023-08-10

## 2023-08-10 ENCOUNTER — OFFICE VISIT (OUTPATIENT)
Dept: OTHER | Facility: CLINIC | Age: 73
End: 2023-08-10
Attending: INTERNAL MEDICINE
Payer: COMMERCIAL

## 2023-08-10 VITALS
DIASTOLIC BLOOD PRESSURE: 73 MMHG | HEART RATE: 71 BPM | BODY MASS INDEX: 29.3 KG/M2 | OXYGEN SATURATION: 100 % | SYSTOLIC BLOOD PRESSURE: 126 MMHG | HEIGHT: 62 IN | WEIGHT: 159.2 LBS

## 2023-08-10 DIAGNOSIS — M79.89 LEFT LEG SWELLING: Primary | ICD-10-CM

## 2023-08-10 DIAGNOSIS — I83.893 VARICOSE VEINS OF BILATERAL LOWER EXTREMITIES WITH OTHER COMPLICATIONS: ICD-10-CM

## 2023-08-10 DIAGNOSIS — U07.1 INFECTION DUE TO 2019 NOVEL CORONAVIRUS: ICD-10-CM

## 2023-08-10 DIAGNOSIS — Z98.890 H/O FOOT SURGERY: ICD-10-CM

## 2023-08-10 PROCEDURE — 99205 OFFICE O/P NEW HI 60 MIN: CPT | Performed by: INTERNAL MEDICINE

## 2023-08-10 PROCEDURE — G0463 HOSPITAL OUTPT CLINIC VISIT: HCPCS

## 2023-08-10 NOTE — TELEPHONE ENCOUNTER
Follow-up to 08/10/23    LLE venous ultrasound at next available  Results will be communicated via MyChart or telephone.

## 2023-08-10 NOTE — PROGRESS NOTES
"Ref by Dr. Elise for left foot swelling; notes sent to [facility].    /73 (BP Location: Left arm)   Pulse 71   Ht 5' 1.5\" (1.562 m)   Wt 159 lb 3.2 oz (72.2 kg)   LMP 11/01/2004   SpO2 100%   BMI 29.59 kg/m        Jane Macario RN    "

## 2023-08-10 NOTE — TELEPHONE ENCOUNTER
Left voicemail with instructions for patient to call back to schedule their appointment(s)    August 10, 2023 , 3:01 PM

## 2023-08-10 NOTE — PROGRESS NOTES
[facility] INITIAL VASCULAR MEDICINE CONSULT    ( New Patient visit)     PRIMARY HEALTH CARE PROVIDER:  Shalini Phan MD      REFERRING HEALTH CARE PROVIDER;  KYA Asher     REASON FOR CONSULT: Evaluation and management of vascular causes of intermittent left lower extremity swelling in a very pleasant 73-year-old female non-smoker underwent left ankle surgery in November 2022 followed by complications of swelling seroma etc. and also recent history of travel and she had a COVID infection on July 22, 2023.      HPI: Aracelis Muñoz is a 73 year old very pleasant female with multiple medical problems including the depression, DJD of the lumbar sacral spine, parathyroid abnormalities, bilateral ankle surgeries and she underwent left ankle foot surgery in November 2022 which was complicated by seroma swelling etc. having issues of redness erythema near the surgical site and also left leg swelling and recently traveled abroad and within the [place] and also she developed COVID infection last week of July treated conservatively.  She denies any chest pain, shortness of breath or palpitations.  She has a history of bilateral lower extremity varicose veins underwent left lower extremity vein stripping more than 30 years ago.  No previous history of a DVT or PE      PAST MEDICAL HISTORY  Past Medical History:   Diagnosis Date    Arthritis     Depressive disorder 1980    Depressive disorder, not elsewhere classified     improved with trazadone    Diffuse cystic mastopathy     bilateral    Insomnia, unspecified      trazodone    Lumbar disc herniation with radiculopathy 2010    left L4-5    Mild persistent asthma     Osteopenia 2008    Parathyroid abnormality (H)     Perennial allergic rhinitis     Post-Nasal Drainage     chronic    Slow transit constipation     Thyroid disease        CURRENT MEDICATIONS  finasteride (PROSCAR) 5 MG tablet, 2.5 mg  minoxidil (LONITEN) 2.5 MG  tablet,   albuterol (PROAIR HFA/PROVENTIL HFA/VENTOLIN HFA) 108 (90 Base) MCG/ACT inhaler, Inhale 2 puffs into the lungs every 6 hours as needed for shortness of breath / dyspnea or wheezing As needed  ALPRAZolam (XANAX) 0.25 MG tablet, Take 1 tablet (0.25 mg) by mouth 2 times daily as needed for anxiety  azelastine (OPTIVAR) 0.05 % ophthalmic solution, PLACE 1 DROP INTO BOTH EYES DAILY AS NEEDED.  benzonatate (TESSALON) 100 MG capsule, Take 1 capsule (100 mg) by mouth 2 times daily as needed for cough  fluticasone (FLOVENT DISKUS) 100 MCG/ACT inhaler, Inhale 1 puff into the lungs every 12 hours  furosemide (LASIX) 20 MG tablet, TAKE 0.5 TABLETS (10 MG) BY MOUTH DAILY AS NEEDED (LEG SWELLING AS NEEDED)  linaclotide (LINZESS) 72 MCG capsule, Take 1 capsule (72 mcg) by mouth every morning (before breakfast)  sennosides (SENOKOT) 8.6 MG tablet, Take 1 tablet by mouth daily as needed for constipation  traZODone (DESYREL) 100 MG tablet, Take 1 tablet (100 mg) by mouth At Bedtime    No current facility-administered medications on file prior to visit.      PAST SURGICAL HISTORY:  Past Surgical History:   Procedure Laterality Date    C/SECTION, LOW TRANSVERSE      S/P C/S    CHOLECYSTECTOMY, OPEN  1985    Cholecystectomy    COLONOSCOPY N/A 11/4/2020    Procedure: COLONOSCOPY;  Surgeon: Alona Mancini MD;  Location:  GI    HC EXCISION BREAST LESION, OPEN >=1  1972    Benign mass right breast    HC MRI LUMBAR SPINE W/O CONTRAST  4/2010    multilevel degen disc disease/facet arthropathy, 5 mm caudally extruded left posterolateral disc herniation at L4-5 with impingement on the left L5 nerve root     HC PUNCTURE/ASPIRATION BREAST CYST, FIRST  1995,12/03,8/04    bilateral '03, left '04    LIGATN/STRIP LONG & SHORT SAPHEN      varicose vein stripping    ORTHOPEDIC SURGERY      foot fusion of joint    ZZC NONSPECIFIC PROCEDURE  1982    Cyst       ALLERGIES     Allergies   Allergen Reactions    Nabumetone      Other  reaction(s): Other (see comments)  Hair loss  Hair loss      Nefazodone Hydrochloride Swelling     (serzone) sore swollen tongue    Sulfa Antibiotics Swelling     Sore swollen tongue    Prochlorperazine Anxiety     (Compazine) became agitated       FAMILY HISTORY  Family History   Problem Relation Age of Onset    Thyroid Disease Mother     Hypertension Mother     Allergies Mother     Cerebrovascular Disease Mother         minor, May, 2016    Cancer - colorectal Father          age 65 colon cancer    Thyroid Disease Father     Allergies Father     Depression Father     Obesity Father     Genitourinary Problems Maternal Aunt         fibrocystic breast    Thyroid Disease Sister        VASCULAR FAMILY HISTORY  1st order relative with atherosclerotic PAD: No  1st order relative with AAA: No  Family history of Familial Hyperlipidemia No  Family History of Hypercoagulable state:No    VASCULAR RISK FACTORS  1. Diabetes:No   2. Smoking: never  3. HTN: normotensive  4.Hyperlipidemia: No      SOCIAL HISTORY  Social History     Socioeconomic History    Marital status: Legally      Spouse name: Kaleb    Number of children: 1    Years of education: Not on file    Highest education level: Not on file   Occupational History    Occupation: Consultant - human resources     Employer: SELF     Comment: works from her home   Tobacco Use    Smoking status: Never    Smokeless tobacco: Never   Vaping Use    Vaping Use: Never used   Substance and Sexual Activity    Alcohol use: Yes     Alcohol/week: 0.0 standard drinks of alcohol     Comment: 1-2 glasses of wine 1-2 times per week     Drug use: No    Sexual activity: Not Currently     Partners: Male     Birth control/protection: Post-menopausal   Other Topics Concern     Service No    Blood Transfusions Yes     Comment: 1982    Caffeine Concern No    Occupational Exposure No    Hobby Hazards No    Sleep Concern Yes     Comment: takes trazadone    Stress Concern No  "   Weight Concern Yes    Special Diet No    Back Care Yes     Comment: occ.    Exercise No    Bike Helmet No    Seat Belt Yes    Self-Exams Yes    Parent/sibling w/ CABG, MI or angioplasty before 65F 55M? No   Social History Narrative     for 45 yrs,   but  currently currently  , has  one son, not working , just  got retired June 2017.     Social Determinants of Health     Financial Resource Strain: Not on file   Food Insecurity: Not on file   Transportation Needs: Not on file   Physical Activity: Not on file   Stress: Not on file   Social Connections: Not on file   Intimate Partner Violence: Not on file   Housing Stability: Not on file       ROS:   General: No change in weight, sleep or appetite.  Normal energy.  No fever or chills  Eyes: Negative for vision changes or eye problems  ENT: No problems with ears, nose or throat.  No difficulty swallowing.  Resp: No coughing, wheezing or shortness of breath  CV: No chest pains or palpitations  GI: No nausea, vomiting,  heartburn, abdominal pain, diarrhea, constipation or change in bowel habits  : No urinary frequency or dysuria, bladder or kidney problems  Musculoskeletal: No significant muscle or joint pains  Neurologic: No headaches, numbness, tingling, weakness, problems with balance or coordination  Psychiatric: No problems with anxiety, depression or mental health  Heme/immune/allergy: No history of bleeding or clotting problems or anemia.  No allergies or immune system problems  Endocrine: No history of thyroid disease, diabetes or other endocrine disorders  Skin: No rashes,worrisome lesions or skin problems  Vascular: History of bilateral lower extremity varicose veins underwent left lower extremity vein stripping 30 years ago and recent history of a left ankle surgery in November 2022 which was complicated by swelling fluid retention and also seroma etc.  EXAM:  /73 (BP Location: Left arm)   Pulse 71   Ht 5' 1.5\" (1.562 m)   Wt 159 lb " 3.2 oz (72.2 kg)   LMP 11/01/2004   SpO2 100%   BMI 29.59 kg/m    In general, the patient is a pleasant female in no apparent distress.    HEENT: NC/AT.  PERRLA.  EOMI.  Sclerae white, not injected.  Nares clear.  Pharynx without erythema or exudate.  Dentition intact.    Neck: No adenopathy.  No thyromegaly. Carotids +2/2 bilaterally without bruits.  No jugular venous distension.   Heart: RRR. Normal S1, S2 splits physiologically. No murmur, rub, click, or gallop. The PMI is in the 5th ICS in the midclavicular line. There is no heave.    Lungs: CTA.  No ronchi, wheezes, rales.  No dullness to percussion.   Abdomen: Soft, nontender, nondistended. No organomegaly. No AAA.  No bruits.   Extremities: Vascular: She has a good palpable peripheral pulses in both lower extremities and dopplerable Bi-triphasic pulses.  Minimal varicose veins bilateral lower extremities, healed surgical scars on ankles.  No evidence of infection        Labs:  LIPID RESULTS:  Lab Results   Component Value Date    CHOL 211 (H) 09/30/2021    CHOL 229 (H) 09/21/2020    HDL 77 09/30/2021    HDL 75 09/21/2020     (H) 09/30/2021     (H) 09/21/2020    TRIG 91 09/30/2021    TRIG 98 09/21/2020    CHOLHDLRATIO 2.9 09/22/2014       LIVER ENZYME RESULTS:  Lab Results   Component Value Date    AST 24 09/14/2022    AST 16 11/12/2020    ALT 25 09/14/2022    ALT 19 11/12/2020       CBC RESULTS:  Lab Results   Component Value Date    WBC 5.2 11/12/2020    RBC 4.50 11/12/2020    HGB 13.5 11/12/2020    HCT 41.0 11/12/2020    MCV 91 11/12/2020    MCH 30.0 11/12/2020    MCHC 32.9 11/12/2020    RDW 12.6 11/12/2020     11/12/2020       BMP RESULTS:  Lab Results   Component Value Date     09/14/2022     11/12/2020    POTASSIUM 4.4 09/14/2022    POTASSIUM 4.2 11/12/2020    CHLORIDE 107 09/14/2022    CHLORIDE 107 11/12/2020    CO2 25 09/14/2022    CO2 27 11/12/2020    ANIONGAP 4 09/14/2022    ANIONGAP 3 11/12/2020    GLC 89  09/14/2022    GLC 95 11/12/2020    BUN 14 09/14/2022    BUN 11 11/12/2020    CR 0.69 09/14/2022    CR 0.77 11/12/2020    GFRESTIMATED >90 09/14/2022    GFRESTIMATED 78 11/12/2020    GFRESTBLACK >90 11/12/2020    MARIA FERNANDA 10.4 (H) 09/14/2022    MARIA FERNANDA 9.7 11/12/2020        A1C RESULTS:  No results found for: A1C    THYROID RESULTS:  Lab Results   Component Value Date    TSH 2.81 09/21/2020       Procedures:     Reviewed recent ankle foot x-rays in the epic      Assessment and Plan:     1. Left leg swelling    - US Lower Extremity Venous Duplex Left; Future  - Compression Sleeve/Stocking Order for DME - ONLY FOR DME    2. H/O foot surgery left 11/2022    3. Varicose veins of bilateral lower extremities with other complications s/p Vein stripping left 30 years ago    4. Infection due to 2019 novel coronavirus july 22/2023 (treated conservatively)      This is a 73-year-old female here for evaluation and management of vascular causes of left lower extremity swelling intermittent and she has known history of varicose veins underwent vein stripping left leg more than 30 years ago and also she underwent left ankle surgery by Dr. Elise at [facility] complicated by fluid retention and seroma formation etc..  She recently traveled within and outside the [place].  Now dealing with redness erythema near the surgical site intermittently and also intermittent leg swelling this got better last few days.  She was also diagnosed COVID infection in last week of July 2023 conservative management doing well chest x-ray was negative recently.  No fever no chills.  No evidence of arterial insufficiency she has a good palpable and dopplerable pulses  She has a minimal varicose veins    Given recent history of travel, COVID infection, ankle surgery and intermittent left leg swelling will obtain the left lower extremity venous duplex to rule out DVT and suggested utilizing compression stockings 20 to 30 mmHg during the daytime and elevate the legs when  able, new prescription given    Will notify venous duplex results to the patient     Thank you for the consultation  This note was dictated by utilizing Dragon software  Copy of this note to primary care physician and referring physician  GAYLES with written instructions given        60 minutes spent on the date of the encounter doing chart review, history and exam, documentation, and further activities as noted above.  She is new to this clinic reviewed available records in the [facility] and Care Everywhere.    She had a lot of questions and all of them were answered    Ermelinda Oropeza MD,FAHA,FSVM,FNLA, FACP  Vascular Medicine  Clinical Hypertension Specialist   Clinical Lipidologist

## 2023-08-15 NOTE — TELEPHONE ENCOUNTER
Left voicemail for patient to schedule appointment(s), stated this our 2nd attempt at scheduling and provided [facility] telephone number for patient to call back to schedule.

## 2023-08-31 DIAGNOSIS — F33.0 MAJOR DEPRESSIVE DISORDER, RECURRENT EPISODE, MILD (H): ICD-10-CM

## 2023-08-31 DIAGNOSIS — G47.09 OTHER INSOMNIA: ICD-10-CM

## 2023-08-31 RX ORDER — TRAZODONE HYDROCHLORIDE 100 MG/1
100 TABLET ORAL AT BEDTIME
Qty: 90 TABLET | Refills: 1 | Status: SHIPPED | OUTPATIENT
Start: 2023-08-31 | End: 2024-01-23

## 2023-10-02 ENCOUNTER — TELEPHONE (OUTPATIENT)
Dept: FAMILY MEDICINE | Facility: CLINIC | Age: 73
End: 2023-10-02
Payer: COMMERCIAL

## 2023-10-02 NOTE — TELEPHONE ENCOUNTER
Reason for Call:    Appointment Request - Wants to be seen ASAP.   Possible lymphedema?      Patient requesting this type of appt:   Requesting referral to [facility].     Has had foot swelling since surgery last November 2022. Seen multiple physician's - follow-up multiple times with foot surgeon podiatrist, and finally back to a PCP.     Requested provider: Shalini Phan    Reason patient unable to be scheduled:   Not with their preferred provider    When does patient want to be seen/preferred time:   1-2 days  Towards the end of the day.     Comments:   Tomorrow there is a 'same day' appt.  Can I schedule this lady in that slot?     [facility] or would you prefer to receive a phone call?:   Patient would prefer a phone call     Okay to leave a detailed message?: Yes at Cell number on file:    Telephone Information:   Mobile [phone]     Call taken on 10/2/2023 at 10:40 AM by Tess Monte

## 2023-10-03 ENCOUNTER — OFFICE VISIT (OUTPATIENT)
Dept: FAMILY MEDICINE | Facility: CLINIC | Age: 73
End: 2023-10-03
Payer: COMMERCIAL

## 2023-10-03 VITALS
DIASTOLIC BLOOD PRESSURE: 60 MMHG | WEIGHT: 161 LBS | OXYGEN SATURATION: 98 % | HEART RATE: 76 BPM | SYSTOLIC BLOOD PRESSURE: 120 MMHG | RESPIRATION RATE: 15 BRPM | TEMPERATURE: 98.9 F | BODY MASS INDEX: 29.93 KG/M2

## 2023-10-03 DIAGNOSIS — M79.671 RIGHT FOOT PAIN: ICD-10-CM

## 2023-10-03 DIAGNOSIS — M25.473 ANKLE EDEMA: ICD-10-CM

## 2023-10-03 DIAGNOSIS — I89.0 LYMPHEDEMA: Primary | ICD-10-CM

## 2023-10-03 PROCEDURE — 99214 OFFICE O/P EST MOD 30 MIN: CPT | Performed by: FAMILY MEDICINE

## 2023-10-03 RX ORDER — FUROSEMIDE 20 MG
10 TABLET ORAL DAILY PRN
Qty: 45 TABLET | Refills: 0
Start: 2023-10-03 | End: 2023-10-27

## 2023-10-03 ASSESSMENT — PAIN SCALES - GENERAL: PAINLEVEL: EXTREME PAIN (8)

## 2023-10-03 ASSESSMENT — PATIENT HEALTH QUESTIONNAIRE - PHQ9
SUM OF ALL RESPONSES TO PHQ QUESTIONS 1-9: 3
10. IF YOU CHECKED OFF ANY PROBLEMS, HOW DIFFICULT HAVE THESE PROBLEMS MADE IT FOR YOU TO DO YOUR WORK, TAKE CARE OF THINGS AT HOME, OR GET ALONG WITH OTHER PEOPLE: NOT DIFFICULT AT ALL
SUM OF ALL RESPONSES TO PHQ QUESTIONS 1-9: 3

## 2023-10-03 ASSESSMENT — ASTHMA QUESTIONNAIRES: ACT_TOTALSCORE: 25

## 2023-10-03 NOTE — PROGRESS NOTES
"  Assessment & Plan     (I89.0) Lymphedema  (primary encounter diagnosis)  Comment: left foot/ ankle - since surgery November 2022  Plan: Lymphedema Therapy Referral, Vascular Medicine         Referral            (M25.473) Ankle edema  Comment: dependent edema - left more then rt  Plan: furosemide (LASIX) 20 MG tablet, Lymphedema         Therapy Referral, Vascular Medicine Referral        She has know hx of mild ankle edema to begin with but now has probably lymphedema left ankle foot bc since surgery pressure stalking do help but she finds it hard wear it and she may need more customized one any way. She wants to consult and get second opinion at [place] also look into other options for treating it.   I have give referral to lymphedema clinic as well for PT to help he in the meantime as well   Cares and symptomatic treatment discussed follow up if problem       (M79.671) Right foot pain  Comment: related to arthritis  Plan: Physical Therapy Referral      Discussed feet cares. Talked about comfortable shoes, orthotics to support etc    Also gave referral to PT to help  in the meantime.  Consider ortho or podiatry  referral if no improvement or problem   She  will follow up as needed     .Cares and  treatment discussed. follow up if problem   Patient expressed understanding and agreement with treatment plan. All patient's questions were answered, will let me know if has more later.  Medications: Rx's: options and plan        BMI:   Estimated body mass index is 29.93 kg/m  as calculated from the following:    Height as of 8/10/23: 1.562 m (5' 1.5\").    Weight as of this encounter: 73 kg (161 lb).       [facility] is a 73 year [facility], presenting for the following health issues:  Foot Swelling (Possible lymphedema.)        10/3/2023     3:29 PM   Additional Questions   Roomed by Garrett YAN       History of Present Illness       Reason for visit:  Left foot " "swelling - would like to get referral for [place]    She eats 0-1 servings of fruits and vegetables daily.She consumes 0 sweetened beverage(s) daily.She exercises with enough effort to increase her heart rate 10 to 19 minutes per day.  She exercises with enough effort to increase her heart rate 3 or less days per week.   She is taking medications regularly.     Her both feet have been causing lot of problem although both hev different problem      LEFT - FOOT. She is s/p left foot surgery back in 11/2022. Her foot has  been problem and has ongoing recurrent problem with swelling on top of her foot / ankle and sometime it goes higher to her leg ,  again mostly since after surgery .   It was much more swollen after surgery last yr and \" her skin was breaking up and  oozing etc.  it took a long time to heal\".   She  had gone back to see orthopedic at [facility] as well and they  are well aware of it but did not have much to offer.  Ortho also sent her to vascular physician ad they R/O blood clots etc , and she was given support hose.  it has helped some but she is still concerned that her  swelling keeps coming back, it feels worse end of the day , somewhat better in the morning and everything feels worse if she renee not wear her stalking . Also it is very hard for her to wear those and especially in summer   she has lymphedema and she wasn't to get second opinion and explore other options for treatment and asking for referral to be seen at [place]       Rt foot has ongoing pain issue across top of the foot and lateral side , hurts to walk , worse in am  after resting . It feels better after she moves around .   Tylenol sometimes does not help enough  .         Review of Systems   Constitutional, HEENT, cardiovascular, pulmonary, GI, , musculoskeletal, neuro, skin, endocrine and psych systems are negative, except as otherwise noted.      Objective    /60   Pulse 76   Temp 98.9  F (37.2  C) (Tympanic)   Resp 15   Wt 73 kg " (161 lb)   LMP 11/01/2004   SpO2 98%   BMI 29.93 kg/m    Body mass index is 29.93 kg/m .  Physical Exam   GENERAL: healthy, alert and no distress  HENT: ear canals and TM's normal, nose and mouth without ulcers or lesions  RESP: lungs clear to auscultation - no rales, rhonchi or wheezes  CV: regular rate and rhythm, normal S1 S2, no S3 or S4,   ABDOMEN: soft, nontender, no hepatosplenomegaly, no masses and bowel sounds normal  MS:  RLE exam shows no  significant edema , rt foot  has some tenderness across mid foot and lateral side of her foot  ,also a little below the ankle along lateal side of foot , but no obvious edema or swelling or redness . no evidence of joint effusion, ROM of rt ankle is  normal.     LLE exam shows-  no erythema, induration, or no evidence of joint effusion, ankle ROM  is good  and no acute tenderness,  but has swelling and 1 +edema along ankle and distal foot ( although she  showed me a pic on her phone - and her swelling does look worse,  a night before in the pic)   SKIN: no suspicious lesions or rashes  NEURO: Normal strength and tone, mentation intact and speech normal  PSYCH: mentation appears normal, affect normal/bright

## 2023-10-18 ENCOUNTER — THERAPY VISIT (OUTPATIENT)
Dept: OCCUPATIONAL THERAPY | Facility: CLINIC | Age: 73
End: 2023-10-18
Attending: FAMILY MEDICINE
Payer: COMMERCIAL

## 2023-10-18 DIAGNOSIS — I89.0 LYMPHEDEMA: ICD-10-CM

## 2023-10-18 DIAGNOSIS — M25.473 ANKLE EDEMA: ICD-10-CM

## 2023-10-18 PROCEDURE — 97165 OT EVAL LOW COMPLEX 30 MIN: CPT | Mod: GO | Performed by: OCCUPATIONAL THERAPIST

## 2023-10-18 PROCEDURE — 97140 MANUAL THERAPY 1/> REGIONS: CPT | Mod: GO | Performed by: OCCUPATIONAL THERAPIST

## 2023-10-18 NOTE — PROGRESS NOTES
OCCUPATIONAL THERAPY EVALUATION  Type of Visit: Evaluation    See electronic medical record for Abuse and Falls Screening details.    Subjective      Presenting condition or subjective complaint: Edema started in my left foot following surgery to remove bone spurs.  Foot was very enlarged and fluid would squirt out of the incision until it healed.  With fluid trapped, swelling started and it now affects my legs.  Date of onset: 10/17/23    Relevant medical history: Arthritis   Dates & types of surgery: 1/19 fusion of talenvicular joint in right foot.  12/19 remove bone spurs and screw from talenvicular surgery.  11/22 remove bone spurs from left foot. This surgery did not heal well    Prior diagnostic imaging/testing results: X-ray     Prior therapy history for the same diagnosis, illness or injury: No      Prior Level of Function  Transfers: Independent  Ambulation: Independent  ADL: Independent  IADL: Driving, Housekeeping, Laundry, Meal preparation, Medication management    Living Environment  Social support: Alone   Type of home: House; 2-story   Stairs to enter the home: No       Ramp: Yes   Stairs inside the home: Yes 13 Is there a railing: Yes   Help at home: Home and Yard maintenance tasks  Equipment owned:       Employment: No    Hobbies/Interests: Socializing, reading, some walking.  Would like to continue to be able to travel    Patient goals for therapy: Wear socks, shoes and sandals without swelling.   Stand for short periods, take walks and work around the house without swelling.    Pain assessment:  Pt has pain in LLE ankle and foot at times but repoprts recently reduced swelling and issues past 2-3 weeks after parathyroid surgery.     Objective       EDEMA EVALUATION  Additional history:  Body part affected by edema: primarily left foot and leg with some impact to right foot and leg  If cancer related, treatment:    If not cancer related, problems with veins or cause of swelling: I had varicose veins  removed from left leg 30 years ago.  I was recently told the swelling is lymphedema  Distance able to walk: About 20 minutes  Time able to stand: Not sure.  Try to maintain movement when standing and need to sit often  Sensation problems in hands/feet: Yes Tingling in feet, particularly at end of day.  Podiatrist indicated it is neuropathy  Edema etiology: Surgery, Unknown    FUNCTIONAL SCALES  Lower Extremity Functional Scale (score out of 80). A lower score indicates greater impairment:    Shoulder Pain and Disability Index (score out of 100).  A higher score indicates greater impairment:      Cognitive Status Examination  Orientation: Oriented to person, place and time   Level of Consciousness: Alert  Follows Commands and Answers Questions: 100% of the time  Personal Safety and Judgement: Intact  Memory:  NT'd    EDEMA  Skin Condition: Dryness, Non-pitting, Pitting  Scar:  small ant ankle scars from bone spur removal surgery last year/Fall  Capillary Refill: Symmetrical  Radial Pulse:  NT'd  Dorsal Pedal Pulse:  did not palpate-no signs ischemia noted  Stemmer Sign: -  Ulceration: No    GIRTH MEASUREMENTS: Refer to separate girth measurement flowsheet.     VOLUME UE  Right UE (mL)    Left UE (mL)    UE Volume Comparison BUE vol approx same   % Difference    VOLUME LE  Right LE (mL)    Left LE (mL)    LE Volume Comparison LLE volume greater than RLE volume   % Difference    Head/Neck Volume     Trunk Volume    Genital Volume       RANGE OF MOTION: LE ROM WFL  UE ROM WFL  STRENGTH: UE Strength WFL, LE Strength WFL  POSTURE: WFL  PALPATION:   ACTIVITIES OF DAILY LIVING: Ind-Mod I  BED MOBILITY: WFL  TRANSFERS: WFL  GAIT/LOCOMOTION: Ind  BALANCE: WFL  SENSATION:  no neuropathy reported or identified  VASCULAR:  pt mentions history vein stripping procedure LLE  COORDINATION: WFL  MUSCLE TONE: WFL    Assessment & Plan   CLINICAL IMPRESSIONS  Medical Diagnosis: lymphedema    Treatment Diagnosis: lymphedema     Impression/Assessment:  Pts L ankle swelling/lymphedema seems most related to her ankle surgery and development of lymphedema soon after from surgery Nov 2022. She had bone spurs removed and sounds like almost had cellulitis infection afterwards with weeping open skin and needed to be put on antibiotic. Could be venous related in LLE swelling 2/2 vein stripping in past or scar tissue formation and damage from ankle surgery.    Clinical Decision Making (Complexity):  Assessment of Occupational Performance: 1-3 Performance Deficits  Occupational Performance Limitations: functional mobility and infection risk; pain affecting walking  Clinical Decision Making (Complexity): Low complexity    PLAN OF CARE  Treatment Interventions:  Interventions: Self-Care/Home Management, Therapeutic Exercise, Gradient Compression Bandaging, Manual Therapy    Long Term Goals   OT Goal 1  Goal Identifier: Stockings  Goal Description: Pt will tolerate compression stockings daily from time waking in morning until bedtime for BLE/LLE lymphedema management needed to help reduce pain for better walking and reduce risk skin infections  Rationale: In order to maximize safety and independence with functional transfers and functional mobility within the home or community  Target Date: 01/12/24  OT Goal 2  Goal Identifier: MLD/HEP  Goal Description: Demonstrate independence in performing prescribed exercises to facilate the lymph system and muscle pumping systems for max reductions in LLE/BLE lymphedema needed to reduce risk infections and promote less pain and improved walking  Rationale: In order to maximize safety and independence with functional transfers and functional mobility within the home or community  Target Date: 01/12/24  OT Goal 3  Goal Identifier: Garments  Goal Description: 5. be independent in donning/doffing, wearing schedule, and care of compression garments for I building with home management LLE/BLE lymphedema needed to rduce  risk infections and promote more ease with mobility  Rationale: In order to maximize safety and independence with functional transfers and functional mobility within the home or community  Target Date: 01/12/24      Frequency of Treatment: 1x/wk  Duration of Treatment: 12 weeks with ability to taper as needed     Recommended Referrals to Other Professionals:  none at this time  Education Assessment: Learner/Method: Patient;Listening     Risks and benefits of evaluation/treatment have been explained.   Patient/Family/caregiver agrees with Plan of Care.     Evaluation Time:    OT Eval, Low Complexity Minutes (01310): 30       Signing Clinician: Mark YAN [facility]                                                                                   OUTPATIENT OCCUPATIONAL THERAPY      PLAN OF TREATMENT FOR OUTPATIENT REHABILITATION   Patient's Last Name, First Name, Aracelis Lyons YOB: 1950   Provider's Name   [facility]   Medical Record No.  [id number]     Onset Date: 10/17/23 Start of Care Date: 10/18/23     Medical Diagnosis:  lymphedema      OT Treatment Diagnosis:  lymphedema Plan of Treatment  Frequency/Duration:1x/wk/12 weeks with ability to taper as needed    Certification date from 10/18/23   To 01/12/24        See note for plan of treatment details and functional goals     Mark Iqbal                         I CERTIFY THE NEED FOR THESE SERVICES FURNISHED UNDER        THIS PLAN OF TREATMENT AND WHILE UNDER MY CARE     (Physician attestation of this document indicates review and certification of the therapy plan).                Referring Provider:  Shalini Phan      Initial Assessment  See Epic Evaluation- 10/18/23

## 2023-10-27 DIAGNOSIS — M25.473 ANKLE EDEMA: ICD-10-CM

## 2023-10-29 RX ORDER — FUROSEMIDE 20 MG
10 TABLET ORAL DAILY PRN
Qty: 45 TABLET | Refills: 0 | Status: SHIPPED | OUTPATIENT
Start: 2023-10-29 | End: 2024-01-22

## 2023-11-13 ENCOUNTER — PATIENT OUTREACH (OUTPATIENT)
Dept: CARE COORDINATION | Facility: CLINIC | Age: 73
End: 2023-11-13
Payer: COMMERCIAL

## 2023-11-27 ENCOUNTER — PATIENT OUTREACH (OUTPATIENT)
Dept: CARE COORDINATION | Facility: CLINIC | Age: 73
End: 2023-11-27
Payer: COMMERCIAL

## 2023-12-04 ENCOUNTER — VIRTUAL VISIT (OUTPATIENT)
Dept: GASTROENTEROLOGY | Facility: CLINIC | Age: 73
End: 2023-12-04
Payer: COMMERCIAL

## 2023-12-04 VITALS — HEIGHT: 61 IN | WEIGHT: 158 LBS | BODY MASS INDEX: 29.83 KG/M2

## 2023-12-04 DIAGNOSIS — K59.09 CHRONIC CONSTIPATION: Primary | ICD-10-CM

## 2023-12-04 DIAGNOSIS — M62.89 PELVIC FLOOR DYSFUNCTION: ICD-10-CM

## 2023-12-04 PROCEDURE — 99214 OFFICE O/P EST MOD 30 MIN: CPT | Mod: 95 | Performed by: PHYSICIAN ASSISTANT

## 2023-12-04 ASSESSMENT — PAIN SCALES - GENERAL: PAINLEVEL: NO PAIN (0)

## 2023-12-04 NOTE — PATIENT INSTRUCTIONS
It was a pleasure taking care of you today.  I've included a brief summary of our discussion and care plan from today's visit below.  Please review this information with your primary care provider.  _______________________________________________________________________     My recommendations are summarized as follows:     - continue metamucil and linzess  - you can increase or adjust your metamucil according to your bowel movements. Consider increasing to 2 teaspoons daily.   - consider taking linzess every 2 days to manage constipation   - follow up in ~ 6 months, sooner if needed.    ______________________________________________________________________     How do I schedule labs, imaging studies, or procedures that were ordered in clinic today?      Labs: To schedule lab appointment you can contact your local [facility] or call [phone] to schedule at any convenient [facility] location.     Procedures: If a colonoscopy, upper endoscopy, breath test, esophageal manometry, or pH impedence was ordered today, our endoscopy team will call you to schedule this. If you have not heard from our endoscopy team within a week, please call [phone] to schedule.      Imaging Studies: If you were scheduled for a CT scan, X-ray, MRI, ultrasound, HIDA scan or other imaging study, please call [phone] to have this scheduled.      Referral: If a referral to another specialty was ordered, expect a phone call or follow instructions above. If you have not heard from anyone regarding your referral in a week, please call our clinic to check the status.      Who do I call with any questions after my visit?  Please be in touch if there are any further questions that arise following today's visit.  There are multiple ways to contact your gastroenterology care team.       During business hours, you may reach a Gastroenterology nurse at [phone]     To schedule or reschedule an appointment, please call  [phone].      You can always send a secure message through [employer].  [employer] messages are answered by your nurse or doctor typically within 24 hours.  Please allow extra time on weekends and holidays.       For urgent/emergent questions after business hours, you may reach the on-call GI Fellow by contacting the [facility]  at [phone].     How will I get the results of any tests ordered?    You will receive all of your results.  If you have signed up for [facility] [employer], any tests ordered at your visit will be available to you after your physician reviews them.  Typically this takes 1-2 weeks.  If there are urgent results that require a change in your care plan, your physician or nurse will call you to discuss the next steps.       What is [employer]?  [employer] is a secure way for you to access all of your healthcare records from the [facility].  It is a web based computer program, so you can sign on to it from any location.  It also allows you to send secure messages to your care team.  I recommend signing up for [employer] access if you have not already done so and are comfortable with using a computer.       How to I schedule a follow-up visit?  If you did not schedule a follow-up visit today, please call [phone] to schedule a follow-up office visit.      Jorge A Estrada PA-C  Division of Gastroenterology, Hepatology and Nutrition   [facility]

## 2023-12-04 NOTE — NURSING NOTE
Is the patient currently in the state of [place]? YES    Visit mode:VIDEO    If the visit is dropped, the patient can be reconnected by: VIDEO VISIT: Text to cell phone:   Telephone Information:   Mobile [phone]       Will anyone else be joining the visit? NO  (If patient encounters technical issues they should call [phone])    How would you like to obtain your AVS? MyChart    Are changes needed to the allergy or medication list? No    Reason for visit: Video Visit (Recheck)    Ernestine VIDES

## 2023-12-28 ENCOUNTER — PATIENT OUTREACH (OUTPATIENT)
Dept: CARE COORDINATION | Facility: CLINIC | Age: 73
End: 2023-12-28
Payer: COMMERCIAL

## 2024-01-09 ENCOUNTER — TELEPHONE (OUTPATIENT)
Dept: FAMILY MEDICINE | Facility: CLINIC | Age: 74
End: 2024-01-09
Payer: COMMERCIAL

## 2024-01-09 DIAGNOSIS — H91.90 DECREASED HEARING, UNSPECIFIED LATERALITY: Primary | ICD-10-CM

## 2024-01-09 NOTE — TELEPHONE ENCOUNTER
Pt states she has been wearing her hearing aids for almost 8 years now and one is buzzing. She needs a hearing exam and has been seeing a provider that she likes. This provider moved to [place] [facility] and Medicare requires a referral before she can set up an exam with her. She says it has already been cleaned and needs new ones. She denies any new symptoms just wants the Otology consult referral.     Dr. Chavez  Fax: [phone]    Tiffanie TRONCOSO RN  [facility]

## 2024-01-09 NOTE — TELEPHONE ENCOUNTER
General Call    Contacts         Type Contact Phone/Fax    01/09/2024 05:26 PM CST Phone (Incoming) Maile Muñoz (Self)           Reason for Call:  Pt needs a wellness appt with Dr. Phan but first available isn't until 2/28 which I scheduled. Can you call pt if she can be seen sooner? Thank you!    Could we send this information to you in [place] or would you prefer to receive a phone call?:   Patient would prefer a phone call   Okay to leave a detailed message?: Yes at Cell number on file:    Telephone Information:   Mobile [phone]

## 2024-01-09 NOTE — TELEPHONE ENCOUNTER
2/28/24 is Dr. Phan's first available slot for a preventative visit. Not able to schedule pt any sooner.    Dotty Peters,  [facility]

## 2024-01-10 NOTE — TELEPHONE ENCOUNTER
May use external - group out of [place] - py it is pt's responsibility to verify coverage with insurance ( ot use [place])

## 2024-01-11 ENCOUNTER — PATIENT OUTREACH (OUTPATIENT)
Dept: CARE COORDINATION | Facility: CLINIC | Age: 74
End: 2024-01-11
Payer: COMMERCIAL

## 2024-01-11 NOTE — TELEPHONE ENCOUNTER
Patient called and she was told referral is for internal audiology.     Patient  states the provider out of [place] is covered in network with medicare supplement insurance.       Syeda Butler RN  [facility]

## 2024-01-20 DIAGNOSIS — M25.473 ANKLE EDEMA: ICD-10-CM

## 2024-01-22 DIAGNOSIS — G47.09 OTHER INSOMNIA: ICD-10-CM

## 2024-01-22 DIAGNOSIS — F33.0 MAJOR DEPRESSIVE DISORDER, RECURRENT EPISODE, MILD (H): ICD-10-CM

## 2024-01-22 RX ORDER — FUROSEMIDE 20 MG
10 TABLET ORAL DAILY PRN
Qty: 45 TABLET | Refills: 0 | Status: SHIPPED | OUTPATIENT
Start: 2024-01-22 | End: 2024-02-28

## 2024-01-22 NOTE — TELEPHONE ENCOUNTER
Script refill faxed. Remind pt to do follow up for med check and labs, since she is past due.  Virtual/ Video visit ok  If no appointment available soon enough

## 2024-01-22 NOTE — TELEPHONE ENCOUNTER
Pt has appointment scheduled for 2/28/24 with PCP.       Brenda Moreno RN on 1/22/2024 at 3:41 PM

## 2024-01-23 RX ORDER — TRAZODONE HYDROCHLORIDE 100 MG/1
100 TABLET ORAL AT BEDTIME
Qty: 90 TABLET | Refills: 0 | Status: SHIPPED | OUTPATIENT
Start: 2024-01-23 | End: 2024-02-28

## 2024-01-24 NOTE — TELEPHONE ENCOUNTER
Script refill faxed. Remind pt to do follow up for med check and labs, and  since she is due.  Virtual/ Video visit ok  If no appointment available soon enough

## 2024-02-27 SDOH — HEALTH STABILITY: PHYSICAL HEALTH: ON AVERAGE, HOW MANY DAYS PER WEEK DO YOU ENGAGE IN MODERATE TO STRENUOUS EXERCISE (LIKE A BRISK WALK)?: 2 DAYS

## 2024-02-27 SDOH — HEALTH STABILITY: PHYSICAL HEALTH: ON AVERAGE, HOW MANY MINUTES DO YOU ENGAGE IN EXERCISE AT THIS LEVEL?: 20 MIN

## 2024-02-27 ASSESSMENT — PATIENT HEALTH QUESTIONNAIRE - PHQ9
10. IF YOU CHECKED OFF ANY PROBLEMS, HOW DIFFICULT HAVE THESE PROBLEMS MADE IT FOR YOU TO DO YOUR WORK, TAKE CARE OF THINGS AT HOME, OR GET ALONG WITH OTHER PEOPLE: NOT DIFFICULT AT ALL
SUM OF ALL RESPONSES TO PHQ QUESTIONS 1-9: 3
SUM OF ALL RESPONSES TO PHQ QUESTIONS 1-9: 3

## 2024-02-27 ASSESSMENT — SOCIAL DETERMINANTS OF HEALTH (SDOH): HOW OFTEN DO YOU GET TOGETHER WITH FRIENDS OR RELATIVES?: THREE TIMES A WEEK

## 2024-02-28 ENCOUNTER — OFFICE VISIT (OUTPATIENT)
Dept: FAMILY MEDICINE | Facility: CLINIC | Age: 74
End: 2024-02-28
Payer: COMMERCIAL

## 2024-02-28 VITALS
HEIGHT: 62 IN | OXYGEN SATURATION: 99 % | HEART RATE: 73 BPM | RESPIRATION RATE: 21 BRPM | TEMPERATURE: 97.9 F | SYSTOLIC BLOOD PRESSURE: 120 MMHG | DIASTOLIC BLOOD PRESSURE: 66 MMHG | WEIGHT: 161 LBS | BODY MASS INDEX: 29.63 KG/M2

## 2024-02-28 DIAGNOSIS — J45.20 MILD INTERMITTENT ASTHMA WITHOUT COMPLICATION: ICD-10-CM

## 2024-02-28 DIAGNOSIS — M25.473 ANKLE EDEMA: ICD-10-CM

## 2024-02-28 DIAGNOSIS — F33.0 MAJOR DEPRESSIVE DISORDER, RECURRENT EPISODE, MILD (H): ICD-10-CM

## 2024-02-28 DIAGNOSIS — I89.0 LYMPHEDEMA: ICD-10-CM

## 2024-02-28 DIAGNOSIS — M85.88 OSTEOPENIA OF OTHER SITE: ICD-10-CM

## 2024-02-28 DIAGNOSIS — G47.09 OTHER INSOMNIA: ICD-10-CM

## 2024-02-28 DIAGNOSIS — Z00.00 ROUTINE GENERAL MEDICAL EXAMINATION AT A HEALTH CARE FACILITY: Primary | ICD-10-CM

## 2024-02-28 DIAGNOSIS — Z78.0 MENOPAUSE PRESENT: ICD-10-CM

## 2024-02-28 LAB
ALBUMIN SERPL BCG-MCNC: 4.5 G/DL (ref 3.5–5.2)
ALP SERPL-CCNC: 52 U/L (ref 40–150)
ALT SERPL W P-5'-P-CCNC: 16 U/L (ref 0–50)
ANION GAP SERPL CALCULATED.3IONS-SCNC: 10 MMOL/L (ref 7–15)
AST SERPL W P-5'-P-CCNC: 23 U/L (ref 0–45)
BILIRUB SERPL-MCNC: 1 MG/DL
BUN SERPL-MCNC: 11.3 MG/DL (ref 8–23)
CALCIUM SERPL-MCNC: 9.4 MG/DL (ref 8.8–10.2)
CHLORIDE SERPL-SCNC: 101 MMOL/L (ref 98–107)
CHOLEST SERPL-MCNC: 245 MG/DL
CREAT SERPL-MCNC: 0.88 MG/DL (ref 0.51–0.95)
DEPRECATED HCO3 PLAS-SCNC: 27 MMOL/L (ref 22–29)
EGFRCR SERPLBLD CKD-EPI 2021: 69 ML/MIN/1.73M2
FASTING STATUS PATIENT QL REPORTED: YES
GLUCOSE SERPL-MCNC: 93 MG/DL (ref 70–99)
HDLC SERPL-MCNC: 67 MG/DL
LDLC SERPL CALC-MCNC: 154 MG/DL
NONHDLC SERPL-MCNC: 178 MG/DL
POTASSIUM SERPL-SCNC: 3.8 MMOL/L (ref 3.4–5.3)
PROT SERPL-MCNC: 7.2 G/DL (ref 6.4–8.3)
SODIUM SERPL-SCNC: 138 MMOL/L (ref 135–145)
TRIGL SERPL-MCNC: 120 MG/DL

## 2024-02-28 PROCEDURE — 80061 LIPID PANEL: CPT | Performed by: FAMILY MEDICINE

## 2024-02-28 PROCEDURE — G0439 PPPS, SUBSEQ VISIT: HCPCS | Performed by: FAMILY MEDICINE

## 2024-02-28 PROCEDURE — 36415 COLL VENOUS BLD VENIPUNCTURE: CPT | Performed by: FAMILY MEDICINE

## 2024-02-28 PROCEDURE — 80053 COMPREHEN METABOLIC PANEL: CPT | Performed by: FAMILY MEDICINE

## 2024-02-28 PROCEDURE — 99214 OFFICE O/P EST MOD 30 MIN: CPT | Mod: 25 | Performed by: FAMILY MEDICINE

## 2024-02-28 RX ORDER — ALBUTEROL SULFATE 90 UG/1
2 AEROSOL, METERED RESPIRATORY (INHALATION) EVERY 6 HOURS PRN
Status: SHIPPED
Start: 2024-02-28 | End: 2024-09-03

## 2024-02-28 RX ORDER — RESPIRATORY SYNCYTIAL VIRUS VACCINE 120MCG/0.5
0.5 KIT INTRAMUSCULAR ONCE
Qty: 1 EACH | Refills: 0 | Status: CANCELLED | OUTPATIENT
Start: 2024-02-28 | End: 2024-02-28

## 2024-02-28 RX ORDER — TRAZODONE HYDROCHLORIDE 100 MG/1
100 TABLET ORAL AT BEDTIME
Qty: 90 TABLET | Refills: 0 | Status: SHIPPED | OUTPATIENT
Start: 2024-02-28 | End: 2024-07-19

## 2024-02-28 RX ORDER — FUROSEMIDE 20 MG
20 TABLET ORAL DAILY PRN
Qty: 90 TABLET | Refills: 1 | Status: SHIPPED | OUTPATIENT
Start: 2024-02-28 | End: 2024-08-23

## 2024-02-28 ASSESSMENT — ACTIVITIES OF DAILY LIVING (ADL): CURRENT_FUNCTION: NO ASSISTANCE NEEDED

## 2024-02-28 ASSESSMENT — PAIN SCALES - GENERAL: PAINLEVEL: MODERATE PAIN (4)

## 2024-02-28 NOTE — PROGRESS NOTES
Answers submitted by the patient for this visit:  Patient Health Questionnaire (Submitted on 2/27/2024)  If you checked off any problems, how difficult have these problems made it for you to do your work, take care of things at home, or get along with other people?: Not difficult at all  PHQ9 TOTAL SCORE: 3  Preventive Care Visit  [facility] DELORIS Phan MD, Family Medicine  Feb 28, 2024    Assessment & Plan     (Z00.00) Routine general medical examination at a health care facility  (primary encounter diagnosis)  Comment:   Plan: Lipid panel reflex to direct LDL Fasting,         Comprehensive metabolic panel            (F33.0) Major depressive disorder, recurrent episode, mild (H24)  Comment:   Plan: traZODone (DESYREL) 100 MG tablet          Doing well without any med's except taking trazadone for sleep . Mood is stable. F/unit(s) as needed   (G47.09) Other insomnia  Comment:   Plan: traZODone (DESYREL) 100 MG tablet        Doing well has bene taking trazadone 100 mg  willing to try to taper and go down to 50 mg dose to see if she can be ok on low dose   Cares ,concerns and treatment discussed follow up if problem       (J45.20) Mild intermittent asthma without complication  Comment: since no dog. has improved and rarely needs albuterol.  Plan: albuterol (PROAIR HFA/PROVENTIL HFA/VENTOLIN         HFA) 108 (90 Base) MCG/ACT inhaler            Improved and stable  Need to keep it for prn use     (Z78.0) Menopause present  Comment: had improved after treatment but not on any meds  Plan: DX Hip/Pelvis/Spine           M25.473) Ankle edema  Comment: dpenedent sarah - left more then rt  Plan: Comprehensive metabolic panel, furosemide         (LASIX) 20 MG tablet             (I89.0) Lymphedema  Comment: left leg, since ankle surgery few yrs ago - stable  Plan: furosemide (LASIX) 20 MG tablet, Comprehensive         metabolic panel        Takes med's as needed . Need refil check labs  "f/unit(s) as needed     (J45.20) Mild intermittent asthma without complication  Comment:   Plan: albuterol (PROAIR HFA/PROVENTIL HFA/VENTOLIN         HFA) 108 (90 Base) MCG/ACT inhaler        (M85.88) Osteopenia of other site  Comment:   Plan: previous bone density test showed  osteopenia. should work on taking in 5791-5707 mg of calcium with vitamin D daily.  should also be getting daily weight bearing exercise (walking works)                Check labs. refill sent.Cares and  treatment discussed follow. up if problem   Patient expressed understanding and agreement with treatment plan. All patient's questions were answered, will let me know if has more later.  Medications: Rx's: Reviewed the potential side effects/complications of medications prescribed.       BMI  Estimated body mass index is 29.48 kg/m  as calculated from the following:    Height as of this encounter: 1.574 m (5' 1.97\").    Weight as of this encounter: 73 kg (161 lb).       Counseling  Appropriate preventive services were discussed with this patient, including applicable screening as appropriate for fall prevention, nutrition, physical activity, Tobacco-use cessation, weight loss and cognition.  Checklist reviewing preventive services available has been given to the patient.  Reviewed patient's diet, addressing concerns and/or questions.   She is at risk for lack of exercise and has been provided with information to increase physical activity for the benefit of her well-being.   The patient was provided with written information regarding signs of hearing loss.   Information on urinary incontinence and treatment options given to patient.       Regular exercise  See Patient Instructions    Juventino Gr is a 73 year old, presenting for the following:  Annual Visit        2/28/2024     8:19 AM   Additional Questions   Roomed by Heena ALONZO         Health Care Directive  Patient does not have a Health Care Directive or Living Will: Discussed advance " "care planning with patient; information given to patient to review.    Healthy Habits:     In general, how would you rate your overall health?  Good    Frequency of exercise:  2-3 days/week    Duration of exercise:  15-30 minutes    Do you usually eat at least 4 servings of fruit and vegetables a day, include whole grains    & fiber and avoid regularly eating high fat or \"junk\" foods?  No    Taking medications regularly:  Yes    Barriers to taking medications:  Not applicable    Medication side effects:  Not applicable    Ability to successfully perform activities of daily living:  No assistance needed    Home Safety:  No safety concerns identified    Hearing impairment symptoms: Hearing aides.    In the past 6 months, have you been bothered by leaking of urine?  No    In general, how would you rate your overall mental or emotional health?  Very good    Additional concerns today:  No          Anxiety Follow-Up  How are you doing with your anxiety since your last visit? No change  doing just fine without any  med;s only take trazadone for sleep with no problem   Are you having other symptoms that might be associated with anxiety? No  Have you had a significant life event? No   Are you feeling depressed? No  Do you have any concerns with your use of alcohol or other drugs? No    Social History     Tobacco Use    Smoking status: Never    Smokeless tobacco: Never   Vaping Use    Vaping Use: Never used   Substance Use Topics    Alcohol use: Yes     Comment: 1-2 glasses of wine 1-2 times per week     Drug use: No         4/18/2019    11:31 AM 1/26/2021     6:06 PM 9/14/2021     1:27 PM   MARCELLE-7 SCORE   Total Score   1 (minimal anxiety)   Total Score 10 2 1         7/27/2023     2:18 PM 10/3/2023     3:16 PM 2/27/2024    10:38 AM   PHQ   PHQ-9 Total Score 10 3 3   Q9: Thoughts of better off dead/self-harm past 2 weeks Not at all Not at all Not at all         2/27/2024    10:38 AM   Last PHQ-9   1.  Little interest or " pleasure in doing things 1   2.  Feeling down, depressed, or hopeless 0   3.  Trouble falling or staying asleep, or sleeping too much 1   4.  Feeling tired or having little energy 1   5.  Poor appetite or overeating 0   6.  Feeling bad about yourself 0   7.  Trouble concentrating 0   8.  Moving slowly or restless 0   Q9: Thoughts of better off dead/self-harm past 2 weeks 0   PHQ-9 Total Score 3         2021     1:27 PM   MARCELLE-7    1. Feeling nervous, anxious, or on edge 0   2. Not being able to stop or control worrying 0   3. Worrying too much about different things 0   4. Trouble relaxing 0   5. Being so restless that it is hard to sit still 0   6. Becoming easily annoyed or irritable 1   7. Feeling afraid, as if something awful might happen 0   MARCELLE-7 Total Score 1     Asthma Follow-Up    Was ACT completed today?  Yes  , since her dof has  she has been doing well and has not needed inhaler much and  rarely needs it .       10/3/2023     3:19 PM   ACT Total Scores   ACT TOTAL SCORE (Goal Greater than or Equal to 20) 25   In the past 12 months, how many times did you visit the emergency room for your asthma without being admitted to the hospital? 0   In the past 12 months, how many times were you hospitalized overnight because of your asthma? 0        How many days per week do you miss taking your asthma controller medication?  I do not have an asthma controller medication  Please describe any recent triggers for your asthma: animal dander  Have you had any Emergency Room Visits, Urgent Care Visits, or Hospital Admissions since your last office visit?  No  Hypothyroidism Follow-up    Since last visit, patient describes the following symptoms: Weight stable, no hair loss, no skin changes, no constipation, no loose stools        2024   General Health   How would you rate your overall physical health? (!) FAIR   Feel stress (tense, anxious, or unable to sleep) Not at all         2024   Nutrition    Diet: Regular (no restrictions)         2/27/2024   Exercise   Days per week of moderate/strenous exercise 2 days   Average minutes spent exercising at this level 20 min   (!) EXERCISE CONCERN      2/27/2024   Social Factors   Frequency of gathering with friends or relatives Three times a week   Worry food won't last until get money to buy more No   Food not last or not have enough money for food? No   Do you have housing?  Yes   Are you worried about losing your housing? No   Lack of transportation? No   Unable to get utilities (heat,electricity)? No         2/28/2024   Fall Risk   Gait Speed Test (Document in seconds) 4          2/27/2024   Activities of Daily Living- Home Safety   Needs help with the following daily activites None of the above   Safety concerns in the home None of the above         2/27/2024   Dental   Dentist two times every year? Yes         2/27/2024   Hearing Screening   Hearing concerns? (!) I NEED TO ASK PEOPLE TO SPEAK UP OR REPEAT THEMSELVES.    (!) IT'S HARDER TO UNDERSTAND WOMEN'S VOICES THAN MEN'S VOICES.    (!) TROUBLE FOLLOWING DIALOGUE IN THE THEATHER.    (!) TROUBLE UNDERSTANDING SOFT OR WHISPERED SPEECH.         2/27/2024   Driving Risk Screening   Patient/family members have concerns about driving No         2/27/2024   General Alertness/Fatigue Screening   Have you been more tired than usual lately? No         2/27/2024   Urinary Incontinence Screening   Bothered by leaking urine in past 6 months Yes         2/27/2024   TB Screening   Were you born outside of [place]?  No       Today's PHQ-9 Score:       2/27/2024    10:38 AM   PHQ-9 SCORE   PHQ-9 Total Score MyChart 3 (Minimal depression)   PHQ-9 Total Score 3         2/27/2024   Substance Use   Alcohol more than 3/day or more than 7/wk No   Do you have a current opioid prescription? No   How severe/bad is pain from 1 to 10? 5/10   Do you use any other substances recreationally? No     Social History     Tobacco Use    Smoking  status: Never    Smokeless tobacco: Never   Vaping Use    Vaping Use: Never used   Substance Use Topics    Alcohol use: Yes     Comment: 1-2 glasses of wine 1-2 times per week     Drug use: No           4/25/2023   LAST FHS-7 RESULTS   1st degree relative breast or ovarian cancer No   Any relative bilateral breast cancer No   Any male have breast cancer No   Any ONE woman have BOTH breast AND ovarian cancer No   Any woman with breast cancer before 50yrs No   2 or more relatives with breast AND/OR ovarian cancer No   2 or more relatives with breast AND/OR bowel cancer No            ASCVD Risk   The 10-year ASCVD risk score (Antonella CAMPOS, et al., 2019) is: 11.4%    Values used to calculate the score:      Age: 73 years      Sex: Female      Is Non- : No      Diabetic: No      Tobacco smoker: No      Systolic Blood Pressure: 120 mmHg      Is BP treated: No      HDL Cholesterol: 77 mg/dL      Total Cholesterol: 211 mg/dL            Reviewed and updated as needed this visit by Provider                    Patient Active Problem List   Diagnosis    Menopausal LMP age 54    Diffuse cystic mastopathy    Constipation    Lumbar disc herniation with radiculopathy    Post-Nasal Drainage    Perennial allergic rhinitis    Environmental allergies    Osteopenia    Generalized anxiety disorder    Mild intermittent asthma without complication    Serum calcium elevated    Hyperparathyroidism (H24)    Vitamin D deficiency    Hypercalcemia    Other insomnia    Hyperlipidemia with target LDL less than 130    Major depressive disorder, recurrent episode, mild (H24)    Eczema, unspecified type    Left knee pain, unspecified chronicity    Acute midline low back pain with left-sided sciatica    Lumbago    Right knee pain    Right foot pain    Osteopenia of other site    Diverticulosis of sigmoid colon    Pelvic somatic dysfunction    Lymphedema    Ankle edema     Past Surgical History:   Procedure Laterality Date     C/SECTION, LOW TRANSVERSE      S/P C/S    CHOLECYSTECTOMY, OPEN      Cholecystectomy    COLONOSCOPY N/A 2020    Procedure: COLONOSCOPY;  Surgeon: Alona Mancini MD;  Location: [facility]    HC EXCISION BREAST LESION, OPEN >=1      Benign mass right breast    HC MRI LUMBAR SPINE W/O CONTRAST  2010    multilevel degen disc disease/facet arthropathy, 5 mm caudally extruded left posterolateral disc herniation at L4-5 with impingement on the left L5 nerve root     HC PUNCTURE/ASPIRATION BREAST CYST, FIRST  ,,    bilateral ', left '    LIGATN/STRIP LONG & SHORT SAPHEN      varicose vein stripping    ORTHOPEDIC SURGERY      foot fusion of joint    ZZC NONSPECIFIC PROCEDURE      Cyst       Social History     Tobacco Use    Smoking status: Never    Smokeless tobacco: Never   Substance Use Topics    Alcohol use: Yes     Comment: 1-2 glasses of wine 1-2 times per week      Family History   Problem Relation Age of Onset    Thyroid Disease Mother     Hypertension Mother     Allergies Mother     Cerebrovascular Disease Mother         minor, May, 2016    Hyperlipidemia Mother         Well controlled    Cancer - colorectal Father          age 65 colon cancer    Thyroid Disease Father     Allergies Father     Depression Father     Obesity Father     Colon Cancer Father     Genitourinary Problems Maternal Aunt         fibrocystic breast    Thyroid Disease Sister     Depression Sister     Mental Illness Sister          Current providers sharing in care for this patient include:  Patient Care Team:  Shalini Phan MD as PCP - General (Family Practice)  Shalini Phan MD as Assigned PCP  Desmond Musa MD as MD (Colon & Rectal)  Jorge A Estrada PA-C as Physician Assistant (Gastroenterology)  Ermelinda Oropeza MD as Assigned Heart and Vascular Provider  Jorge A Estrada PA-C as Assigned Gastroenterology Provider    The following health maintenance items are  reviewed in Epic and correct as of today:  Health Maintenance   Topic Date Due    ZOSTER IMMUNIZATION (1 of 2) Never done    RSV VACCINE (Pregnancy & 60+) (1 - 1-dose 60+ series) Never done    CREATININE  09/14/2023    MEDICARE ANNUAL WELLNESS VISIT  12/12/2023    ASTHMA ACTION PLAN  12/31/2024 (Originally 11/12/2021)    ASTHMA CONTROL TEST  04/03/2024    MAMMO SCREENING  05/03/2024    PHQ-9  08/28/2024    ANNUAL REVIEW OF HM ORDERS  10/03/2024    DEXA  11/17/2024    FALL RISK ASSESSMENT  02/28/2025    GLUCOSE  09/14/2025    LIPID  09/30/2026    ADVANCE CARE PLANNING  09/30/2026    DTAP/TDAP/TD IMMUNIZATION (2 - Td or Tdap) 01/16/2027    HEPATITIS C SCREENING  Completed    DEPRESSION ACTION PLAN  Completed    INFLUENZA VACCINE  Completed    Pneumococcal Vaccine: 65+ Years  Completed    COVID-19 Vaccine  Completed    IPV IMMUNIZATION  Aged Out    HPV IMMUNIZATION  Aged Out    MENINGITIS IMMUNIZATION  Aged Out    RSV MONOCLONAL ANTIBODY  Aged Out    COLORECTAL CANCER SCREENING  Discontinued         Review of Systems  CONSTITUTIONAL: NEGATIVE for fever, chills, change in weight  INTEGUMENTARY/SKIN: NEGATIVE for worrisome rashes, moles or lesions  EYES: NEGATIVE for vision changes or irritation  ENT/MOUTH: NEGATIVE for ear, mouth and throat problems  RESP: NEGATIVE for significant cough or SOB  BREAST: NEGATIVE for masses, tenderness or discharge  CV: NEGATIVE for chest pain, palpitations or peripheral edema  GI: NEGATIVE for nausea, abdominal pain, heartburn, or change in bowel habits  : NEGATIVE for frequency, dysuria, or hematuria  MUSCULOSKELETAL: NEGATIVE for significant arthralgias or myalgia  NEURO: NEGATIVE for weakness, dizziness or paresthesias  ENDOCRINE: NEGATIVE for temperature intolerance, skin/hair changes  HEME: NEGATIVE for bleeding problems  PSYCHIATRIC: NEGATIVE for changes in mood or affect     Objective    Exam  /66   Pulse 73   Temp 97.9  F (36.6  C) (Temporal)   Resp 21   Ht 1.574 m  "(5' 1.97\")   Wt 73 kg (161 lb)   LMP 11/01/2004   SpO2 99%   BMI 29.48 kg/m     Estimated body mass index is 29.48 kg/m  as calculated from the following:    Height as of this encounter: 1.574 m (5' 1.97\").    Weight as of this encounter: 73 kg (161 lb).    Physical Exam  GENERAL: alert and no distress  EYES: Eyes grossly normal to inspection, PERRL and conjunctivae and sclerae normal  HENT: ear canals and TM's normal, nose and mouth without ulcers or lesions  NECK: no adenopathy, no asymmetry, masses, or scars  RESP: lungs clear to auscultation - no rales, rhonchi or wheezes  CV: regular rate and rhythm, normal S1 S2, no S3 or S4, no murmur, click or rub, no peripheral edema  ABDOMEN: soft, nontender, no hepatosplenomegaly, no masses and bowel sounds normal  MS: extremities normal- no gross deformities noted  SKIN: no suspicious lesions or rashes  NEURO: Normal strength and tone, mentation intact and speech normal  PSYCH: mentation appears normal, affect normal/bright        2/28/2024   Mini Cog   Clock Draw Score 2 Normal   3 Item Recall 3 objects recalled   Mini Cog Total Score 5            Signed Electronically by: Shalini Phan MD    "

## 2024-03-04 ENCOUNTER — VIRTUAL VISIT (OUTPATIENT)
Dept: FAMILY MEDICINE | Facility: CLINIC | Age: 74
End: 2024-03-04
Payer: COMMERCIAL

## 2024-03-04 DIAGNOSIS — E78.5 HYPERLIPIDEMIA LDL GOAL <130: Primary | ICD-10-CM

## 2024-03-04 PROCEDURE — 99213 OFFICE O/P EST LOW 20 MIN: CPT | Mod: 95 | Performed by: FAMILY MEDICINE

## 2024-03-04 RX ORDER — SIMVASTATIN 10 MG
10 TABLET ORAL AT BEDTIME
Qty: 30 TABLET | Refills: 0 | Status: SHIPPED | OUTPATIENT
Start: 2024-03-04 | End: 2024-03-27

## 2024-03-04 RX ORDER — SIMVASTATIN 20 MG
20 TABLET ORAL AT BEDTIME
Qty: 30 TABLET | Refills: 3 | Status: SHIPPED | OUTPATIENT
Start: 2024-03-04 | End: 2024-03-04

## 2024-03-04 ASSESSMENT — ASTHMA QUESTIONNAIRES: ACT_TOTALSCORE: 25

## 2024-03-04 NOTE — PROGRESS NOTES
Maile is a 73 year old who is being evaluated via a billable video visit.      How would you like to obtain your AVS? MyChart  If the video visit is dropped, the invitation should be resent by: Text to cell phone: [phone]  Will anyone else be joining your video visit? No          Assessment & Plan     (E78.5) Hyperlipidemia LDL goal <130  (primary encounter diagnosis)  Comment:   Plan: simvastatin (ZOCOR) 20 MG tablet    After discussion of the treatment of hyperlipidemia,risk factors, benefits of statin treatment and need to prevent potential risk of CAD, stroke etc discussed. Pt is wiling to start treatment and a statin-drug is chosen; prescription for simvastatin 20 mg once daily is written. The patient is informed that this type of drug is usually highly effective to lower LDL cholesterol and is usually very well tolerated. However potential side effects of  the liver and periodic monitoring with  Blood tests discussed. Damage to the liver, if detected early, can be reversed by stopping the drug. Other minor GI s/e like  nausea, abdominal pain, or potential risk  etc discussed  and pt should report such to me directly. Statin drugs may also cause skeletal muscle injury/ myalgias  in rare cases. Be alert for pronounced persistent diffuse muscle pain and discontinue the drug immediately should such symptoms develop.      Work on weight loss  Regular exercise  See Patient Instructions    Subjective   Maile is a 73 year old, presenting for the following health issues:  Lipids        3/4/2024    11:37 AM   Additional Questions   Roomed by Breonna CAMPBELL     Discuss Labs done last week, possible start of a Statin.    Recent Labs   Lab Test 02/28/24  0930 09/30/21  0938   CHOL 245* 211*   HDL 67 77   * 116*   TRIG 120 91      Hyperlipidemia Follow-Up    Are you regularly taking any medication or supplement to lower your cholesterol?   No  but willing to try has been mildy elevated always but worse  recently. She thinsk she is still eating well but navid=y be lack of exercise lately and willing to back on it as wather is good now  Are you having muscle aches or other side effects that you think could be caused by your cholesterol lowering medication?  NA        Review of Systems  Constitutional, HEENT, cardiovascular, pulmonary, GI, , musculoskeletal, neuro, skin, endocrine and psych systems are negative, except as otherwise noted.      Objective           Vitals:  No vitals were obtained today due to virtual visit.    Physical Exam   GENERAL: alert and no distress  EYES: Eyes grossly normal to inspection.  No discharge or erythema, or obvious scleral/conjunctival abnormalities.  RESP: No audible wheeze, cough, or visible cyanosis.    SKIN: Visible skin clear. No significant rash, abnormal pigmentation or lesions.  NEURO: Cranial nerves grossly intact.  Mentation and speech appropriate for age.  PSYCH: Appropriate affect, tone, and pace of words    Video-Visit Details    Type of service:  Video Visit       Originating Location (pt. Location): Home    Distant Location (provider location):  Off-site  Platform used for Video Visit: Divina  Signed Electronically by: Shalini Phan MD

## 2024-03-04 NOTE — PATIENT INSTRUCTIONS
Take medications as directed.( We can try  low dose - simvastatin 10 mg daily,  for now)  script faxed   Cares and treatment discussed   Continue with low fat low cholesterol diet/ exercise   Follow up  3-4 months , but sooner  if problem or concern

## 2024-03-27 DIAGNOSIS — E78.5 HYPERLIPIDEMIA LDL GOAL <130: ICD-10-CM

## 2024-04-03 ENCOUNTER — PATIENT OUTREACH (OUTPATIENT)
Dept: CARE COORDINATION | Facility: CLINIC | Age: 74
End: 2024-04-03
Payer: COMMERCIAL

## 2024-04-09 RX ORDER — SIMVASTATIN 10 MG
10 TABLET ORAL AT BEDTIME
Qty: 30 TABLET | Refills: 0 | Status: SHIPPED | OUTPATIENT
Start: 2024-04-09 | End: 2024-09-03

## 2024-04-15 DIAGNOSIS — E78.5 HYPERLIPIDEMIA LDL GOAL <130: ICD-10-CM

## 2024-04-16 RX ORDER — SIMVASTATIN 10 MG
10 TABLET ORAL AT BEDTIME
Qty: 90 TABLET | Refills: 0 | OUTPATIENT
Start: 2024-04-16

## 2024-05-01 ENCOUNTER — PATIENT OUTREACH (OUTPATIENT)
Dept: CARE COORDINATION | Facility: CLINIC | Age: 74
End: 2024-05-01
Payer: COMMERCIAL

## 2024-05-20 NOTE — ADDENDUM NOTE
Addended by: JOHN LINDA on: 10/29/2020 03:20 PM     Modules accepted: Orders    
[FreeTextEntry1] : Patient presents to clinic complaining of R ankle fracture.  Injury occurred on 4/1 when she passed out and fell in her kitchen. Went to the ED, where they took XRs revealing non displaced lat mall fracture and splinted patient. Continued to wear splint and complains of pain to the lateral ankle. Also notes swelling to the R foot as well.  Denies any numbness, tingling or burning sensations.

## 2024-06-17 ENCOUNTER — HOSPITAL ENCOUNTER (OUTPATIENT)
Dept: MAMMOGRAPHY | Facility: CLINIC | Age: 74
Discharge: HOME | End: 2024-06-17
Attending: FAMILY MEDICINE | Admitting: FAMILY MEDICINE
Payer: COMMERCIAL

## 2024-06-17 DIAGNOSIS — Z12.31 VISIT FOR SCREENING MAMMOGRAM: ICD-10-CM

## 2024-06-17 PROCEDURE — 77063 BREAST TOMOSYNTHESIS BI: CPT

## 2024-06-30 NOTE — PROGRESS NOTES
GI CLINIC VISIT    CC: Follow up chronic constipation       ASSESSMENT/PLAN:  1. Chronic idiopathic constipation  - linaclotide (LINZESS) 72 MCG capsule; Take 1 capsule (72 mcg) by mouth every morning (before breakfast)  Dispense: 90 capsule; Refill: 3  - Adult GI Clinic Follow-Up Order (Blank); Future  2. Pelvic floor dysfunction     Aracelis Muñoz is a 74 year old female with hyperparathyroidism s/p parathyroidectomy in 9/2023 who presents for follow up of chronic constipation. She was previously following with Jorge A Estrada PA-C. At her last visit she was finding her constipation to be more manageable which was suspected to be due to having her parathyroidectomy.     She is taking metamucil gummies (2 per day) almost daily and linzess every 2-3 days. She has completed pelvic floor PT and biofeedback therapy for pelvic floor dysfunction which she felt was helpful.  If she takes Linzess every day she reports that her stools are very loose and urgent and she worries about leaving the house.  She has not been feeling completely evacuated on days that she does not take Linzess.  She also stopped taking MiraLAX which caused fecal urgency.  She does not typically have any fecal incontinence or nocturnal bowel movements and she is not having any abdominal pain since starting on Linzess.  Since May she has started to have more liquid bowel movements with Pecos type VII stools mixed with Pecos type I stool.  Some days she does have Pecos type II or Pecos type III stools.  She states that when she first started on Linzess with some MiraLAX she was having Pecos type IV stools and felt completely evacuated.    We discussed that I suspect that she is having constipation with overflow diarrhea at this point and that we will need to increase her bowel regimen in order to empty the harder stool that is within the colon.  She reports that this does feel like what she has been experiencing.  I recommend either taking  "Linzess daily or performing a bowel cleanse.  She has a family history of colon cancer in her father diagnosed in his 60s and her last colonoscopy was in 2020 and was incomplete.  The ascending colon was not completely visualized.  I do recommend that she does another colonoscopy before age 75 through our GI team.  She has tolerated the prep fairly well in the past and she would be open to this but she would like to prioritize her a potential knee replacement surgery first.    We reviewed the following plan:  -Use a glycerin suppository followed by a mini enema or \"enemeez\"  -Recommended MiraLAX and Gatorade bowel cleanse.  She does not feel that she could maintain on high doses of MiraLAX for 2 weeks so she would like to try taking 2 Dulcolax after performing the initial bowel cleanse.  I do encourage her to continue on 2 capfuls of MiraLAX daily for at least 7 days to follow  -After the bowel cleanse she will plan to resume Linzess every day or every other day.  We discussed that her stools may get a bit more watery before they improve.  -Also recommend switching to powdered fiber supplementation slowly increasing the dose up to 2 tablespoons/day  -Follow-up in 2 months, sooner if needed.  I will reach out to her via [employer] in 1 month to check in.      Specialty Problems          Gastroenterology Diagnoses    Constipation        Diverticulosis of sigmoid colon           RTC 2-3 months    It was a pleasure to participate in the care of this patient; please contact us with any further questions.  A total of 58 face-to-face minutes was spent with this patient, >50% of which was counseling regarding the above delineated issues. An additional 12 minutes was spent on the date of the encounter doing chart review, documentation, and further activities as noted above.    Evelin Iyer PA-C  Division of Gastroenterology, Hepatology and Nutrition  [facility]" [place]    ---------------------------------------------------------------------------------------------------  HPI:  Aracelis Muñoz is a 74 year old female with past medical history significant for hyperparathyroidism s/p recent parathyroidectomy who presents for follow up on constipation. She was previously following with Jorge A Estrada PA-C.     She has had great difficulty over the past year trying to manage her constipation despite fiber, miralax, linzess and amitiza.      She recently had parathyroidectomy which was very successful in September 2023. She reports that her constipation has been much easier to manage over the past several months. She is taking metamucil fiber 1.5 teaspoons almost daily. She also takes linzess 72mcg every 2-3 days. She has a BM almost daily. Sometimes stools are smaller but not having pain. No straining. Bowel movements are formed and easier to pass after taking linzess. She is not having any explosive BM's.      She has pelvic floor dysfunction and has participated in PT and biofeedback therapy which helped.     Interval history 7/2024:   When she was on 72 mcg Linzess daily she felt she can't control her bowel movements. She is taking linzess once every 2-3 days. Occasionally will take it 2 days in a row if feeling very constipated. Urgency gets worse when she does so. Normally stools are formed and pretty soft.   She reports frequent trips to the bathroom. Since the end of May she has had more liquid mixed into the stool with some small pieces. Stools range from bristol type 1 to bristol type 3, with type 7 mixed in. On days that she takes linzess she will have 2 bowel movments per day. On a day without linzess, she will not have a bowel movement. On days that she takes linzess she feels emptied after a BM most of the time. Has clustered stools a lot of the time and doesn't always feel emptied.  When she first started on linzess, she was also on miralax, was getting a  briostl type 4 stool multiple times per day. She no longer takes miralax because she feels it causes urgency. Now using metamucil gummies 2 per day. She has tried metamucil powder in the past. 1-2 tsp not more effective than the gummies, but reports this was a while ago. Before linzess she would go 2 weeks without a bowel movement. Abdominal pain was occurring before Linzess when more constipated and using laxatives. With linzess the pain has been gone.     No nocturnal Bms. In the last 6 weeks she will have a couple small bm in the night but this is not typical. No blood or black. No recent nausea or vomiting. Did have nausea and vomiting previously before linzess with constipation.    Pelvic floor therapy was helpful. She completed this. She has been constipated her whole life.    - Family history: She remembers her mother always took metamucil powder. Paternal cousin with celiac. No known IBD. Dad had colon cancer diagnosed early 60s, passed 65.    - She had an incomplete colonoscopy in 2020 (ascending colon not adequately visualized).       ROS:    A 10 point review of systems was performed and is negative except as noted in the HPI.       PHYSICAL EXAMINATION:  Vitals /70   Pulse 94   Wt 73.9 kg (163 lb)   LMP 11/01/2004   SpO2 100%   BMI 29.84 kg/m     Wt   Wt Readings from Last 2 Encounters:   07/01/24 73.9 kg (163 lb)   02/28/24 73 kg (161 lb)      Gen: Pt sitting up on exam table in NAD, interactive and cooperative on exam  Eyes: sclerae anicteric, no injection  Resp: Unlabored breathing  GI: Hyperactive BS, abd soft, flat, nontender throughout all quadrants, no organomegaly or masses palpated  Skin: Warm, dry, no jaundice, nails appear healthy  Neuro: alert, oriented, answers questions appropriately

## 2024-07-01 ENCOUNTER — OFFICE VISIT (OUTPATIENT)
Dept: GASTROENTEROLOGY | Facility: CLINIC | Age: 74
End: 2024-07-01
Attending: PHYSICIAN ASSISTANT
Payer: COMMERCIAL

## 2024-07-01 VITALS
OXYGEN SATURATION: 100 % | HEART RATE: 94 BPM | DIASTOLIC BLOOD PRESSURE: 70 MMHG | WEIGHT: 163 LBS | SYSTOLIC BLOOD PRESSURE: 119 MMHG | BODY MASS INDEX: 29.84 KG/M2

## 2024-07-01 DIAGNOSIS — K59.04 CHRONIC IDIOPATHIC CONSTIPATION: Primary | ICD-10-CM

## 2024-07-01 DIAGNOSIS — M62.89 PELVIC FLOOR DYSFUNCTION: ICD-10-CM

## 2024-07-01 PROCEDURE — 99215 OFFICE O/P EST HI 40 MIN: CPT | Performed by: STUDENT IN AN ORGANIZED HEALTH CARE EDUCATION/TRAINING PROGRAM

## 2024-07-01 PROCEDURE — 99417 PROLNG OP E/M EACH 15 MIN: CPT | Performed by: STUDENT IN AN ORGANIZED HEALTH CARE EDUCATION/TRAINING PROGRAM

## 2024-07-01 ASSESSMENT — PAIN SCALES - GENERAL: PAINLEVEL: NO PAIN (0)

## 2024-07-01 NOTE — PATIENT INSTRUCTIONS
"It was a pleasure meeting with you today and discussing your healthcare plan. Below is a summary of what we covered:    - Use a glycerin suppository followed by a mini enema ([facility]) or \"enemeez.\" Anytime you feel that there is a blockage in the rectum, use a suppository     To do a bowel cleanse:  --  ONE Miralax bottle (238 g) and TWO 32 once Gatorade (64 ounces). Mix the two. Drink mixture over the course of 3-4 hours.  You should experience loose bowel movements that are watery in consistency when complete.  -- The day after the cleanout, immediately resume Miralax 3 caps full daily x 1 week, then go to 2 caps full per day ongoing.  -- This is not a stimulant laxative and is safe to take on a daily basis.  You can titrate this dose (increase or decrease) based on stool frequency and consistency.  -- If you want to try taking 2 dulcolax tablets the night after the bowel cleanse, then you can give that a try to see if you get results instead of continuing on the miralax daily. If you go this route, you should take at least 2 capfuls of miralax daily for a week   - Stop fiber during these 2 weeks, but then resume it afterwards       - After the cleanse you may find you are able to take Linzess every day or every other day   - Try going on powdered fiber (metamucil, citrucel or Benefiber). Start with 2 teaspoons per day and over the course of a couple of months work up to 2 tablespoons per day    - Follow up in 2 months     Please see below for any additional questions and scheduling guidelines.    Sign up for [facility]: [facility] patient portal serves as a secure platform for accessing your medical records from the [facility]. Additionally, [employer] facilitates easy, timely, and secure messaging with your care team. If you have not signed up, you may do so by using the provided code or calling [phone].    Coordinating your care after your visit:  There are multiple options for scheduling your " follow-up care based on your provider's recommendation.    How do I schedule a follow-up clinic appointment:   After your appointment, you may receive scheduling assistance with the Clinic Coordinators by having a seat in the waiting room and a Clinic Coordinator will call you up to schedule.  Virtual visits or after you leave the clinic:  Your provider has placed a follow-up order in the [facility] portal for scheduling your return appointment. A member of the scheduling team will contact you to schedule.  [facility] Scheduling: Timely scheduling through [facility] is advised to ensure appointment availability.   Call to schedule: You may schedule your follow-up appointment(s) by calling [phone], option 1.    How do I schedule my endoscopy or colonoscopy procedure:  If a procedure, such as a colonoscopy or upper endoscopy was ordered by your provider, the scheduling team will contact you to schedule this procedure. Or you may choose to call to schedule at   [phone], option 2.  Please allow 20-30 minutes when scheduling a procedure.    How do I get my blood work done? To get your blood work done, you need to schedule a lab appointment at an [facility]. There are multiple ways to schedule:   At the clinic: The Clinic Coordinator you meet after your visit can help you schedule a lab appointment.   [facility] scheduling: [facility] offers online lab scheduling at all [facility] laboratory locations.   Call to schedule: You can call [phone] to schedule your lab appointment.    How do I schedule my imaging study: To schedule imaging studies, such as CT scans, ultrasounds, MRIs, or X-rays, contact Imaging Services at [phone].    How do I schedule a referral to another doctor: If your provider recommended a referral to another specialist(s), the referral order was placed by your provider. You will receive a phone call to schedule this referral, or you may choose to call the number attached to  the referral to self-schedule.    For Post-Visit Question(s):  For any inquiries following today's visit:  Please utilize [facility] messaging and allow 48 hours for reply or contact the Call Center during normal business hours at [phone], option 3.  For Emergent After-hours questions, contact the On-Call GI Fellow through the [facility]  at [phone].  In addition, you may contact your Nurse directly using the provided contact information.    Test Results:  Test results will be accessible via [facility] in compliance with the 21st Century Cures Act. This means that your results will be available to you at the same time as your provider. Often you may see your results before your provider does. Results are reviewed by staff within two weeks with communication follow-up. Results may be released in the patient portal prior to your care team review.    Prescription Refill(s):  Medication prescribed by your provider will be addressed during your visit. For future refills, please coordinate with your pharmacy. If you have not had a recent clinic visit or routine labs, for your safety, your provider may not be able to refill your prescription.

## 2024-07-01 NOTE — LETTER
7/1/2024      Aracelis Muñoz  [address]  [place] [address]      Dear Colleague,    Thank you for referring your patient, Aracelis Muñoz, to the [facility] [place]. Please see a copy of my visit note below.    GI CLINIC VISIT    CC: Follow up chronic constipation       ASSESSMENT/PLAN:  1. Chronic idiopathic constipation  - linaclotide (LINZESS) 72 MCG capsule; Take 1 capsule (72 mcg) by mouth every morning (before breakfast)  Dispense: 90 capsule; Refill: 3  - Adult GI Clinic Follow-Up Order (Blank); Future  2. Pelvic floor dysfunction     Araceils Muñoz is a 74 year old female with hyperparathyroidism s/p parathyroidectomy in 9/2023 who presents for follow up of chronic constipation. She was previously following with Jorge A Estrada PA-C. At her last visit she was finding her constipation to be more manageable which was suspected to be due to having her parathyroidectomy.     She is taking metamucil gummies (2 per day) almost daily and linzess every 2-3 days. She has completed pelvic floor PT and biofeedback therapy for pelvic floor dysfunction which she felt was helpful.  If she takes Linzess every day she reports that her stools are very loose and urgent and she worries about leaving the house.  She has not been feeling completely evacuated on days that she does not take Linzess.  She also stopped taking MiraLAX which caused fecal urgency.  She does not typically have any fecal incontinence or nocturnal bowel movements and she is not having any abdominal pain since starting on Linzess.  Since May she has started to have more liquid bowel movements with Ransom type VII stools mixed with Ransom type I stool.  Some days she does have Ransom type II or Ransom type III stools.  She states that when she first started on Linzess with some MiraLAX she was having Ransom type IV stools and felt completely evacuated.    We discussed that I suspect that she is having  "constipation with overflow diarrhea at this point and that we will need to increase her bowel regimen in order to empty the harder stool that is within the colon.  She reports that this does feel like what she has been experiencing.  I recommend either taking Linzess daily or performing a bowel cleanse.  She has a family history of colon cancer in her father diagnosed in his 60s and her last colonoscopy was in 2020 and was incomplete.  The ascending colon was not completely visualized.  I do recommend that she does another colonoscopy before age 75 through our GI team.  She has tolerated the prep fairly well in the past and she would be open to this but she would like to prioritize her a potential knee replacement surgery first.    We reviewed the following plan:  -Use a glycerin suppository followed by a mini enema or \"enemeez\"  -Recommended MiraLAX and Gatorade bowel cleanse.  She does not feel that she could maintain on high doses of MiraLAX for 2 weeks so she would like to try taking 2 Dulcolax after performing the initial bowel cleanse.  I do encourage her to continue on 2 capfuls of MiraLAX daily for at least 7 days to follow  -After the bowel cleanse she will plan to resume Linzess every day or every other day.  We discussed that her stools may get a bit more watery before they improve.  -Also recommend switching to powdered fiber supplementation slowly increasing the dose up to 2 tablespoons/day  -Follow-up in 2 months, sooner if needed.  I will reach out to her via [employer] in 1 month to check in.      Specialty Problems          Gastroenterology Diagnoses    Constipation        Diverticulosis of sigmoid colon           RTC 2-3 months    It was a pleasure to participate in the care of this patient; please contact us with any further questions.  A total of 58 face-to-face minutes was spent with this patient, >50% of which was counseling regarding the above delineated issues. An additional 12 minutes was spent " on the date of the encounter doing chart review, documentation, and further activities as noted above.    Evelin Iyer PA-C  Division of Gastroenterology, Hepatology and Nutrition  [facility]    ---------------------------------------------------------------------------------------------------  HPI:  Aracelis Muñoz is a 74 year old female with past medical history significant for hyperparathyroidism s/p recent parathyroidectomy who presents for follow up on constipation. She was previously following with Jorge A Estrada PA-C.     She has had great difficulty over the past year trying to manage her constipation despite fiber, miralax, linzess and amitiza.      She recently had parathyroidectomy which was very successful in September 2023. She reports that her constipation has been much easier to manage over the past several months. She is taking metamucil fiber 1.5 teaspoons almost daily. She also takes linzess 72mcg every 2-3 days. She has a BM almost daily. Sometimes stools are smaller but not having pain. No straining. Bowel movements are formed and easier to pass after taking linzess. She is not having any explosive BM's.      She has pelvic floor dysfunction and has participated in PT and biofeedback therapy which helped.     Interval history 7/2024:   When she was on 72 mcg Linzess daily she felt she can't control her bowel movements. She is taking linzess once every 2-3 days. Occasionally will take it 2 days in a row if feeling very constipated. Urgency gets worse when she does so. Normally stools are formed and pretty soft.   She reports frequent trips to the bathroom. Since the end of May she has had more liquid mixed into the stool with some small pieces. Stools range from bristol type 1 to bristol type 3, with type 7 mixed in. On days that she takes linzess she will have 2 bowel movments per day. On a day without linzess, she will not have a bowel movement. On days that she takes linzess she  feels emptied after a BM most of the time. Has clustered stools a lot of the time and doesn't always feel emptied.  When she first started on linzess, she was also on miralax, was getting a briostl type 4 stool multiple times per day. She no longer takes miralax because she feels it causes urgency. Now using metamucil gummies 2 per day. She has tried metamucil powder in the past. 1-2 tsp not more effective than the gummies, but reports this was a while ago. Before linzess she would go 2 weeks without a bowel movement. Abdominal pain was occurring before Linzess when more constipated and using laxatives. With linzess the pain has been gone.     No nocturnal Bms. In the last 6 weeks she will have a couple small bm in the night but this is not typical. No blood or black. No recent nausea or vomiting. Did have nausea and vomiting previously before linzess with constipation.    Pelvic floor therapy was helpful. She completed this. She has been constipated her whole life.    - Family history: She remembers her mother always took metamucil powder. Paternal cousin with celiac. No known IBD. Dad had colon cancer diagnosed early 60s, passed 65.    - She had an incomplete colonoscopy in 2020 (ascending colon not adequately visualized).       ROS:    A 10 point review of systems was performed and is negative except as noted in the HPI.       PHYSICAL EXAMINATION:  Vitals /70   Pulse 94   Wt 73.9 kg (163 lb)   LMP 11/01/2004   SpO2 100%   BMI 29.84 kg/m     Wt   Wt Readings from Last 2 Encounters:   07/01/24 73.9 kg (163 lb)   02/28/24 73 kg (161 lb)      Gen: Pt sitting up on exam table in NAD, interactive and cooperative on exam  Eyes: sclerae anicteric, no injection  Resp: Unlabored breathing  GI: Hyperactive BS, abd soft, flat, nontender throughout all quadrants, no organomegaly or masses palpated  Skin: Warm, dry, no jaundice, nails appear healthy  Neuro: alert, oriented, answers questions  appropriately      Again, thank you for allowing me to participate in the care of your patient.        Sincerely,        Evelin Iyer PA-C

## 2024-07-18 DIAGNOSIS — F33.0 MAJOR DEPRESSIVE DISORDER, RECURRENT EPISODE, MILD (H): ICD-10-CM

## 2024-07-18 DIAGNOSIS — G47.09 OTHER INSOMNIA: ICD-10-CM

## 2024-07-19 RX ORDER — TRAZODONE HYDROCHLORIDE 100 MG/1
100 TABLET ORAL AT BEDTIME
Qty: 90 TABLET | Refills: 0 | Status: SHIPPED | OUTPATIENT
Start: 2024-07-19 | End: 2024-09-03

## 2024-08-01 DIAGNOSIS — Z91.09 ENVIRONMENTAL ALLERGIES: ICD-10-CM

## 2024-08-01 DIAGNOSIS — G47.09 OTHER INSOMNIA: ICD-10-CM

## 2024-08-01 DIAGNOSIS — F33.0 MAJOR DEPRESSIVE DISORDER, RECURRENT EPISODE, MILD (H): ICD-10-CM

## 2024-08-01 RX ORDER — TRAZODONE HYDROCHLORIDE 100 MG/1
100 TABLET ORAL AT BEDTIME
Qty: 90 TABLET | Refills: 0 | OUTPATIENT
Start: 2024-08-01

## 2024-08-01 RX ORDER — AZELASTINE HYDROCHLORIDE 0.5 MG/ML
SOLUTION/ DROPS OPHTHALMIC
Qty: 6 ML | Refills: 1 | Status: SHIPPED | OUTPATIENT
Start: 2024-08-01

## 2024-08-23 DIAGNOSIS — I89.0 LYMPHEDEMA: ICD-10-CM

## 2024-08-23 DIAGNOSIS — M25.473 ANKLE EDEMA: ICD-10-CM

## 2024-08-23 RX ORDER — FUROSEMIDE 20 MG
20 TABLET ORAL DAILY PRN
Qty: 90 TABLET | Refills: 1 | Status: SHIPPED | OUTPATIENT
Start: 2024-08-23 | End: 2024-09-03

## 2024-09-03 ENCOUNTER — VIRTUAL VISIT (OUTPATIENT)
Dept: FAMILY MEDICINE | Facility: CLINIC | Age: 74
End: 2024-09-03
Payer: COMMERCIAL

## 2024-09-03 DIAGNOSIS — E78.5 HYPERLIPIDEMIA LDL GOAL <130: ICD-10-CM

## 2024-09-03 DIAGNOSIS — I89.0 LYMPHEDEMA: ICD-10-CM

## 2024-09-03 DIAGNOSIS — F33.0 MAJOR DEPRESSIVE DISORDER, RECURRENT EPISODE, MILD (H): ICD-10-CM

## 2024-09-03 DIAGNOSIS — M25.473 ANKLE EDEMA: ICD-10-CM

## 2024-09-03 DIAGNOSIS — G47.09 OTHER INSOMNIA: Primary | ICD-10-CM

## 2024-09-03 DIAGNOSIS — J45.20 MILD INTERMITTENT ASTHMA WITHOUT COMPLICATION: ICD-10-CM

## 2024-09-03 PROCEDURE — 99214 OFFICE O/P EST MOD 30 MIN: CPT | Mod: 95 | Performed by: FAMILY MEDICINE

## 2024-09-03 RX ORDER — FUROSEMIDE 20 MG
20 TABLET ORAL DAILY PRN
Status: SHIPPED
Start: 2024-09-03

## 2024-09-03 RX ORDER — SIMVASTATIN 10 MG
10 TABLET ORAL AT BEDTIME
Qty: 30 TABLET | Refills: 0 | Status: SHIPPED | OUTPATIENT
Start: 2024-09-03

## 2024-09-03 RX ORDER — TRAZODONE HYDROCHLORIDE 100 MG/1
100 TABLET ORAL AT BEDTIME
Qty: 90 TABLET | Refills: 0 | Status: SHIPPED | OUTPATIENT
Start: 2024-09-03

## 2024-09-03 ASSESSMENT — PATIENT HEALTH QUESTIONNAIRE - PHQ9
10. IF YOU CHECKED OFF ANY PROBLEMS, HOW DIFFICULT HAVE THESE PROBLEMS MADE IT FOR YOU TO DO YOUR WORK, TAKE CARE OF THINGS AT HOME, OR GET ALONG WITH OTHER PEOPLE: SOMEWHAT DIFFICULT
SUM OF ALL RESPONSES TO PHQ QUESTIONS 1-9: 5
SUM OF ALL RESPONSES TO PHQ QUESTIONS 1-9: 5

## 2024-09-03 ASSESSMENT — ASTHMA QUESTIONNAIRES
QUESTION_2 LAST FOUR WEEKS HOW OFTEN HAVE YOU HAD SHORTNESS OF BREATH: NOT AT ALL
ACT_TOTALSCORE: 25
ACT_TOTALSCORE: 25
QUESTION_5 LAST FOUR WEEKS HOW WOULD YOU RATE YOUR ASTHMA CONTROL: COMPLETELY CONTROLLED
QUESTION_1 LAST FOUR WEEKS HOW MUCH OF THE TIME DID YOUR ASTHMA KEEP YOU FROM GETTING AS MUCH DONE AT WORK, SCHOOL OR AT HOME: NONE OF THE TIME
QUESTION_4 LAST FOUR WEEKS HOW OFTEN HAVE YOU USED YOUR RESCUE INHALER OR NEBULIZER MEDICATION (SUCH AS ALBUTEROL): NOT AT ALL
QUESTION_3 LAST FOUR WEEKS HOW OFTEN DID YOUR ASTHMA SYMPTOMS (WHEEZING, COUGHING, SHORTNESS OF BREATH, CHEST TIGHTNESS OR PAIN) WAKE YOU UP AT NIGHT OR EARLIER THAN USUAL IN THE MORNING: NOT AT ALL

## 2024-09-03 ASSESSMENT — ANXIETY QUESTIONNAIRES
7. FEELING AFRAID AS IF SOMETHING AWFUL MIGHT HAPPEN: NOT AT ALL
GAD7 TOTAL SCORE: 3

## 2024-09-03 ASSESSMENT — ENCOUNTER SYMPTOMS: NERVOUS/ANXIOUS: 1

## 2024-09-03 NOTE — PROGRESS NOTES
Maile is a 74 year old who is being evaluated via a billable video visit.    How would you like to obtain your AVS? MyChart  If the video visit is dropped, the invitation should be resent by: Text to cell phone: [phone]  Will anyone else be joining your video visit? No      Assessment & Plan     (F33.0) Major depressive disorder, recurrent episode, mild (H24)  (primary encounter diagnosis)  Comment: improved and sx resolved, not needing anxiety med's and ok for just taking  Plan: traZODone (DESYREL) 100 MG tablet            (G47.09) Other insomnia  Comment:   Plan: traZODone (DESYREL) 100 MG tablet        Improved and stable  on current dose of trazodone ( she prefers to take full dose and it helps improve anxiety as well     (E78.5) Hyperlipidemia LDL goal <130  Comment: has no started statin , wants to wait   Plan: simvastatin (ZOCOR) 10 MG tablet, Lipid panel         reflex to direct LDL Fasting        Check labs. She will decided after results if sh wasn't to start statin or not .  . Need to continue to watch diet( low fat, low cholesterol, high fiber diet) exercise  to help improve lipid. Follow up as needed       (J45.20) Mild intermittent asthma without complication  Comment: has not taken any med's for few yrs   Plan: resolved     (M25.473) Ankle edema  Comment: dependent edema - left more then rt, has also resolved . She rarely needs lasix as needed   Plan: furosemide (LASIX) 20 MG tablet            (I89.0) Lymphedema  Comment: left leg, since ankle surgery few yrs ago -   she has improved and stable and not needing med's often now and she is doing better ,rarely needs lasix   Plan: furosemide (LASIX) 20 MG tablet                  .Check labs. refill sent.Cares and  treatment discussed.  follow. up if problem   Patient expressed understanding and agreement with treatment plan. All patient's questions were answered, will let me know if has more later.  Medications: Rx's: Reviewed the potential side  "effects/complications of medications prescribed.         BMI  Estimated body mass index is 29.84 kg/m  as calculated from the following:    Height as of 2/28/24: 1.574 m (5' 1.97\").    Weight as of 7/1/24: 73.9 kg (163 lb).   Weight management plan: Discussed healthy diet and exercise guidelines      Regular exercise  See Patient Instructions    Juventino Gr is a 74 year old, presenting for the following health issues:  Depression and Anxiety        9/3/2024     3:33 PM   Additional Questions   Roomed by Garrett YAN     Video Start Time:  17:19    Anxiety    History of Present Illness       Mental Health Follow-up:  Patient presents to follow-up on Anxiety.    Patient's anxiety since last visit has been:  Bad  The patient is having other symptoms associated with anxiety.  Any significant life events: No  Patient is feeling anxious or having panic attacks.  Patient has no concerns about alcohol or drug use.She consumes 0 sweetened beverage(s) daily.She exercises with enough effort to increase her heart rate 30 to 60 minutes per day.  She exercises with enough effort to increase her heart rate 4 days per week.   She is taking medications regularly.       Medication Followup of Trazodone  Taking Medication as prescribed: Yes - pt had been advised to take less Trazodone - so had has been taking 1/2 tablet,  which has not been working well.   Has felt more anxious some times bc of not sleeping good lately on lower dose    She was doing better when she was taking trazodone full 100 mg dose  regularly , rather then taking/2 tab.   She has no problem taking trazodone at current dose, so she prefer to go back on  same dose on 100 mg.   she has been  off lexapro for a while and did not need any anxiety medication in a long time either , but not getting enough sleep,  can make her more anxious next day.     Per pt her leg swelling/ lymphedema also  has also resolved . She has not  needed  lasix in a while too "       Hyperlipidemia Follow-Up     Are you regularly taking any medication or supplement to lower your cholesterol?   No   she was given simvastatin but never started.   she thinks  she is doing well now. her lipid had gone up few months ago, as she was not paying as much attention to her diet and exercise.  but she is now in a better routine and doing much better so wants to do labs to see how she is doing.     Are you having muscle aches or other side effects that you think could be caused by your cholesterol lowering medication?  NA      Asthma follow-up     HAS NOT REALLY USED ANY INHALER IN LAST FEW YEARS   She was on Flovent and also was on albuterol as needed. She thinsk may be it was some environmental allergies etc that may have caused problem but has not used any Flovent last couple of yrs and her albuterol also just sits there an expires . Cannot recall when she may have last used any inhaler  she feels well otherwise       Review of Systems  Constitutional, HEENT, cardiovascular, pulmonary, GI, , musculoskeletal, neuro, skin, endocrine and psych systems are negative, except as otherwise noted.      Objective           Vitals:  No vitals were obtained today due to virtual visit.    Physical Exam   GENERAL: alert and no distress  EYES: Eyes grossly normal to inspection.  No discharge or erythema, or obvious scleral/conjunctival abnormalities.  RESP: No audible wheeze, cough, or visible cyanosis.    SKIN: Visible skin clear. No significant rash, abnormal pigmentation or lesions.  NEURO: Cranial nerves grossly intact.  Mentation and speech appropriate for age.  PSYCH: Appropriate affect, tone, and pace of words          Video-Visit Details    Type of service:  Video Visit   Video End Time:5:50 PM  Originating Location (pt. Location): Home video was discontinued aftre half way through appointment  and continued visit with phone bc of techincal difficulty on pt end     Distant Location (provider location):   On-site  Platform used for Video Visit: Divina  Signed Electronically by: Shalini Phan MD

## 2024-09-16 ENCOUNTER — VIRTUAL VISIT (OUTPATIENT)
Dept: GASTROENTEROLOGY | Facility: CLINIC | Age: 74
End: 2024-09-16
Payer: COMMERCIAL

## 2024-09-16 VITALS — WEIGHT: 162 LBS | BODY MASS INDEX: 29.66 KG/M2

## 2024-09-16 DIAGNOSIS — K59.04 CHRONIC IDIOPATHIC CONSTIPATION: Primary | ICD-10-CM

## 2024-09-16 DIAGNOSIS — M62.89 PELVIC FLOOR DYSFUNCTION: ICD-10-CM

## 2024-09-16 PROCEDURE — 99215 OFFICE O/P EST HI 40 MIN: CPT | Mod: 95 | Performed by: STUDENT IN AN ORGANIZED HEALTH CARE EDUCATION/TRAINING PROGRAM

## 2024-09-16 ASSESSMENT — PAIN SCALES - GENERAL: PAINLEVEL: NO PAIN (0)

## 2024-09-16 NOTE — PATIENT INSTRUCTIONS
It was a pleasure meeting with you today and discussing your healthcare plan. Below is a summary of what we covered:    - Continue Linzess 72 mcg daily   - Start Magnesium oxide 250 mg nightly (regular strength, nature made brand - yellow cap). After a few weeks on the 250 mg dose, you can double it to 500 mg nightly at bedtime. This will pull water into the colon to soften the stools  - Continue Metamucil powder 2 teaspoons daily- move to the morning. You have room to increase this to 3 tsp (equal to 1 tablespoon) daily, if you find that stools are still pebble like with the addition of magnesium.  - Follow up in 3 months   - it is okay to use dulcolax or miralax (1/2 capful 3 days in a row) as a backup as needed   - If you have any questions, please don't hesitate to contact me through our GI RN Clinic Coordinator, Shea Stephens, at [phone].    Please see below for any additional questions and scheduling guidelines.    Sign up for [employer]: [employer] patient portal serves as a secure platform for accessing your medical records from the [facility]. Additionally, [employer] facilitates easy, timely, and secure messaging with your care team. If you have not signed up, you may do so by using the provided code or calling [phone].    Coordinating your care after your visit:  There are multiple options for scheduling your follow-up care based on your provider's recommendation.    How do I schedule a follow-up clinic appointment:   After your appointment, you may receive scheduling assistance with the Clinic Coordinators by having a seat in the waiting room and a Clinic Coordinator will call you up to schedule.  Virtual visits or after you leave the clinic:  Your provider has placed a follow-up order in the [employer] portal for scheduling your return appointment. A member of the scheduling team will contact you to schedule.  [employer] Scheduling: Timely scheduling through [employer] is advised to ensure  appointment availability.   Call to schedule: You may schedule your follow-up appointment(s) by calling [phone], option 1.    How do I schedule my endoscopy or colonoscopy procedure:  If a procedure, such as a colonoscopy or upper endoscopy was ordered by your provider, the scheduling team will contact you to schedule this procedure. Or you may choose to call to schedule at   [phone], option 2.  Please allow 20-30 minutes when scheduling a procedure.    How do I get my blood work done? To get your blood work done, you need to schedule a lab appointment at an [facility] Laboratory. There are multiple ways to schedule:   At the clinic: The Clinic Coordinator you meet after your visit can help you schedule a lab appointment.   [facility] scheduling: [employer] offers online lab scheduling at all [facility] laboratory locations.   Call to schedule: You can call [phone] to schedule your lab appointment.    How do I schedule my imaging study: To schedule imaging studies, such as CT scans, ultrasounds, MRIs, or X-rays, contact Imaging Services at [phone].    How do I schedule a referral to another doctor: If your provider recommended a referral to another specialist(s), the referral order was placed by your provider. You will receive a phone call to schedule this referral, or you may choose to call the number attached to the referral to self-schedule.    For Post-Visit Question(s):  For any inquiries following today's visit:  Please utilize [employer] messaging and allow 48 hours for reply or contact the Call Center during normal business hours at [phone], option 3.  For Emergent After-hours questions, contact the On-Call GI Fellow through the [facility]  at [phone].  In addition, you may contact your Nurse directly using the provided contact information.    Test Results:  Test results will be accessible via [employer] in compliance with the 21st Century Cures Act. This  means that your results will be available to you at the same time as your provider. Often you may see your results before your provider does. Results are reviewed by staff within two weeks with communication follow-up. Results may be released in the patient portal prior to your care team review.    Prescription Refill(s):  Medication prescribed by your provider will be addressed during your visit. For future refills, please coordinate with your pharmacy. If you have not had a recent clinic visit or routine labs, for your safety, your provider may not be able to refill your prescription.

## 2024-09-16 NOTE — PROGRESS NOTES
Virtual Visit Details    Type of service:  Video Visit   Video Start Time: 9:55 AM  Video End Time:10:30 AM    Originating Location (pt. Location): Home    Distant Location (provider location):  Off-site  Platform used for Video Visit: [facility]    GASTROENTEROLOGY Follow-up VIDEO VISIT    CC/REFERRING MD:    Shalini Phan    REASON FOR CONSULTATION:   Shalini Phan for   Chief Complaint   Patient presents with    Video Visit     Recheck       HISTORY OF PRESENT ILLNESS:    Aracelis Muñoz is a 74 year old female who is being evaluated via a billable video visit for follow up on chronic constipation.    past medical history significant for hyperparathyroidism s/p recent parathyroidectomy who presents for follow up on constipation. She was previously following with Jorge A Estrada PA-C.      She has had great difficulty over the past year trying to manage her constipation despite fiber, miralax, linzess and amitiza.      She recently had parathyroidectomy which was very successful in September 2023. She reports that her constipation has been much easier to manage over the past several months. She is taking metamucil fiber 1.5 teaspoons almost daily. She also takes linzess 72mcg every 2-3 days. She has a BM almost daily. Sometimes stools are smaller but not having pain. No straining. Bowel movements are formed and easier to pass after taking linzess. She is not having any explosive BM's.      She has pelvic floor dysfunction and has participated in PT and biofeedback therapy which helped.      Interval history 7/2024:   When she was on 72 mcg Linzess daily she felt she can't control her bowel movements. She is taking linzess once every 2-3 days. Occasionally will take it 2 days in a row if feeling very constipated. Urgency gets worse when she does so. Normally stools are formed and pretty soft.   She reports frequent trips to the bathroom. Since the end of May she has had more liquid  mixed into the stool with some small pieces. Stools range from bristol type 1 to bristol type 3, with type 7 mixed in. On days that she takes linzess she will have 2 bowel movments per day. On a day without linzess, she will not have a bowel movement. On days that she takes linzess she feels emptied after a BM most of the time. Has clustered stools a lot of the time and doesn't always feel emptied.  When she first started on linzess, she was also on miralax, was getting a briostl type 4 stool multiple times per day. She no longer takes miralax because she feels it causes urgency. Now using metamucil gummies 2 per day. She has tried metamucil powder in the past. 1-2 tsp not more effective than the gummies, but reports this was a while ago. Before linzess she would go 2 weeks without a bowel movement. Abdominal pain was occurring before Linzess when more constipated and using laxatives. With linzess the pain has been gone.      No nocturnal Bms. In the last 6 weeks she will have a couple small bm in the night but this is not typical. No blood or black. No recent nausea or vomiting. Did have nausea and vomiting previously before linzess with constipation.     Pelvic floor therapy was helpful. She completed this. She has been constipated her whole life.     - Family history: She remembers her mother always took metamucil powder. Paternal cousin with celiac. No known IBD. Dad had colon cancer diagnosed early 60s, passed 65.     - She had an incomplete colonoscopy in 2020 (ascending colon not adequately visualized).      Interval history 9/2024:  Maile reports that her bowel movements are going pretty well, but could be better. She is having regular bowel movements 95% of the time. She has had a couple instances with no bowel movement for 3-4 days. She doesn't feel complete when she goes. Often times having small deidre but noever type 4 stools. Feels bloated when she skipped days. Used dulcolax and then produces a lot  of stool. Will have multiple stools the next day and then will have formed stool for a while before going back to type 1 stools. One time she took miralax daily for 3 days in a row and by the third day she had loose stools. She takes the Linzess daily now and using metamucil daily. She is using powdered metamucil. 2 heaping teaspoons. Feels she could be better at drinking more water.         I have reviewed and updated the patient's Past Medical History, Social History, Family History and Medication List.    Exam:    General appearance:  Healthy appearing adult, in no acute distress  Eyes:  Sclera anicteric  Ears, nose, mouth and throat:  No obvious external lesions of ears and nose.  Hearing intact  Neck:  Symmetric, No obvious external lesions  Respiratory:  Normal respiration, no use of accessory muscles   MSK:  No visual upper extremity, neck or facial muscle atrophy  Psychiatric:  Oriented to person, place and time, Appropriate mood and affect.   Neurologic:  Peripheral muscle function and dexterity appear to be intact      PERTINENT STUDIES have been reviewed.    ASSESSMENT/PLAN:    Aracelis Muñoz is a 74 year old female who presents for follow up of constipation    1. Chronic idiopathic constipation  2. Pelvic floor dysfunction     Aracelis Muñoz is a 74 year old female with hyperparathyroidism s/p parathyroidectomy in 9/2023 who presents for follow up of chronic constipation.      At our initial visit she was taking metamucil gummies (2 per day) almost daily and linzess every 2-3 days. Stools were loose and urgent if she took linzess 72 mcg daily. She was not feeling completely evacuated. Daily miralax was causing fecal urgency. She was having some constipation with overflow diarrhea with bristol type 1 stools mixed with bristol type 7 stool.    She has completed pelvic floor PT and biofeedback therapy for pelvic floor dysfunction which she felt was helpful.      She completed a miralax/gatorade bowel  cleanse which was difficult and she had loose stools for a few weeks. Now she is able to take Linzess 72 mcg daily and is using miralax 1 capful x 3 days in a row as needed, or dulcolax PRN. She has switched to powdered metamucil taking 2 mounded tsp daily. She is having daily bristol type 1 stools with a couple of instances of no bowel movement for 3-4 days with travel. She does not feel ready to increase Linzess any further at this time.    She has a family history of colon cancer in her father diagnosed in his 60s and her last colonoscopy was in 2020 and was incomplete.  The ascending colon was not completely visualized.  I do recommend that she does another colonoscopy before age 75 through our GI team.  She has tolerated the prep fairly well in the past and she would be open to this but she would like to prioritize her knee replacement surgery first.     We reviewed the following plan:  - Continue Linzess 72 mcg daily   - Start Magnesium oxide 250 mg nightly (regular strength, nature made brand - yellow cap). After a few weeks on the 250 mg dose, you can double it to 500 mg nightly at bedtime. This will pull water into the colon to soften the stools  - Continue Metamucil powder 2 teaspoons daily- move to the morning. You have room to increase this to 3 tsp (equal to 1 tablespoon) daily, if you find that stools are still pebble like with the addition of magnesium.  - Follow up in 3 months   - it is okay to use dulcolax or miralax (1/2 capful 3 days in a row) as a backup as needed   - If you have any questions, please don't hesitate to contact me through our GI RN Clinic Coordinator, Shea Stephens, at [phone].        Video-Visit Details  Video Visit Time: 34 min  Type of service:  Video Visit  Originating Location (pt. Location): Home    Distant Location (provider location):  Off-site  Platform used for Video Visit: Divina    46 minutes spent on the date of the encounter doing chart review, history and exam,  documentation and further activities as noted above.    Evelin Iyer PA-C  Division of Gastroenterology, Hepatology, and Nutrition  [facility]     RTC 3 months

## 2024-09-16 NOTE — NURSING NOTE
Current patient location: [address] [place] [address] [address] [place] [address]    Is the patient currently in the state of [place]? YES    Visit mode:VIDEO    If the visit is dropped, the patient can be reconnected by: VIDEO VISIT: Text to cell phone:   Telephone Information:   Mobile [phone]       Will anyone else be joining the visit? NO  (If patient encounters technical issues they should call [phone])    How would you like to obtain your AVS? MyChart    Are changes needed to the allergy or medication list? No    Are refills needed on medications prescribed by this physician? NO    Rooming Documentation:  Questionnaire(s) not pre-assigned      Reason for visit: Video Visit (Recheck)    Ernestine VIDES

## 2024-09-16 NOTE — LETTER
9/16/2024      Aracelis Muñoz  [address]  [place] [address]      Dear Colleague,    Thank you for referring your patient, Aracelis Muñoz, to the [facility] [place]. Please see a copy of my visit note below.    Virtual Visit Details    Type of service:  Video Visit   Video Start Time: 9:55 AM  Video End Time:10:30 AM    Originating Location (pt. Location): Home    Distant Location (provider location):  Off-site  Platform used for Video Visit: [facility]    GASTROENTEROLOGY Follow-up VIDEO VISIT    CC/REFERRING MD:    Shalini Phan    REASON FOR CONSULTATION:   Shalini Phan for   Chief Complaint   Patient presents with     Video Visit     Recheck       HISTORY OF PRESENT ILLNESS:    Aracelis Muñoz is a 74 year old female who is being evaluated via a billable video visit for follow up on chronic constipation.    past medical history significant for hyperparathyroidism s/p recent parathyroidectomy who presents for follow up on constipation. She was previously following with Jorge A Estrada PA-C.      She has had great difficulty over the past year trying to manage her constipation despite fiber, miralax, linzess and amitiza.      She recently had parathyroidectomy which was very successful in September 2023. She reports that her constipation has been much easier to manage over the past several months. She is taking metamucil fiber 1.5 teaspoons almost daily. She also takes linzess 72mcg every 2-3 days. She has a BM almost daily. Sometimes stools are smaller but not having pain. No straining. Bowel movements are formed and easier to pass after taking linzess. She is not having any explosive BM's.      She has pelvic floor dysfunction and has participated in PT and biofeedback therapy which helped.      Interval history 7/2024:   When she was on 72 mcg Linzess daily she felt she can't control her bowel movements. She is taking  linzess once every 2-3 days. Occasionally will take it 2 days in a row if feeling very constipated. Urgency gets worse when she does so. Normally stools are formed and pretty soft.   She reports frequent trips to the bathroom. Since the end of May she has had more liquid mixed into the stool with some small pieces. Stools range from bristol type 1 to bristol type 3, with type 7 mixed in. On days that she takes linzess she will have 2 bowel movments per day. On a day without linzess, she will not have a bowel movement. On days that she takes linzess she feels emptied after a BM most of the time. Has clustered stools a lot of the time and doesn't always feel emptied.  When she first started on linzess, she was also on miralax, was getting a briostl type 4 stool multiple times per day. She no longer takes miralax because she feels it causes urgency. Now using metamucil gummies 2 per day. She has tried metamucil powder in the past. 1-2 tsp not more effective than the gummies, but reports this was a while ago. Before linzess she would go 2 weeks without a bowel movement. Abdominal pain was occurring before Linzess when more constipated and using laxatives. With linzess the pain has been gone.      No nocturnal Bms. In the last 6 weeks she will have a couple small bm in the night but this is not typical. No blood or black. No recent nausea or vomiting. Did have nausea and vomiting previously before linzess with constipation.     Pelvic floor therapy was helpful. She completed this. She has been constipated her whole life.     - Family history: She remembers her mother always took metamucil powder. Paternal cousin with celiac. No known IBD. Dad had colon cancer diagnosed early 60s, passed 65.     - She had an incomplete colonoscopy in 2020 (ascending colon not adequately visualized).      Interval history 9/2024:  Maile reports that her bowel movements are going pretty well, but could be better. She is having regular bowel  movements 95% of the time. She has had a couple instances with no bowel movement for 3-4 days. She doesn't feel complete when she goes. Often times having small deidre but noever type 4 stools. Feels bloated when she skipped days. Used dulcolax and then produces a lot of stool. Will have multiple stools the next day and then will have formed stool for a while before going back to type 1 stools. One time she took miralax daily for 3 days in a row and by the third day she had loose stools. She takes the Linzess daily now and using metamucil daily. She is using powdered metamucil. 2 heaping teaspoons. Feels she could be better at drinking more water.         I have reviewed and updated the patient's Past Medical History, Social History, Family History and Medication List.    Exam:    General appearance:  Healthy appearing adult, in no acute distress  Eyes:  Sclera anicteric  Ears, nose, mouth and throat:  No obvious external lesions of ears and nose.  Hearing intact  Neck:  Symmetric, No obvious external lesions  Respiratory:  Normal respiration, no use of accessory muscles   MSK:  No visual upper extremity, neck or facial muscle atrophy  Psychiatric:  Oriented to person, place and time, Appropriate mood and affect.   Neurologic:  Peripheral muscle function and dexterity appear to be intact      PERTINENT STUDIES have been reviewed.    ASSESSMENT/PLAN:    Aracelis Muñoz is a 74 year old female who presents for follow up of constipation    1. Chronic idiopathic constipation  2. Pelvic floor dysfunction     Aracelis Muñoz is a 74 year old female with hyperparathyroidism s/p parathyroidectomy in 9/2023 who presents for follow up of chronic constipation.      At our initial visit she was taking metamucil gummies (2 per day) almost daily and linzess every 2-3 days. Stools were loose and urgent if she took linzess 72 mcg daily. She was not feeling completely evacuated. Daily miralax was causing fecal urgency. She was  having some constipation with overflow diarrhea with bristol type 1 stools mixed with bristol type 7 stool.    She has completed pelvic floor PT and biofeedback therapy for pelvic floor dysfunction which she felt was helpful.      She completed a miralax/gatorade bowel cleanse which was difficult and she had loose stools for a few weeks. Now she is able to take Linzess 72 mcg daily and is using miralax 1 capful x 3 days in a row as needed, or dulcolax PRN. She has switched to powdered metamucil taking 2 mounded tsp daily. She is having daily bristol type 1 stools with a couple of instances of no bowel movement for 3-4 days with travel. She does not feel ready to increase Linzess any further at this time.    She has a family history of colon cancer in her father diagnosed in his 60s and her last colonoscopy was in 2020 and was incomplete.  The ascending colon was not completely visualized.  I do recommend that she does another colonoscopy before age 75 through our GI team.  She has tolerated the prep fairly well in the past and she would be open to this but she would like to prioritize her knee replacement surgery first.     We reviewed the following plan:  - Continue Linzess 72 mcg daily   - Start Magnesium oxide 250 mg nightly (regular strength, nature made brand - yellow cap). After a few weeks on the 250 mg dose, you can double it to 500 mg nightly at bedtime. This will pull water into the colon to soften the stools  - Continue Metamucil powder 2 teaspoons daily- move to the morning. You have room to increase this to 3 tsp (equal to 1 tablespoon) daily, if you find that stools are still pebble like with the addition of magnesium.  - Follow up in 3 months   - it is okay to use dulcolax or miralax (1/2 capful 3 days in a row) as a backup as needed   - If you have any questions, please don't hesitate to contact me through our GI RN Clinic Coordinator, Shea Stephens, at [phone].        Video-Visit Details  Video  Visit Time: 34 min  Type of service:  Video Visit  Originating Location (pt. Location): Home    Distant Location (provider location):  Off-site  Platform used for Video Visit: [employer]    46 minutes spent on the date of the encounter doing chart review, history and exam, documentation and further activities as noted above.    Evelin Iyer PA-C  Division of Gastroenterology, Hepatology, and Nutrition  [facility]     RTC 3 months      Again, thank you for allowing me to participate in the care of your patient.        Sincerely,        Evelin Iyer PA-C

## 2024-09-30 ENCOUNTER — TELEPHONE (OUTPATIENT)
Dept: GASTROENTEROLOGY | Facility: CLINIC | Age: 74
End: 2024-09-30
Payer: COMMERCIAL

## 2024-09-30 NOTE — TELEPHONE ENCOUNTER
Left Voicemail (1st Attempt) and Sent Mychart (1st Attempt) for the patient to call back and schedule the following:    Appointment type: Return GI  Provider: Evelin Iyer   Return date: 3 mo (around Dec 2024)  Specialty phone number: [phone]  Additional appointment(s) needed: NA  Additonal Notes: LVM/MyC x1

## 2024-10-02 NOTE — TELEPHONE ENCOUNTER
Left Voicemail (2nd Attempt) and Sent Mychart (2nd Attempt) for the patient to call back and schedule the following:    Appointment type: Return GI  Provider: Evelin Iyer   Return date: 3 mo (around Dec 2024)  Specialty phone number: [phone]  Additional appointment(s) needed: NA  Additonal Notes: LVMx2/MyC x2

## 2024-11-21 ENCOUNTER — VIRTUAL VISIT (OUTPATIENT)
Dept: GASTROENTEROLOGY | Facility: CLINIC | Age: 74
End: 2024-11-21
Payer: COMMERCIAL

## 2024-11-21 DIAGNOSIS — M62.89 PELVIC FLOOR DYSFUNCTION: ICD-10-CM

## 2024-11-21 DIAGNOSIS — K59.04 CHRONIC IDIOPATHIC CONSTIPATION: Primary | ICD-10-CM

## 2024-11-21 RX ORDER — ESCITALOPRAM OXALATE 5 MG/1
5 TABLET ORAL DAILY
COMMUNITY
Start: 2024-10-07 | End: 2025-10-07

## 2024-11-21 NOTE — LETTER
11/21/2024      Aracelis Muñoz  [address]  [place] [address]      Dear Colleague,    Thank you for referring your patient, Aracelis Muñoz, to the [facility]STROENTEROLOG[facility] [place]. Please see a copy of my visit note below.    Virtual Visit Details    Type of service:  Video Visit   Video Start Time: 8:27 AM  Video End Time: 9:06 AM    Originating Location (pt. Location): Home    Distant Location (provider location):  Off-site  Platform used for Video Visit: [facility]                GASTROENTEROLOGY Follow-up VIDEO VISIT    CC/REFERRING MD:    Shalini Phan    REASON FOR CONSULTATION:   Shalini Phan for   Chief Complaint   Patient presents with     RECHECK       HISTORY OF PRESENT ILLNESS:    Aracelis Muñoz is a 74 year old female who is being evaluated via a billable video visit for  follow up on chronic constipation.     past medical history significant for hyperparathyroidism s/p recent parathyroidectomy who presents for follow up on constipation. She was previously following with Jorge A Estrada PA-C.      She has had great difficulty over the past year trying to manage her constipation despite fiber, miralax, linzess and amitiza.      She recently had parathyroidectomy which was very successful in September 2023. She reports that her constipation has been much easier to manage over the past several months. She is taking metamucil fiber 1.5 teaspoons almost daily. She also takes linzess 72mcg every 2-3 days. She has a BM almost daily. Sometimes stools are smaller but not having pain. No straining. Bowel movements are formed and easier to pass after taking linzess. She is not having any explosive BM's.      She has pelvic floor dysfunction and has participated in PT and biofeedback therapy which helped.      Interval history 7/2024:   When she was on 72 mcg Linzess daily she felt she can't control her bowel movements. She is taking  linzess once every 2-3 days. Occasionally will take it 2 days in a row if feeling very constipated. Urgency gets worse when she does so. Normally stools are formed and pretty soft.   She reports frequent trips to the bathroom. Since the end of May she has had more liquid mixed into the stool with some small pieces. Stools range from bristol type 1 to bristol type 3, with type 7 mixed in. On days that she takes linzess she will have 2 bowel movments per day. On a day without linzess, she will not have a bowel movement. On days that she takes linzess she feels emptied after a BM most of the time. Has clustered stools a lot of the time and doesn't always feel emptied.  When she first started on linzess, she was also on miralax, was getting a briostl type 4 stool multiple times per day. She no longer takes miralax because she feels it causes urgency. Now using metamucil gummies 2 per day. She has tried metamucil powder in the past. 1-2 tsp not more effective than the gummies, but reports this was a while ago. Before linzess she would go 2 weeks without a bowel movement. Abdominal pain was occurring before Linzess when more constipated and using laxatives. With linzess the pain has been gone.      No nocturnal Bms. In the last 6 weeks she will have a couple small bm in the night but this is not typical. No blood or black. No recent nausea or vomiting. Did have nausea and vomiting previously before linzess with constipation.     Pelvic floor therapy was helpful. She completed this. She has been constipated her whole life.     - Family history: She remembers her mother always took metamucil powder. Paternal cousin with celiac. No known IBD. Dad had colon cancer diagnosed early 60s, passed 65.     - She had an incomplete colonoscopy in 2020 (ascending colon not adequately visualized).      Interval history 9/2024:  Maile reports that her bowel movements are going pretty well, but could be better. She is having regular bowel  "movements 95% of the time. She has had a couple instances with no bowel movement for 3-4 days. She doesn't feel complete when she goes. Often times having small deidre but noever type 4 stools. Feels bloated when she skipped days. Used dulcolax and then produces a lot of stool. Will have multiple stools the next day and then will have formed stool for a while before going back to type 1 stools. One time she took miralax daily for 3 days in a row and by the third day she had loose stools. She takes the Linzess daily now and using metamucil daily. She is using powdered metamucil. 2 heaping teaspoons. Feels she could be better at drinking more water.      Interval history 11/2024:   Maile reports once a month she has a bad episode. She took 1/2 capful of miralax at night, as well as her linzess 45 mcg in AM, and grabbed coffee with a friend yesterday. Had a watery stool after 1 sip of coffee. After a few sips of coffee, had another trip to the bathroom with mostly gas. Then had to go again and felt she \"cleaned out.\" This also happened a month ago while shopping. No dulcolax around these times. Using powdered metamucil but doesn't always do the job, then resorts to a little miralax. 1 cap of miralax usually brings results. This does happen when she is out somewhere and has not had a bowel movement for a few days or has had very pebbly stools.     Most of the time she is having daily stools. Stools are soft formed but can be hard to pass. Sometimes she can tell stool is sitting there, but won't pass. She has tried suppositories in the past but has a hard time holding in place for 15 mins despite laying down. Last week was in [facility] and wasn't taking miralax and didn't have a BM all weekend. She did add in magnesium oxide without much difference so she stopped it.     Recently started Lexapro and for the first few days had diarrhea. This has since resolved. She states she felt much better when she had these stools " because she felt cleaned out.       I have reviewed and updated the patient's Past Medical History, Social History, Family History and Medication List.    Exam:    General appearance:  Healthy appearing adult, in no acute distress  Eyes:  Sclera anicteric  Ears, nose, mouth and throat:  No obvious external lesions of ears and nose.  Hearing intact  Neck:  Symmetric, No obvious external lesions  Respiratory:  Normal respiration, no use of accessory muscles   MSK:  No visual upper extremity, neck or facial muscle atrophy  Psychiatric:  Oriented to person, place and time, Appropriate mood and affect.   Neurologic:  Peripheral muscle function and dexterity appear to be intact      PERTINENT STUDIES have been reviewed.        ASSESSMENT/PLAN:    Aracelis Muñoz is a 74 year old female who presents for follow up of constipation     1. Chronic idiopathic constipation  2. Pelvic floor dysfunction     Aracelis Muñoz is a 74 year old female with hyperparathyroidism s/p parathyroidectomy in 9/2023 who presents for follow up of chronic constipation with intermittent diarrhea.      At our initial visit she was taking metamucil gummies (2 per day) almost daily and linzess every 2-3 days. Stools were loose and urgent if she took linzess 72 mcg daily. She was not feeling completely evacuated. Daily miralax was causing fecal urgency. She was having some constipation with overflow diarrhea with bristol type 1 stools mixed with bristol type 7 stool.     She has completed pelvic floor PT and biofeedback therapy for pelvic floor dysfunction which she felt was helpful.      She completed a miralax/gatorade bowel cleanse which was difficult and she had loose stools for a few weeks. Now she is able to take Linzess 72 mcg daily and is using miralax 1 capful x 3 days in a row as needed, or dulcolax PRN. She has switched to powdered metamucil taking 2 mounded tsp daily. She is having daily bristol type 1 stools with a couple of instances  of no bowel movement for 3-4 days with travel. She does not feel ready to increase Linzess any further at this time.     She has a family history of colon cancer in her father diagnosed in his 60s and her last colonoscopy was in 2020 and was incomplete.  The ascending colon was not completely visualized.  I do recommend that she does another colonoscopy before age 75 through our GI team.  She has tolerated the prep fairly well in the past and she would be open to this but she would like to prioritize her knee replacement surgery first.    She has done well for the most part with daily bowel movements but they become bristol type 1 and then she will skip a couple of days with no BM, then will have diarrhea while out at a store or a coffee shop. We discussed avoiding the skipped days in order to prevent the loose stools that follow.      We reviewed the following plan:  - Start 1 tsp of miralax daily. I will check in with you via [employer] in 2-3 weeks. At that time, we might consider going up to 1/2 capful of miralax daily vs. Increase metamucil  - Continue 2 tsp of metamucil daily  - Continue Linzess 45 mcg daily  - Don't worry about taking any magnesium right now  - It would be ok to use a suppository when you feel stool is needing to pass but won't, and if not able to hold for more than 5 minutes, you can do 2 suppositories in a row  - Follow up in 3 months       Video-Visit Details  Video Visit Time: 38 minutes  Type of service:  Video Visit  Originating Location (pt. Location): Home    Distant Location (provider location):  Off-site  Platform used for Video Visit: [employer]    46 minutes spent on the date of the encounter doing chart review, history and exam, documentation and further activities as noted above.    Evelin Iyer PA-C  Division of Gastroenterology, Hepatology, and Nutrition  [facility]     RT 3 months      Again, thank you for allowing me to participate in the care of  your patient.        Sincerely,        Evelin Iyre PA-C

## 2024-11-21 NOTE — NURSING NOTE
Current patient location: Patient declined to provide     Is the patient currently in the state of [place]? YES    Visit mode:VIDEO    If the visit is dropped, the patient can be reconnected by:TELEPHONE VISIT: Phone number:   Telephone Information:   Mobile [phone]       Will anyone else be joining the visit? NO  (If patient encounters technical issues they should call [phone])    Are changes needed to the allergy or medication list? No    Are refills needed on medications prescribed by this physician? NO    Rooming Documentation:  Not applicable    Reason for visit: RECHECK    Lisa VIDES

## 2024-11-21 NOTE — PROGRESS NOTES
Virtual Visit Details    Type of service:  Video Visit   Video Start Time: 8:27 AM  Video End Time: 9:06 AM    Originating Location (pt. Location): Home    Distant Location (provider location):  Off-site  Platform used for Video Visit: [facility]                GASTROENTEROLOGY Follow-up VIDEO VISIT    CC/REFERRING MD:    Shalini Phan    REASON FOR CONSULTATION:   Shalini Phan for   Chief Complaint   Patient presents with    RECHECK       HISTORY OF PRESENT ILLNESS:    Aracelis Muñoz is a 74 year old female who is being evaluated via a billable video visit for  follow up on chronic constipation.     past medical history significant for hyperparathyroidism s/p recent parathyroidectomy who presents for follow up on constipation. She was previously following with Jorge A Estrada PA-C.      She has had great difficulty over the past year trying to manage her constipation despite fiber, miralax, linzess and amitiza.      She recently had parathyroidectomy which was very successful in September 2023. She reports that her constipation has been much easier to manage over the past several months. She is taking metamucil fiber 1.5 teaspoons almost daily. She also takes linzess 72mcg every 2-3 days. She has a BM almost daily. Sometimes stools are smaller but not having pain. No straining. Bowel movements are formed and easier to pass after taking linzess. She is not having any explosive BM's.      She has pelvic floor dysfunction and has participated in PT and biofeedback therapy which helped.      Interval history 7/2024:   When she was on 72 mcg Linzess daily she felt she can't control her bowel movements. She is taking linzess once every 2-3 days. Occasionally will take it 2 days in a row if feeling very constipated. Urgency gets worse when she does so. Normally stools are formed and pretty soft.   She reports frequent trips to the bathroom. Since the end of May she has had more liquid mixed  into the stool with some small pieces. Stools range from bristol type 1 to bristol type 3, with type 7 mixed in. On days that she takes linzess she will have 2 bowel movments per day. On a day without linzess, she will not have a bowel movement. On days that she takes linzess she feels emptied after a BM most of the time. Has clustered stools a lot of the time and doesn't always feel emptied.  When she first started on linzess, she was also on miralax, was getting a briostl type 4 stool multiple times per day. She no longer takes miralax because she feels it causes urgency. Now using metamucil gummies 2 per day. She has tried metamucil powder in the past. 1-2 tsp not more effective than the gummies, but reports this was a while ago. Before linzess she would go 2 weeks without a bowel movement. Abdominal pain was occurring before Linzess when more constipated and using laxatives. With linzess the pain has been gone.      No nocturnal Bms. In the last 6 weeks she will have a couple small bm in the night but this is not typical. No blood or black. No recent nausea or vomiting. Did have nausea and vomiting previously before linzess with constipation.     Pelvic floor therapy was helpful. She completed this. She has been constipated her whole life.     - Family history: She remembers her mother always took metamucil powder. Paternal cousin with celiac. No known IBD. Dad had colon cancer diagnosed early 60s, passed 65.     - She had an incomplete colonoscopy in 2020 (ascending colon not adequately visualized).      Interval history 9/2024:  Maile reports that her bowel movements are going pretty well, but could be better. She is having regular bowel movements 95% of the time. She has had a couple instances with no bowel movement for 3-4 days. She doesn't feel complete when she goes. Often times having small deidre but noever type 4 stools. Feels bloated when she skipped days. Used dulcolax and then produces a lot of  "stool. Will have multiple stools the next day and then will have formed stool for a while before going back to type 1 stools. One time she took miralax daily for 3 days in a row and by the third day she had loose stools. She takes the Linzess daily now and using metamucil daily. She is using powdered metamucil. 2 heaping teaspoons. Feels she could be better at drinking more water.      Interval history 11/2024:   Maile reports once a month she has a bad episode. She took 1/2 capful of miralax at night, as well as her linzess 45 mcg in AM, and grabbed coffee with a friend yesterday. Had a watery stool after 1 sip of coffee. After a few sips of coffee, had another trip to the bathroom with mostly gas. Then had to go again and felt she \"cleaned out.\" This also happened a month ago while shopping. No dulcolax around these times. Using powdered metamucil but doesn't always do the job, then resorts to a little miralax. 1 cap of miralax usually brings results. This does happen when she is out somewhere and has not had a bowel movement for a few days or has had very pebbly stools.     Most of the time she is having daily stools. Stools are soft formed but can be hard to pass. Sometimes she can tell stool is sitting there, but won't pass. She has tried suppositories in the past but has a hard time holding in place for 15 mins despite laying down. Last week was in [facility] and wasn't taking miralax and didn't have a BM all weekend. She did add in magnesium oxide without much difference so she stopped it.     Recently started Lexapro and for the first few days had diarrhea. This has since resolved. She states she felt much better when she had these stools because she felt cleaned out.       I have reviewed and updated the patient's Past Medical History, Social History, Family History and Medication List.    Exam:    General appearance:  Healthy appearing adult, in no acute distress  Eyes:  Sclera anicteric  Ears, nose, " mouth and throat:  No obvious external lesions of ears and nose.  Hearing intact  Neck:  Symmetric, No obvious external lesions  Respiratory:  Normal respiration, no use of accessory muscles   MSK:  No visual upper extremity, neck or facial muscle atrophy  Psychiatric:  Oriented to person, place and time, Appropriate mood and affect.   Neurologic:  Peripheral muscle function and dexterity appear to be intact      PERTINENT STUDIES have been reviewed.        ASSESSMENT/PLAN:    Aracelis Muñoz is a 74 year old female who presents for follow up of constipation     1. Chronic idiopathic constipation  2. Pelvic floor dysfunction     Aracelis Muñoz is a 74 year old female with hyperparathyroidism s/p parathyroidectomy in 9/2023 who presents for follow up of chronic constipation with intermittent diarrhea.      At our initial visit she was taking metamucil gummies (2 per day) almost daily and linzess every 2-3 days. Stools were loose and urgent if she took linzess 72 mcg daily. She was not feeling completely evacuated. Daily miralax was causing fecal urgency. She was having some constipation with overflow diarrhea with bristol type 1 stools mixed with bristol type 7 stool.     She has completed pelvic floor PT and biofeedback therapy for pelvic floor dysfunction which she felt was helpful.      She completed a miralax/gatorade bowel cleanse which was difficult and she had loose stools for a few weeks. Now she is able to take Linzess 72 mcg daily and is using miralax 1 capful x 3 days in a row as needed, or dulcolax PRN. She has switched to powdered metamucil taking 2 mounded tsp daily. She is having daily bristol type 1 stools with a couple of instances of no bowel movement for 3-4 days with travel. She does not feel ready to increase Linzess any further at this time.     She has a family history of colon cancer in her father diagnosed in his 60s and her last colonoscopy was in 2020 and was incomplete.  The ascending  colon was not completely visualized.  I do recommend that she does another colonoscopy before age 75 through our GI team.  She has tolerated the prep fairly well in the past and she would be open to this but she would like to prioritize her knee replacement surgery first.    She has done well for the most part with daily bowel movements but they become bristol type 1 and then she will skip a couple of days with no BM, then will have diarrhea while out at a store or a coffee shop. We discussed avoiding the skipped days in order to prevent the loose stools that follow.      We reviewed the following plan:  - Start 1 tsp of miralax daily. I will check in with you via [employer] in 2-3 weeks. At that time, we might consider going up to 1/2 capful of miralax daily vs. Increase metamucil  - Continue 2 tsp of metamucil daily  - Continue Linzess 45 mcg daily  - Don't worry about taking any magnesium right now  - It would be ok to use a suppository when you feel stool is needing to pass but won't, and if not able to hold for more than 5 minutes, you can do 2 suppositories in a row  - Follow up in 3 months       Video-Visit Details  Video Visit Time: 38 minutes  Type of service:  Video Visit  Originating Location (pt. Location): Home    Distant Location (provider location):  Off-site  Platform used for Video Visit: [employer]    46 minutes spent on the date of the encounter doing chart review, history and exam, documentation and further activities as noted above.    Evelin Iyer PA-C  Division of Gastroenterology, Hepatology, and Nutrition  [facility]     RTC 3 months

## 2024-11-21 NOTE — PATIENT INSTRUCTIONS
It was a pleasure meeting with you today and discussing your healthcare plan. Below is a summary of what we covered:    - Start 1 tsp of miralax daily. I will check in with you via [employer] in 2-3 weeks. At that time, we might consider going up to 1/2 capful of miralax daily vs. Increase metamucil  - Continue 2 tsp of metamucil daily  - Continue Linzess 45 mcg daily  - Don't worry about taking any magnesium right now  - It would be ok to use a suppository when you feel stool is needing to pass but won't, and if not able to hold for more than 5 minutes, you can do 2 suppositories in a row  - Good luck with knee surgery!  - Follow up in 3 months     Please see below for any additional questions and scheduling guidelines.    Sign up for [employer]: [employer] patient portal serves as a secure platform for accessing your medical records from the [facility]. Additionally, [employer] facilitates easy, timely, and secure messaging with your care team. If you have not signed up, you may do so by using the provided code or calling [phone].    Coordinating your care after your visit:  There are multiple options for scheduling your follow-up care based on your provider's recommendation.    How do I schedule a follow-up clinic appointment:   After your appointment, you may receive scheduling assistance with the Clinic Coordinators by having a seat in the waiting room and a Clinic Coordinator will call you up to schedule.  Virtual visits or after you leave the clinic:  Your provider has placed a follow-up order in the [employer] portal for scheduling your return appointment. A member of the scheduling team will contact you to schedule.  [facility] Scheduling: Timely scheduling through [employer] is advised to ensure appointment availability.   Call to schedule: You may schedule your follow-up appointment(s) by calling [phone], option 1.    How do I schedule my endoscopy or colonoscopy procedure:  If a procedure, such as a  colonoscopy or upper endoscopy was ordered by your provider, the scheduling team will contact you to schedule this procedure. Or you may choose to call to schedule at   [phone], option 2.  Please allow 20-30 minutes when scheduling a procedure.    How do I get my blood work done? To get your blood work done, you need to schedule a lab appointment at an [facility]. There are multiple ways to schedule:   At the clinic: The Clinic Coordinator you meet after your visit can help you schedule a lab appointment.   [employer] scheduling: [employer] offers online lab scheduling at all [facility] laboratory locations.   Call to schedule: You can call [phone] to schedule your lab appointment.    How do I schedule my imaging study: To schedule imaging studies, such as CT scans, ultrasounds, MRIs, or X-rays, contact Imaging Services at [phone].    How do I schedule a referral to another doctor: If your provider recommended a referral to another specialist(s), the referral order was placed by your provider. You will receive a phone call to schedule this referral, or you may choose to call the number attached to the referral to self-schedule.    For Post-Visit Question(s):  For any inquiries following today's visit:  Please utilize [employer] messaging and allow 48 hours for reply or contact the Call Center during normal business hours at [phone], option 3.  For Emergent After-hours questions, contact the On-Call GI Fellow through the [facility]  at [phone].  In addition, you may contact your Nurse directly using the provided contact information.    Test Results:  Test results will be accessible via [email or web address] in compliance with the 21st Century Cures Act. This means that your results will be available to you at the same time as your provider. Often you may see your results before your provider does. Results are reviewed by staff within two weeks with communication  follow-up. Results may be released in the patient portal prior to your care team review.    Prescription Refill(s):  Medication prescribed by your provider will be addressed during your visit. For future refills, please coordinate with your pharmacy. If you have not had a recent clinic visit or routine labs, for your safety, your provider may not be able to refill your prescription.

## 2024-11-30 DIAGNOSIS — G47.09 OTHER INSOMNIA: ICD-10-CM

## 2024-11-30 DIAGNOSIS — F33.0 MAJOR DEPRESSIVE DISORDER, RECURRENT EPISODE, MILD (H): ICD-10-CM

## 2024-12-02 RX ORDER — TRAZODONE HYDROCHLORIDE 100 MG/1
100 TABLET ORAL AT BEDTIME
Qty: 90 TABLET | Refills: 0 | OUTPATIENT
Start: 2024-12-02

## 2024-12-02 NOTE — TELEPHONE ENCOUNTER
Patient has transferred care to [facility], all refills should be sent to new PCP

## 2024-12-02 NOTE — TELEPHONE ENCOUNTER
Called patient and relayed message- patient understood and will call pharmacy     Sandra ALONZO, RN-BSN  [facility]

## 2024-12-05 ENCOUNTER — TELEPHONE (OUTPATIENT)
Dept: GASTROENTEROLOGY | Facility: CLINIC | Age: 74
End: 2024-12-05
Payer: COMMERCIAL

## 2024-12-05 NOTE — TELEPHONE ENCOUNTER
Patient confirmed scheduled appointment:  Date: 2/24/25  Time: 1 pm  Visit type: return gi  Provider: Evelin Iyer   Location: virtual  Testing/imaging: NA  Additional notes: NA

## 2025-01-23 DIAGNOSIS — K59.04 CHRONIC IDIOPATHIC CONSTIPATION: Primary | ICD-10-CM

## 2025-01-23 RX ORDER — SENNA AND DOCUSATE SODIUM 50; 8.6 MG/1; MG/1
TABLET, FILM COATED ORAL
Qty: 90 TABLET | Refills: 1 | Status: SHIPPED | OUTPATIENT
Start: 2025-01-23

## 2025-02-24 ENCOUNTER — VIRTUAL VISIT (OUTPATIENT)
Dept: GASTROENTEROLOGY | Facility: CLINIC | Age: 75
End: 2025-02-24
Attending: STUDENT IN AN ORGANIZED HEALTH CARE EDUCATION/TRAINING PROGRAM
Payer: COMMERCIAL

## 2025-02-24 DIAGNOSIS — K59.04 CHRONIC IDIOPATHIC CONSTIPATION: Primary | ICD-10-CM

## 2025-02-24 DIAGNOSIS — M62.89 PELVIC FLOOR DYSFUNCTION: ICD-10-CM

## 2025-02-24 PROCEDURE — 98007 SYNCH AUDIO-VIDEO EST HI 40: CPT | Performed by: STUDENT IN AN ORGANIZED HEALTH CARE EDUCATION/TRAINING PROGRAM

## 2025-02-24 NOTE — PROGRESS NOTES
Virtual Visit Details    Type of service:  Video Visit   Video Start Time: 1:01 PM  Video End Time: 1:40 PM    Originating Location (pt. Location): Home    Distant Location (provider location):  Off-site  Platform used for Video Visit: [facility]        GASTROENTEROLOGY Follow-up VIDEO VISIT    CC/REFERRING MD:    Shalini Phan    REASON FOR CONSULTATION:   Shalini Phan for   Chief Complaint   Patient presents with    RECHECK       HISTORY OF PRESENT ILLNESS:  Aracelis Muñoz is a 74 year old female who is being evaluated via a billable video visit for  follow up on chronic constipation.     Past medical history significant for hyperparathyroidism s/p parathyroidectomy. She was previously following with Jorge A Estrada PA-C.     She underwent parathyroidectomy which was successful in September 2023. After that, her constipation was initially much easier to manage for several months. She was taking metamucil fiber 1.5 teaspoons almost daily. This was also when she first started linzess 72 mcg every 2-3 days. She has a BM almost daily. Sometimes stools are smaller but not having pain. No straining. Bowel movements are formed and easier to pass after taking linzess. She is not having any explosive BM's.      She has pelvic floor dysfunction and has participated in PT and biofeedback therapy which helped.      Interval history 7/2024:   When she was on 72 mcg Linzess daily she felt she can't control her bowel movements. She is taking linzess once every 2-3 days. Occasionally will take it 2 days in a row if feeling very constipated. Urgency gets worse when she does so. Normally stools are formed and pretty soft. She reports frequent trips to the bathroom. Since the end of May she has had more liquid mixed into the stool with some small pieces. Stools range from bristol type 1 to bristol type 3, with type 7 mixed in. On days that she takes linzess she will have 2 bowel movments per day. On a  day without linzess, she will not have a bowel movement. On days that she takes linzess she feels emptied after a BM most of the time. Has clustered stools a lot of the time and doesn't always feel emptied.  When she first started on linzess, she was also on miralax, was getting a briostl type 4 stool multiple times per day. She no longer takes miralax because she feels it causes urgency. Now using metamucil gummies 2 per day. She has tried metamucil powder in the past. 1-2 tsp not more effective than the gummies, but reports this was a while ago. Before linzess she would go 2 weeks without a bowel movement. Abdominal pain was occurring before Linzess when more constipated and using laxatives. With linzess the pain has been gone.      No nocturnal Bms. In the last 6 weeks she will have a couple small bm in the night but this is not typical. No blood or black. No recent nausea or vomiting. Did have nausea and vomiting previously before linzess with constipation.     Pelvic floor therapy was helpful. She completed this. She has been constipated her whole life.     - Family history: She remembers her mother always took metamucil powder. Paternal cousin with celiac. No known IBD. Dad had colon cancer diagnosed early 60s, passed 65.     - She had an incomplete colonoscopy in 2020 (ascending colon not adequately visualized).      Interval history 9/2024:  Maile reports that her bowel movements are going pretty well, but could be better. She is having regular bowel movements 95% of the time. She has had a couple instances with no bowel movement for 3-4 days. She doesn't feel complete when she goes. Often times having small deidre but never type 4 stools. Feels bloated when she skipped days. Used dulcolax and then produces a lot of stool. Will have multiple stools the next day and then will have formed stool for a while before going back to type 1 stools. One time she took miralax daily for 3 days in a row and by the third  "day she had loose stools. She takes the Linzess daily now and using metamucil daily. She is using powdered metamucil. 2 heaping teaspoons. Feels she could be better at drinking more water.      Interval history 11/2024:   Maile reports once a month she has a bad episode. She took 1/2 capful of miralax at night, as well as her linzess 45 mcg in AM, and grabbed coffee with a friend yesterday. Had a watery stool after 1 sip of coffee. After a few sips of coffee, had another trip to the bathroom with mostly gas. Then had to go again and felt she \"cleaned out.\" This also happened a month ago while shopping. No dulcolax around these times. Using powdered metamucil but doesn't always do the job, then resorts to a little miralax. 1 cap of miralax usually brings results. This does happen when she is out somewhere and has not had a bowel movement for a few days or has had very pebbly stools.     Most of the time she is having daily stools. Stools are soft formed but can be hard to pass. Sometimes she can tell stool is sitting there, but won't pass. She has tried suppositories in the past but has a hard time holding in place for 15 mins despite laying down. Last week was in [facility] and wasn't taking miralax and didn't have a BM all weekend. She did add in magnesium oxide without much difference so she stopped it.     Recently started Lexapro and for the first few days had diarrhea. This has since resolved. She states she felt much better when she had these stools because she felt cleaned out.     Interval history 2/2025:   She stopped Linzess about 1 month ago. Today, Maile reports she is not having explosive stools however stools are bristol type 1. A couple weeks ago she took senna once, which worked but stools were deidre. Most of the following week, she reports having a \"few deidre here and there.\" Then took dulcolax, which caused her to have multiple bowel movements that were again all deidre. She stopped Linzess " and miralax. She is taking 1 heaping tablespoon of metamucil. 2 days ago she added a little miralax to it at 1 tsp. Had gotten up to 2 T per day on the metamcul, still formed but stick together, bristol type 2.    I have reviewed and updated the patient's Past Medical History, Social History, Family History and Medication List.    Exam:    General appearance:  Healthy appearing adult, in no acute distress  Eyes:  Sclera anicteric  Ears, nose, mouth and throat:  No obvious external lesions of ears and nose.  Hearing intact  Neck:  Symmetric, No obvious external lesions  Respiratory:  Normal respiration, no use of accessory muscles   MSK:  No visual upper extremity, neck or facial muscle atrophy  Psychiatric:  Oriented to person, place and time, Appropriate mood and affect.   Neurologic:  Peripheral muscle function and dexterity appear to be intact      PERTINENT STUDIES have been reviewed.    ASSESSMENT/PLAN:    Aracelis Muñoz is a 74 year old female who presents for follow up of constipation     1. Chronic idiopathic constipation  2. Pelvic floor dysfunction     Aracelis Muñoz is a 74 year old female with hyperparathyroidism s/p parathyroidectomy in 9/2023 who presents for follow up of chronic constipation with intermittent diarrhea.      In 2023 she started Linzess which was effective initially. Over time it seemed to lose its effectiveness in regulating her stools even after trying multiple different doses and combinations. She has since discontinued Linzess about 1 month ago. She has supplemented with various doses of miralax and metamucil. Lately she is taking 1 heaping tablespoon of metamucil. With this alone, she has had bristol type 1 stools. As of 2 days ago she added 1 tsp of miralax.     She has completed pelvic floor PT and biofeedback therapy for pelvic floor dysfunction which she felt was helpful.      She has a family history of colon cancer in her father diagnosed in his 60s and her last  colonoscopy was in 2020 and was incomplete.  The ascending colon was not completely visualized.  I do recommend that she does another colonoscopy before age 75 through our GI team.  She has tolerated the prep fairly well in the past and she would be open to this but she would like to prioritize her knee replacement surgery first.    We reviewed the following plan:  - Continue 1 heaping tablespoon of metamucil plus 1 tsp of miralax daily for 2-3 months  - If needed in a month or two months, you can increase metamucil up closer to 2 T daily to help bring your stools closer to a bristol type 3-4  - Use 1 senna or Dulcolax if no bowel movement in 2 days (or if no decent sized bowel movement in 2 days)  - Follow up in 3 months   - Next steps could include Tr[employer] or [employer]      Video-Visit Details  Video Visit Time: 39 minutes  Type of service:  Video Visit  Originating Location (pt. Location): Home    Distant Location (provider location):  Off-site  Platform used for Video Visit: [employer]    53 minutes spent on the date of the encounter doing chart review, history and exam, documentation and further activities as noted above.    Evelin Iyer PA-C  Division of Gastroenterology, Hepatology, and Nutrition  [facility]     RTC 3 months

## 2025-02-24 NOTE — LETTER
2/24/2025      Aracelis Muñoz  [address]  [place] [address]      Dear Colleague,    Thank you for referring your patient, Aracelis Muñoz, to the [facility] [place]. Please see a copy of my visit note below.    Virtual Visit Details    Type of service:  Video Visit   Video Start Time: 1:01 PM  Video End Time: 1:40 PM    Originating Location (pt. Location): Home    Distant Location (provider location):  Off-site  Platform used for Video Visit: [facility]        GASTROENTEROLOGY Follow-up VIDEO VISIT    CC/REFERRING MD:    Shalini Phan    REASON FOR CONSULTATION:   Shalini Phan for   Chief Complaint   Patient presents with     RECHECK       HISTORY OF PRESENT ILLNESS:  Aracelis Muñoz is a 74 year old female who is being evaluated via a billable video visit for  follow up on chronic constipation.     Past medical history significant for hyperparathyroidism s/p parathyroidectomy. She was previously following with Jorge A Estrada PA-C.     She underwent parathyroidectomy which was successful in September 2023. After that, her constipation was initially much easier to manage for several months. She was taking metamucil fiber 1.5 teaspoons almost daily. This was also when she first started linzess 72 mcg every 2-3 days. She has a BM almost daily. Sometimes stools are smaller but not having pain. No straining. Bowel movements are formed and easier to pass after taking linzess. She is not having any explosive BM's.      She has pelvic floor dysfunction and has participated in PT and biofeedback therapy which helped.      Interval history 7/2024:   When she was on 72 mcg Linzess daily she felt she can't control her bowel movements. She is taking linzess once every 2-3 days. Occasionally will take it 2 days in a row if feeling very constipated. Urgency gets worse when she does so. Normally stools are formed and pretty soft. She  reports frequent trips to the bathroom. Since the end of May she has had more liquid mixed into the stool with some small pieces. Stools range from bristol type 1 to bristol type 3, with type 7 mixed in. On days that she takes linzess she will have 2 bowel movments per day. On a day without linzess, she will not have a bowel movement. On days that she takes linzess she feels emptied after a BM most of the time. Has clustered stools a lot of the time and doesn't always feel emptied.  When she first started on linzess, she was also on miralax, was getting a briostl type 4 stool multiple times per day. She no longer takes miralax because she feels it causes urgency. Now using metamucil gummies 2 per day. She has tried metamucil powder in the past. 1-2 tsp not more effective than the gummies, but reports this was a while ago. Before linzess she would go 2 weeks without a bowel movement. Abdominal pain was occurring before Linzess when more constipated and using laxatives. With linzess the pain has been gone.      No nocturnal Bms. In the last 6 weeks she will have a couple small bm in the night but this is not typical. No blood or black. No recent nausea or vomiting. Did have nausea and vomiting previously before linzess with constipation.     Pelvic floor therapy was helpful. She completed this. She has been constipated her whole life.     - Family history: She remembers her mother always took metamucil powder. Paternal cousin with celiac. No known IBD. Dad had colon cancer diagnosed early 60s, passed 65.     - She had an incomplete colonoscopy in 2020 (ascending colon not adequately visualized).      Interval history 9/2024:  Maile reports that her bowel movements are going pretty well, but could be better. She is having regular bowel movements 95% of the time. She has had a couple instances with no bowel movement for 3-4 days. She doesn't feel complete when she goes. Often times having small deidre but never type 4  "stools. Feels bloated when she skipped days. Used dulcolax and then produces a lot of stool. Will have multiple stools the next day and then will have formed stool for a while before going back to type 1 stools. One time she took miralax daily for 3 days in a row and by the third day she had loose stools. She takes the Linzess daily now and using metamucil daily. She is using powdered metamucil. 2 heaping teaspoons. Feels she could be better at drinking more water.      Interval history 11/2024:   Maile reports once a month she has a bad episode. She took 1/2 capful of miralax at night, as well as her linzess 45 mcg in AM, and grabbed coffee with a friend yesterday. Had a watery stool after 1 sip of coffee. After a few sips of coffee, had another trip to the bathroom with mostly gas. Then had to go again and felt she \"cleaned out.\" This also happened a month ago while shopping. No dulcolax around these times. Using powdered metamucil but doesn't always do the job, then resorts to a little miralax. 1 cap of miralax usually brings results. This does happen when she is out somewhere and has not had a bowel movement for a few days or has had very pebbly stools.     Most of the time she is having daily stools. Stools are soft formed but can be hard to pass. Sometimes she can tell stool is sitting there, but won't pass. She has tried suppositories in the past but has a hard time holding in place for 15 mins despite laying down. Last week was in [facility] and wasn't taking miralax and didn't have a BM all weekend. She did add in magnesium oxide without much difference so she stopped it.     Recently started Lexapro and for the first few days had diarrhea. This has since resolved. She states she felt much better when she had these stools because she felt cleaned out.     Interval history 2/2025:   She stopped Linzess about 1 month ago. Today, Maile reports she is not having explosive stools however stools are bristol type " "1. A couple weeks ago she took senna once, which worked but stools were deidre. Most of the following week, she reports having a \"few deidre here and there.\" Then took dulcolax, which caused her to have multiple bowel movements that were again all deidre. She stopped Linzess and miralax. She is taking 1 heaping tablespoon of metamucil. 2 days ago she added a little miralax to it at 1 tsp. Had gotten up to 2 T per day on the metamcul, still formed but stick together, bristol type 2.    I have reviewed and updated the patient's Past Medical History, Social History, Family History and Medication List.    Exam:    General appearance:  Healthy appearing adult, in no acute distress  Eyes:  Sclera anicteric  Ears, nose, mouth and throat:  No obvious external lesions of ears and nose.  Hearing intact  Neck:  Symmetric, No obvious external lesions  Respiratory:  Normal respiration, no use of accessory muscles   MSK:  No visual upper extremity, neck or facial muscle atrophy  Psychiatric:  Oriented to person, place and time, Appropriate mood and affect.   Neurologic:  Peripheral muscle function and dexterity appear to be intact      PERTINENT STUDIES have been reviewed.    ASSESSMENT/PLAN:    Aracelis Muñoz is a 74 year old female who presents for follow up of constipation     1. Chronic idiopathic constipation  2. Pelvic floor dysfunction     Aracelis Muñoz is a 74 year old female with hyperparathyroidism s/p parathyroidectomy in 9/2023 who presents for follow up of chronic constipation with intermittent diarrhea.      In 2023 she started Linzess which was effective initially. Over time it seemed to lose its effectiveness in regulating her stools even after trying multiple different doses and combinations. She has since discontinued Linzess about 1 month ago. She has supplemented with various doses of miralax and metamucil. Lately she is taking 1 heaping tablespoon of metamucil. With this alone, she has had bristol " type 1 stools. As of 2 days ago she added 1 tsp of miralax.     She has completed pelvic floor PT and biofeedback therapy for pelvic floor dysfunction which she felt was helpful.      She has a family history of colon cancer in her father diagnosed in his 60s and her last colonoscopy was in 2020 and was incomplete.  The ascending colon was not completely visualized.  I do recommend that she does another colonoscopy before age 75 through our GI team.  She has tolerated the prep fairly well in the past and she would be open to this but she would like to prioritize her knee replacement surgery first.    We reviewed the following plan:  - Continue 1 heaping tablespoon of metamucil plus 1 tsp of miralax daily for 2-3 months  - If needed in a month or two months, you can increase metamucil up closer to 2 T daily to help bring your stools closer to a bristol type 3-4  - Use 1 senna or Dulcolax if no bowel movement in 2 days (or if no decent sized bowel movement in 2 days)  - Follow up in 3 months   - Next steps could include Trulance or Motegrity      Video-Visit Details  Video Visit Time: 39 minutes  Type of service:  Video Visit  Originating Location (pt. Location): Home    Distant Location (provider location):  Off-site  Platform used for Video Visit: [employer]    53 minutes spent on the date of the encounter doing chart review, history and exam, documentation and further activities as noted above.    Evelin Iyer PA-C  Division of Gastroenterology, Hepatology, and Nutrition  [facility]     RTC 3 months      Again, thank you for allowing me to participate in the care of your patient.        Sincerely,        Evelin Iyer PA-C    Electronically signed

## 2025-02-24 NOTE — NURSING NOTE
Current patient location: [address] [place] [address] [address] [place] [address]    Is the patient currently in the state of [place]? YES    Visit mode: VIDEO    If the visit is dropped, the patient can be reconnected by:VIDEO VISIT: Send to e-mail at: [email or web address]    Will anyone else be joining the visit? NO  (If patient encounters technical issues they should call [phone]    Are changes needed to the allergy or medication list? No    Are refills needed on medications prescribed by this physician? NO    Rooming Documentation:  Questionnaire(s) completed    Reason for visit: RECHECK    Roby VIDES

## 2025-02-24 NOTE — PATIENT INSTRUCTIONS
It was a pleasure meeting with you today and discussing your healthcare plan. Below is a summary of what we covered:    - Continue 1 heaping tablespoon of metamucil plus 1 tsp of miralax daily for 2-3 months  - If needed in a month or two months, you can increase metamucil up closer to 2 T daily to help bring your stools closer to a bristol type 3-4  - Use 1 senna or Dulcolax if no bowel movement in 2 days (or if no decent sized bowel movement in 2 days)  - Follow up in 3 months   - Next steps could include Trulance or Motegrity    Please see below for any additional questions and scheduling guidelines.    Sign up for [facility]: [employer] patient portal serves as a secure platform for accessing your medical records from the [facility]. Additionally, [employer] facilitates easy, timely, and secure messaging with your care team. If you have not signed up, you may do so by using the provided code or calling [phone].    Coordinating your care after your visit:  There are multiple options for scheduling your follow-up care based on your provider's recommendation.    How do I schedule a follow-up clinic appointment:   After your appointment, you may receive scheduling assistance with the Clinic Coordinators by having a seat in the waiting room and a Clinic Coordinator will call you up to schedule.  Virtual visits or after you leave the clinic:  Your provider has placed a follow-up order in the [employer] portal for scheduling your return appointment. A member of the scheduling team will contact you to schedule.  [facility] Scheduling: Timely scheduling through [employer] is advised to ensure appointment availability.   Call to schedule: You may schedule your follow-up appointment(s) by calling [phone], option 1.    How do I schedule my endoscopy or colonoscopy procedure:  If a procedure, such as a colonoscopy or upper endoscopy was ordered by your provider, the scheduling team will contact you to schedule this  procedure. Or you may choose to call to schedule at   [phone], option 2.  Please allow 20-30 minutes when scheduling a procedure.    How do I get my blood work done? To get your blood work done, you need to schedule a lab appointment at an [facility]. There are multiple ways to schedule:   At the clinic: The Clinic Coordinator you meet after your visit can help you schedule a lab appointment.   [facility] scheduling: [facility] offers online lab scheduling at all [facility] laboratory locations.   Call to schedule: You can call [phone] to schedule your lab appointment.    How do I schedule my imaging study: To schedule imaging studies, such as CT scans, ultrasounds, MRIs, or X-rays, contact Imaging Services at [phone].    How do I schedule a referral to another doctor: If your provider recommended a referral to another specialist(s), the referral order was placed by your provider. You will receive a phone call to schedule this referral, or you may choose to call the number attached to the referral to self-schedule.    For Post-Visit Question(s):  For any inquiries following today's visit:  Please utilize [employer] messaging and allow 48 hours for reply or contact the Call Center during normal business hours at [phone], option 3.  For Emergent After-hours questions, contact the On-Call GI Fellow through the [facility]  at [phone].  In addition, you may contact your Nurse directly using the provided contact information.    Test Results:  Test results will be accessible via [employer] in compliance with the 21st Century Cures Act. This means that your results will be available to you at the same time as your provider. Often you may see your results before your provider does. Results are reviewed by staff within two weeks with communication follow-up. Results may be released in the patient portal prior to your care team review.    Prescription Refill(s):   Medication prescribed by your provider will be addressed during your visit. For future refills, please coordinate with your pharmacy. If you have not had a recent clinic visit or routine labs, for your safety, your provider may not be able to refill your prescription.

## 2025-02-26 ENCOUNTER — TELEPHONE (OUTPATIENT)
Dept: GASTROENTEROLOGY | Facility: CLINIC | Age: 75
End: 2025-02-26
Payer: COMMERCIAL

## 2025-02-26 NOTE — TELEPHONE ENCOUNTER
Patient Contacted  and scheduled the following:    Appointment type: Return  Provider: AMANUEL Jara  Return date: 6/26  Specialty phone number: [phone]  Additional appointment(s) needed:   Additonal Notes:     2/26 AA

## 2025-03-22 ENCOUNTER — HEALTH MAINTENANCE LETTER (OUTPATIENT)
Age: 75
End: 2025-03-22

## 2025-04-12 ENCOUNTER — HEALTH MAINTENANCE LETTER (OUTPATIENT)
Age: 75
End: 2025-04-12